# Patient Record
Sex: FEMALE | Race: BLACK OR AFRICAN AMERICAN | HISPANIC OR LATINO | Employment: OTHER | URBAN - METROPOLITAN AREA
[De-identification: names, ages, dates, MRNs, and addresses within clinical notes are randomized per-mention and may not be internally consistent; named-entity substitution may affect disease eponyms.]

---

## 2017-07-13 ENCOUNTER — HOSPITAL ENCOUNTER (INPATIENT)
Facility: HOSPITAL | Age: 72
LOS: 6 days | Discharge: NURSING, REHAB OR LONG-TERM CARE | DRG: 291 | End: 2017-07-20
Attending: EMERGENCY MEDICINE | Admitting: INTERNAL MEDICINE
Payer: MEDICARE

## 2017-07-13 DIAGNOSIS — I50.9 ACUTE CHF (HCC): ICD-10-CM

## 2017-07-13 DIAGNOSIS — R53.1 WEAKNESS: ICD-10-CM

## 2017-07-13 DIAGNOSIS — R79.1 SUPRATHERAPEUTIC INR: Primary | ICD-10-CM

## 2017-07-13 DIAGNOSIS — R06.00 DOE (DYSPNEA ON EXERTION): ICD-10-CM

## 2017-07-13 DIAGNOSIS — N39.0 URINARY TRACT INFECTION, ACUTE: ICD-10-CM

## 2017-07-13 DIAGNOSIS — R17 ELEVATED BILIRUBIN: ICD-10-CM

## 2017-07-14 ENCOUNTER — APPOINTMENT (INPATIENT)
Dept: ULTRASOUND IMAGING | Facility: HOSPITAL | Age: 72
DRG: 291 | End: 2017-07-14
Payer: MEDICARE

## 2017-07-14 ENCOUNTER — APPOINTMENT (EMERGENCY)
Dept: CT IMAGING | Facility: HOSPITAL | Age: 72
DRG: 291 | End: 2017-07-14
Payer: MEDICARE

## 2017-07-14 ENCOUNTER — GENERIC CONVERSION - ENCOUNTER (OUTPATIENT)
Dept: OTHER | Facility: OTHER | Age: 72
End: 2017-07-14

## 2017-07-14 ENCOUNTER — APPOINTMENT (INPATIENT)
Dept: NON INVASIVE DIAGNOSTICS | Facility: HOSPITAL | Age: 72
DRG: 291 | End: 2017-07-14
Payer: MEDICARE

## 2017-07-14 ENCOUNTER — APPOINTMENT (EMERGENCY)
Dept: RADIOLOGY | Facility: HOSPITAL | Age: 72
DRG: 291 | End: 2017-07-14
Payer: MEDICARE

## 2017-07-14 PROBLEM — Z86.73 HISTORY OF CVA (CEREBROVASCULAR ACCIDENT): Status: ACTIVE | Noted: 2017-07-14

## 2017-07-14 PROBLEM — R53.1 WEAKNESS: Status: ACTIVE | Noted: 2017-07-14

## 2017-07-14 PROBLEM — N17.9 AKI (ACUTE KIDNEY INJURY) (HCC): Status: ACTIVE | Noted: 2017-07-14

## 2017-07-14 PROBLEM — D72.829 LEUKOCYTOSIS: Status: ACTIVE | Noted: 2017-07-14

## 2017-07-14 PROBLEM — E11.9 DIABETES MELLITUS (HCC): Status: ACTIVE | Noted: 2017-07-14

## 2017-07-14 PROBLEM — R17 ELEVATED BILIRUBIN: Status: ACTIVE | Noted: 2017-07-14

## 2017-07-14 PROBLEM — D64.9 NORMOCYTIC ANEMIA: Status: ACTIVE | Noted: 2017-07-14

## 2017-07-14 PROBLEM — I50.9 CHF, ACUTE (HCC): Status: ACTIVE | Noted: 2017-07-14

## 2017-07-14 PROBLEM — I10 ESSENTIAL HYPERTENSION: Status: ACTIVE | Noted: 2017-07-14

## 2017-07-14 PROBLEM — M54.50 RIGHT LOW BACK PAIN: Status: ACTIVE | Noted: 2017-07-14

## 2017-07-14 PROBLEM — K59.00 CONSTIPATION: Status: ACTIVE | Noted: 2017-07-14

## 2017-07-14 PROBLEM — R79.1 SUPRATHERAPEUTIC INR: Status: ACTIVE | Noted: 2017-07-14

## 2017-07-14 LAB
ALBUMIN SERPL BCP-MCNC: 2 G/DL (ref 3.5–5)
ALBUMIN SERPL BCP-MCNC: 2.3 G/DL (ref 3.5–5)
ALP SERPL-CCNC: 292 U/L (ref 46–116)
ALP SERPL-CCNC: 301 U/L (ref 46–116)
ALT SERPL W P-5'-P-CCNC: 17 U/L (ref 12–78)
ALT SERPL W P-5'-P-CCNC: 19 U/L (ref 12–78)
ANION GAP SERPL CALCULATED.3IONS-SCNC: 12 MMOL/L (ref 4–13)
ANION GAP SERPL CALCULATED.3IONS-SCNC: 14 MMOL/L (ref 4–13)
APTT PPP: 100 SECONDS (ref 23–35)
APTT PPP: 90 SECONDS (ref 23–35)
AST SERPL W P-5'-P-CCNC: 41 U/L (ref 5–45)
AST SERPL W P-5'-P-CCNC: 52 U/L (ref 5–45)
BASOPHILS # BLD AUTO: 0.02 THOUSANDS/ΜL (ref 0–0.1)
BASOPHILS NFR BLD AUTO: 0 % (ref 0–1)
BILIRUB DIRECT SERPL-MCNC: 2.46 MG/DL (ref 0–0.2)
BILIRUB SERPL-MCNC: 3.1 MG/DL (ref 0.2–1)
BILIRUB SERPL-MCNC: 3.1 MG/DL (ref 0.2–1)
BUN SERPL-MCNC: 35 MG/DL (ref 5–25)
BUN SERPL-MCNC: 38 MG/DL (ref 5–25)
CALCIUM SERPL-MCNC: 8.2 MG/DL (ref 8.3–10.1)
CALCIUM SERPL-MCNC: 8.9 MG/DL (ref 8.3–10.1)
CHLORIDE SERPL-SCNC: 104 MMOL/L (ref 100–108)
CHLORIDE SERPL-SCNC: 106 MMOL/L (ref 100–108)
CHOLEST SERPL-MCNC: <50 MG/DL (ref 50–200)
CLARITY, POC: CLEAR
CO2 SERPL-SCNC: 20 MMOL/L (ref 21–32)
CO2 SERPL-SCNC: 21 MMOL/L (ref 21–32)
COLOR, POC: YELLOW
CREAT SERPL-MCNC: 1.27 MG/DL (ref 0.6–1.3)
CREAT SERPL-MCNC: 1.45 MG/DL (ref 0.6–1.3)
EOSINOPHIL # BLD AUTO: 0.06 THOUSAND/ΜL (ref 0–0.61)
EOSINOPHIL NFR BLD AUTO: 1 % (ref 0–6)
ERYTHROCYTE [DISTWIDTH] IN BLOOD BY AUTOMATED COUNT: 15.8 % (ref 11.6–15.1)
EST. AVERAGE GLUCOSE BLD GHB EST-MCNC: 146 MG/DL
EXT BILIRUBIN, UA: NEGATIVE
EXT BLOOD URINE: NORMAL
EXT GLUCOSE, UA: NEGATIVE
EXT KETONES: NEGATIVE
EXT NITRITE, UA: POSITIVE
EXT PH, UA: 5
EXT PROTEIN, UA: NEGATIVE
EXT SPECIFIC GRAVITY, UA: 1.01
EXT UROBILINOGEN: 0.2
GFR SERPL CREATININE-BSD FRML MDRD: 35.7 ML/MIN/1.73SQ M
GFR SERPL CREATININE-BSD FRML MDRD: 41.6 ML/MIN/1.73SQ M
GLUCOSE SERPL-MCNC: 110 MG/DL (ref 65–140)
GLUCOSE SERPL-MCNC: 114 MG/DL (ref 65–140)
GLUCOSE SERPL-MCNC: 80 MG/DL (ref 65–140)
GLUCOSE SERPL-MCNC: 87 MG/DL (ref 65–140)
GLUCOSE SERPL-MCNC: 88 MG/DL (ref 65–140)
GLUCOSE SERPL-MCNC: 97 MG/DL (ref 65–140)
HBA1C MFR BLD: 6.7 % (ref 4.2–6.3)
HCT VFR BLD AUTO: 33.5 % (ref 34.8–46.1)
HDLC SERPL-MCNC: 9 MG/DL (ref 40–60)
HGB BLD-MCNC: 11.1 G/DL (ref 11.5–15.4)
INR PPP: 10.52 (ref 0.86–1.16)
INR PPP: 6.21 (ref 0.86–1.16)
LYMPHOCYTES # BLD AUTO: 0.47 THOUSANDS/ΜL (ref 0.6–4.47)
LYMPHOCYTES NFR BLD AUTO: 4 % (ref 14–44)
MCH RBC QN AUTO: 29.5 PG (ref 26.8–34.3)
MCHC RBC AUTO-ENTMCNC: 33.1 G/DL (ref 31.4–37.4)
MCV RBC AUTO: 89 FL (ref 82–98)
MONOCYTES # BLD AUTO: 1.04 THOUSAND/ΜL (ref 0.17–1.22)
MONOCYTES NFR BLD AUTO: 10 % (ref 4–12)
NEUTROPHILS # BLD AUTO: 8.97 THOUSANDS/ΜL (ref 1.85–7.62)
NEUTS SEG NFR BLD AUTO: 84 % (ref 43–75)
NRBC BLD AUTO-RTO: 0 /100 WBCS
NT-PROBNP SERPL-MCNC: ABNORMAL PG/ML
PLATELET # BLD AUTO: 230 THOUSANDS/UL (ref 149–390)
PMV BLD AUTO: 10.4 FL (ref 8.9–12.7)
POTASSIUM SERPL-SCNC: 3.5 MMOL/L (ref 3.5–5.3)
POTASSIUM SERPL-SCNC: 3.9 MMOL/L (ref 3.5–5.3)
PROT SERPL-MCNC: 7.8 G/DL (ref 6.4–8.2)
PROT SERPL-MCNC: 8.2 G/DL (ref 6.4–8.2)
PROTHROMBIN TIME: 57.1 SECONDS (ref 12.1–14.4)
PROTHROMBIN TIME: 86.5 SECONDS (ref 12.1–14.4)
RBC # BLD AUTO: 3.76 MILLION/UL (ref 3.81–5.12)
SODIUM SERPL-SCNC: 138 MMOL/L (ref 136–145)
SODIUM SERPL-SCNC: 139 MMOL/L (ref 136–145)
TRIGL SERPL-MCNC: 57 MG/DL
TROPONIN I SERPL-MCNC: <0.02 NG/ML
WBC # BLD AUTO: 10.64 THOUSAND/UL (ref 4.31–10.16)
WBC # BLD EST: NEGATIVE 10*3/UL

## 2017-07-14 PROCEDURE — 81002 URINALYSIS NONAUTO W/O SCOPE: CPT | Performed by: EMERGENCY MEDICINE

## 2017-07-14 PROCEDURE — 96374 THER/PROPH/DIAG INJ IV PUSH: CPT

## 2017-07-14 PROCEDURE — 76705 ECHO EXAM OF ABDOMEN: CPT

## 2017-07-14 PROCEDURE — 96361 HYDRATE IV INFUSION ADD-ON: CPT

## 2017-07-14 PROCEDURE — 71010 HB CHEST X-RAY 1 VIEW FRONTAL (PORTABLE): CPT

## 2017-07-14 PROCEDURE — 99285 EMERGENCY DEPT VISIT HI MDM: CPT

## 2017-07-14 PROCEDURE — 80053 COMPREHEN METABOLIC PANEL: CPT | Performed by: PHYSICIAN ASSISTANT

## 2017-07-14 PROCEDURE — 93005 ELECTROCARDIOGRAM TRACING: CPT | Performed by: EMERGENCY MEDICINE

## 2017-07-14 PROCEDURE — 80053 COMPREHEN METABOLIC PANEL: CPT | Performed by: EMERGENCY MEDICINE

## 2017-07-14 PROCEDURE — 85730 THROMBOPLASTIN TIME PARTIAL: CPT | Performed by: PHYSICIAN ASSISTANT

## 2017-07-14 PROCEDURE — 80061 LIPID PANEL: CPT | Performed by: PHYSICIAN ASSISTANT

## 2017-07-14 PROCEDURE — 82948 REAGENT STRIP/BLOOD GLUCOSE: CPT

## 2017-07-14 PROCEDURE — 74176 CT ABD & PELVIS W/O CONTRAST: CPT

## 2017-07-14 PROCEDURE — 93306 TTE W/DOPPLER COMPLETE: CPT

## 2017-07-14 PROCEDURE — 83880 ASSAY OF NATRIURETIC PEPTIDE: CPT | Performed by: EMERGENCY MEDICINE

## 2017-07-14 PROCEDURE — 84484 ASSAY OF TROPONIN QUANT: CPT | Performed by: EMERGENCY MEDICINE

## 2017-07-14 PROCEDURE — 83036 HEMOGLOBIN GLYCOSYLATED A1C: CPT | Performed by: PHYSICIAN ASSISTANT

## 2017-07-14 PROCEDURE — 82248 BILIRUBIN DIRECT: CPT | Performed by: PHYSICIAN ASSISTANT

## 2017-07-14 PROCEDURE — 36415 COLL VENOUS BLD VENIPUNCTURE: CPT | Performed by: EMERGENCY MEDICINE

## 2017-07-14 PROCEDURE — 85730 THROMBOPLASTIN TIME PARTIAL: CPT | Performed by: EMERGENCY MEDICINE

## 2017-07-14 PROCEDURE — 85610 PROTHROMBIN TIME: CPT | Performed by: EMERGENCY MEDICINE

## 2017-07-14 PROCEDURE — 85610 PROTHROMBIN TIME: CPT | Performed by: PHYSICIAN ASSISTANT

## 2017-07-14 PROCEDURE — 84484 ASSAY OF TROPONIN QUANT: CPT | Performed by: PHYSICIAN ASSISTANT

## 2017-07-14 PROCEDURE — 85025 COMPLETE CBC W/AUTO DIFF WBC: CPT | Performed by: EMERGENCY MEDICINE

## 2017-07-14 RX ORDER — WARFARIN SODIUM 5 MG/1
5 TABLET ORAL
COMMUNITY
End: 2017-07-20 | Stop reason: HOSPADM

## 2017-07-14 RX ORDER — FUROSEMIDE 40 MG/1
40 TABLET ORAL 2 TIMES DAILY
Status: ON HOLD | COMMUNITY
End: 2017-12-29

## 2017-07-14 RX ORDER — ASCORBIC ACID 500 MG
250 TABLET ORAL DAILY
Status: DISCONTINUED | OUTPATIENT
Start: 2017-07-14 | End: 2017-07-20 | Stop reason: HOSPADM

## 2017-07-14 RX ORDER — MULTIVIT WITH MINERALS/LUTEIN
250 TABLET ORAL DAILY
COMMUNITY
End: 2021-02-14 | Stop reason: HOSPADM

## 2017-07-14 RX ORDER — ATORVASTATIN CALCIUM 40 MG/1
40 TABLET, FILM COATED ORAL DAILY
COMMUNITY

## 2017-07-14 RX ORDER — ACETAMINOPHEN 325 MG/1
325 TABLET ORAL EVERY 6 HOURS PRN
Status: DISCONTINUED | OUTPATIENT
Start: 2017-07-14 | End: 2017-07-20 | Stop reason: HOSPADM

## 2017-07-14 RX ORDER — CALCIUM POLYCARBOPHIL 625 MG
500 TABLET ORAL
Status: ON HOLD | COMMUNITY
End: 2017-12-29

## 2017-07-14 RX ORDER — METOPROLOL TARTRATE 5 MG/5ML
5 INJECTION INTRAVENOUS EVERY 6 HOURS PRN
Status: DISCONTINUED | OUTPATIENT
Start: 2017-07-14 | End: 2017-07-20 | Stop reason: HOSPADM

## 2017-07-14 RX ORDER — CHLORAL HYDRATE 500 MG
1000 CAPSULE ORAL 2 TIMES DAILY
COMMUNITY

## 2017-07-14 RX ORDER — LEVOTHYROXINE SODIUM 0.05 MG/1
50 TABLET ORAL DAILY
COMMUNITY

## 2017-07-14 RX ORDER — FERROUS SULFATE 325(65) MG
325 TABLET ORAL
Status: DISCONTINUED | OUTPATIENT
Start: 2017-07-14 | End: 2017-07-20 | Stop reason: HOSPADM

## 2017-07-14 RX ORDER — MULTIVITAMIN
1 TABLET ORAL DAILY
COMMUNITY

## 2017-07-14 RX ORDER — OXYBUTYNIN CHLORIDE 5 MG/1
10 TABLET, EXTENDED RELEASE ORAL
Status: DISCONTINUED | OUTPATIENT
Start: 2017-07-14 | End: 2017-07-20 | Stop reason: HOSPADM

## 2017-07-14 RX ORDER — FUROSEMIDE 10 MG/ML
40 INJECTION INTRAMUSCULAR; INTRAVENOUS 2 TIMES DAILY
Status: DISCONTINUED | OUTPATIENT
Start: 2017-07-14 | End: 2017-07-15

## 2017-07-14 RX ORDER — FUROSEMIDE 10 MG/ML
40 INJECTION INTRAMUSCULAR; INTRAVENOUS ONCE
Status: COMPLETED | OUTPATIENT
Start: 2017-07-14 | End: 2017-07-14

## 2017-07-14 RX ORDER — CALCIUM CARBONATE 200(500)MG
1000 TABLET,CHEWABLE ORAL DAILY PRN
Status: DISCONTINUED | OUTPATIENT
Start: 2017-07-14 | End: 2017-07-20 | Stop reason: HOSPADM

## 2017-07-14 RX ORDER — ONDANSETRON 2 MG/ML
4 INJECTION INTRAMUSCULAR; INTRAVENOUS EVERY 6 HOURS PRN
Status: DISCONTINUED | OUTPATIENT
Start: 2017-07-14 | End: 2017-07-20 | Stop reason: HOSPADM

## 2017-07-14 RX ORDER — FERROUS SULFATE 325(65) MG
325 TABLET ORAL
COMMUNITY
End: 2021-02-10

## 2017-07-14 RX ORDER — OXYBUTYNIN CHLORIDE 10 MG/1
10 TABLET, EXTENDED RELEASE ORAL
COMMUNITY
End: 2017-12-29 | Stop reason: HOSPADM

## 2017-07-14 RX ORDER — LEVOTHYROXINE SODIUM 0.05 MG/1
50 TABLET ORAL
Status: DISCONTINUED | OUTPATIENT
Start: 2017-07-14 | End: 2017-07-20 | Stop reason: HOSPADM

## 2017-07-14 RX ORDER — ATORVASTATIN CALCIUM 40 MG/1
40 TABLET, FILM COATED ORAL
Status: DISCONTINUED | OUTPATIENT
Start: 2017-07-14 | End: 2017-07-16

## 2017-07-14 RX ORDER — PHYTONADIONE 10 MG/ML
INJECTION, EMULSION INTRAMUSCULAR; INTRAVENOUS; SUBCUTANEOUS
Status: COMPLETED
Start: 2017-07-14 | End: 2017-07-14

## 2017-07-14 RX ADMIN — CEFTRIAXONE 1000 MG: 1 INJECTION, POWDER, FOR SOLUTION INTRAMUSCULAR; INTRAVENOUS at 09:02

## 2017-07-14 RX ADMIN — ATORVASTATIN CALCIUM 40 MG: 40 TABLET, FILM COATED ORAL at 17:18

## 2017-07-14 RX ADMIN — OXYBUTYNIN CHLORIDE 10 MG: 5 TABLET, EXTENDED RELEASE ORAL at 21:02

## 2017-07-14 RX ADMIN — FUROSEMIDE 40 MG: 10 INJECTION, SOLUTION INTRAMUSCULAR; INTRAVENOUS at 03:35

## 2017-07-14 RX ADMIN — PHYTONADIONE 10 MG: 10 INJECTION, EMULSION INTRAMUSCULAR; INTRAVENOUS; SUBCUTANEOUS at 03:15

## 2017-07-14 RX ADMIN — LEVOTHYROXINE SODIUM 50 MCG: 50 TABLET ORAL at 06:17

## 2017-07-14 RX ADMIN — OXYCODONE HYDROCHLORIDE AND ACETAMINOPHEN 250 MG: 500 TABLET ORAL at 09:04

## 2017-07-14 RX ADMIN — Medication 325 MG: at 09:03

## 2017-07-14 RX ADMIN — FUROSEMIDE 40 MG: 10 INJECTION, SOLUTION INTRAMUSCULAR; INTRAVENOUS at 09:03

## 2017-07-14 RX ADMIN — FUROSEMIDE 40 MG: 10 INJECTION, SOLUTION INTRAMUSCULAR; INTRAVENOUS at 17:18

## 2017-07-14 RX ADMIN — SERTRALINE HYDROCHLORIDE 50 MG: 50 TABLET ORAL at 09:03

## 2017-07-14 RX ADMIN — METOPROLOL TARTRATE 25 MG: 25 TABLET ORAL at 21:02

## 2017-07-14 RX ADMIN — METOPROLOL TARTRATE 25 MG: 25 TABLET ORAL at 09:03

## 2017-07-14 RX ADMIN — SODIUM CHLORIDE 1000 ML: 0.9 INJECTION, SOLUTION INTRAVENOUS at 01:00

## 2017-07-15 LAB
ALBUMIN SERPL BCP-MCNC: 1.9 G/DL (ref 3.5–5)
ALP SERPL-CCNC: 297 U/L (ref 46–116)
ALT SERPL W P-5'-P-CCNC: 13 U/L (ref 12–78)
ANION GAP SERPL CALCULATED.3IONS-SCNC: 13 MMOL/L (ref 4–13)
AST SERPL W P-5'-P-CCNC: 34 U/L (ref 5–45)
BILIRUB DIRECT SERPL-MCNC: 2.56 MG/DL (ref 0–0.2)
BILIRUB SERPL-MCNC: 3.3 MG/DL (ref 0.2–1)
BUN SERPL-MCNC: 35 MG/DL (ref 5–25)
CALCIUM SERPL-MCNC: 8.1 MG/DL (ref 8.3–10.1)
CHLORIDE SERPL-SCNC: 106 MMOL/L (ref 100–108)
CO2 SERPL-SCNC: 20 MMOL/L (ref 21–32)
CREAT SERPL-MCNC: 1.21 MG/DL (ref 0.6–1.3)
ERYTHROCYTE [DISTWIDTH] IN BLOOD BY AUTOMATED COUNT: 15.6 % (ref 11.6–15.1)
FERRITIN SERPL-MCNC: 930 NG/ML (ref 8–388)
GFR SERPL CREATININE-BSD FRML MDRD: 44 ML/MIN/1.73SQ M
GLUCOSE SERPL-MCNC: 114 MG/DL (ref 65–140)
GLUCOSE SERPL-MCNC: 119 MG/DL (ref 65–140)
GLUCOSE SERPL-MCNC: 122 MG/DL (ref 65–140)
GLUCOSE SERPL-MCNC: 135 MG/DL (ref 65–140)
GLUCOSE SERPL-MCNC: 166 MG/DL (ref 65–140)
HCT VFR BLD AUTO: 30.8 % (ref 34.8–46.1)
HGB BLD-MCNC: 10.4 G/DL (ref 11.5–15.4)
INR PPP: 2.1 (ref 0.86–1.16)
IRON SATN MFR SERPL: 13 %
IRON SERPL-MCNC: 26 UG/DL (ref 50–170)
MCH RBC QN AUTO: 29.5 PG (ref 26.8–34.3)
MCHC RBC AUTO-ENTMCNC: 33.8 G/DL (ref 31.4–37.4)
MCV RBC AUTO: 87 FL (ref 82–98)
PLATELET # BLD AUTO: 229 THOUSANDS/UL (ref 149–390)
PMV BLD AUTO: 10.6 FL (ref 8.9–12.7)
POTASSIUM SERPL-SCNC: 2.9 MMOL/L (ref 3.5–5.3)
PROT SERPL-MCNC: 7.6 G/DL (ref 6.4–8.2)
PROTHROMBIN TIME: 24.1 SECONDS (ref 12.1–14.4)
RBC # BLD AUTO: 3.53 MILLION/UL (ref 3.81–5.12)
SODIUM SERPL-SCNC: 139 MMOL/L (ref 136–145)
TIBC SERPL-MCNC: 193 UG/DL (ref 250–450)
WBC # BLD AUTO: 9.87 THOUSAND/UL (ref 4.31–10.16)

## 2017-07-15 PROCEDURE — 86803 HEPATITIS C AB TEST: CPT | Performed by: PHYSICIAN ASSISTANT

## 2017-07-15 PROCEDURE — G8978 MOBILITY CURRENT STATUS: HCPCS

## 2017-07-15 PROCEDURE — 97163 PT EVAL HIGH COMPLEX 45 MIN: CPT

## 2017-07-15 PROCEDURE — 82948 REAGENT STRIP/BLOOD GLUCOSE: CPT

## 2017-07-15 PROCEDURE — 86235 NUCLEAR ANTIGEN ANTIBODY: CPT | Performed by: PHYSICIAN ASSISTANT

## 2017-07-15 PROCEDURE — 83550 IRON BINDING TEST: CPT | Performed by: PHYSICIAN ASSISTANT

## 2017-07-15 PROCEDURE — 83540 ASSAY OF IRON: CPT | Performed by: PHYSICIAN ASSISTANT

## 2017-07-15 PROCEDURE — 86039 ANTINUCLEAR ANTIBODIES (ANA): CPT | Performed by: PHYSICIAN ASSISTANT

## 2017-07-15 PROCEDURE — 82728 ASSAY OF FERRITIN: CPT | Performed by: PHYSICIAN ASSISTANT

## 2017-07-15 PROCEDURE — 83516 IMMUNOASSAY NONANTIBODY: CPT | Performed by: PHYSICIAN ASSISTANT

## 2017-07-15 PROCEDURE — 80076 HEPATIC FUNCTION PANEL: CPT | Performed by: PHYSICIAN ASSISTANT

## 2017-07-15 PROCEDURE — 86705 HEP B CORE ANTIBODY IGM: CPT | Performed by: PHYSICIAN ASSISTANT

## 2017-07-15 PROCEDURE — 82784 ASSAY IGA/IGD/IGG/IGM EACH: CPT | Performed by: PHYSICIAN ASSISTANT

## 2017-07-15 PROCEDURE — 85610 PROTHROMBIN TIME: CPT | Performed by: INTERNAL MEDICINE

## 2017-07-15 PROCEDURE — 86038 ANTINUCLEAR ANTIBODIES: CPT | Performed by: PHYSICIAN ASSISTANT

## 2017-07-15 PROCEDURE — 86255 FLUORESCENT ANTIBODY SCREEN: CPT | Performed by: PHYSICIAN ASSISTANT

## 2017-07-15 PROCEDURE — 80048 BASIC METABOLIC PNL TOTAL CA: CPT | Performed by: INTERNAL MEDICINE

## 2017-07-15 PROCEDURE — 86704 HEP B CORE ANTIBODY TOTAL: CPT | Performed by: PHYSICIAN ASSISTANT

## 2017-07-15 PROCEDURE — 87340 HEPATITIS B SURFACE AG IA: CPT | Performed by: PHYSICIAN ASSISTANT

## 2017-07-15 PROCEDURE — 86256 FLUORESCENT ANTIBODY TITER: CPT | Performed by: PHYSICIAN ASSISTANT

## 2017-07-15 PROCEDURE — 85027 COMPLETE CBC AUTOMATED: CPT | Performed by: INTERNAL MEDICINE

## 2017-07-15 PROCEDURE — G8979 MOBILITY GOAL STATUS: HCPCS

## 2017-07-15 RX ORDER — FUROSEMIDE 10 MG/ML
20 INJECTION INTRAMUSCULAR; INTRAVENOUS 2 TIMES DAILY
Status: DISCONTINUED | OUTPATIENT
Start: 2017-07-15 | End: 2017-07-16

## 2017-07-15 RX ORDER — WARFARIN SODIUM 3 MG/1
3 TABLET ORAL
Status: DISCONTINUED | OUTPATIENT
Start: 2017-07-15 | End: 2017-07-17

## 2017-07-15 RX ORDER — LISINOPRIL 2.5 MG/1
2.5 TABLET ORAL DAILY
Status: DISCONTINUED | OUTPATIENT
Start: 2017-07-15 | End: 2017-07-18

## 2017-07-15 RX ORDER — POTASSIUM CHLORIDE 20 MEQ/1
40 TABLET, EXTENDED RELEASE ORAL 2 TIMES DAILY
Status: DISCONTINUED | OUTPATIENT
Start: 2017-07-15 | End: 2017-07-15

## 2017-07-15 RX ORDER — POTASSIUM CHLORIDE 20 MEQ/1
20 TABLET, EXTENDED RELEASE ORAL 2 TIMES DAILY
Status: DISCONTINUED | OUTPATIENT
Start: 2017-07-15 | End: 2017-07-16

## 2017-07-15 RX ORDER — SPIRONOLACTONE 25 MG/1
25 TABLET ORAL DAILY
Status: DISCONTINUED | OUTPATIENT
Start: 2017-07-15 | End: 2017-07-20 | Stop reason: HOSPADM

## 2017-07-15 RX ADMIN — METOPROLOL TARTRATE 25 MG: 25 TABLET ORAL at 09:00

## 2017-07-15 RX ADMIN — OXYCODONE HYDROCHLORIDE AND ACETAMINOPHEN 250 MG: 500 TABLET ORAL at 09:00

## 2017-07-15 RX ADMIN — SERTRALINE HYDROCHLORIDE 50 MG: 50 TABLET ORAL at 09:02

## 2017-07-15 RX ADMIN — Medication 325 MG: at 08:59

## 2017-07-15 RX ADMIN — WARFARIN SODIUM 3 MG: 3 TABLET ORAL at 17:22

## 2017-07-15 RX ADMIN — LEVOTHYROXINE SODIUM 50 MCG: 50 TABLET ORAL at 06:16

## 2017-07-15 RX ADMIN — ATORVASTATIN CALCIUM 40 MG: 40 TABLET, FILM COATED ORAL at 16:32

## 2017-07-15 RX ADMIN — FUROSEMIDE 20 MG: 10 INJECTION, SOLUTION INTRAMUSCULAR; INTRAVENOUS at 09:04

## 2017-07-15 RX ADMIN — INSULIN LISPRO 1 UNITS: 100 INJECTION, SOLUTION INTRAVENOUS; SUBCUTANEOUS at 12:15

## 2017-07-15 RX ADMIN — LISINOPRIL 2.5 MG: 2.5 TABLET ORAL at 10:45

## 2017-07-15 RX ADMIN — METOPROLOL TARTRATE 25 MG: 25 TABLET ORAL at 21:34

## 2017-07-15 RX ADMIN — FUROSEMIDE 20 MG: 10 INJECTION, SOLUTION INTRAMUSCULAR; INTRAVENOUS at 17:21

## 2017-07-15 RX ADMIN — OXYBUTYNIN CHLORIDE 10 MG: 5 TABLET, EXTENDED RELEASE ORAL at 21:34

## 2017-07-15 RX ADMIN — SPIRONOLACTONE 25 MG: 25 TABLET, FILM COATED ORAL at 10:45

## 2017-07-15 RX ADMIN — POTASSIUM CHLORIDE 20 MEQ: 1500 TABLET, EXTENDED RELEASE ORAL at 17:22

## 2017-07-16 LAB
ALBUMIN SERPL BCP-MCNC: 1.9 G/DL (ref 3.5–5)
ALP SERPL-CCNC: 296 U/L (ref 46–116)
ALT SERPL W P-5'-P-CCNC: 13 U/L (ref 12–78)
ANION GAP SERPL CALCULATED.3IONS-SCNC: 10 MMOL/L (ref 4–13)
AST SERPL W P-5'-P-CCNC: 39 U/L (ref 5–45)
BILIRUB SERPL-MCNC: 3.2 MG/DL (ref 0.2–1)
BUN SERPL-MCNC: 30 MG/DL (ref 5–25)
CALCIUM SERPL-MCNC: 8 MG/DL (ref 8.3–10.1)
CHLORIDE SERPL-SCNC: 104 MMOL/L (ref 100–108)
CO2 SERPL-SCNC: 21 MMOL/L (ref 21–32)
CREAT SERPL-MCNC: 1.08 MG/DL (ref 0.6–1.3)
ERYTHROCYTE [DISTWIDTH] IN BLOOD BY AUTOMATED COUNT: 15.6 % (ref 11.6–15.1)
GFR SERPL CREATININE-BSD FRML MDRD: 50.2 ML/MIN/1.73SQ M
GLUCOSE SERPL-MCNC: 135 MG/DL (ref 65–140)
GLUCOSE SERPL-MCNC: 367 MG/DL (ref 65–140)
GLUCOSE SERPL-MCNC: 94 MG/DL (ref 65–140)
GLUCOSE SERPL-MCNC: 94 MG/DL (ref 65–140)
GLUCOSE SERPL-MCNC: 96 MG/DL (ref 65–140)
HBV CORE AB SER QL: NORMAL
HBV CORE IGM SER QL: NORMAL
HBV SURFACE AG SER QL: NORMAL
HCT VFR BLD AUTO: 31.9 % (ref 34.8–46.1)
HCV AB SER QL: NORMAL
HGB BLD-MCNC: 10.7 G/DL (ref 11.5–15.4)
INR PPP: 2.03 (ref 0.86–1.16)
MCH RBC QN AUTO: 29.6 PG (ref 26.8–34.3)
MCHC RBC AUTO-ENTMCNC: 33.5 G/DL (ref 31.4–37.4)
MCV RBC AUTO: 88 FL (ref 82–98)
PLATELET # BLD AUTO: 224 THOUSANDS/UL (ref 149–390)
PMV BLD AUTO: 10.4 FL (ref 8.9–12.7)
POTASSIUM SERPL-SCNC: 4.3 MMOL/L (ref 3.5–5.3)
PROT SERPL-MCNC: 7.7 G/DL (ref 6.4–8.2)
PROTHROMBIN TIME: 23.5 SECONDS (ref 12.1–14.4)
RBC # BLD AUTO: 3.61 MILLION/UL (ref 3.81–5.12)
SODIUM SERPL-SCNC: 135 MMOL/L (ref 136–145)
WBC # BLD AUTO: 9.56 THOUSAND/UL (ref 4.31–10.16)

## 2017-07-16 PROCEDURE — 87493 C DIFF AMPLIFIED PROBE: CPT | Performed by: PHYSICIAN ASSISTANT

## 2017-07-16 PROCEDURE — 80053 COMPREHEN METABOLIC PANEL: CPT | Performed by: INTERNAL MEDICINE

## 2017-07-16 PROCEDURE — 85610 PROTHROMBIN TIME: CPT | Performed by: INTERNAL MEDICINE

## 2017-07-16 PROCEDURE — 85027 COMPLETE CBC AUTOMATED: CPT | Performed by: INTERNAL MEDICINE

## 2017-07-16 PROCEDURE — 82948 REAGENT STRIP/BLOOD GLUCOSE: CPT

## 2017-07-16 RX ORDER — FUROSEMIDE 40 MG/1
40 TABLET ORAL
Status: DISCONTINUED | OUTPATIENT
Start: 2017-07-16 | End: 2017-07-20 | Stop reason: HOSPADM

## 2017-07-16 RX ORDER — POTASSIUM CHLORIDE 20 MEQ/1
20 TABLET, EXTENDED RELEASE ORAL DAILY
Status: DISCONTINUED | OUTPATIENT
Start: 2017-07-17 | End: 2017-07-17

## 2017-07-16 RX ORDER — ATORVASTATIN CALCIUM 10 MG/1
10 TABLET, FILM COATED ORAL
Status: DISCONTINUED | OUTPATIENT
Start: 2017-07-16 | End: 2017-07-20 | Stop reason: HOSPADM

## 2017-07-16 RX ORDER — OXYCODONE HYDROCHLORIDE 5 MG/1
5 TABLET ORAL EVERY 8 HOURS PRN
Status: DISCONTINUED | OUTPATIENT
Start: 2017-07-16 | End: 2017-07-20 | Stop reason: HOSPADM

## 2017-07-16 RX ADMIN — METOPROLOL TARTRATE 25 MG: 25 TABLET ORAL at 21:24

## 2017-07-16 RX ADMIN — METOPROLOL TARTRATE 25 MG: 25 TABLET ORAL at 08:57

## 2017-07-16 RX ADMIN — OXYCODONE HYDROCHLORIDE AND ACETAMINOPHEN 250 MG: 500 TABLET ORAL at 08:57

## 2017-07-16 RX ADMIN — LISINOPRIL 2.5 MG: 2.5 TABLET ORAL at 08:57

## 2017-07-16 RX ADMIN — OXYBUTYNIN CHLORIDE 10 MG: 5 TABLET, EXTENDED RELEASE ORAL at 21:24

## 2017-07-16 RX ADMIN — FUROSEMIDE 40 MG: 40 TABLET ORAL at 08:57

## 2017-07-16 RX ADMIN — Medication 325 MG: at 08:00

## 2017-07-16 RX ADMIN — POTASSIUM CHLORIDE 20 MEQ: 1500 TABLET, EXTENDED RELEASE ORAL at 08:56

## 2017-07-16 RX ADMIN — SERTRALINE HYDROCHLORIDE 50 MG: 50 TABLET ORAL at 08:57

## 2017-07-16 RX ADMIN — WARFARIN SODIUM 3 MG: 3 TABLET ORAL at 17:43

## 2017-07-16 RX ADMIN — ATORVASTATIN CALCIUM 10 MG: 10 TABLET, FILM COATED ORAL at 17:42

## 2017-07-16 RX ADMIN — SPIRONOLACTONE 25 MG: 25 TABLET, FILM COATED ORAL at 08:57

## 2017-07-16 RX ADMIN — FUROSEMIDE 40 MG: 40 TABLET ORAL at 17:42

## 2017-07-16 RX ADMIN — INSULIN LISPRO 6 UNITS: 100 INJECTION, SOLUTION INTRAVENOUS; SUBCUTANEOUS at 22:51

## 2017-07-16 RX ADMIN — LEVOTHYROXINE SODIUM 50 MCG: 50 TABLET ORAL at 05:49

## 2017-07-17 LAB
ACTIN IGG SERPL-ACNC: 26 UNITS (ref 0–19)
ANION GAP SERPL CALCULATED.3IONS-SCNC: 12 MMOL/L (ref 4–13)
BUN SERPL-MCNC: 27 MG/DL (ref 5–25)
C DIFF TOX GENS STL QL NAA+PROBE: NORMAL
CALCIUM SERPL-MCNC: 8.2 MG/DL (ref 8.3–10.1)
CHLORIDE SERPL-SCNC: 104 MMOL/L (ref 100–108)
CO2 SERPL-SCNC: 19 MMOL/L (ref 21–32)
CREAT SERPL-MCNC: 1.05 MG/DL (ref 0.6–1.3)
ENDOMYSIUM IGA SER QL: NEGATIVE
GFR SERPL CREATININE-BSD FRML MDRD: 51.8 ML/MIN/1.73SQ M
GLIADIN PEPTIDE IGA SER-ACNC: 6 UNITS (ref 0–19)
GLIADIN PEPTIDE IGG SER-ACNC: 3 UNITS (ref 0–19)
GLUCOSE SERPL-MCNC: 101 MG/DL (ref 65–140)
GLUCOSE SERPL-MCNC: 118 MG/DL (ref 65–140)
GLUCOSE SERPL-MCNC: 131 MG/DL (ref 65–140)
GLUCOSE SERPL-MCNC: 146 MG/DL (ref 65–140)
GLUCOSE SERPL-MCNC: 63 MG/DL (ref 65–140)
GLUCOSE SERPL-MCNC: 72 MG/DL (ref 65–140)
IGA SERPL-MCNC: 777 MG/DL (ref 87–352)
INR PPP: 2.59 (ref 0.86–1.16)
MITOCHONDRIA M2 IGG SER-ACNC: 6.4 UNITS (ref 0–20)
POTASSIUM SERPL-SCNC: 3.1 MMOL/L (ref 3.5–5.3)
PROTHROMBIN TIME: 28.6 SECONDS (ref 12.1–14.4)
SODIUM SERPL-SCNC: 135 MMOL/L (ref 136–145)
TTG IGA SER-ACNC: 3 U/ML (ref 0–3)
TTG IGG SER-ACNC: 9 U/ML (ref 0–5)

## 2017-07-17 PROCEDURE — 97167 OT EVAL HIGH COMPLEX 60 MIN: CPT

## 2017-07-17 PROCEDURE — 85610 PROTHROMBIN TIME: CPT | Performed by: INTERNAL MEDICINE

## 2017-07-17 PROCEDURE — 80048 BASIC METABOLIC PNL TOTAL CA: CPT | Performed by: INTERNAL MEDICINE

## 2017-07-17 PROCEDURE — G8987 SELF CARE CURRENT STATUS: HCPCS

## 2017-07-17 PROCEDURE — G8988 SELF CARE GOAL STATUS: HCPCS

## 2017-07-17 PROCEDURE — 97535 SELF CARE MNGMENT TRAINING: CPT

## 2017-07-17 PROCEDURE — 82948 REAGENT STRIP/BLOOD GLUCOSE: CPT

## 2017-07-17 RX ORDER — POTASSIUM CHLORIDE 20 MEQ/1
20 TABLET, EXTENDED RELEASE ORAL 2 TIMES DAILY
Status: DISCONTINUED | OUTPATIENT
Start: 2017-07-17 | End: 2017-07-20 | Stop reason: HOSPADM

## 2017-07-17 RX ORDER — POTASSIUM CHLORIDE 20 MEQ/1
40 TABLET, EXTENDED RELEASE ORAL ONCE
Status: COMPLETED | OUTPATIENT
Start: 2017-07-17 | End: 2017-07-17

## 2017-07-17 RX ORDER — WARFARIN SODIUM 2 MG/1
2 TABLET ORAL
Status: DISCONTINUED | OUTPATIENT
Start: 2017-07-17 | End: 2017-07-19

## 2017-07-17 RX ADMIN — METOPROLOL TARTRATE 25 MG: 25 TABLET ORAL at 21:24

## 2017-07-17 RX ADMIN — POTASSIUM CHLORIDE 20 MEQ: 1500 TABLET, EXTENDED RELEASE ORAL at 10:00

## 2017-07-17 RX ADMIN — POTASSIUM CHLORIDE 20 MEQ: 1500 TABLET, EXTENDED RELEASE ORAL at 17:56

## 2017-07-17 RX ADMIN — SERTRALINE HYDROCHLORIDE 50 MG: 50 TABLET ORAL at 09:59

## 2017-07-17 RX ADMIN — LEVOTHYROXINE SODIUM 50 MCG: 50 TABLET ORAL at 06:28

## 2017-07-17 RX ADMIN — POTASSIUM CHLORIDE 40 MEQ: 1500 TABLET, EXTENDED RELEASE ORAL at 08:01

## 2017-07-17 RX ADMIN — FUROSEMIDE 40 MG: 40 TABLET ORAL at 16:42

## 2017-07-17 RX ADMIN — ATORVASTATIN CALCIUM 10 MG: 10 TABLET, FILM COATED ORAL at 16:42

## 2017-07-17 RX ADMIN — SPIRONOLACTONE 25 MG: 25 TABLET, FILM COATED ORAL at 10:00

## 2017-07-17 RX ADMIN — OXYBUTYNIN CHLORIDE 10 MG: 5 TABLET, EXTENDED RELEASE ORAL at 21:24

## 2017-07-17 RX ADMIN — Medication 325 MG: at 08:19

## 2017-07-17 RX ADMIN — WARFARIN SODIUM 2 MG: 2 TABLET ORAL at 17:56

## 2017-07-17 RX ADMIN — LISINOPRIL 2.5 MG: 2.5 TABLET ORAL at 10:00

## 2017-07-17 RX ADMIN — FUROSEMIDE 40 MG: 40 TABLET ORAL at 08:59

## 2017-07-17 RX ADMIN — METOPROLOL TARTRATE 25 MG: 25 TABLET ORAL at 09:59

## 2017-07-17 RX ADMIN — OXYCODONE HYDROCHLORIDE AND ACETAMINOPHEN 250 MG: 500 TABLET ORAL at 10:00

## 2017-07-18 LAB
ANA HOMOGEN SER QL IF: NORMAL
ANA HOMOGEN TITR SER: NORMAL {TITER}
ANION GAP SERPL CALCULATED.3IONS-SCNC: 10 MMOL/L (ref 4–13)
BUN SERPL-MCNC: 26 MG/DL (ref 5–25)
CALCIUM SERPL-MCNC: 8.6 MG/DL (ref 8.3–10.1)
CHLORIDE SERPL-SCNC: 103 MMOL/L (ref 100–108)
CO2 SERPL-SCNC: 20 MMOL/L (ref 21–32)
CREAT SERPL-MCNC: 1.12 MG/DL (ref 0.6–1.3)
ERYTHROCYTE [DISTWIDTH] IN BLOOD BY AUTOMATED COUNT: 15.6 % (ref 11.6–15.1)
GFR SERPL CREATININE-BSD FRML MDRD: 48.1 ML/MIN/1.73SQ M
GLUCOSE SERPL-MCNC: 116 MG/DL (ref 65–140)
GLUCOSE SERPL-MCNC: 116 MG/DL (ref 65–140)
GLUCOSE SERPL-MCNC: 126 MG/DL (ref 65–140)
GLUCOSE SERPL-MCNC: 199 MG/DL (ref 65–140)
GLUCOSE SERPL-MCNC: 217 MG/DL (ref 65–140)
HCT VFR BLD AUTO: 34.8 % (ref 34.8–46.1)
HGB BLD-MCNC: 11.6 G/DL (ref 11.5–15.4)
INR PPP: 2.99 (ref 0.86–1.16)
MCH RBC QN AUTO: 29.4 PG (ref 26.8–34.3)
MCHC RBC AUTO-ENTMCNC: 33.3 G/DL (ref 31.4–37.4)
MCV RBC AUTO: 88 FL (ref 82–98)
PLATELET # BLD AUTO: 249 THOUSANDS/UL (ref 149–390)
PMV BLD AUTO: 10.4 FL (ref 8.9–12.7)
POTASSIUM SERPL-SCNC: 4.5 MMOL/L (ref 3.5–5.3)
PROTHROMBIN TIME: 31.9 SECONDS (ref 12.1–14.4)
RBC # BLD AUTO: 3.95 MILLION/UL (ref 3.81–5.12)
RYE IGE QN: POSITIVE
SODIUM SERPL-SCNC: 133 MMOL/L (ref 136–145)
WBC # BLD AUTO: 10.57 THOUSAND/UL (ref 4.31–10.16)

## 2017-07-18 PROCEDURE — 82948 REAGENT STRIP/BLOOD GLUCOSE: CPT

## 2017-07-18 PROCEDURE — 80048 BASIC METABOLIC PNL TOTAL CA: CPT | Performed by: INTERNAL MEDICINE

## 2017-07-18 PROCEDURE — 85027 COMPLETE CBC AUTOMATED: CPT | Performed by: INTERNAL MEDICINE

## 2017-07-18 PROCEDURE — 87493 C DIFF AMPLIFIED PROBE: CPT | Performed by: NURSE PRACTITIONER

## 2017-07-18 PROCEDURE — 85610 PROTHROMBIN TIME: CPT | Performed by: INTERNAL MEDICINE

## 2017-07-18 RX ORDER — LISINOPRIL 5 MG/1
5 TABLET ORAL DAILY
Status: DISCONTINUED | OUTPATIENT
Start: 2017-07-19 | End: 2017-07-20 | Stop reason: HOSPADM

## 2017-07-18 RX ADMIN — POTASSIUM CHLORIDE 20 MEQ: 1500 TABLET, EXTENDED RELEASE ORAL at 17:12

## 2017-07-18 RX ADMIN — LISINOPRIL 2.5 MG: 2.5 TABLET ORAL at 08:16

## 2017-07-18 RX ADMIN — LEVOTHYROXINE SODIUM 50 MCG: 50 TABLET ORAL at 05:20

## 2017-07-18 RX ADMIN — FUROSEMIDE 40 MG: 40 TABLET ORAL at 16:49

## 2017-07-18 RX ADMIN — SPIRONOLACTONE 25 MG: 25 TABLET, FILM COATED ORAL at 08:16

## 2017-07-18 RX ADMIN — METOPROLOL TARTRATE 25 MG: 25 TABLET ORAL at 21:09

## 2017-07-18 RX ADMIN — ATORVASTATIN CALCIUM 10 MG: 10 TABLET, FILM COATED ORAL at 16:50

## 2017-07-18 RX ADMIN — WARFARIN SODIUM 2 MG: 2 TABLET ORAL at 17:12

## 2017-07-18 RX ADMIN — FUROSEMIDE 40 MG: 40 TABLET ORAL at 08:15

## 2017-07-18 RX ADMIN — SERTRALINE HYDROCHLORIDE 50 MG: 50 TABLET ORAL at 08:15

## 2017-07-18 RX ADMIN — OXYBUTYNIN CHLORIDE 10 MG: 5 TABLET, EXTENDED RELEASE ORAL at 21:09

## 2017-07-18 RX ADMIN — POTASSIUM CHLORIDE 20 MEQ: 1500 TABLET, EXTENDED RELEASE ORAL at 08:16

## 2017-07-18 RX ADMIN — Medication 325 MG: at 08:15

## 2017-07-18 RX ADMIN — METOPROLOL TARTRATE 25 MG: 25 TABLET ORAL at 08:15

## 2017-07-18 RX ADMIN — INSULIN LISPRO 2 UNITS: 100 INJECTION, SOLUTION INTRAVENOUS; SUBCUTANEOUS at 12:40

## 2017-07-18 RX ADMIN — OXYCODONE HYDROCHLORIDE AND ACETAMINOPHEN 250 MG: 500 TABLET ORAL at 08:15

## 2017-07-18 RX ADMIN — INSULIN LISPRO 2 UNITS: 100 INJECTION, SOLUTION INTRAVENOUS; SUBCUTANEOUS at 21:11

## 2017-07-19 LAB
C DIFF TOX GENS STL QL NAA+PROBE: NORMAL
GLUCOSE SERPL-MCNC: 132 MG/DL (ref 65–140)
GLUCOSE SERPL-MCNC: 135 MG/DL (ref 65–140)
GLUCOSE SERPL-MCNC: 163 MG/DL (ref 65–140)
GLUCOSE SERPL-MCNC: 88 MG/DL (ref 65–140)
INR PPP: 3.47 (ref 0.86–1.16)
PROTHROMBIN TIME: 35.9 SECONDS (ref 12.1–14.4)

## 2017-07-19 PROCEDURE — 97530 THERAPEUTIC ACTIVITIES: CPT

## 2017-07-19 PROCEDURE — 85610 PROTHROMBIN TIME: CPT | Performed by: INTERNAL MEDICINE

## 2017-07-19 PROCEDURE — 97110 THERAPEUTIC EXERCISES: CPT

## 2017-07-19 PROCEDURE — 82948 REAGENT STRIP/BLOOD GLUCOSE: CPT

## 2017-07-19 RX ORDER — METOPROLOL SUCCINATE 50 MG/1
50 TABLET, EXTENDED RELEASE ORAL DAILY
Status: DISCONTINUED | OUTPATIENT
Start: 2017-07-19 | End: 2017-07-20 | Stop reason: HOSPADM

## 2017-07-19 RX ADMIN — OXYCODONE HYDROCHLORIDE AND ACETAMINOPHEN 250 MG: 500 TABLET ORAL at 08:13

## 2017-07-19 RX ADMIN — LEVOTHYROXINE SODIUM 50 MCG: 50 TABLET ORAL at 05:08

## 2017-07-19 RX ADMIN — SPIRONOLACTONE 25 MG: 25 TABLET, FILM COATED ORAL at 08:13

## 2017-07-19 RX ADMIN — INSULIN LISPRO 1 UNITS: 100 INJECTION, SOLUTION INTRAVENOUS; SUBCUTANEOUS at 11:46

## 2017-07-19 RX ADMIN — OXYBUTYNIN CHLORIDE 10 MG: 5 TABLET, EXTENDED RELEASE ORAL at 21:20

## 2017-07-19 RX ADMIN — POTASSIUM CHLORIDE 20 MEQ: 1500 TABLET, EXTENDED RELEASE ORAL at 08:13

## 2017-07-19 RX ADMIN — LISINOPRIL 5 MG: 5 TABLET ORAL at 08:13

## 2017-07-19 RX ADMIN — METOPROLOL SUCCINATE 50 MG: 50 TABLET, FILM COATED, EXTENDED RELEASE ORAL at 20:18

## 2017-07-19 RX ADMIN — Medication 325 MG: at 07:43

## 2017-07-19 RX ADMIN — POTASSIUM CHLORIDE 20 MEQ: 1500 TABLET, EXTENDED RELEASE ORAL at 18:31

## 2017-07-19 RX ADMIN — FUROSEMIDE 40 MG: 40 TABLET ORAL at 07:43

## 2017-07-19 RX ADMIN — METOPROLOL TARTRATE 25 MG: 25 TABLET ORAL at 08:13

## 2017-07-19 RX ADMIN — SERTRALINE HYDROCHLORIDE 50 MG: 50 TABLET ORAL at 08:13

## 2017-07-19 RX ADMIN — FUROSEMIDE 40 MG: 40 TABLET ORAL at 16:33

## 2017-07-19 RX ADMIN — ATORVASTATIN CALCIUM 10 MG: 10 TABLET, FILM COATED ORAL at 16:33

## 2017-07-20 VITALS
BODY MASS INDEX: 26.15 KG/M2 | TEMPERATURE: 98.2 F | HEIGHT: 66 IN | HEART RATE: 81 BPM | DIASTOLIC BLOOD PRESSURE: 67 MMHG | SYSTOLIC BLOOD PRESSURE: 156 MMHG | RESPIRATION RATE: 18 BRPM | OXYGEN SATURATION: 96 % | WEIGHT: 162.7 LBS

## 2017-07-20 LAB
GLUCOSE SERPL-MCNC: 132 MG/DL (ref 65–140)
GLUCOSE SERPL-MCNC: 167 MG/DL (ref 65–140)
GLUCOSE SERPL-MCNC: 192 MG/DL (ref 65–140)
INR PPP: 3.93 (ref 0.86–1.16)
PROTHROMBIN TIME: 39.7 SECONDS (ref 12.1–14.4)

## 2017-07-20 PROCEDURE — 82948 REAGENT STRIP/BLOOD GLUCOSE: CPT

## 2017-07-20 PROCEDURE — 85610 PROTHROMBIN TIME: CPT | Performed by: INTERNAL MEDICINE

## 2017-07-20 RX ORDER — SPIRONOLACTONE 25 MG/1
25 TABLET ORAL DAILY
Qty: 30 TABLET | Refills: 2 | Status: SHIPPED | OUTPATIENT
Start: 2017-07-20 | End: 2017-12-29 | Stop reason: HOSPADM

## 2017-07-20 RX ORDER — POTASSIUM CHLORIDE 20 MEQ/1
20 TABLET, EXTENDED RELEASE ORAL 2 TIMES DAILY
Qty: 60 TABLET | Refills: 2 | Status: SHIPPED | OUTPATIENT
Start: 2017-07-20 | End: 2017-12-23 | Stop reason: HOSPADM

## 2017-07-20 RX ORDER — LISINOPRIL 5 MG/1
5 TABLET ORAL DAILY
Qty: 30 TABLET | Refills: 2 | Status: SHIPPED | OUTPATIENT
Start: 2017-07-20 | End: 2018-09-06 | Stop reason: SDUPTHER

## 2017-07-20 RX ORDER — METOPROLOL SUCCINATE 50 MG/1
50 TABLET, EXTENDED RELEASE ORAL DAILY
Qty: 30 TABLET | Refills: 2 | Status: SHIPPED | OUTPATIENT
Start: 2017-07-20 | End: 2021-02-14 | Stop reason: HOSPADM

## 2017-07-20 RX ADMIN — LISINOPRIL 5 MG: 5 TABLET ORAL at 09:26

## 2017-07-20 RX ADMIN — SPIRONOLACTONE 25 MG: 25 TABLET, FILM COATED ORAL at 09:26

## 2017-07-20 RX ADMIN — SERTRALINE HYDROCHLORIDE 50 MG: 50 TABLET ORAL at 09:25

## 2017-07-20 RX ADMIN — POTASSIUM CHLORIDE 20 MEQ: 1500 TABLET, EXTENDED RELEASE ORAL at 09:26

## 2017-07-20 RX ADMIN — INSULIN LISPRO 1 UNITS: 100 INJECTION, SOLUTION INTRAVENOUS; SUBCUTANEOUS at 11:52

## 2017-07-20 RX ADMIN — METOPROLOL SUCCINATE 50 MG: 50 TABLET, FILM COATED, EXTENDED RELEASE ORAL at 09:25

## 2017-07-20 RX ADMIN — LEVOTHYROXINE SODIUM 50 MCG: 50 TABLET ORAL at 05:19

## 2017-07-20 RX ADMIN — Medication 325 MG: at 08:25

## 2017-07-20 RX ADMIN — OXYCODONE HYDROCHLORIDE AND ACETAMINOPHEN 250 MG: 500 TABLET ORAL at 09:26

## 2017-07-20 RX ADMIN — FUROSEMIDE 40 MG: 40 TABLET ORAL at 08:25

## 2017-08-10 ENCOUNTER — GENERIC CONVERSION - ENCOUNTER (OUTPATIENT)
Dept: OTHER | Facility: OTHER | Age: 72
End: 2017-08-10

## 2017-09-22 ENCOUNTER — GENERIC CONVERSION - ENCOUNTER (OUTPATIENT)
Dept: OTHER | Facility: OTHER | Age: 72
End: 2017-09-22

## 2017-09-22 ENCOUNTER — TRANSCRIBE ORDERS (OUTPATIENT)
Dept: NON INVASIVE DIAGNOSTICS | Facility: CLINIC | Age: 72
End: 2017-09-22

## 2017-09-22 DIAGNOSIS — I42.9 FAMILIAL CARDIOMYOPATHY (HCC): Primary | ICD-10-CM

## 2017-10-16 DIAGNOSIS — I42.9 CARDIOMYOPATHY (HCC): ICD-10-CM

## 2017-10-17 ENCOUNTER — HOSPITAL ENCOUNTER (OUTPATIENT)
Dept: NON INVASIVE DIAGNOSTICS | Facility: CLINIC | Age: 72
Discharge: HOME | End: 2017-10-17
Payer: MEDICARE

## 2017-10-17 DIAGNOSIS — I42.9 CARDIOMYOPATHY (HCC): ICD-10-CM

## 2017-10-17 PROCEDURE — 93308 TTE F-UP OR LMTD: CPT

## 2017-10-31 ENCOUNTER — ALLSCRIPTS OFFICE VISIT (OUTPATIENT)
Dept: OTHER | Facility: OTHER | Age: 72
End: 2017-10-31

## 2017-11-01 NOTE — PROGRESS NOTES
Assessment  Assessed    1  Chronic systolic congestive heart failure (428 22,428 0) (I50 22)   2  Cardiomyopathy (425 4) (I42 9)   3  CAD S/P percutaneous coronary angioplasty (414 01,V45 82) (I25 10,Z98 61)   4  Hypertension (401 9) (I10)   5  Diastolic dysfunction (686 0) (I51 9)   6  Non-rheumatic mitral regurgitation (424 0) (I34 0)   7  Nonrheumatic aortic valve stenosis (424 1) (I35 0)   8  Tricuspid regurgitation (397 0) (I07 1)   9  Pulmonary hypertension (416 8) (I27 20)    Plan  CAD S/P percutaneous coronary angioplasty    · From  Metoprolol Succinate ER 50 MG Oral Tablet Extended Release 24 Hour  TAKE 1 TABLET DAILY To Metoprolol Succinate ER 25 MG Oral Tablet Extended Release  24 Hour TAKE 1 TABLET DAILY   Rx By: Rebecca Bhatia; Dispense: 30 Days ; #:30 Tablet Extended Release 24 Hour; Refill: 2;For: CAD S/P percutaneous coronary angioplasty; ANN = N; Record  Chronic systolic congestive heart failure    · From  Furosemide 40 MG Oral Tablet TAKE 1 1/2 TABLET EVERY MORNING  To Furosemide 40 MG Oral Tablet TAKE 1 TABLET DAILY   Rx By: Rebecca Bhatia; Dispense: 30 Days ; #:30 Tablet; Refill: 0;For: Chronic systolic congestive heart failure; ANN = N; Record   · Spironolactone 25 MG Oral Tablet; take 1 tablet every day   Rx By: Rebecca Bhatia; Dispense: 90 Days ; #:90 Tablet; Refill: 2;For: Chronic systolic congestive heart failure; ANN = N; Verified Transmission to [facility] #[id number]; Last Updated By: System, [employer]; 10/27/2017 4:73:02 PM  Diastolic dysfunction    · From  Potassium Chloride 20 MEQ TBCR TAKE 1 TABLET TWICE DAILY To  Klor-Con M20 20 MEQ Oral Tablet Extended Release take one tablet by mouth every day   Rx By: Rebecca Bhatia; Dispense: 30 Days ; #:30 Tablet Extended Release;  Refill: 2;For: Diastolic dysfunction; ANN = N; Record  Hypertension    · From  Lisinopril 5 MG Oral Tablet TAKE 1 TABLET DAILY AS DIRECTED To  Lisinopril 2 5 MG Oral Tablet TAKE 1 TABLET DAILY   Rx By: Osmany Gee, [place]il[place]; Dispense: 30 Days ; #:30 Tablet; Refill: 2;For: Hypertension; ANN = N; Record  Unlinked    · Vitamin C 250 MG Oral Tablet   Dispense: 0 Days ; #: Sufficient TABS; Refill: 0; ANN = N; Record; Last Updated By: Jayshree Flannery; 8/7/2017 2:34:28 PM    Discussion/Summary  Cardiology Discussion Summary Free Text Note Form [facility]:   2D echo October 2017 - LVEF 35-45%, up from 25-30%  Restrictive diastolic dysfunction  RV dilated  Mild MAC  Moderate MR  mild -moderate AS  Mild TR  PA systolic pressure 46 mmHg  Mild [place] echo July 2017 - EF 25-30%, restrictive physiology, RV/LA/RA dilated, mild MAC, severe MR, mild-mod AS, severe TR, severe PHTNecho Oct 2016 - EF 30%test Oct 2016 - No ischemia  EF 46%  furosemide to 40 mg daily  decrease K to one pill dailycan be returned  current meds  aggressive risk factor modification and therapeutic lifestyle changes  Low salt, low calorie, low fat, low cholesterol diet concepts of atherosclerosis, including signs and symptoms of cardiac disease  studies were reviewed  measures were reviewed  were entertained and answered  was advised to report any problem requiring medical attention  up with appropriate specialists and lab work as discussed  for follow up visit as scheduled or earlier, if needed  you for allowing me to participate in the care and evaluation of your patient  Should you have any questions, please feel free to contact me  Goals and Barriers: The patient has the current Goals: Symptom free  The patent has the current Barriers: None  Patient's Capacity to Self-Care: Patient is able to Self-Care  Medication SE Review and Pt Understands Tx: Possible side effects of new medications were reviewed with the patient/guardian today  The treatment plan was reviewed with the patient/guardian   The patient/guardian understands and agrees with the treatment plan       Counseling Documentation With Imm: The patient, patient's family was counseled regarding diagnostic results,-- instructions for management,-- risk factor reductions,-- prognosis,-- patient and family education,-- risks and benefits of treatment options,-- importance of compliance with treatment  total time of encounter was 40 minutes-- and-- 35 minutes was spent counseling  Chief Complaint  Chief Complaint Chronic Condition [facility]: Patient is here today for follow up of chronic conditions described in HPI  History of Present Illness  Cardiology HPI Free Text Note Form [facility]: [facility]  Found to have severe CMP and was discharged on LifeVest which she is currently wearing  And was started on OMT  She recently had her three monthly limited echo which she wants me to review with her  Pt feeling better since d/c  SOB much improved  No CP, swelling feet  Also denies palpitations, LH, syncope, orthopnea, PND  Taking her meds regularly  s/p PCI with 2 stents to RCA in 2011   Congestive Heart Failure (Follow-Up): The patient presents for follow-up of chronic heart failure  The patient is NYHA functional Class II  The patient states she has been stable with her heart failure symptoms since the last visit  Symptoms: resolved lower extremity edema,-- improved dyspnea on exertion,-- resolved fatigue,-- improved exercise intolerance,-- denies orthopnea-- and-- denies paroxysmal nocturnal dyspnea  Associated symptoms include no chest pain-- and-- no syncope  Coronary Artery Disease (Follow-Up): The patient states she has been stable with her coronary artery disease symptoms since the last visit  Comorbid Illnesses: cardiac failure-- and-- hypertension  Symptoms: denies chest pain when at rest,-- denies exertional chest pain,-- improved dyspnea,-- denies fatigue-- and-- improved exercise intolerance  Associated symptoms include no palpitations-- and-- no edema  Hypertension (Follow-Up): The patient presents for follow-up of essential hypertension   The patient states she has been stable with her blood pressure control since the last visit  Comorbid Illnesses: cardiac failure-- and-- coronary artery disease  Symptoms: denies impaired vision,-- improved dyspnea,-- denies chest pain,-- denies intermittent leg claudication-- and-- resolved lower extremity edema  Medications: the patient is adherent with her medication regimen  -- She denies medication side effects  Review of Systems  Cardiology Female ROS:     Cardiac: as noted in HPI  Skin: No complaints of nonhealing sores or skin rash  Genitourinary: No complaints of recurrent urinary tract infections, frequent urination at night, difficult urination, blood in urine, kidney stones, loss of bladder control, kidney problems, denies any birth control or hormone replacement, is not post menopausal, not currently pregnant  Psychological: No complaints of feeling depressed, anxiety, panic attacks, or difficulty concentrating  General: No complaints of trouble sleeping, lack of energy, fatigue, appetite changes, weight changes, fever, frequent infections, or night sweats  Respiratory: as noted in HPI  HEENT: No complaints of serious problems, hearing problems, nose problems, throat problems, or snoring  Gastrointestinal: No complaints of liver problems, nausea, vomiting, heartburn, constipation, bloody stools, diarrhea, problems swallowing, adbominal pain, or rectal bleeding  Hematologic: No complaints of bleeding disorders, anemia, blood clots, or excessive brusing  Neurological: No complaints of numbness, tingling, dizziness, weakness, seizures, headaches, syncope or fainting, AM fatigue, daytime sleepiness, no witnessed apnea episodes  Musculoskeletal: No complaints of arthritis, back pain, or painfull swelling  ROS Reviewed:   ROS reviewed  Active Problems  Problems    1  CAD S/P percutaneous coronary angioplasty (414 01,V45 82) (I25 10,Z98 61)   2  Cardiomyopathy (425 4) (I42 9)   3   Chronic systolic congestive heart failure (428 22,428 0) (I50 22)   4  Diastolic dysfunction (852 8) (I51 9)   5  Hypertension (401 9) (I10)   6  Nonrheumatic aortic valve stenosis (424 1) (I35 0)   7  Non-rheumatic mitral regurgitation (424 0) (I34 0)   8  Pulmonary hypertension (416 8) (I27 20)   9  Tricuspid regurgitation (397 0) (I07 1)    Past Medical History  Problems    1  History of CKD (chronic kidney disease), stage III (585 3) (N18 3)  Active Problems And Past Medical History Reviewed: The active problems and past medical history were reviewed and updated today  Surgical History  Problems    1  History of Breast Surgery Mastectomy  Surgical History Reviewed: The surgical history was reviewed and updated today  Family History  Family History    1  No pertinent family history  Family History Reviewed: The family history was reviewed and updated today  Social History  Problems    · Former smoker (G93 73) (I53 386)   · No alcohol use  Social History Reviewed: The social history was reviewed and updated today  The social history was reviewed and is unchanged  Current Meds   1  Atorvastatin Calcium 40 MG Oral Tablet; TAKE 1 TABLET DAILY; Therapy: (Recorded:[id number]) to Recorded   2  Ditropan XL 10 MG Oral Tablet Extended Release 24 Hour; take 2 tablet daily; Therapy: (Recorded:[id number]) to Recorded   3  Ferrous Sulfate 325 (65 Fe) MG Oral Tablet; Therapy: ([place]) to Recorded   4  Fiber 625 MG Oral Tablet; Therapy: ([place]) to Recorded   5  Fish Oil 1000 MG Oral Capsule; Therapy: ([place]) to Recorded   6  Furosemide 40 MG Oral Tablet; TAKE 1 TABLET TWICE DAILY; Therapy: ([place]) to Recorded   7  Levothyroxine Sodium 50 MCG Oral Tablet; TAKE 1 TABLET DAILY; Therapy: ([place]) to Recorded   8  Lisinopril 5 MG Oral Tablet; TAKE 1 TABLET DAILY AS DIRECTED; Therapy: (Recorded:[id number]) to Recorded   9   Metoprolol Succinate ER 50 MG Oral Tablet Extended Release 24 Hour; TAKE 1 TABLET   DAILY; Therapy: (Recorded:[id number]) to Recorded   10  Multivitamin TABS; Therapy: (Recorded:[id number]) to Recorded   11  Potassium Chloride 20 MEQ TBCR; TAKE 1 TABLET TWICE DAILY; Therapy: ([place]) to Recorded   12  Sertraline HCl - 50 MG Oral Tablet; TAKE 1 TABLET DAILY; Therapy: ([place]) to Recorded   13  Spironolactone 25 MG Oral Tablet; take 1 tablet every day; Therapy: (Recorded:[id number]) to Recorded   14  Vitamin C 250 MG Oral Tablet; Therapy: (Recorded:33Tsf2439) to Recorded  Medication List Reviewed: The medication list was reviewed and updated today  Allergies  Medication    1  Penicillins    Vitals  Vital Signs    Recorded: [id number] 12:47PM   Heart Rate 55   Systolic 152   Diastolic 68   Height 5 ft 6 in   Weight 150 lb    BMI Calculated 24 21   BSA Calculated 1 77   O2 Saturation 99     Physical Exam    Constitutional   General appearance: No acute distress, well appearing and well nourished  Eyes   Conjunctiva and Sclera examination: Conjunctiva pink, sclera anicteric  Ears, Nose, Mouth, and Throat - External inspection of ears and nose: Normal without deformities or discharge  -- Oropharynx: Clear, nares are clear, mucous membranes are moist    Neck   Neck and thyroid: Normal, supple, trachea midline, no thyromegaly  Pulmonary   Respiratory effort: No increased work of breathing or signs of respiratory distress  Auscultation of lungs: Clear to auscultation, no rales, no rhonchi, no wheezing, good air movement  Cardiovascular   Palpation of heart: Normal PMI, no thrills  Auscultation of heart: Normal rate and rhythm, normal S1 and S2, no murmurs  Carotid pulses: Normal, 2+ bilaterally  Examination of extremities for edema and/or varicosities: Normal     Chest - Chest: Abnormal -- Wearing LifeVest    Abdomen   Abdomen: Non-tender and no distention      Liver and spleen: No hepatomegaly or splenomegaly  Musculoskeletal Gait and station: Normal gait  -- Digits and nails: Normal without clubbing or cyanosis  Skin - Skin and subcutaneous tissue: Normal without rashes or lesions  Skin is warm and well perfused, normal turgor  Neurologic - Speech: Normal     Psychiatric - Orientation to person, place, and time: Normal -- Mood and affect: Normal       Future Appointments    Date/Time Provider Specialty Site   12/12/2017 01:40 PM ILIA Cooley   [facility]     Signatures   Electronically signed by : ILIA Dwyer ; Oct 31 2017  5:50PM EST                       (Author)

## 2017-12-01 ENCOUNTER — GENERIC CONVERSION - ENCOUNTER (OUTPATIENT)
Dept: OTHER | Facility: OTHER | Age: 72
End: 2017-12-01

## 2017-12-22 ENCOUNTER — HOSPITAL ENCOUNTER (INPATIENT)
Facility: HOSPITAL | Age: 72
LOS: 6 days | DRG: 682 | End: 2017-12-29
Attending: EMERGENCY MEDICINE | Admitting: INTERNAL MEDICINE
Payer: MEDICARE

## 2017-12-22 DIAGNOSIS — N17.9 AKI (ACUTE KIDNEY INJURY) (HCC): ICD-10-CM

## 2017-12-22 DIAGNOSIS — N39.0 UTI (URINARY TRACT INFECTION): Primary | ICD-10-CM

## 2017-12-22 LAB
BACTERIA UR QL AUTO: ABNORMAL /HPF
BILIRUB UR QL STRIP: NEGATIVE
CLARITY UR: ABNORMAL
COLOR UR: YELLOW
GLUCOSE UR STRIP-MCNC: NEGATIVE MG/DL
HGB UR QL STRIP.AUTO: ABNORMAL
KETONES UR STRIP-MCNC: NEGATIVE MG/DL
LEUKOCYTE ESTERASE UR QL STRIP: ABNORMAL
NITRITE UR QL STRIP: NEGATIVE
NON-SQ EPI CELLS URNS QL MICRO: ABNORMAL /HPF
PH UR STRIP.AUTO: 5.5 [PH] (ref 4.5–8)
PROT UR STRIP-MCNC: NEGATIVE MG/DL
RBC #/AREA URNS AUTO: ABNORMAL /HPF
SP GR UR STRIP.AUTO: 1.01 (ref 1–1.03)
UROBILINOGEN UR QL STRIP.AUTO: 0.2 E.U./DL
WBC #/AREA URNS AUTO: ABNORMAL /HPF

## 2017-12-22 PROCEDURE — 87086 URINE CULTURE/COLONY COUNT: CPT | Performed by: PHYSICIAN ASSISTANT

## 2017-12-22 PROCEDURE — 81001 URINALYSIS AUTO W/SCOPE: CPT | Performed by: PHYSICIAN ASSISTANT

## 2017-12-22 PROCEDURE — 87186 SC STD MICRODIL/AGAR DIL: CPT | Performed by: PHYSICIAN ASSISTANT

## 2017-12-22 PROCEDURE — 96360 HYDRATION IV INFUSION INIT: CPT

## 2017-12-22 PROCEDURE — 85007 BL SMEAR W/DIFF WBC COUNT: CPT | Performed by: PHYSICIAN ASSISTANT

## 2017-12-22 PROCEDURE — 80048 BASIC METABOLIC PNL TOTAL CA: CPT | Performed by: PHYSICIAN ASSISTANT

## 2017-12-22 PROCEDURE — 85027 COMPLETE CBC AUTOMATED: CPT | Performed by: PHYSICIAN ASSISTANT

## 2017-12-22 PROCEDURE — 36415 COLL VENOUS BLD VENIPUNCTURE: CPT | Performed by: PHYSICIAN ASSISTANT

## 2017-12-22 RX ORDER — NITROFURANTOIN 25; 75 MG/1; MG/1
100 CAPSULE ORAL 2 TIMES DAILY
Qty: 14 CAPSULE | Refills: 0 | Status: SHIPPED | OUTPATIENT
Start: 2017-12-22 | End: 2017-12-29 | Stop reason: HOSPADM

## 2017-12-22 RX ORDER — NITROFURANTOIN 25; 75 MG/1; MG/1
100 CAPSULE ORAL ONCE
Status: COMPLETED | OUTPATIENT
Start: 2017-12-22 | End: 2017-12-22

## 2017-12-22 RX ADMIN — SODIUM CHLORIDE 1000 ML: 0.9 INJECTION, SOLUTION INTRAVENOUS at 23:54

## 2017-12-22 RX ADMIN — NITROFURANTOIN MONOHYDRATE AND NITROFURANTOIN MACROCRYSTALLINE 100 MG: 75; 25 CAPSULE ORAL at 22:37

## 2017-12-23 ENCOUNTER — APPOINTMENT (INPATIENT)
Dept: ULTRASOUND IMAGING | Facility: HOSPITAL | Age: 72
DRG: 682 | End: 2017-12-23
Payer: MEDICARE

## 2017-12-23 PROBLEM — D69.6 THROMBOCYTOPENIA (HCC): Status: ACTIVE | Noted: 2017-12-23

## 2017-12-23 PROBLEM — E87.5 HYPERKALEMIA: Status: ACTIVE | Noted: 2017-12-23

## 2017-12-23 PROBLEM — E87.1 HYPONATREMIA: Status: ACTIVE | Noted: 2017-12-23

## 2017-12-23 PROBLEM — N39.0 UTI (URINARY TRACT INFECTION): Status: ACTIVE | Noted: 2017-12-23

## 2017-12-23 LAB
ALBUMIN SERPL BCP-MCNC: 3.2 G/DL (ref 3.5–5)
ALP SERPL-CCNC: 301 U/L (ref 46–116)
ALT SERPL W P-5'-P-CCNC: 98 U/L (ref 12–78)
ANION GAP SERPL CALCULATED.3IONS-SCNC: 13 MMOL/L (ref 4–13)
ANION GAP SERPL CALCULATED.3IONS-SCNC: 14 MMOL/L (ref 4–13)
APTT PPP: 52 SECONDS (ref 23–35)
AST SERPL W P-5'-P-CCNC: 105 U/L (ref 5–45)
BASOPHILS # BLD MANUAL: 0.08 THOUSAND/UL (ref 0–0.1)
BASOPHILS NFR MAR MANUAL: 1 % (ref 0–1)
BILIRUB SERPL-MCNC: 0.7 MG/DL (ref 0.2–1)
BUN SERPL-MCNC: 113 MG/DL (ref 5–25)
BUN SERPL-MCNC: 94 MG/DL (ref 5–25)
C DIFF TOX GENS STL QL NAA+PROBE: NORMAL
C3 SERPL-MCNC: 98.1 MG/DL (ref 90–180)
C4 SERPL-MCNC: 35 MG/DL (ref 10–40)
CALCIUM SERPL-MCNC: 9 MG/DL (ref 8.3–10.1)
CALCIUM SERPL-MCNC: 9.4 MG/DL (ref 8.3–10.1)
CHLORIDE SERPL-SCNC: 102 MMOL/L (ref 100–108)
CHLORIDE SERPL-SCNC: 106 MMOL/L (ref 100–108)
CO2 SERPL-SCNC: 17 MMOL/L (ref 21–32)
CO2 SERPL-SCNC: 18 MMOL/L (ref 21–32)
CREAT SERPL-MCNC: 2.61 MG/DL (ref 0.6–1.3)
CREAT SERPL-MCNC: 3.09 MG/DL (ref 0.6–1.3)
EOSINOPHIL # BLD MANUAL: 0 THOUSAND/UL (ref 0–0.4)
EOSINOPHIL NFR BLD MANUAL: 0 % (ref 0–6)
ERYTHROCYTE [DISTWIDTH] IN BLOOD BY AUTOMATED COUNT: 14 % (ref 11.6–15.1)
ERYTHROCYTE [DISTWIDTH] IN BLOOD BY AUTOMATED COUNT: 14.1 % (ref 11.6–15.1)
EST. AVERAGE GLUCOSE BLD GHB EST-MCNC: 131 MG/DL
GFR SERPL CREATININE-BSD FRML MDRD: 17 ML/MIN/1.73SQ M
GFR SERPL CREATININE-BSD FRML MDRD: 21 ML/MIN/1.73SQ M
GLUCOSE SERPL-MCNC: 118 MG/DL (ref 65–140)
GLUCOSE SERPL-MCNC: 120 MG/DL (ref 65–140)
GLUCOSE SERPL-MCNC: 139 MG/DL (ref 65–140)
GLUCOSE SERPL-MCNC: 141 MG/DL (ref 65–140)
GLUCOSE SERPL-MCNC: 167 MG/DL (ref 65–140)
GLUCOSE SERPL-MCNC: 172 MG/DL (ref 65–140)
HBA1C MFR BLD: 6.2 % (ref 4.2–6.3)
HCT VFR BLD AUTO: 36.1 % (ref 34.8–46.1)
HCT VFR BLD AUTO: 37.2 % (ref 34.8–46.1)
HGB BLD-MCNC: 11.5 G/DL (ref 11.5–15.4)
HGB BLD-MCNC: 12.2 G/DL (ref 11.5–15.4)
INR PPP: 2.52 (ref 0.86–1.16)
LYMPHOCYTES # BLD AUTO: 0.99 THOUSAND/UL (ref 0.6–4.47)
LYMPHOCYTES # BLD AUTO: 13 % (ref 14–44)
MAGNESIUM SERPL-MCNC: 2 MG/DL (ref 1.6–2.6)
MCH RBC QN AUTO: 30.1 PG (ref 26.8–34.3)
MCH RBC QN AUTO: 30.6 PG (ref 26.8–34.3)
MCHC RBC AUTO-ENTMCNC: 31.9 G/DL (ref 31.4–37.4)
MCHC RBC AUTO-ENTMCNC: 32.8 G/DL (ref 31.4–37.4)
MCV RBC AUTO: 93 FL (ref 82–98)
MCV RBC AUTO: 95 FL (ref 82–98)
MONOCYTES # BLD AUTO: 0.46 THOUSAND/UL (ref 0–1.22)
MONOCYTES NFR BLD: 6 % (ref 4–12)
NEUTROPHILS # BLD MANUAL: 5.86 THOUSAND/UL (ref 1.85–7.62)
NEUTS SEG NFR BLD AUTO: 77 % (ref 43–75)
NRBC BLD AUTO-RTO: 0 /100 WBCS
PLATELET # BLD AUTO: 98 THOUSANDS/UL (ref 149–390)
PLATELET # BLD AUTO: 98 THOUSANDS/UL (ref 149–390)
PLATELET BLD QL SMEAR: ABNORMAL
PMV BLD AUTO: 12.7 FL (ref 8.9–12.7)
PMV BLD AUTO: 13.4 FL (ref 8.9–12.7)
POTASSIUM SERPL-SCNC: 4.6 MMOL/L (ref 3.5–5.3)
POTASSIUM SERPL-SCNC: 5.8 MMOL/L (ref 3.5–5.3)
PROT SERPL-MCNC: 9 G/DL (ref 6.4–8.2)
PROTHROMBIN TIME: 27.9 SECONDS (ref 12.1–14.4)
RBC # BLD AUTO: 3.82 MILLION/UL (ref 3.81–5.12)
RBC # BLD AUTO: 3.99 MILLION/UL (ref 3.81–5.12)
SODIUM SERPL-SCNC: 133 MMOL/L (ref 136–145)
SODIUM SERPL-SCNC: 137 MMOL/L (ref 136–145)
TOTAL CELLS COUNTED SPEC: 100
TSH SERPL DL<=0.05 MIU/L-ACNC: 0.99 UIU/ML (ref 0.36–3.74)
VARIANT LYMPHS # BLD AUTO: 3 %
WBC # BLD AUTO: 7.46 THOUSAND/UL (ref 4.31–10.16)
WBC # BLD AUTO: 7.61 THOUSAND/UL (ref 4.31–10.16)

## 2017-12-23 PROCEDURE — 85027 COMPLETE CBC AUTOMATED: CPT | Performed by: PHYSICIAN ASSISTANT

## 2017-12-23 PROCEDURE — 86160 COMPLEMENT ANTIGEN: CPT | Performed by: INTERNAL MEDICINE

## 2017-12-23 PROCEDURE — 87040 BLOOD CULTURE FOR BACTERIA: CPT | Performed by: FAMILY MEDICINE

## 2017-12-23 PROCEDURE — 86038 ANTINUCLEAR ANTIBODIES: CPT | Performed by: INTERNAL MEDICINE

## 2017-12-23 PROCEDURE — 82948 REAGENT STRIP/BLOOD GLUCOSE: CPT

## 2017-12-23 PROCEDURE — 99284 EMERGENCY DEPT VISIT MOD MDM: CPT

## 2017-12-23 PROCEDURE — 86334 IMMUNOFIX E-PHORESIS SERUM: CPT | Performed by: INTERNAL MEDICINE

## 2017-12-23 PROCEDURE — 87493 C DIFF AMPLIFIED PROBE: CPT | Performed by: INTERNAL MEDICINE

## 2017-12-23 PROCEDURE — 83735 ASSAY OF MAGNESIUM: CPT | Performed by: PHYSICIAN ASSISTANT

## 2017-12-23 PROCEDURE — 84165 PROTEIN E-PHORESIS SERUM: CPT | Performed by: INTERNAL MEDICINE

## 2017-12-23 PROCEDURE — 76770 US EXAM ABDO BACK WALL COMP: CPT

## 2017-12-23 PROCEDURE — 84443 ASSAY THYROID STIM HORMONE: CPT | Performed by: PHYSICIAN ASSISTANT

## 2017-12-23 PROCEDURE — 85610 PROTHROMBIN TIME: CPT | Performed by: PHYSICIAN ASSISTANT

## 2017-12-23 PROCEDURE — 86225 DNA ANTIBODY NATIVE: CPT | Performed by: INTERNAL MEDICINE

## 2017-12-23 PROCEDURE — 80053 COMPREHEN METABOLIC PANEL: CPT | Performed by: PHYSICIAN ASSISTANT

## 2017-12-23 PROCEDURE — 85730 THROMBOPLASTIN TIME PARTIAL: CPT | Performed by: PHYSICIAN ASSISTANT

## 2017-12-23 PROCEDURE — 83036 HEMOGLOBIN GLYCOSYLATED A1C: CPT | Performed by: PHYSICIAN ASSISTANT

## 2017-12-23 PROCEDURE — 86039 ANTINUCLEAR ANTIBODIES (ANA): CPT | Performed by: INTERNAL MEDICINE

## 2017-12-23 RX ORDER — SODIUM POLYSTYRENE SULFONATE 15 G/60ML
15 SUSPENSION ORAL; RECTAL ONCE
Status: COMPLETED | OUTPATIENT
Start: 2017-12-23 | End: 2017-12-23

## 2017-12-23 RX ORDER — ACETAMINOPHEN 325 MG/1
650 TABLET ORAL EVERY 6 HOURS PRN
Status: DISCONTINUED | OUTPATIENT
Start: 2017-12-23 | End: 2017-12-29 | Stop reason: HOSPADM

## 2017-12-23 RX ORDER — SODIUM CHLORIDE 9 MG/ML
125 INJECTION, SOLUTION INTRAVENOUS CONTINUOUS
Status: DISCONTINUED | OUTPATIENT
Start: 2017-12-23 | End: 2017-12-23 | Stop reason: SDUPTHER

## 2017-12-23 RX ORDER — OXYBUTYNIN CHLORIDE 5 MG/1
10 TABLET, EXTENDED RELEASE ORAL
Status: DISCONTINUED | OUTPATIENT
Start: 2017-12-23 | End: 2017-12-23 | Stop reason: DRUGHIGH

## 2017-12-23 RX ORDER — METOPROLOL TARTRATE 5 MG/5ML
5 INJECTION INTRAVENOUS EVERY 6 HOURS PRN
Status: DISCONTINUED | OUTPATIENT
Start: 2017-12-23 | End: 2017-12-29 | Stop reason: HOSPADM

## 2017-12-23 RX ORDER — FUROSEMIDE 40 MG/1
40 TABLET ORAL
Status: DISCONTINUED | OUTPATIENT
Start: 2017-12-23 | End: 2017-12-23

## 2017-12-23 RX ORDER — ONDANSETRON 2 MG/ML
4 INJECTION INTRAMUSCULAR; INTRAVENOUS EVERY 6 HOURS PRN
Status: DISCONTINUED | OUTPATIENT
Start: 2017-12-23 | End: 2017-12-29 | Stop reason: HOSPADM

## 2017-12-23 RX ORDER — LEVOTHYROXINE SODIUM 0.05 MG/1
50 TABLET ORAL
Status: DISCONTINUED | OUTPATIENT
Start: 2017-12-23 | End: 2017-12-29 | Stop reason: HOSPADM

## 2017-12-23 RX ORDER — METOPROLOL SUCCINATE 50 MG/1
50 TABLET, EXTENDED RELEASE ORAL DAILY
Status: DISCONTINUED | OUTPATIENT
Start: 2017-12-23 | End: 2017-12-29 | Stop reason: HOSPADM

## 2017-12-23 RX ORDER — SODIUM CHLORIDE 9 MG/ML
75 INJECTION, SOLUTION INTRAVENOUS CONTINUOUS
Status: DISCONTINUED | OUTPATIENT
Start: 2017-12-23 | End: 2017-12-24

## 2017-12-23 RX ORDER — ATORVASTATIN CALCIUM 40 MG/1
40 TABLET, FILM COATED ORAL DAILY
Status: DISCONTINUED | OUTPATIENT
Start: 2017-12-23 | End: 2017-12-29 | Stop reason: HOSPADM

## 2017-12-23 RX ORDER — FERROUS SULFATE 325(65) MG
325 TABLET ORAL
Status: DISCONTINUED | OUTPATIENT
Start: 2017-12-23 | End: 2017-12-29 | Stop reason: HOSPADM

## 2017-12-23 RX ORDER — OXYBUTYNIN CHLORIDE 5 MG/1
10 TABLET, EXTENDED RELEASE ORAL
Status: DISCONTINUED | OUTPATIENT
Start: 2017-12-23 | End: 2017-12-29 | Stop reason: HOSPADM

## 2017-12-23 RX ORDER — LISINOPRIL 5 MG/1
5 TABLET ORAL DAILY
Status: DISCONTINUED | OUTPATIENT
Start: 2017-12-23 | End: 2017-12-23

## 2017-12-23 RX ADMIN — OXYBUTYNIN CHLORIDE 10 MG: 5 TABLET, EXTENDED RELEASE ORAL at 21:36

## 2017-12-23 RX ADMIN — SODIUM CHLORIDE 1000 ML: 0.9 INJECTION, SOLUTION INTRAVENOUS at 01:09

## 2017-12-23 RX ADMIN — SODIUM CHLORIDE 75 ML/HR: 0.9 INJECTION, SOLUTION INTRAVENOUS at 17:58

## 2017-12-23 RX ADMIN — DESMOPRESSIN ACETATE 40 MG: 0.2 TABLET ORAL at 11:00

## 2017-12-23 RX ADMIN — CEFTRIAXONE 1000 MG: 1 INJECTION, SOLUTION INTRAVENOUS at 01:09

## 2017-12-23 RX ADMIN — FUROSEMIDE 40 MG: 40 TABLET ORAL at 08:33

## 2017-12-23 RX ADMIN — SODIUM POLYSTYRENE SULFONATE 15 G: 15 SUSPENSION ORAL; RECTAL at 04:00

## 2017-12-23 RX ADMIN — DESMOPRESSIN ACETATE 40 MG: 0.2 TABLET ORAL at 09:40

## 2017-12-23 RX ADMIN — LEVOTHYROXINE SODIUM 50 MCG: 50 TABLET ORAL at 06:53

## 2017-12-23 RX ADMIN — METOPROLOL TARTRATE 5 MG: 5 INJECTION, SOLUTION INTRAVENOUS at 04:00

## 2017-12-23 RX ADMIN — METOPROLOL SUCCINATE 50 MG: 50 TABLET, FILM COATED, EXTENDED RELEASE ORAL at 11:00

## 2017-12-23 RX ADMIN — SERTRALINE HYDROCHLORIDE 50 MG: 50 TABLET ORAL at 10:15

## 2017-12-23 RX ADMIN — SODIUM CHLORIDE 75 ML/HR: 0.9 INJECTION, SOLUTION INTRAVENOUS at 04:00

## 2017-12-23 RX ADMIN — INSULIN LISPRO 1 UNITS: 100 INJECTION, SOLUTION INTRAVENOUS; SUBCUTANEOUS at 17:00

## 2017-12-23 RX ADMIN — Medication 325 MG: at 08:00

## 2017-12-23 NOTE — DISCHARGE INSTRUCTIONS

## 2017-12-23 NOTE — CONSULTS
Consultation - Nephrology   Yasmany Parra 71 y o  female MRN: [id number]  Unit/Bed#: MS 36-1 Encounter: [id number]    Referring PHYSICIAN:  Joy Sage     REASON FOR THE CONSULTATION:  Acute kidney injury    DATE OF CONSULTATION:  December 23, 2017    ADMISSION DIAGNOSIS: UTI (urinary tract infection)     CHIEF COMPLAINT     80-year-old woman was brought in emergency room by the family for unknown reason and admitted with possible UTI and acute kidney injury    HPI     Patient is quite demented cannot give any history  She does not know why she is here  She denies any acute complaint    She denies a headache dizziness any visual problem, he had no chest pain no palpitation or shortness of Breath, no nausea no vomiting no abdominal pain, denies any urinary or bowel problem, no ankle pain or ankle swelling  she does carry diagnosis of cardiomyopathy with EF about 25% and does follow with Cardiology    She does take medication as prescribed according to the chart    She does look comfortable    PAST MEDICAL HISTORY     Past Medical History:   Diagnosis Date    Cancer [facility])     Cardiac disease     Diabetes mellitus ([facility] )     Hypertension     Renal disorder     Stroke ([facility] )        PAST SURGICAL HISTORY     Past Surgical History:   Procedure Laterality Date    CARDIAC SURGERY      CORONARY ANGIOPLASTY WITH STENT PLACEMENT      KIDNEY SURGERY      MASTECTOMY         ALLERGIES     Allergies   Allergen Reactions    Penicillins Rash       SOCIAL HISTORY     History   Alcohol Use No     History   Drug Use No     History   Smoking Status    Former Smoker    Packs/day: 0 50    Years: 45 00    Quit date: 12/23/2009   Smokeless Tobacco    Never Used       FAMILY HISTORY     Family History   Problem Relation Age of Onset    Family history unknown: Yes       CURRENT MEDICATIONS       Current Facility-Administered Medications:     acetaminophen (TYLENOL) tablet 650 mg, 650 mg, Oral, Q6H PRN, Steven Vazquez ISAAC    atorvastatin (LIPITOR) tablet 40 mg, 40 mg, Oral, Daily, Lindsay Lee PA-C    [START ON 12/24/2017] cefTRIAXone (ROCEPHIN) IVPB (premix) 1,000 mg, 1,000 mg, Intravenous, Q24H, Mone Lee PA-C    ferrous sulfate tablet 325 mg, 325 mg, Oral, Daily With Breakfast, Nancy Mullins PA-C, 325 mg at 12/23/17 0800    insulin lispro (HumaLOG) 100 units/mL subcutaneous injection 1-5 Units, 1-5 Units, Subcutaneous, TID AC **AND** Fingerstick Glucose (POCT), , , TID AC, Nancy Mullins PA-C    insulin lispro (HumaLOG) 100 units/mL subcutaneous injection 1-5 Units, 1-5 Units, Subcutaneous, HS, Mone Lee PA-C    levothyroxine tablet 50 mcg, 50 mcg, Oral, Early Morning, Nancy Mullins PA-C, 50 mcg at 12/23/17 1198    metoprolol (LOPRESSOR) injection 5 mg, 5 mg, Intravenous, Q6H PRN, Nancy Mullins PA-C, 5 mg at 12/23/17 0400    metoprolol succinate (TOPROL-XL) 24 hr tablet 50 mg, 50 mg, Oral, Daily, Mone Lee PA-C    ondansetron [facility]) injection 4 mg, 4 mg, Intravenous, Q6H PRN, Nancy Mullins PA-C    oxybutynin (DITROPAN-XL) 24 hr tablet 10 mg, 10 mg, Oral, HS, Mone Lee PA-C    sertraline (ZOLOFT) tablet 50 mg, 50 mg, Oral, Daily, Lindsay Lee PA-C    sodium chloride 0 9 % infusion, 75 mL/hr, Intravenous, Continuous, Lindsay Lee PA-C, Last Rate: 75 mL/hr at 12/23/17 0400, 75 mL/hr at 12/23/17 0400    REVIEW OF SYSTEMS     Complete 10 point review of systems were obtained and discussed in length with the patient  Complete review of systems were negative / unremarkable except mentioned in the HPI section       LAB RESULTS          Results from last 7 days  Lab Units 12/23/17  0640 12/23/17  0628 12/22/17  2354   WBC Thousand/uL  --  7 46 7 61   HEMOGLOBIN g/dL  --  11 5 12 2   HEMATOCRIT %  --  36 1 37 2   PLATELETS Thousands/uL  --  98* 98*   SODIUM mmol/L 137  --  133*   POTASSIUM mmol/L 4 6  --  5 8*   CHLORIDE mmol/L 106  --  102   CO2 mmol/L 18*  --  17*   BUN mg/dL 94* --  113*   CREATININE mg/dL 2 61*  --  3 09*   CALCIUM mg/dL 9 0  --  9 4   MAGNESIUM mg/dL 2 0  --   --    ALBUMIN g/dL 3 2*  --   --    TOTAL PROTEIN g/dL 9 0*  --   --    GLUCOSE RANDOM mg/dL 120  --  139       I have personally reviewed the old medical records and patient's previously known baseline creatinine level is ~ 1 2    RADIOLOGY RESULTS     Results for orders placed during the hospital encounter of 07/13/17   XR chest 1 view portable    Narrative CHEST     INDICATION:  Shortness of breath  Elevated INR  COMPARISON:  Chest film 9/1/2005 and report of CT chest also from that date    VIEWS:   AP frontal    IMAGES:  1    FINDINGS:         The cardiac silhouette is enlarged  Prominent, indistinct pulmonary vasculature and faint reticular interstitial densities in the lung bases consistent with element of CHF  Mild azygous distention  No discernible pleural effusions or pneumothorax  Visualized osseous structures appear within normal limits for the patient's age  Impression Cardiomegaly with mild CHF  Workstation performed: [id number]       No results found for this or any previous visit  No results found for this or any previous visit  No results found for this or any previous visit  No results found for this or any previous visit  No results found for this or any previous visit      OBJECTIVE     Current Weight: Weight - Scale: 65 kg (143 lb 4 8 oz)  Vitals:    12/23/17 0700   BP: 150/62   Pulse: 74   Resp: 18   Temp: 99 5 °F (37 5 °C)   SpO2:        Intake/Output Summary (Last 24 hours) at 12/23/17 1214  Last data filed at 12/23/17 0231   Gross per 24 hour   Intake             1170 ml   Output                0 ml   Net             1170 ml       PHYSICAL EXAMINATION     Physical Exam:  Eyes:  Pupils equally react to light conjunctiva pink  ENT:  Moist tongue  Gen:  Not in any acute distress  Neck:  Supple no JVD no carotid artery bruit  Chest:  Clear no rales no rhonchi moving air very well  Cor:  S1-S2 normal no S3 no S4 grade 3/6 systolic murmur  Abdomen:  Soft nontender positive bowel sounds no organomegaly  Ext:  Decreased dorsalis pedis pulse no ankle swelling no calf tenderness  Neuro:  Awake seems to be confused does move all extremities well  Skin:  Decreased skin turgor  PLAN / RECOMMENDATIONS      Acute kidney injury:  Possible related to cardiorenal syndrome  May be volume depletion  Will hold diuretic and give gentle IV hydration  Will monitor volume status very closely    CKD stage 3:  Baseline creatinine is 1 2  She does seems to have paraproteinemia saw will order some workup in past she was positive for a any so I will do workup for that also    Cardiomyopathy:  Will hold diuretic now but will need to resume diuretic before discharge    Altered mental status:  Seems to be baseline will continue to monitor    Thank you for the consultation to participate in patient's care  I have personally discussed my plan with the referring physician  Adilia Kaur MD  Nephrology  12/23/2017        Portions of the record may have been created with voice recognition software  Occasional wrong word or "sound a like" substitutions may have occurred due to the inherent limitations of voice recognition software  Read the chart carefully and recognize, using context, where substitutions have occurred

## 2017-12-23 NOTE — ED PROVIDER NOTES
History  Chief Complaint   Patient presents with    Possible UTI     Patient comes to ED for furthur evaluation of blood in the urine and possible UTI       71year old female with past medical history significant for hyperlipidemia, diabetes and stroke presents to ED with chief complaint of UTI symptoms  History obtained from patient and patient's daughter at bedside  Onset of symptoms reported as 2 days ago  Location of symptoms reported as suprapubic area  Quality is reported as dysuria, frequency and hematuria  Modifiers: urination exacerbates symptoms  Context: patient daughter reports patient has been having UTI symptoms for the past few days - called PCP and has outpatient labwork performed - received called about possible UTI and PCP referred patient to ED for further evaluation of possible UTI  Patient reports no fevers, no back pain, denies urinary retention  Denies dizziness, syncope, vomiting or diaphoresis  Daughter reports a history of diabetes  Medical summary: review of past visit history via [employer] demonstrates patient was last seen as outpatient by cardiologist on 10/31/2017 for evaluation of CHF and valvular heart disease  History provided by:  Patient and relative   used: No        Prior to Admission Medications   Prescriptions Last Dose Informant Patient Reported? Taking?    Calcium Polycarbophil (FIBER) 625 MG TABS 12/22/2017 at Unknown time  Yes Yes   Sig: Take 500 mg by mouth   Multiple Vitamin (MULTIVITAMIN) tablet 12/22/2017 at Unknown time  Yes Yes   Sig: Take 1 tablet by mouth daily   Omega-3 Fatty Acids (FISH OIL) 1,000 mg 12/22/2017 at Unknown time  Yes Yes   Sig: Take 1,000 mg by mouth 2 (two) times a day   ascorbic acid (VITAMIN C) 250 mg tablet 12/22/2017 at Unknown time  Yes Yes   Sig: Take 250 mg by mouth daily   atorvastatin (LIPITOR) 40 mg tablet 12/22/2017 at Unknown time  Yes Yes   Sig: Take 40 mg by mouth daily   ferrous sulfate 325 (65 Fe) mg tablet 12/22/2017 at Unknown time  Yes Yes   Sig: Take 325 mg by mouth daily with breakfast   furosemide (LASIX) 40 mg tablet 12/22/2017 at Unknown time  Yes Yes   Sig: Take 40 mg by mouth 2 (two) times a day   levothyroxine 50 mcg tablet 12/22/2017 at Unknown time  Yes Yes   Sig: Take 50 mcg by mouth daily   lisinopril (ZESTRIL) 5 mg tablet 12/22/2017 at Unknown time  No Yes   Sig: Take 1 tablet by mouth daily   metoprolol succinate (TOPROL-XL) 50 mg 24 hr tablet 12/22/2017 at Unknown time  No Yes   Sig: Take 1 tablet by mouth daily   oxybutynin (DITROPAN-XL) 10 MG 24 hr tablet 12/22/2017 at Unknown time  Yes Yes   Sig: Take 10 mg by mouth 2 (two) times a day   sertraline (ZOLOFT) 50 mg tablet 12/22/2017 at Unknown time  Yes Yes   Sig: Take 50 mg by mouth daily   spironolactone (ALDACTONE) 25 mg tablet 12/22/2017 at Unknown time  No Yes   Sig: Take 1 tablet by mouth daily      Facility-Administered Medications: None       Past Medical History:   Diagnosis Date    Cancer [facility])     Cardiac disease     [facility] mellitus ([facility] [address] )     Hypertension     Renal disorder     Stroke [facility])        Past Surgical History:   Procedure Laterality Date    CARDIAC SURGERY      CORONARY ANGIOPLASTY WITH STENT PLACEMENT      KIDNEY SURGERY      MASTECTOMY         Family History   Problem Relation Age of Onset    Family history unknown: Yes     I have reviewed and agree with the history as documented  Social History   Substance Use Topics    Smoking status: Former Smoker     Packs/day: 0 50     Years: 45 00     Quit date: 12/23/2009    Smokeless tobacco: Never Used    Alcohol use No        Review of Systems   Constitutional: Negative for activity change, appetite change, chills, diaphoresis, fatigue, fever and unexpected weight change     HENT: Negative for congestion, dental problem, drooling, ear discharge, ear pain, facial swelling, hearing loss, mouth sores, nosebleeds, postnasal drip, rhinorrhea, sinus pain, sinus pressure, sneezing, sore throat, tinnitus, trouble swallowing and voice change  Eyes: Negative for photophobia, pain, discharge, redness, itching and visual disturbance  Respiratory: Negative for cough, choking, chest tightness, shortness of breath and wheezing  Cardiovascular: Negative for chest pain, palpitations and leg swelling  Gastrointestinal: Negative for abdominal distention, anal bleeding, blood in stool, constipation, diarrhea, nausea, rectal pain and vomiting  Endocrine: Negative for cold intolerance, heat intolerance, polydipsia, polyphagia and polyuria  Genitourinary: Positive for difficulty urinating, dysuria, frequency, hematuria and urgency  Negative for decreased urine volume, dyspareunia, enuresis, menstrual problem, pelvic pain, vaginal bleeding, vaginal discharge and vaginal pain  Musculoskeletal: Negative for arthralgias, back pain, gait problem, joint swelling, myalgias, neck pain and neck stiffness  Skin: Negative for color change, pallor, rash and wound  Allergic/Immunologic: Negative for environmental allergies, food allergies and immunocompromised state  Neurological: Negative for dizziness, tremors, seizures, syncope, facial asymmetry, speech difficulty, weakness, light-headedness, numbness and headaches  Hematological: Negative for adenopathy  Does not bruise/bleed easily  Psychiatric/Behavioral: Negative for agitation, confusion, hallucinations and self-injury  The patient is not hyperactive  All other systems reviewed and are negative        Physical Exam  ED Triage Vitals   Temperature Pulse Respirations Blood Pressure SpO2   12/22/17 1853 12/22/17 1856 12/22/17 1856 12/22/17 1856 12/22/17 1856   98 4 °F (36 9 °C) 76 18 114/78 100 %      Temp Source Heart Rate Source Patient Position - Orthostatic VS BP Location FiO2 (%)   12/22/17 1853 12/22/17 2354 12/22/17 1856 12/22/17 1856 --   Oral Monitor Sitting Left arm       Pain Score       12/22/17 1853 5           Orthostatic Vital Signs  Vitals:    12/22/17 2354 12/23/17 0112 12/23/17 0231 12/23/17 0700   BP: 110/80 145/80 (!) 173/73 150/62   Pulse: 72 70 70 74   Patient Position - Orthostatic VS: Lying Lying Lying Lying       Physical Exam   Constitutional: She is oriented to person, place, and time  She appears well-developed and well-nourished  No distress  /62   Pulse 74   Temp 99 5 °F (37 5 °C) (Oral)   Resp 18   Ht 5' 7" (1 702 m)   Wt 65 kg (143 lb 4 8 oz)   SpO2 99%   BMI 22 44 kg/m²   interp normal, no intervention  HENT:   Head: Normocephalic and atraumatic  Right Ear: External ear normal    Left Ear: External ear normal    Nose: Nose normal    Mouth/Throat: Oropharynx is clear and moist  No oropharyngeal exudate  Eyes: Conjunctivae and EOM are normal  Right eye exhibits no discharge  Left eye exhibits no discharge  No scleral icterus  Neck: Normal range of motion  Neck supple  No JVD present  No tracheal deviation present  Cardiovascular: Normal rate, regular rhythm and intact distal pulses  Murmur heard  Pulmonary/Chest: Effort normal and breath sounds normal  No respiratory distress  She has no wheezes  She has no rales  She exhibits no tenderness  Abdominal: Soft  Bowel sounds are normal  She exhibits no distension and no mass  There is tenderness  There is no rebound and no guarding  No hernia  Suprapubic tenderness to palpation  Musculoskeletal: Normal range of motion  She exhibits no edema, tenderness or deformity  Lymphadenopathy:     She has no cervical adenopathy  Neurological: She is alert and oriented to person, place, and time  She displays normal reflexes  No cranial nerve deficit or sensory deficit  She exhibits normal muscle tone  Coordination normal    Skin: Skin is warm and dry  Capillary refill takes less than 2 seconds  No rash noted  She is not diaphoretic  No erythema  No pallor  Psychiatric: She has a normal mood and affect   Her behavior is normal  Judgment and thought content normal    Nursing note and vitals reviewed  ED Medications  Medications   atorvastatin (LIPITOR) tablet 40 mg (not administered)   ferrous sulfate tablet 325 mg (325 mg Oral Given 12/23/17 0800)   levothyroxine tablet 50 mcg (50 mcg Oral Given 12/23/17 0653)   metoprolol succinate (TOPROL-XL) 24 hr tablet 50 mg (not administered)   sertraline (ZOLOFT) tablet 50 mg (not administered)   sodium chloride 0 9 % infusion (75 mL/hr Intravenous New Bag 12/23/17 0400)   ondansetron (ZOFRAN) injection 4 mg (not administered)   acetaminophen (TYLENOL) tablet 650 mg (not administered)   cefTRIAXone (ROCEPHIN) IVPB (premix) 1,000 mg (not administered)   metoprolol (LOPRESSOR) injection 5 mg (5 mg Intravenous Given 12/23/17 0400)   insulin lispro (HumaLOG) 100 units/mL subcutaneous injection 1-5 Units (1 Units Subcutaneous Not Given 12/23/17 0834)   insulin lispro (HumaLOG) 100 units/mL subcutaneous injection 1-5 Units (not administered)   oxybutynin (DITROPAN-XL) 24 hr tablet 10 mg (not administered)   nitrofurantoin (MACROBID) extended-release capsule 100 mg (100 mg Oral Given 12/22/17 2237)   sodium chloride 0 9 % bolus 1,000 mL (0 mL Intravenous Stopped 12/23/17 0100)   sodium chloride 0 9 % bolus 1,000 mL (0 mL Intravenous Stopped 12/23/17 0223)   cefTRIAXone (ROCEPHIN) IVPB (premix) 1,000 mg (0 mg Intravenous Stopped 12/23/17 0152)   sodium polystyrene sulfonate (KAYEXALATE) oral suspension 15 g (15 g Oral Given 12/23/17 0400)       Diagnostic Studies  Results Reviewed     Procedure Component Value Units Date/Time    Hemoglobin A1c (Orders if not completed within the last 90 days) [[id number]] Collected:  12/23/17 0628    Lab Status:   In process Specimen:  Blood from Arm, Left Updated:  12/23/17 0721    CBC and differential [[id number]]  (Abnormal) Collected:  12/22/17 2354    Lab Status:  Final result Specimen:  Blood from Arm, Right Updated:  12/23/17 0021     WBC 7 61 Thousand/uL      RBC 3 99 Million/uL      Hemoglobin 12 2 g/dL      Hematocrit 37 2 %      MCV 93 fL      MCH 30 6 pg      MCHC 32 8 g/dL      RDW 14 0 %      MPV 12 7 fL      Platelets 98 (L) Thousands/uL      nRBC 0 /100 WBCs     Basic metabolic panel [[id number]]  (Abnormal) Collected:  12/22/17 2354    Lab Status:  Final result Specimen:  Blood from Arm, Right Updated:  12/23/17 0015     Sodium 133 (L) mmol/L      Potassium 5 8 (H) mmol/L      Chloride 102 mmol/L      CO2 17 (L) mmol/L      Anion Gap 14 (H) mmol/L       (H) mg/dL      Creatinine 3 09 (H) mg/dL      Glucose 139 mg/dL      Calcium 9 4 mg/dL      eGFR 17 ml/min/1 73sq m     Narrative:         National Kidney Disease Education Program recommendations are as follows:  GFR calculation is accurate only with a steady state creatinine  Chronic Kidney disease less than 60 ml/min/1 73 sq  meters  Kidney failure less than 15 ml/min/1 73 sq  meters  Urine Microscopic [[id number]]  (Abnormal) Collected:  12/22/17 2048    Lab Status:  Final result Specimen:  Urine from Urine, Clean Catch Updated:  12/22/17 2105     RBC, UA 2-4 (A) /hpf      WBC, UA 30-50 (A) /hpf      Epithelial Cells Moderate (A) /hpf      Bacteria, UA Innumerable (A) /hpf     Urine culture [[id number]] Collected:  12/22/17 2048    Lab Status:   In process Specimen:  Urine from Urine, Clean Catch Updated:  12/22/17 2105    UA w Reflex to Microscopic w Reflex to Culture [[id number]]  (Abnormal) Collected:  12/22/17 2048    Lab Status:  Final result Specimen:  Urine from Urine, Clean Catch Updated:  12/22/17 2057     Color, UA Yellow     Clarity, UA Cloudy     Specific Gravity, UA 1 015     pH, UA 5 5     Leukocytes, UA Large (A)     Nitrite, UA Negative     Protein, UA Negative mg/dl      Glucose, UA Negative mg/dl      Ketones, UA Negative mg/dl      Urobilinogen, UA 0 2 E U /dl      Bilirubin, UA Negative     Blood, UA Trace-Intact (A)                 US retroperitoneal complete    (Results Pending)              Procedures  Procedures       Phone Contacts  ED Phone Contact    ED Course  ED Course                                MDM  Number of Diagnoses or Management Options  UTI (urinary tract infection): new and requires workup  Diagnosis management comments: ddx includes but is not limited to UTI, STI, pregnancy, ectopic pregnancy, pyelonephritis, kidney stone, ovarian cyst, endometriosis, cervicitis, consider but doubt TOA, ovarian torsion, appendicitis, plan:  check UA  Lab results reviewed: UA remarkable for large leukocytes, trace blood and negative for nitrites  Innumerable bacteria  I reviewed results with patient at bedside  Initially discussed with patient would treat with antibiotics for UTI as outpatient  However, during ED coarse, patient's daughter arrived and provided additional details of HPI in which she reported that patient's primary care physician had wanted patient to get lab work to evaluate kidney function due to concerns for worsening kidney function  I apologized to patient daughter - she was not present during initial evaluation and patient herself did not share this information with me during initial evaluation - now that new information was available I discussed with daughter will order BMP and CBC to assess kidney function and infection  Labs ordered    Lab results reviewed:  BMP remarkable for elevated potassium at 5 8 - slightly hemolyzed, BUN elevated at 113, Creatinine elevated at 3 09  Comparison with most recent renal function testing from 7/2017 demonstrates creatinine 1 1  Todays results represent new onset renal failure, possible secondary to uti  Anion gap elevated but glucose 139 - doubt dka  Potassium elevated but hemolyzed sample - discussed results with daughter and patient at bedside  Will admit for new onset renal failure and UTI,  IV fluids and IV antibiotics for UTI ordered    Case discussed with Hilton Loera PA-C regarding admission Amount and/or Complexity of Data Reviewed  Clinical lab tests: ordered and reviewed  Obtain history from someone other than the patient: yes  Review and summarize past medical records: yes  Independent visualization of images, tracings, or specimens: yes    Patient Progress  Patient progress: stable    CritCare Time    Disposition  Final diagnoses:   UTI (urinary tract infection)     Time reflects when diagnosis was documented in both MDM as applicable and the Disposition within this note     Time User Action Codes Description Comment    12/22/2017 10:29 PM Everet Ohm Add [N39 0] UTI (urinary tract infection)     12/23/2017  2:19 AM Earma Kales Add [N17 9] MANJEET (acute kidney injury) ([facility] [address] )     12/23/2017  2:19 AM Earma Kales Modify [N17 9] MANJEET (acute kidney injury) [facility])       ED Disposition     ED Disposition Condition Comment    Admit    Case discussed with Steven Chang PA-C    Patient is to be admission status to the service of Dr Carmen Forsyth up With Specialties Details Why Contact Info Additional 200 Agustina Johnson MD Internal Medicine Call in 2 days for further evaluation of symptoms 500 1000 [facility] 28 [facility] Emergency Department Emergency Medicine Go to If symptoms worsen 34 [facility] 4183 ED, [address], 301 [facility] [address], MD Urology Call in 2 days for further evaluation of symptoms [address]  [phone]           Current Discharge Medication List      START taking these medications    Details   nitrofurantoin (MACROBID) 100 mg capsule Take 1 capsule by mouth 2 (two) times a day for 7 days  Qty: 14 capsule, Refills: 0         CONTINUE these medications which have NOT CHANGED    Details   ascorbic acid (VITAMIN C) 250 mg tablet Take 250 mg by mouth daily      atorvastatin (LIPITOR) 40 mg tablet Take 40 mg by mouth daily      Calcium Polycarbophil (FIBER) 625 MG TABS Take 500 mg by mouth      ferrous sulfate 325 (65 Fe) mg tablet Take 325 mg by mouth daily with breakfast      furosemide (LASIX) 40 mg tablet Take 40 mg by mouth 2 (two) times a day      levothyroxine 50 mcg tablet Take 50 mcg by mouth daily      lisinopril (ZESTRIL) 5 mg tablet Take 1 tablet by mouth daily  Qty: 30 tablet, Refills: 2      metoprolol succinate (TOPROL-XL) 50 mg 24 hr tablet Take 1 tablet by mouth daily  Qty: 30 tablet, Refills: 2      Multiple Vitamin (MULTIVITAMIN) tablet Take 1 tablet by mouth daily      Omega-3 Fatty Acids (FISH OIL) 1,000 mg Take 1,000 mg by mouth 2 (two) times a day      oxybutynin (DITROPAN-XL) 10 MG 24 hr tablet Take 10 mg by mouth 2 (two) times a day      sertraline (ZOLOFT) 50 mg tablet Take 50 mg by mouth daily      spironolactone (ALDACTONE) 25 mg tablet Take 1 tablet by mouth daily  Qty: 30 tablet, Refills: 2           No discharge procedures on file      ED Provider  Electronically Signed by           Keron Monae PA-C  12/23/17 5428

## 2017-12-23 NOTE — H&P
History and Physical - [facility]    Patient Information: Alejandra Jaramillo 71 y o  female MRN: [id number]  Unit/Bed#: RUDI Encounter: [id number]  Admitting Physician: Cassidy Bergeron PA-C  PCP: Daniel Wang MD  Date of Admission:  12/23/17    Assessment/Plan:    Hospital Problem List:     Principal Problem:    UTI (urinary tract infection)  Active Problems:    MANJEET (acute kidney injury) ([facility] )    Essential hypertension    Diabetes mellitus ([place] [address] )    Hyponatremia    Hyperkalemia    Thrombocytopenia ([place] [address] )      Plan for the Primary Problem(s):  · UTI  · IVF hydration, await UC, IV rocephin    Plan for Additional Problems:   · MANJEET  · Fluids, avoid nephrotoxic meds, nephrology consult  · HTN  · PRN lopressor, monitor  · DM  · SSI, BGM, a1c, diabetic diet  · Hyponatremia  · Fluids, monitor BMP in am  · Hyperkalemia  · Monitor K, 5 8, kayexelate  · Thrombocytopenia  · 98, monitor, no bleeding    VTE Prophylaxis: Pharmacologic VTE Prophylaxis contraindicated due to thrombocytopenia  / sequential compression device   Code Status: full  POLST: POLST form is not discussed and not completed at this time  Anticipated Length of Stay:  Patient will be admitted on an Inpatient basis with an anticipated length of stay of  > 2 midnights  Justification for Hospital Stay: IVF and IV abx    Total Time for Visit, including Counseling / Coordination of Care: 1 hour  Greater than 50% of this total time spent on direct patient counseling and coordination of care  Chief Complaint:   Sent in from PCP    History of Present Illness:    Alejandra Jaramillo is a 71 y o  female who presents with PMHx of Ca, cardiac disease, DM, HTN, renal disorder, CVA presenting from PCP  Pt unsure why she was sent here  Pt alone on exam  Denies any systemic sx at this time  History is very limited at this time  Review of Systems:    Review of Systems   Constitutional: Negative for chills and fatigue  HENT: Negative for congestion      Eyes: Negative for visual disturbance  Respiratory: Negative for cough, shortness of breath and wheezing  Cardiovascular: Negative for chest pain, palpitations and leg swelling  Gastrointestinal: Negative for abdominal distention, abdominal pain, constipation, diarrhea, nausea and vomiting  Genitourinary: Negative for hematuria  Musculoskeletal: Negative for gait problem  Skin: Negative for color change  Neurological: Negative for dizziness, syncope, speech difficulty, weakness, light-headedness, numbness and headaches  Psychiatric/Behavioral: Negative for decreased concentration  Past Medical and Surgical History:     Past Medical History:   Diagnosis Date    Cancer [facility])     Cardiac disease     Diabetes mellitus ([facility] )     Hypertension     Renal disorder     Stroke [facility])        Past Surgical History:   Procedure Laterality Date    CARDIAC SURGERY      CORONARY ANGIOPLASTY WITH STENT PLACEMENT      KIDNEY SURGERY      MASTECTOMY         Meds/Allergies:    Prior to Admission medications    Medication Sig Start Date End Date Taking?  Authorizing Provider   ascorbic acid (VITAMIN C) 250 mg tablet Take 250 mg by mouth daily    Historical Provider, MD   atorvastatin (LIPITOR) 40 mg tablet Take 40 mg by mouth daily    Historical Provider, MD   Calcium Polycarbophil (FIBER) 625 MG TABS Take 500 mg by mouth    Historical Provider, MD   ferrous sulfate 325 (65 Fe) mg tablet Take 325 mg by mouth daily with breakfast    Historical Provider, MD   furosemide (LASIX) 40 mg tablet Take 40 mg by mouth 2 (two) times a day    Historical Provider, MD   levothyroxine 50 mcg tablet Take 50 mcg by mouth daily    Historical Provider, MD   lisinopril (ZESTRIL) 5 mg tablet Take 1 tablet by mouth daily 7/20/17   Miles Ahumada MD   metoprolol succinate (TOPROL-XL) 50 mg 24 hr tablet Take 1 tablet by mouth daily 7/20/17   Miles Ahumada MD   Multiple Vitamin (MULTIVITAMIN) tablet Take 1 tablet by mouth daily Historical Provider, MD   nitrofurantoin (MACROBID) 100 mg capsule Take 1 capsule by mouth 2 (two) times a day for 7 days 12/22/17 12/29/17  Mary Mantle Kiah,    Omega-3 Fatty Acids (FISH OIL) 1,000 mg Take 1,000 mg by mouth 2 (two) times a day    Historical Provider, MD   oxybutynin (DITROPAN-XL) 10 MG 24 hr tablet Take 10 mg by mouth 2 (two) times a day    Historical Provider, MD   potassium chloride (K-DUR,KLOR-CON) 20 mEq tablet Take 1 tablet by mouth 2 (two) times a day 7/20/17   Alejandro Carson MD   Probiotic Product (SOLUBLE FIBER/PROBIOTICS PO) Take by mouth    Historical Provider, MD   sertraline (ZOLOFT) 50 mg tablet Take 50 mg by mouth daily    Historical Provider, MD   spironolactone (ALDACTONE) 25 mg tablet Take 1 tablet by mouth daily 7/20/17   Alejandro Carson MD     nurse med rec    Allergies: Allergies   Allergen Reactions    Penicillins Rash       Social History:     Marital Status:    Occupation: unknown  Patient Pre-hospital Living Situation: daughter  Patient Pre-hospital Level of Mobility: unknown  Patient Pre-hospital Diet Restrictions: diabetic  Substance Use History:   History   Alcohol Use No     History   Smoking Status    Former Smoker   Smokeless Tobacco    Never Used     History   Drug Use No       Family History:    History reviewed  No pertinent family history  Physical Exam:     Vitals:   Blood Pressure: 145/80 (12/23/17 0112)  Pulse: 70 (12/23/17 0112)  Temperature: 98 4 °F (36 9 °C) (12/22/17 1853)  Temp Source: Oral (12/22/17 1853)  Respirations: 16 (12/23/17 0112)  Height: 5' 8" (172 7 cm) (12/22/17 1853)  Weight - Scale: 68 kg (150 lb) (12/22/17 1853)  SpO2: 99 % (12/23/17 0112)    Physical Exam   Constitutional: She is oriented to person, place, and time  She appears well-developed and well-nourished  No distress  HENT:   Head: Normocephalic and atraumatic  Mouth/Throat: Oropharynx is clear and moist  No oropharyngeal exudate     Eyes: Conjunctivae are normal  Right eye exhibits no discharge  Left eye exhibits no discharge  No scleral icterus  Neck: Neck supple  Cardiovascular: Normal rate, regular rhythm, normal heart sounds and intact distal pulses  Exam reveals no gallop and no friction rub  No murmur heard  Pulmonary/Chest: Effort normal and breath sounds normal  No respiratory distress  She has no wheezes  She has no rales  She exhibits no tenderness  Abdominal: Soft  Bowel sounds are normal  She exhibits no distension and no mass  There is no tenderness  There is no rebound and no guarding  Musculoskeletal: Normal range of motion  She exhibits no edema, tenderness or deformity  Neurological: She is alert and oriented to person, place, and time  No cranial nerve deficit  Coordination normal    Skin: Skin is warm and dry  No rash noted  She is not diaphoretic  No erythema  No pallor  Psychiatric: She has a normal mood and affect  Her behavior is normal    Nursing note and vitals reviewed  Additional Data:     Lab Results: I have personally reviewed pertinent reports  Results from last 7 days  Lab Units 12/22/17  2354   WBC Thousand/uL 7 61   HEMOGLOBIN g/dL 12 2   HEMATOCRIT % 37 2   PLATELETS Thousands/uL 98*   LYMPHO PCT % 13*   MONO PCT MAN % 6   EOSINO PCT MANUAL % 0       Results from last 7 days  Lab Units 12/22/17  2354   SODIUM mmol/L 133*   POTASSIUM mmol/L 5 8*   CHLORIDE mmol/L 102   CO2 mmol/L 17*   BUN mg/dL 113*   CREATININE mg/dL 3 09*   CALCIUM mg/dL 9 4   GLUCOSE RANDOM mg/dL 139           Imaging: I have personally reviewed pertinent reports  No results found  EKG, Pathology, and Other Studies Reviewed on Admission:   · BMP, CBC, UA    Allscripts / Epic Records Reviewed: No     ** Please Note: This note has been constructed using a voice recognition system   **

## 2017-12-23 NOTE — PLAN OF CARE
Problem: Potential for Falls  Goal: Patient will remain free of falls  INTERVENTIONS:  - Assess patient frequently for physical needs  -  Identify cognitive and physical deficits and behaviors that affect risk of falls    -  [place] fall precautions as indicated by assessment   - Educate patient/family on patient safety including physical limitations  - Instruct patient to call for assistance with activity based on assessment  - Modify environment to reduce risk of injury  - Consider OT/PT consult to assist with strengthening/mobility   Outcome: Progressing      Problem: INFECTION - ADULT  Goal: Absence or prevention of progression during hospitalization  INTERVENTIONS:  - Assess and monitor for signs and symptoms of infection  - Monitor lab/diagnostic results  - Monitor all insertion sites, i e  indwelling lines, tubes, and drains  - Monitor endotracheal (as able) and nasal secretions for changes in amount and color  - [place] appropriate cooling/warming therapies per order  - Administer medications as ordered  - Instruct and encourage patient and family to use good hand hygiene technique  - Identify and instruct in appropriate isolation precautions for identified infection/condition  Outcome: Progressing      Problem: DISCHARGE PLANNING  Goal: Discharge to home or other facility with appropriate resources  INTERVENTIONS:  - Identify barriers to discharge w/patient and caregiver  - Arrange for needed discharge resources and transportation as appropriate  - Identify discharge learning needs (meds, wound care, etc )  - Arrange for interpretive services to assist at discharge as needed  - Refer to Case Management Department for coordinating discharge planning if the patient needs post-hospital services based on physician/advanced practitioner order or complex needs related to functional status, cognitive ability, or social support system  Outcome: Progressing      Problem: Knowledge Deficit  Goal: Patient/family/caregiver demonstrates understanding of disease process, treatment plan, medications, and discharge instructions  Complete learning assessment and assess knowledge base    Interventions:  - Provide teaching at level of understanding  - Provide teaching via preferred learning methods  Outcome: Progressing      Problem: METABOLIC, FLUID AND ELECTROLYTES - ADULT  Goal: Electrolytes maintained within normal limits  INTERVENTIONS:  - Monitor labs and assess patient for signs and symptoms of electrolyte imbalances  - Administer electrolyte replacement as ordered  - Monitor response to electrolyte replacements, including repeat lab results as appropriate  - Instruct patient on fluid and nutrition as appropriate  Outcome: Progressing    Goal: Fluid balance maintained  INTERVENTIONS:  - Monitor labs and assess for signs and symptoms of volume excess or deficit  - Monitor I/O and WT  - Instruct patient on fluid and nutrition as appropriate  Outcome: Progressing    Goal: Glucose maintained within target range  INTERVENTIONS:  - Monitor Blood Glucose as ordered  - Assess for signs and symptoms of hyperglycemia and hypoglycemia  - Administer ordered medications to maintain glucose within target range  - Assess nutritional intake and initiate nutrition service referral as needed  Outcome: Progressing

## 2017-12-24 PROBLEM — E87.5 HYPERKALEMIA: Status: RESOLVED | Noted: 2017-12-23 | Resolved: 2017-12-24

## 2017-12-24 PROBLEM — E87.1 HYPONATREMIA: Status: RESOLVED | Noted: 2017-12-23 | Resolved: 2017-12-24

## 2017-12-24 PROBLEM — G93.40 ENCEPHALOPATHY: Status: ACTIVE | Noted: 2017-12-24

## 2017-12-24 LAB
25(OH)D3 SERPL-MCNC: 15 NG/ML (ref 30–100)
ANION GAP SERPL CALCULATED.3IONS-SCNC: 14 MMOL/L (ref 4–13)
BACTERIA UR CULT: ABNORMAL
BACTERIA UR CULT: ABNORMAL
BASOPHILS # BLD AUTO: 0.04 THOUSANDS/ΜL (ref 0–0.1)
BASOPHILS NFR BLD AUTO: 1 % (ref 0–1)
BUN SERPL-MCNC: 67 MG/DL (ref 5–25)
CALCIUM SERPL-MCNC: 8.4 MG/DL (ref 8.3–10.1)
CHLORIDE SERPL-SCNC: 107 MMOL/L (ref 100–108)
CO2 SERPL-SCNC: 17 MMOL/L (ref 21–32)
CREAT SERPL-MCNC: 2.11 MG/DL (ref 0.6–1.3)
EOSINOPHIL # BLD AUTO: 0 THOUSAND/ΜL (ref 0–0.61)
EOSINOPHIL NFR BLD AUTO: 0 % (ref 0–6)
ERYTHROCYTE [DISTWIDTH] IN BLOOD BY AUTOMATED COUNT: 13.9 % (ref 11.6–15.1)
FERRITIN SERPL-MCNC: 3888 NG/ML (ref 8–388)
GFR SERPL CREATININE-BSD FRML MDRD: 27 ML/MIN/1.73SQ M
GLUCOSE SERPL-MCNC: 117 MG/DL (ref 65–140)
GLUCOSE SERPL-MCNC: 131 MG/DL (ref 65–140)
GLUCOSE SERPL-MCNC: 139 MG/DL (ref 65–140)
GLUCOSE SERPL-MCNC: 143 MG/DL (ref 65–140)
GLUCOSE SERPL-MCNC: 172 MG/DL (ref 65–140)
HCT VFR BLD AUTO: 34 % (ref 34.8–46.1)
HGB BLD-MCNC: 10.9 G/DL (ref 11.5–15.4)
IRON SATN MFR SERPL: 19 %
IRON SERPL-MCNC: 38 UG/DL (ref 50–170)
LYMPHOCYTES # BLD AUTO: 1.31 THOUSANDS/ΜL (ref 0.6–4.47)
LYMPHOCYTES NFR BLD AUTO: 15 % (ref 14–44)
MCH RBC QN AUTO: 29.9 PG (ref 26.8–34.3)
MCHC RBC AUTO-ENTMCNC: 32.1 G/DL (ref 31.4–37.4)
MCV RBC AUTO: 93 FL (ref 82–98)
MONOCYTES # BLD AUTO: 0.39 THOUSAND/ΜL (ref 0.17–1.22)
MONOCYTES NFR BLD AUTO: 4 % (ref 4–12)
NEUTROPHILS # BLD AUTO: 6.99 THOUSANDS/ΜL (ref 1.85–7.62)
NEUTS SEG NFR BLD AUTO: 80 % (ref 43–75)
NRBC BLD AUTO-RTO: 0 /100 WBCS
PHOSPHATE SERPL-MCNC: 3.2 MG/DL (ref 2.3–4.1)
PLATELET # BLD AUTO: 92 THOUSANDS/UL (ref 149–390)
PMV BLD AUTO: 12.6 FL (ref 8.9–12.7)
POTASSIUM SERPL-SCNC: 3.8 MMOL/L (ref 3.5–5.3)
PTH-INTACT SERPL-MCNC: 104.4 PG/ML (ref 14–72)
RBC # BLD AUTO: 3.64 MILLION/UL (ref 3.81–5.12)
SODIUM SERPL-SCNC: 138 MMOL/L (ref 136–145)
TIBC SERPL-MCNC: 198 UG/DL (ref 250–450)
URATE SERPL-MCNC: 7.6 MG/DL (ref 2–6.8)
WBC # BLD AUTO: 8.79 THOUSAND/UL (ref 4.31–10.16)

## 2017-12-24 PROCEDURE — 83970 ASSAY OF PARATHORMONE: CPT | Performed by: INTERNAL MEDICINE

## 2017-12-24 PROCEDURE — 83550 IRON BINDING TEST: CPT | Performed by: INTERNAL MEDICINE

## 2017-12-24 PROCEDURE — 84550 ASSAY OF BLOOD/URIC ACID: CPT | Performed by: INTERNAL MEDICINE

## 2017-12-24 PROCEDURE — 84100 ASSAY OF PHOSPHORUS: CPT | Performed by: INTERNAL MEDICINE

## 2017-12-24 PROCEDURE — 80048 BASIC METABOLIC PNL TOTAL CA: CPT | Performed by: INTERNAL MEDICINE

## 2017-12-24 PROCEDURE — 83540 ASSAY OF IRON: CPT | Performed by: INTERNAL MEDICINE

## 2017-12-24 PROCEDURE — 85025 COMPLETE CBC W/AUTO DIFF WBC: CPT | Performed by: INTERNAL MEDICINE

## 2017-12-24 PROCEDURE — 82948 REAGENT STRIP/BLOOD GLUCOSE: CPT

## 2017-12-24 PROCEDURE — 82728 ASSAY OF FERRITIN: CPT | Performed by: INTERNAL MEDICINE

## 2017-12-24 PROCEDURE — 82306 VITAMIN D 25 HYDROXY: CPT | Performed by: INTERNAL MEDICINE

## 2017-12-24 RX ORDER — DIPHENOXYLATE HYDROCHLORIDE AND ATROPINE SULFATE 2.5; .025 MG/1; MG/1
1 TABLET ORAL 4 TIMES DAILY PRN
Status: DISCONTINUED | OUTPATIENT
Start: 2017-12-24 | End: 2017-12-29 | Stop reason: HOSPADM

## 2017-12-24 RX ORDER — NYSTATIN 100000 [USP'U]/G
1 POWDER TOPICAL 3 TIMES DAILY
Status: DISCONTINUED | OUTPATIENT
Start: 2017-12-24 | End: 2017-12-29 | Stop reason: HOSPADM

## 2017-12-24 RX ADMIN — METOPROLOL SUCCINATE 50 MG: 50 TABLET, FILM COATED, EXTENDED RELEASE ORAL at 08:02

## 2017-12-24 RX ADMIN — INSULIN LISPRO 1 UNITS: 100 INJECTION, SOLUTION INTRAVENOUS; SUBCUTANEOUS at 12:19

## 2017-12-24 RX ADMIN — CEFTRIAXONE 1000 MG: 1 INJECTION, SOLUTION INTRAVENOUS at 00:59

## 2017-12-24 RX ADMIN — Medication 325 MG: at 08:03

## 2017-12-24 RX ADMIN — NYSTATIN 1 APPLICATION: 100000 POWDER TOPICAL at 16:57

## 2017-12-24 RX ADMIN — NYSTATIN 1 APPLICATION: 100000 POWDER TOPICAL at 22:26

## 2017-12-24 RX ADMIN — DESMOPRESSIN ACETATE 40 MG: 0.2 TABLET ORAL at 08:02

## 2017-12-24 RX ADMIN — SERTRALINE HYDROCHLORIDE 50 MG: 50 TABLET ORAL at 08:03

## 2017-12-24 RX ADMIN — LEVOTHYROXINE SODIUM 50 MCG: 50 TABLET ORAL at 05:57

## 2017-12-24 RX ADMIN — NYSTATIN 1 APPLICATION: 100000 POWDER TOPICAL at 12:29

## 2017-12-24 RX ADMIN — OXYBUTYNIN CHLORIDE 10 MG: 5 TABLET, EXTENDED RELEASE ORAL at 22:26

## 2017-12-24 RX ADMIN — SODIUM CHLORIDE 75 ML/HR: 0.9 INJECTION, SOLUTION INTRAVENOUS at 05:57

## 2017-12-24 NOTE — PROGRESS NOTES
NEPHROLOGY PROGRESS NOTE    Patient: Porsche Chan               Sex: female          DOA: 12/22/2017  7:31 PM   YOB: 1945        Age:  71 y o         LOS:  LOS: 1 day       HPI     Patient with cardiorenal syndrome came with abdominal pain    SUBJECTIVE     Feeling better  Still confused    Denies any complaint    CURRENT MEDICATIONS       Current Facility-Administered Medications:     acetaminophen (TYLENOL) tablet 650 mg, 650 mg, Oral, Q6H PRN, Tae Pineda PA-C    atorvastatin (LIPITOR) tablet 40 mg, 40 mg, Oral, Daily, Tae Pineda PA-C, 40 mg at 12/24/17 0802    cefTRIAXone (ROCEPHIN) IVPB (premix) 1,000 mg, 1,000 mg, Intravenous, Q24H, Tae Pineda PA-C, Last Rate: 100 mL/hr at 12/24/17 0059, 1,000 mg at 12/24/17 0059    ferrous sulfate tablet 325 mg, 325 mg, Oral, Daily With Breakfast, Tae Pineda PA-C, 325 mg at 12/24/17 0803    insulin lispro (HumaLOG) 100 units/mL subcutaneous injection 1-5 Units, 1-5 Units, Subcutaneous, TID AC, 1 Units at 12/23/17 1700 **AND** Fingerstick Glucose (POCT), , , TID AC, Tae Pineda PA-C    insulin lispro (HumaLOG) 100 units/mL subcutaneous injection 1-5 Units, 1-5 Units, Subcutaneous, HS, Mone Lee PA-C    levothyroxine tablet 50 mcg, 50 mcg, Oral, Early Morning, Tae Pineda PA-C, 50 mcg at 12/24/17 0557    metoprolol (LOPRESSOR) injection 5 mg, 5 mg, Intravenous, Q6H PRN, Tae Pineda PA-C, 5 mg at 12/23/17 0400    metoprolol succinate (TOPROL-XL) 24 hr tablet 50 mg, 50 mg, Oral, Daily, Regina Lee PA-C, 50 mg at 12/24/17 0802    nystatin (MYCOSTATIN) powder 1 application, 1 application, Topical, TID, Tae Pineda PA-C    ondansetron [facility]) injection 4 mg, 4 mg, Intravenous, Q6H PRN, Tae Pineda PA-C    oxybutynin (DITROPAN-XL) 24 hr tablet 10 mg, 10 mg, Oral, HS, Tae Pineda PA-C, 10 mg at 12/23/17 2136    sertraline (ZOLOFT) tablet 50 mg, 50 mg, Oral, Daily, Regina Lee PA-C, 50 mg at 12/24/17 0803    sodium chloride 0 9 % infusion, 75 mL/hr, Intravenous, Continuous, Kelli Barlow PA-C, Last Rate: 75 mL/hr at 12/24/17 0557, 75 mL/hr at 12/24/17 0557    OBJECTIVE     Current Weight: Weight - Scale: 66 2 kg (145 lb 15 1 oz)  Vitals:    12/24/17 0800   BP: 132/60   Pulse: 66   Resp: 18   Temp: 100 5 °F (38 1 °C)   SpO2: 93%       Intake/Output Summary (Last 24 hours) at 12/24/17 1028  Last data filed at 12/24/17 0557   Gross per 24 hour   Intake          1898 75 ml   Output              300 ml   Net          1598 75 ml       PHYSICAL EXAMINATION     Physical Exam:   Eyes:  Pupils equally react to light  ENT:  Moist tongue  Gen:  Not in any acute distress  Neck:  Supple no JVD  Chest:  Clear no rales no rhonchi  Cor:  S1-S2 normal no S3 no S4  Abdomen:  Soft nontender  Ext:  No swelling  Neuro:   Awake and alert  Skin:   Good skin turgor      LAB RESULTS       Results from last 7 days  Lab Units 12/24/17  0520 12/23/17  0640 12/23/17  0628 12/22/17  2354   WBC Thousand/uL 8 79  --  7 46 7 61   HEMOGLOBIN g/dL 10 9*  --  11 5 12 2   HEMATOCRIT % 34 0*  --  36 1 37 2   PLATELETS Thousands/uL 92*  --  98* 98*   SODIUM mmol/L 138 137  --  133*   POTASSIUM mmol/L 3 8 4 6  --  5 8*   CHLORIDE mmol/L 107 106  --  102   CO2 mmol/L 17* 18*  --  17*   BUN mg/dL 67* 94*  --  113*   CREATININE mg/dL 2 11* 2 61*  --  3 09*   CALCIUM mg/dL 8 4 9 0  --  9 4   MAGNESIUM mg/dL  --  2 0  --   --    PHOSPHORUS mg/dL 3 2  --   --   --    ALBUMIN g/dL  --  3 2*  --   --    TOTAL PROTEIN g/dL  --  9 0*  --   --    GLUCOSE RANDOM mg/dL 131 120  --  139       RADIOLOGY RESULTS      Results for orders placed during the hospital encounter of 07/13/17   XR chest 1 view portable    Narrative CHEST     INDICATION:  Shortness of breath  Elevated INR  COMPARISON:  Chest film 9/1/2005 and report of CT chest also from that date    VIEWS:   AP frontal    IMAGES:  1    FINDINGS:         The cardiac silhouette is enlarged      Prominent, indistinct pulmonary vasculature and faint reticular interstitial densities in the lung bases consistent with element of CHF  Mild azygous distention  No discernible pleural effusions or pneumothorax  Visualized osseous structures appear within normal limits for the patient's age  Impression Cardiomegaly with mild CHF  Workstation performed: [id number]       No results found for this or any previous visit  No results found for this or any previous visit  No results found for this or any previous visit  No results found for this or any previous visit  No results found for this or any previous visit  PLAN / RECOMMENDATIONS      Acute kidney injury:  Part of the cardiorenal syndrome  Slowly improving on IV fluid and off diuretic    Cardiomyopathy:  Asymptomatic but I will stop IV fluid to prevent fluid overload    Altered mental status:  Possible dementia will continue to monitor    Will follow    Speedy Rodriguez MD  Nephrology  12/24/2017        Portions of the record may have been created with voice recognition software  Occasional wrong word or "sound a like" substitutions may have occurred due to the inherent limitations of voice recognition software  Read the chart carefully and recognize, using context, where substitutions have occurred

## 2017-12-24 NOTE — ASSESSMENT & PLAN NOTE
· Patient has been more so confused over the last several days per daughter  States that she is normally normal with periods of forgetfulness at baseline  States that she had a kidney removed earlier this year and subsequently her mental status has been optimal   States this was done at [facility] due no records in computer, will have daughter signed consent to review records when available  · Likely multifactorial in the setting of MANJEET and acute UTI  · Patient is currently alert and oriented to person and place  Unable to recall the month,year or what brought her to the hospital   · C diff negative  · Fall precautions  · Reduction p r n

## 2017-12-24 NOTE — ASSESSMENT & PLAN NOTE
· Creatinine 3 09 on admission, improved today to 2 11  · Nephrology following, baseline appears to be 1 2   · Continue to trend BMP  · Continue intravenous fluids and hold Lasix  Patient is euvolemic on exam   · Continue to monitor fluid status closely  · Strict I&Os, daily weights

## 2017-12-24 NOTE — ASSESSMENT & PLAN NOTE
· Blood pressure is controlled, last reading 132/60  · Continue metoprolol  · ACE-inhibitor currently on hold in the setting of MANJEET  · Monitor

## 2017-12-24 NOTE — PROGRESS NOTES
[facility]    Progress Note Alyssa Mcmahon 1945, 71 y o  female MRN: [id number]    Unit/Bed#: -01 Encounter: [id number]    Primary Care Provider: Jomar Ross MD   Date and time admitted to hospital: 12/22/2017  7:31 PM    * Encephalopathy   Assessment & Plan    · Patient has been more so confused over the last several days per daughter  States that she is normally normal with periods of forgetfulness at baseline  States that she had a kidney removed earlier this year and subsequently her mental status has been optimal   States this was done at [facility] due no records in computer, will have daughter signed consent to review records when available  · Likely multifactorial in the setting of MANJEET and acute UTI  · Patient is currently alert and oriented to person and place  Unable to recall the month,year or what brought her to the hospital   · C diff negative  · Fall precautions  · Reduction p r n         UTI (urinary tract infection)   Assessment & Plan    · Patient was having fevers at home went to primary care physician was subsequently sent to the ED per daughter  · Urinalysis positive on admission suspecting urinary tract infection, final culture pending--follow result  · Blood cultures pending--follow result  · Low-grade temp this morning 100 5, no leukocytosis, continue to trend  · Continue intravenous Rocephin         MANJEET (acute kidney injury) ([facility] [address] )   Assessment & Plan    · Creatinine 3 09 on admission, improved today to 2 11  · Nephrology following, baseline appears to be 1 2  · Will discontinue IVF at this time as patient has EF of 40%  and continue to hold Lasix  Patient is euvolemic on exam   · Continue to monitor fluid status closely  · Continue to trend BMP  · Strict I&Os, daily weights  Hyponatremiaresolved as of 12/24/2017   Assessment & Plan    · Resolved today at 138    · Monitor        Diabetes mellitus [facility])   Assessment & Plan · Hemoglobin A1c 6 2, does not take any medications at home  · Continue sliding scale insulin p r n  · Blood sugars acceptable  · Diabetic diet  · Monitor Accu-Cheks        Hyperkalemiaresolved as of 2017   Assessment & Plan    · Resolved, 3 8 today  · Monitor        Essential hypertension   Assessment & Plan    · Blood pressure is controlled, last reading 132/60  · Continue metoprolol  · ACE-inhibitor currently on hold in the setting of MANJEET  · Monitor        Thrombocytopenia (HCC)   Assessment & Plan    · Platelets 92 today, was normal per records from July  · DVT prophylaxis contraindicated at this time  · Continue to monitor  · No signs of bleeding            Pharmacologic: Pharmacologic VTE Prophylaxis contraindicated due to Thrombocytopenia  Mechanical VTE Prophylaxis in Place: Yes    Patient Centered Rounds: I have performed bedside rounds with nursing staff today  Cisco Dolan    Discussions with Specialists or Other Care Team Provider: nursing, case management  Education and Discussions with Family / Patient: Patient and daughter Nikita Mae over phone  Time Spent for Care: 30 minutes  More than 50% of total time spent on counseling and coordination of care as described above  Current Length of Stay: 1 day(s)    Current Patient Status: Inpatient   Certification Statement: The patient will continue to require additional inpatient hospital stay due to Intravenous antibiotics, monitor labs, PT OT consult, Nephrology following    Discharge Plan / Estimated Discharge Date:  When culture back, symptomatically improved, cleared by Nephrology and PT OT recommendations obtained  Code Status: Level 1 - Full Code      Subjective:   Patient offers no complaints  Denies any urinary symptoms  Difficult to obtain subjective data given patient's mental status      Objective:     Vitals:   Temp (24hrs), Av 8 °F (37 7 °C), Min:99 5 °F (37 5 °C), Max:100 5 °F (38 1 °C)    HR:  [66-80] 66  Resp:  [16-18] 18  BP: (119-137)/(56-60) 132/60  SpO2:  [93 %-98 %] 93 %  Body mass index is 22 86 kg/m²  Input and Output Summary (last 24 hours): Intake/Output Summary (Last 24 hours) at 12/24/17 0835  Last data filed at 12/24/17 0557   Gross per 24 hour   Intake          2078 75 ml   Output              800 ml   Net          1278 75 ml       Physical Exam:     Physical Exam   Constitutional: She appears well-developed and well-nourished  HENT:   Head: Normocephalic  Eyes: Pupils are equal, round, and reactive to light  Neck: Normal range of motion  No JVD present  Cardiovascular: Normal rate, regular rhythm and intact distal pulses  Murmur heard  Pulmonary/Chest: Breath sounds normal    Poor respiratory effort  Abdominal: Soft  Bowel sounds are normal  There is no tenderness  Musculoskeletal: Normal range of motion  She exhibits no edema  Neurological: She is alert  Oriented to person and place  Skin: Skin is warm and dry  Psychiatric: She has a normal mood and affect  Nursing note and vitals reviewed  Additional Data:     Labs:      Results from last 7 days  Lab Units 12/24/17  0520   WBC Thousand/uL 8 79   HEMOGLOBIN g/dL 10 9*   HEMATOCRIT % 34 0*   PLATELETS Thousands/uL 92*   NEUTROS PCT % 80*   LYMPHS PCT % 15   MONOS PCT % 4   EOS PCT % 0       Results from last 7 days  Lab Units 12/24/17  0520 12/23/17  0640   SODIUM mmol/L 138 137   POTASSIUM mmol/L 3 8 4 6   CHLORIDE mmol/L 107 106   CO2 mmol/L 17* 18*   BUN mg/dL 67* 94*   CREATININE mg/dL 2 11* 2 61*   CALCIUM mg/dL 8 4 9 0   TOTAL PROTEIN g/dL  --  9 0*   BILIRUBIN TOTAL mg/dL  --  0 70   ALK PHOS U/L  --  301*   ALT U/L  --  98*   AST U/L  --  105*   GLUCOSE RANDOM mg/dL 131 120       Results from last 7 days  Lab Units 12/23/17  0628   INR  2 52*         Recent Cultures (last 7 days):       Results from last 7 days  Lab Units 12/23/17  4664 12/22/17 2048   URINE CULTURE   --  Culture results to follow     C DIFF TOXIN B NEGATIVE for C difficle toxin by PCR    --        Last 24 Hours Medication List:     atorvastatin 40 mg Oral Daily   cefTRIAXone 1,000 mg Intravenous Q24H   ferrous sulfate 325 mg Oral Daily With Breakfast   insulin lispro 1-5 Units Subcutaneous TID AC   insulin lispro 1-5 Units Subcutaneous HS   levothyroxine 50 mcg Oral Early Morning   metoprolol succinate 50 mg Oral Daily   nystatin 1 application Topical TID   oxybutynin 10 mg Oral HS   sertraline 50 mg Oral Daily        Today, Patient Was Seen By: CHANDU Mosley    ** Please Note: Dragon 360 Dictation voice to text software may have been used in the creation of this document   **

## 2017-12-24 NOTE — CASE MANAGEMENT
Initial Clinical Review    Admission: Date/Time/Statement: 12/23/17 @ 0100     Orders Placed This Encounter   Procedures    Inpatient Admission (expected length of stay for this patient is greater than two midnights)     Standing Status:   Standing     Number of Occurrences:   1     Order Specific Question:   Admitting Physician     Answer:   Cristal Ann [[id number]]     Order Specific Question:   Level of Care     Answer:   Med Surg [[id number]]     Order Specific Question:   Estimated length of stay     Answer:   More than 2 Midnights     Order Specific Question:   Certification     Answer:   I certify that inpatient services are medically necessary for this patient for a duration of greater than two midnights  See H&P and MD Progress Notes for additional information about the patient's course of treatment  ED: Date/Time/Mode of Arrival:   ED Arrival Information     Expected Arrival Acuity Means of Arrival Escorted By Service Admission Type    - 12/22/2017 18:23 Urgent Walk-In Family Member General Medicine Urgent    Arrival Complaint    POSSIBLE UTI          Chief Complaint:   Chief Complaint   Patient presents with    Possible UTI     Patient comes to ED for furthur evaluation of blood in the urine and possible UTI  History of Illness: 71 y o  female who presents with PMHx of Ca, cardiac disease, DM, HTN, renal disorder, CVA presenting from PCP  Pt unsure why she was sent here  Pt alone on exam  Denies any systemic sx at this time      ED Vital Signs:   ED Triage Vitals   Temperature Pulse Respirations Blood Pressure SpO2   12/22/17 1853 12/22/17 1856 12/22/17 1856 12/22/17 1856 12/22/17 1856   98 4 °F (36 9 °C) 76 18 114/78 100 %      Temp Source Heart Rate Source Patient Position - Orthostatic VS BP Location FiO2 (%)   12/22/17 1853 12/22/17 2354 12/22/17 1856 12/22/17 1856 --   Oral Monitor Sitting Left arm       Pain Score       12/22/17 1853       5        Wt Readings from Last 1 Encounters: 12/24/17 66 2 kg (145 lb 15 1 oz)       Vital Signs (abnormal): WNL    Abnormal Labs:    12/23/17 0628    Protime 12 1 - 14 4 seconds 27 9     INR 0 86 - 1 16 2 52       12/22/17 2354     Sodium 136 - 145 mmol/L 133     Potassium 3 5 - 5 3 mmol/L 5 8     Comments: Slightly Hemolyzed; Results May be Affected   Chloride 100 - 108 mmol/L 102    CO2 21 - 32 mmol/L 17     Anion Gap 4 - 13 mmol/L 14     BUN 5 - 25 mg/dL 113     Creatinine 0 60 - 1 30 mg/dL 3 09       12/24/17 0520     WBC 4 31 - 10 16 Thousand/uL 8 79    RBC 3 81 - 5 12 Million/uL 3 64     Hemoglobin 11 5 - 15 4 g/dL 10 9     Hematocrit 34 8 - 46 1 % 34 0       Diagnostic Test Results: US kidney and bladder - Normal appearance status post left nephrectomy    ED Treatment:   Medication Administration from 12/22/2017 1823 to 12/23/2017 0231       Date/Time Order Dose Route Action Action by Comments     12/22/2017 2237 nitrofurantoin (MACROBID) extended-release capsule 100 mg 100 mg Oral Given Edwardo Savage RN      12/22/2017 2354 sodium chloride 0 9 % bolus 1,000 mL 1,000 mL Intravenous [address] Marcela Potts RN      12/23/2017 0109 sodium chloride 0 9 % bolus 1,000 mL 1,000 mL Intravenous [address] Marcela Potts RN      12/23/2017 0109 cefTRIAXone (ROCEPHIN) IVPB (premix) 1,000 mg 1,000 mg Intravenous New Max Shah RN        Past Medical/Surgical History:    Active Ambulatory Problems     Diagnosis Date Noted    Supratherapeutic INR 07/14/2017    Constipation 07/14/2017    MNAJEET (acute kidney injury) ([facility] ) 07/14/2017    Leukocytosis 07/14/2017    Normocytic anemia 07/14/2017    Essential hypertension 07/14/2017    CHF, acute ([facility] ) 07/14/2017    [facility] 07/14/2017    Right low back pain 07/14/2017    Weakness 07/14/2017    Elevated bilirubin 07/14/2017     Resolved Ambulatory Problems     Diagnosis Date Noted    No Resolved Ambulatory Problems     Past Medical History:   Diagnosis Date    Cancer ([facility] )  Cardiac disease     Diabetes mellitus ([place] [address] )     Hypertension     Renal disorder     Stroke [facility])        Admitting Diagnosis: UTI (urinary tract infection) [N39 0]  MANJEET (acute kidney injury) ([place] [address] ) [N17 9]    Age/Sex: 71 y o  female    Assessment/Plan:   Hospital Problem List:      Principal Problem:    UTI (urinary tract infection)  Active Problems:    MANJEET (acute kidney injury) ([place] [address] )    Essential hypertension    Diabetes mellitus ([place] [address] )    Hyponatremia    Hyperkalemia    Thrombocytopenia ([place] [address] )     Plan for the Primary Problem(s):  · UTI  ? IVF hydration, await UC, IV rocephin     Plan for Additional Problems:   · MANJEET  ? Fluids, avoid nephrotoxic meds, nephrology consult  · HTN  ? PRN lopressor, monitor  · DM  ? SSI, BGM, a1c, diabetic diet  · Hyponatremia  ? Fluids, monitor BMP in am  · Hyperkalemia  ? Monitor K, 5 8, kayexelate  · Thrombocytopenia  ? 98, monitor, no bleeding     VTE Prophylaxis: Pharmacologic VTE Prophylaxis contraindicated due to thrombocytopenia  / sequential compression device   Code Status: full  POLST: POLST form is not discussed and not completed at this time      Anticipated Length of Stay:  Patient will be admitted on an Inpatient basis with an anticipated length of stay of  > 2 midnights     Justification for Hospital Stay: IVF and IV abx       Admission Orders:  Bld culture x2  Nephrology cons  PT/OT eval and treat    Scheduled Meds:   atorvastatin 40 mg Oral Daily   cefTRIAXone 1,000 mg Intravenous Q24H   ferrous sulfate 325 mg Oral Daily With Breakfast   insulin lispro 1-5 Units Subcutaneous TID AC   insulin lispro 1-5 Units Subcutaneous HS   levothyroxine 50 mcg Oral Early Morning   metoprolol succinate 50 mg Oral Daily   nystatin 1 application Topical TID   oxybutynin 10 mg Oral HS   sertraline 50 mg Oral Daily     Continuous Infusions:     sodium chloride 0 9 % infusion  Rate: 75 mL/hr Dose: 75 mL/hr  Freq: Continuous Route: IV    PRN Meds:   acetaminophen   diphenoxylate-atropine    metoprolol    ondansetron    ----------------------------------------------------------------------------------------------    12/23 Nephrology cons:    PLAN / RECOMMENDATIONS       Acute kidney injury:  Possible related to cardiorenal syndrome  May be volume depletion  Will hold diuretic and give gentle IV hydration  Will monitor volume status very closely     CKD stage 3:  Baseline creatinine is 1 2    She does seems to have paraproteinemia saw will order some workup in past she was positive for a any so I will do workup for that also     Cardiomyopathy:  Will hold diuretic now but will need to resume diuretic before discharge     Altered mental status:  Seems to be baseline will continue to monitor

## 2017-12-24 NOTE — ASSESSMENT & PLAN NOTE
· Patient was having fevers at home went to primary care physician was subsequently sent to the ED per daughter  · Urinalysis positive on admission suspecting urinary tract infection, final culture pending--follow result  · Blood cultures pending--follow result  · Low-grade temp this morning 100 5, no leukocytosis, continue to trend     · Continue intravenous Rocephin

## 2017-12-24 NOTE — ASSESSMENT & PLAN NOTE
· Hemoglobin A1c 6 2, does not take any medications at home  · Continue sliding scale insulin p r n    · Blood sugars acceptable  · Diabetic diet  · Monitor Accu-Cheks

## 2017-12-24 NOTE — ASSESSMENT & PLAN NOTE
· Platelets 92 today, was normal per records from July  · DVT prophylaxis contraindicated at this time    · Continue to monitor  · No signs of bleeding

## 2017-12-25 PROBLEM — IMO0002 SOLITARY KIDNEY: Status: ACTIVE | Noted: 2017-12-25

## 2017-12-25 PROBLEM — K59.00 CONSTIPATION: Status: RESOLVED | Noted: 2017-07-14 | Resolved: 2017-12-25

## 2017-12-25 PROBLEM — R53.1 WEAKNESS: Status: RESOLVED | Noted: 2017-07-14 | Resolved: 2017-12-25

## 2017-12-25 PROBLEM — D72.829 LEUKOCYTOSIS: Status: RESOLVED | Noted: 2017-07-14 | Resolved: 2017-12-25

## 2017-12-25 PROBLEM — M54.50 RIGHT LOW BACK PAIN: Status: RESOLVED | Noted: 2017-07-14 | Resolved: 2017-12-25

## 2017-12-25 PROBLEM — R79.1 SUPRATHERAPEUTIC INR: Status: RESOLVED | Noted: 2017-07-14 | Resolved: 2017-12-25

## 2017-12-25 PROBLEM — I50.9 CHF, ACUTE (HCC): Status: RESOLVED | Noted: 2017-07-14 | Resolved: 2017-12-25

## 2017-12-25 PROBLEM — R17 ELEVATED BILIRUBIN: Status: RESOLVED | Noted: 2017-07-14 | Resolved: 2017-12-25

## 2017-12-25 LAB
ANION GAP SERPL CALCULATED.3IONS-SCNC: 13 MMOL/L (ref 4–13)
BACTERIA UR QL AUTO: ABNORMAL /HPF
BILIRUB UR QL STRIP: NEGATIVE
BUN SERPL-MCNC: 60 MG/DL (ref 5–25)
CALCIUM SERPL-MCNC: 8.3 MG/DL (ref 8.3–10.1)
CHLORIDE SERPL-SCNC: 107 MMOL/L (ref 100–108)
CHLORIDE UR-SCNC: 24 MMOL/L (ref 10–330)
CLARITY UR: ABNORMAL
CO2 SERPL-SCNC: 16 MMOL/L (ref 21–32)
COLOR UR: YELLOW
CREAT SERPL-MCNC: 2.2 MG/DL (ref 0.6–1.3)
CREAT UR-MCNC: 126 MG/DL
CREAT UR-MCNC: 130 MG/DL
FINE GRAN CASTS URNS QL MICRO: ABNORMAL /LPF
GFR SERPL CREATININE-BSD FRML MDRD: 25 ML/MIN/1.73SQ M
GLUCOSE SERPL-MCNC: 102 MG/DL (ref 65–140)
GLUCOSE SERPL-MCNC: 104 MG/DL (ref 65–140)
GLUCOSE SERPL-MCNC: 140 MG/DL (ref 65–140)
GLUCOSE SERPL-MCNC: 161 MG/DL (ref 65–140)
GLUCOSE SERPL-MCNC: 88 MG/DL (ref 65–140)
GLUCOSE UR STRIP-MCNC: NEGATIVE MG/DL
HGB UR QL STRIP.AUTO: ABNORMAL
KETONES UR STRIP-MCNC: NEGATIVE MG/DL
LEUKOCYTE ESTERASE UR QL STRIP: ABNORMAL
MICROALBUMIN UR-MCNC: 71.7 MG/L (ref 0–20)
MICROALBUMIN/CREAT 24H UR: 57 MG/G CREATININE (ref 0–30)
NITRITE UR QL STRIP: NEGATIVE
NON-SQ EPI CELLS URNS QL MICRO: ABNORMAL /HPF
OSMOLALITY UR: 444 MMOL/KG
PH UR STRIP.AUTO: 6 [PH] (ref 4.5–8)
POTASSIUM SERPL-SCNC: 3.6 MMOL/L (ref 3.5–5.3)
PROT UR STRIP-MCNC: ABNORMAL MG/DL
RBC #/AREA URNS AUTO: ABNORMAL /HPF
SODIUM 24H UR-SCNC: 22 MOL/L
SODIUM SERPL-SCNC: 136 MMOL/L (ref 136–145)
SP GR UR STRIP.AUTO: 1.01 (ref 1–1.03)
UROBILINOGEN UR QL STRIP.AUTO: 0.2 E.U./DL
WBC #/AREA URNS AUTO: ABNORMAL /HPF

## 2017-12-25 PROCEDURE — 84300 ASSAY OF URINE SODIUM: CPT | Performed by: INTERNAL MEDICINE

## 2017-12-25 PROCEDURE — 82948 REAGENT STRIP/BLOOD GLUCOSE: CPT

## 2017-12-25 PROCEDURE — 81001 URINALYSIS AUTO W/SCOPE: CPT | Performed by: INTERNAL MEDICINE

## 2017-12-25 PROCEDURE — 83935 ASSAY OF URINE OSMOLALITY: CPT | Performed by: INTERNAL MEDICINE

## 2017-12-25 PROCEDURE — 82436 ASSAY OF URINE CHLORIDE: CPT | Performed by: INTERNAL MEDICINE

## 2017-12-25 PROCEDURE — 80048 BASIC METABOLIC PNL TOTAL CA: CPT | Performed by: INTERNAL MEDICINE

## 2017-12-25 PROCEDURE — 82043 UR ALBUMIN QUANTITATIVE: CPT | Performed by: INTERNAL MEDICINE

## 2017-12-25 PROCEDURE — 82570 ASSAY OF URINE CREATININE: CPT | Performed by: INTERNAL MEDICINE

## 2017-12-25 PROCEDURE — 84166 PROTEIN E-PHORESIS/URINE/CSF: CPT | Performed by: INTERNAL MEDICINE

## 2017-12-25 RX ORDER — CEFUROXIME AXETIL 250 MG/1
250 TABLET ORAL EVERY 12 HOURS SCHEDULED
Status: DISCONTINUED | OUTPATIENT
Start: 2017-12-25 | End: 2017-12-25

## 2017-12-25 RX ORDER — CEFUROXIME AXETIL 250 MG/1
250 TABLET ORAL EVERY 24 HOURS
Status: DISCONTINUED | OUTPATIENT
Start: 2017-12-25 | End: 2017-12-29 | Stop reason: HOSPADM

## 2017-12-25 RX ADMIN — OXYBUTYNIN CHLORIDE 10 MG: 5 TABLET, EXTENDED RELEASE ORAL at 22:23

## 2017-12-25 RX ADMIN — DESMOPRESSIN ACETATE 40 MG: 0.2 TABLET ORAL at 08:07

## 2017-12-25 RX ADMIN — SODIUM CHLORIDE 500 ML: 0.9 INJECTION, SOLUTION INTRAVENOUS at 10:40

## 2017-12-25 RX ADMIN — INSULIN LISPRO 1 UNITS: 100 INJECTION, SOLUTION INTRAVENOUS; SUBCUTANEOUS at 12:08

## 2017-12-25 RX ADMIN — SERTRALINE HYDROCHLORIDE 50 MG: 50 TABLET ORAL at 08:07

## 2017-12-25 RX ADMIN — Medication 325 MG: at 08:07

## 2017-12-25 RX ADMIN — NYSTATIN 1 APPLICATION: 100000 POWDER TOPICAL at 08:08

## 2017-12-25 RX ADMIN — CEFTRIAXONE 1000 MG: 1 INJECTION, SOLUTION INTRAVENOUS at 00:13

## 2017-12-25 RX ADMIN — NYSTATIN 1 APPLICATION: 100000 POWDER TOPICAL at 22:24

## 2017-12-25 RX ADMIN — METOPROLOL SUCCINATE 50 MG: 50 TABLET, FILM COATED, EXTENDED RELEASE ORAL at 08:07

## 2017-12-25 RX ADMIN — NYSTATIN 1 APPLICATION: 100000 POWDER TOPICAL at 15:34

## 2017-12-25 RX ADMIN — ACETAMINOPHEN 650 MG: 325 TABLET ORAL at 00:13

## 2017-12-25 RX ADMIN — LEVOTHYROXINE SODIUM 50 MCG: 50 TABLET ORAL at 05:26

## 2017-12-25 RX ADMIN — CEFUROXIME AXETIL 250 MG: 250 TABLET ORAL at 15:33

## 2017-12-25 NOTE — PROGRESS NOTES
Progress Note Reese Toney 1945, 71 y o  female MRN: [id number]    Unit/Bed#: -01 Encounter: [id number]    Primary Care Provider: Aranza Matthews MD   Date and time admitted to hospital: 12/22/2017  7:31 PM        * Encephalopathy   Overview    Acute confusion present on admission likely infectious etiology given positive findings of UTI as well as component of metabolic dysfunction given acute kidney injury, continue hydration switch to Ceftin oral which needs to be dosed renally  Await PT recommendations     Solitary kidney   Overview    Status post nephrectomy, with acute kidney injury currently, appreciate Nephrology input     Thrombocytopenia ([facility] [facility] )   Overview    Not present on admission, VTE prophylaxis on hold     Diabetes mellitus ([facility] )   Overview    Well controlled, sliding scale if needed     Essential hypertension   Overview    Chronic, continue metoprolol, lisinopril and Lasix on hold due to renal function     MANJEET (acute kidney injury) ([facility] [address] )   Overview    In a solitary kidney with component of cardiorenal syndrome given that patient has history of CHF  Function unchanged, gentle bolus of fluid, appreciate Nephrology input         VTE Pharmacologic Prophylaxis:   Pharmacologic: Pharmacologic VTE Prophylaxis contraindicated due to Thrombocytopenia  Mechanical: Mechanical VTE prophylaxis in place  Patient Centered Rounds: I have performed bedside rounds with nursing staff today  Discussions with Specialists or Other Care Team Provider:  Nephrology  Education and Discussions with Family / Patient:  Patient, call placed to daughter  Time Spent for Care: 20 minutes  More than 50% of total time spent on counseling and coordination of care as described above      Current Length of Stay: 2 day(s)  Current Patient Status: Inpatient   Certification Statement: The patient will continue to require additional inpatient hospital stay due to Management of acute encephalopathy, acute kidney injury in a patient with solitary kidney    Discharge Plan:  Pending clearance by Nephrology and PT recommendations  Code Status: Level 1 - Full Code    Subjective:   No events overnight, nursing concerned with patient appearing dehydrated, she has been having intermittent fluid intake  Patient complains of fatigue, she denies any pain or dizziness    Objective:   Vitals:   Temp (24hrs), Av °F (37 2 °C), Min:97 9 °F (36 6 °C), Max:100 2 °F (37 9 °C)    HR:  [64-82] 82  Resp:  [16] 16  BP: (140-146)/(63-66) 140/63  SpO2:  [94 %-98 %] 98 %  Body mass index is 22 96 kg/m²  Input and Output Summary (last 24 hours): Intake/Output Summary (Last 24 hours) at 17 1235  Last data filed at 17 0501   Gross per 24 hour   Intake             1130 ml   Output                0 ml   Net             1130 ml       Physical Exam:     Physical Exam   Constitutional: She appears well-developed  No distress  Thin body habitus   HENT:   Head: Normocephalic and atraumatic  Missing dentition dry lips   Eyes: Conjunctivae and EOM are normal    Neck: Neck supple  No JVD present  Cardiovascular: Normal rate and regular rhythm  Pulmonary/Chest: Effort normal and breath sounds normal    Musculoskeletal: She exhibits no edema  Neurological: She is alert  She is disoriented  She displays no tremor  No cranial nerve deficit or sensory deficit  Coordination abnormal    Skin: Skin is warm and dry  No rash noted  She is not diaphoretic  No cyanosis  No pallor  Nails show no clubbing  Psychiatric: Her mood appears anxious  Her speech is delayed  She is slowed and withdrawn  Thought content is not paranoid and not delusional    Nursing note and vitals reviewed        Additional Data:   Labs:    Results from last 7 days  Lab Units 17  0520   WBC Thousand/uL 8 79   HEMOGLOBIN g/dL 10 9*   HEMATOCRIT % 34 0*   PLATELETS Thousands/uL 92*   NEUTROS PCT % 80*   LYMPHS PCT % 15   MONOS PCT % 4   EOS PCT % 0 Results from last 7 days  Lab Units 12/25/17  0511  12/23/17  0640   SODIUM mmol/L 136  < > 137   POTASSIUM mmol/L 3 6  < > 4 6   CHLORIDE mmol/L 107  < > 106   CO2 mmol/L 16*  < > 18*   BUN mg/dL 60*  < > 94*   CREATININE mg/dL 2 20*  < > 2 61*   CALCIUM mg/dL 8 3  < > 9 0   TOTAL PROTEIN g/dL  --   --  9 0*   BILIRUBIN TOTAL mg/dL  --   --  0 70   ALK PHOS U/L  --   --  301*   ALT U/L  --   --  98*   AST U/L  --   --  105*   GLUCOSE RANDOM mg/dL 104  < > 120   < > = values in this interval not displayed  Results from last 7 days  Lab Units 12/23/17  0628   INR  2 52*       * I Have Reviewed All Lab Data Listed Above  * Additional Pertinent Lab Tests Reviewed: All Labs Within Last 24 Hours Reviewed    Imaging:    Imaging Reports Reviewed Today Include:  None new    Cultures:   Blood Culture:   Lab Results   Component Value Date    BLOODCX No Growth at 24 hrs  12/23/2017     Urine Culture:   Lab Results   Component Value Date    URINECX >100,000 cfu/ml Escherichia coli (A) 12/22/2017    URINECX 50,000-59,000 cfu/ml Escherichia coli (A) 12/22/2017     Sputum Culture: No components found for: SPUTUMCX  Wound Culture: No results found for: WOUNDCULT    Last 24 Hours Medication List:     atorvastatin 40 mg Oral Daily   cefuroxime 250 mg Oral Q24H   ferrous sulfate 325 mg Oral Daily With Breakfast   insulin lispro 1-5 Units Subcutaneous TID AC   insulin lispro 1-5 Units Subcutaneous HS   levothyroxine 50 mcg Oral Early Morning   metoprolol succinate 50 mg Oral Daily   nystatin 1 application Topical TID   oxybutynin 10 mg Oral HS   sertraline 50 mg Oral Daily   sodium chloride 500 mL Intravenous Once        Today, Patient Was Seen By: Kimberli Singh MD    ** Please Note: Dragon 360 Dictation voice to text software may have been used in the creation of this document   **

## 2017-12-25 NOTE — PROGRESS NOTES
NEPHROLOGY PROGRESS NOTE    Patient: Hector Preciado               Sex: female          DOA: 12/22/2017  7:31 PM   YOB: 1945        Age:  71 y o         LOS:  LOS: 2 days       HPI     Admitted with confusion    SUBJECTIVE     Feeling well denies any complaint    Not sure why she is here    CURRENT MEDICATIONS       Current Facility-Administered Medications:     acetaminophen (TYLENOL) tablet 650 mg, 650 mg, Oral, Q6H PRN, GAIL Rodriguez-C, 650 mg at 12/25/17 0013    atorvastatin (LIPITOR) tablet 40 mg, 40 mg, Oral, Daily, Kymeline Darryl PA-C, 40 mg at 12/25/17 7044    cefTRIAXone (ROCEPHIN) IVPB (premix) 1,000 mg, 1,000 mg, Intravenous, Q24H, Gregory Lee PA-C, Stopped at 12/25/17 0100    diphenoxylate-atropine (LOMOTIL) 2 5-0 025 mg per tablet 1 tablet, 1 tablet, Oral, 4x Daily PRN, CHANDU Anaya    ferrous sulfate tablet 325 mg, 325 mg, Oral, Daily With Breakfast, Hazeline Friendly, PA-C, 325 mg at 12/25/17 0807    insulin lispro (HumaLOG) 100 units/mL subcutaneous injection 1-5 Units, 1-5 Units, Subcutaneous, TID AC, 1 Units at 12/24/17 1219 **AND** Fingerstick Glucose (POCT), , , TID AC, Mariah Andrews PA-C    insulin lispro (HumaLOG) 100 units/mL subcutaneous injection 1-5 Units, 1-5 Units, Subcutaneous, HS, Mone Lee PA-C    levothyroxine tablet 50 mcg, 50 mcg, Oral, Early Morning, Kymeline Friendly, PA-C, 50 mcg at 12/25/17 4473    metoprolol (LOPRESSOR) injection 5 mg, 5 mg, Intravenous, Q6H PRN, Kymeline Darryl PA-C, 5 mg at 12/23/17 0400    metoprolol succinate (TOPROL-XL) 24 hr tablet 50 mg, 50 mg, Oral, Daily, GAIL Mckeon-C, 50 mg at 12/25/17 5335    nystatin (MYCOSTATIN) powder 1 application, 1 application, Topical, TID, Mariah FriendlyISAAC, 1 application at 03/55/74 6070    ondansetron [facility]) injection 4 mg, 4 mg, Intravenous, Q6H PRN, Hazeline FriendlyISAAC    oxybutynin (DITROPAN-XL) 24 hr tablet 10 mg, 10 mg, Oral, HS, Gregorymar Lee PA-C, 10 mg at 12/24/17 2226    sertraline (ZOLOFT) tablet 50 mg, 50 mg, Oral, Daily, Emogene Angy Lee PA-C, 50 mg at 12/25/17 0807    sodium chloride 0 9 % bolus 500 mL, 500 mL, Intravenous, Once, Alessandra Puente MD    OBJECTIVE     Current Weight: Weight - Scale: 66 5 kg (146 lb 9 7 oz)  Vitals:    12/25/17 0746   BP: 140/63   Pulse: 82   Resp: 16   Temp: 97 9 °F (36 6 °C)   SpO2: 98%       Intake/Output Summary (Last 24 hours) at 12/25/17 1041  Last data filed at 12/25/17 0501   Gross per 24 hour   Intake          1436 25 ml   Output                0 ml   Net          1436 25 ml       PHYSICAL EXAMINATION     Physical Exam:   Eyes:  Pupils equally react to light  ENT:  Moist tongue  Gen:  Not in any acute distress  Neck:  Supple no JVD  Chest:  Clear no rales no rhonchi  Cor:  S1-S2 normal no S3 no S4  Abdomen:  Soft nontender positive bowel sounds  Ext:  No swelling  Neuro:  Awake and confused  Skin:  Decreased skin turgor      LAB RESULTS       Results from last 7 days  Lab Units 12/25/17  0511 12/24/17  0520 12/23/17  0640 12/23/17  0628 12/22/17  2354   WBC Thousand/uL  --  8 79  --  7 46 7 61   HEMOGLOBIN g/dL  --  10 9*  --  11 5 12 2   HEMATOCRIT %  --  34 0*  --  36 1 37 2   PLATELETS Thousands/uL  --  92*  --  98* 98*   SODIUM mmol/L 136 138 137  --  133*   POTASSIUM mmol/L 3 6 3 8 4 6  --  5 8*   CHLORIDE mmol/L 107 107 106  --  102   CO2 mmol/L 16* 17* 18*  --  17*   BUN mg/dL 60* 67* 94*  --  113*   CREATININE mg/dL 2 20* 2 11* 2 61*  --  3 09*   CALCIUM mg/dL 8 3 8 4 9 0  --  9 4   MAGNESIUM mg/dL  --   --  2 0  --   --    PHOSPHORUS mg/dL  --  3 2  --   --   --    ALBUMIN g/dL  --   --  3 2*  --   --    TOTAL PROTEIN g/dL  --   --  9 0*  --   --    GLUCOSE RANDOM mg/dL 104 131 120  --  139       RADIOLOGY RESULTS      Results for orders placed during the hospital encounter of 07/13/17   XR chest 1 view portable    Narrative CHEST     INDICATION:  Shortness of breath  Elevated INR      COMPARISON:  Chest film 9/1/2005 and report of CT chest also from that date    VIEWS:   AP frontal    IMAGES:  1    FINDINGS:         The cardiac silhouette is enlarged  Prominent, indistinct pulmonary vasculature and faint reticular interstitial densities in the lung bases consistent with element of CHF  Mild azygous distention  No discernible pleural effusions or pneumothorax  Visualized osseous structures appear within normal limits for the patient's age  Impression Cardiomegaly with mild CHF  Workstation performed: [id number]       No results found for this or any previous visit  No results found for this or any previous visit  No results found for this or any previous visit  No results found for this or any previous visit  No results found for this or any previous visit  PLAN / RECOMMENDATIONS      Acute kidney injury:  She does have 1 functioning kidney with left nephrectomy  She does have cardiomyopathy with EF of 25-30% so she likely to have cardiorenal syndrome  I will order urine electrolytes to assess volume status  Further workup is in progress    Possible CKD:  Again single functioning kidney  Workup in progress  May need IV hydration    Will continue to follow    Constance Sutton MD  Nephrology  12/25/2017        Portions of the record may have been created with voice recognition software  Occasional wrong word or "sound a like" substitutions may have occurred due to the inherent limitations of voice recognition software  Read the chart carefully and recognize, using context, where substitutions have occurred

## 2017-12-26 LAB
ANION GAP SERPL CALCULATED.3IONS-SCNC: 14 MMOL/L (ref 4–13)
BUN SERPL-MCNC: 61 MG/DL (ref 5–25)
CALCIUM SERPL-MCNC: 8.5 MG/DL (ref 8.3–10.1)
CHLORIDE SERPL-SCNC: 105 MMOL/L (ref 100–108)
CO2 SERPL-SCNC: 15 MMOL/L (ref 21–32)
CREAT SERPL-MCNC: 2.34 MG/DL (ref 0.6–1.3)
GFR SERPL CREATININE-BSD FRML MDRD: 23 ML/MIN/1.73SQ M
GLUCOSE SERPL-MCNC: 106 MG/DL (ref 65–140)
GLUCOSE SERPL-MCNC: 127 MG/DL (ref 65–140)
GLUCOSE SERPL-MCNC: 92 MG/DL (ref 65–140)
GLUCOSE SERPL-MCNC: 96 MG/DL (ref 65–140)
GLUCOSE SERPL-MCNC: 98 MG/DL (ref 65–140)
POTASSIUM SERPL-SCNC: 3.7 MMOL/L (ref 3.5–5.3)
SODIUM SERPL-SCNC: 134 MMOL/L (ref 136–145)
URATE SERPL-MCNC: 7.8 MG/DL (ref 2–6.8)

## 2017-12-26 PROCEDURE — 80048 BASIC METABOLIC PNL TOTAL CA: CPT | Performed by: INTERNAL MEDICINE

## 2017-12-26 PROCEDURE — 82948 REAGENT STRIP/BLOOD GLUCOSE: CPT

## 2017-12-26 PROCEDURE — 84550 ASSAY OF BLOOD/URIC ACID: CPT | Performed by: INTERNAL MEDICINE

## 2017-12-26 RX ORDER — SODIUM CHLORIDE 9 MG/ML
75 INJECTION, SOLUTION INTRAVENOUS CONTINUOUS
Status: DISCONTINUED | OUTPATIENT
Start: 2017-12-26 | End: 2017-12-29

## 2017-12-26 RX ADMIN — SODIUM CHLORIDE 75 ML/HR: 0.9 INJECTION, SOLUTION INTRAVENOUS at 11:40

## 2017-12-26 RX ADMIN — CEFUROXIME AXETIL 250 MG: 250 TABLET ORAL at 12:37

## 2017-12-26 RX ADMIN — OXYBUTYNIN CHLORIDE 10 MG: 5 TABLET, EXTENDED RELEASE ORAL at 23:26

## 2017-12-26 RX ADMIN — Medication 325 MG: at 09:50

## 2017-12-26 RX ADMIN — DESMOPRESSIN ACETATE 40 MG: 0.2 TABLET ORAL at 09:50

## 2017-12-26 RX ADMIN — NYSTATIN 1 APPLICATION: 100000 POWDER TOPICAL at 15:56

## 2017-12-26 RX ADMIN — SERTRALINE HYDROCHLORIDE 50 MG: 50 TABLET ORAL at 09:50

## 2017-12-26 RX ADMIN — NYSTATIN 1 APPLICATION: 100000 POWDER TOPICAL at 09:49

## 2017-12-26 RX ADMIN — NYSTATIN 1 APPLICATION: 100000 POWDER TOPICAL at 23:29

## 2017-12-26 RX ADMIN — LEVOTHYROXINE SODIUM 50 MCG: 50 TABLET ORAL at 05:14

## 2017-12-26 RX ADMIN — METOPROLOL SUCCINATE 50 MG: 50 TABLET, FILM COATED, EXTENDED RELEASE ORAL at 09:50

## 2017-12-26 NOTE — PLAN OF CARE
DISCHARGE PLANNING     Discharge to home or other facility with appropriate resources Progressing        INFECTION - ADULT     Absence or prevention of progression during hospitalization Progressing        Knowledge Deficit     Patient/family/caregiver demonstrates understanding of disease process, treatment plan, medications, and discharge instructions Progressing        METABOLIC, FLUID AND ELECTROLYTES - ADULT     Electrolytes maintained within normal limits Progressing     Fluid balance maintained Progressing     Glucose maintained within target range Progressing        Potential for Falls     Patient will remain free of falls Progressing        Prexisting or High Potential for Compromised Skin Integrity     Skin integrity is maintained or improved Progressing

## 2017-12-26 NOTE — PROGRESS NOTES
NEPHROLOGY PROGRESS NOTE    Patient: Fatimah Robison               Sex: female          DOA: 12/22/2017  7:31 PM   YOB: 1945        Age:  71 y o         LOS:  LOS: 3 days       HPI     Patient with acute kidney injury    SUBJECTIVE     Feeling well denies any complaint    CURRENT MEDICATIONS       Current Facility-Administered Medications:     acetaminophen (TYLENOL) tablet 650 mg, 650 mg, Oral, Q6H PRN, Cristian Ward PA-C, 650 mg at 12/25/17 0013    atorvastatin (LIPITOR) tablet 40 mg, 40 mg, Oral, Daily, GAIL Cedeño-C, 40 mg at 12/26/17 6114    cefuroxime (CEFTIN) tablet 250 mg, 250 mg, Oral, Q24H, Iglesia Gupta MD, 250 mg at 12/25/17 1533    diphenoxylate-atropine (LOMOTIL) 2 5-0 025 mg per tablet 1 tablet, 1 tablet, Oral, 4x Daily PRN, CHANDU Anaya    ferrous sulfate tablet 325 mg, 325 mg, Oral, Daily With Breakfast, Cristian Ward PA-C, 325 mg at 12/26/17 0950    insulin lispro (HumaLOG) 100 units/mL subcutaneous injection 1-5 Units, 1-5 Units, Subcutaneous, TID AC, 1 Units at 12/25/17 1208 **AND** Fingerstick Glucose (POCT), , , TID AC, Cristian Ward PA-C    insulin lispro (HumaLOG) 100 units/mL subcutaneous injection 1-5 Units, 1-5 Units, Subcutaneous, HS, Mone Lee PA-C    levothyroxine tablet 50 mcg, 50 mcg, Oral, Early Morning, Cristian Ward PA-C, 50 mcg at 12/26/17 0514    metoprolol (LOPRESSOR) injection 5 mg, 5 mg, Intravenous, Q6H PRN, Cristian Ward PA-C, 5 mg at 12/23/17 0400    metoprolol succinate (TOPROL-XL) 24 hr tablet 50 mg, 50 mg, Oral, Daily, Peggy Lee PA-C, 50 mg at 12/26/17 0950    nystatin (MYCOSTATIN) powder 1 application, 1 application, Topical, TID, GAIL LenzWENDY, 1 application at 58/71/70 0949    ondansetron [facility] injection 4 mg, 4 mg, Intravenous, Q6H PRN, Cristian Ward PA-C    oxybutynin (DITROPAN-XL) 24 hr tablet 10 mg, 10 mg, Oral, HS, Cristian Ward PA-C, 10 mg at 12/25/17 0881    sertraline (ZOLOFT) tablet 50 mg, 50 mg, Oral, Daily, Baron Adry Lee PA-C, 50 mg at 12/26/17 0950    sodium chloride 0 9 % infusion, 75 mL/hr, Intravenous, Continuous, Patricia Mendoza MD    OBJECTIVE     Current Weight: Weight - Scale: 66 9 kg (147 lb 7 8 oz)  Vitals:    12/25/17 2300   BP: 138/64   Pulse: 74   Resp: 18   Temp: 99 9 °F (37 7 °C)   SpO2: 95%       Intake/Output Summary (Last 24 hours) at 12/26/17 1028  Last data filed at 12/25/17 2201   Gross per 24 hour   Intake              960 ml   Output              250 ml   Net              710 ml       PHYSICAL EXAMINATION     Physical Exam:   Eyes:  Pupils equally react to light  ENT:  Dry tongue  Gen:  Not in any acute distress  Neck:  Supple no JVD  Chest:  Clear no rales no rhonchi  Cor:  S1-S2 normal no S3 no S4  Abdomen:  Soft nontender positive bowel sounds  Ext:  No leg swelling  Neuro:  Awake and alert  Skin:  Good skin turgor      LAB RESULTS       Results from last 7 days  Lab Units 12/26/17  0449 12/25/17  0511 12/24/17  0520 12/23/17  0640 12/23/17  0628 12/22/17  2354   WBC Thousand/uL  --   --  8 79  --  7 46 7 61   HEMOGLOBIN g/dL  --   --  10 9*  --  11 5 12 2   HEMATOCRIT %  --   --  34 0*  --  36 1 37 2   PLATELETS Thousands/uL  --   --  92*  --  98* 98*   SODIUM mmol/L 134* 136 138 137  --  133*   POTASSIUM mmol/L 3 7 3 6 3 8 4 6  --  5 8*   CHLORIDE mmol/L 105 107 107 106  --  102   CO2 mmol/L 15* 16* 17* 18*  --  17*   BUN mg/dL 61* 60* 67* 94*  --  113*   CREATININE mg/dL 2 34* 2 20* 2 11* 2 61*  --  3 09*   CALCIUM mg/dL 8 5 8 3 8 4 9 0  --  9 4   MAGNESIUM mg/dL  --   --   --  2 0  --   --    PHOSPHORUS mg/dL  --   --  3 2  --   --   --    ALBUMIN g/dL  --   --   --  3 2*  --   --    TOTAL PROTEIN g/dL  --   --   --  9 0*  --   --    GLUCOSE RANDOM mg/dL 96 104 131 120  --  139       RADIOLOGY RESULTS      Results for orders placed during the hospital encounter of 07/13/17   XR chest 1 view portable    Narrative CHEST     INDICATION:  Shortness of breath  Elevated INR  COMPARISON:  Chest film 9/1/2005 and report of CT chest also from that date    VIEWS:   AP frontal    IMAGES:  1    FINDINGS:         The cardiac silhouette is enlarged  Prominent, indistinct pulmonary vasculature and faint reticular interstitial densities in the lung bases consistent with element of CHF  Mild azygous distention  No discernible pleural effusions or pneumothorax  Visualized osseous structures appear within normal limits for the patient's age  Impression Cardiomegaly with mild CHF  Workstation performed: [id number]       No results found for this or any previous visit  No results found for this or any previous visit  No results found for this or any previous visit  No results found for this or any previous visit  No results found for this or any previous visit  PLAN / RECOMMENDATIONS      Acute kidney injury:  Getting worse  Urinary study suggest volume depletion  I will start gentle IV hydration and monitor closely    Confusion:  Seems to be baseline with possible dementia    Single functioning kidney:  Status post nephrectomy    High blood pressure:  Stable    Will continue to follow    Tomás Walton MD  Nephrology  12/26/2017        Portions of the record may have been created with voice recognition software  Occasional wrong word or "sound a like" substitutions may have occurred due to the inherent limitations of voice recognition software  Read the chart carefully and recognize, using context, where substitutions have occurred

## 2017-12-26 NOTE — PHYSICAL THERAPY NOTE
PHYSICAL THERAPY CANCELLATION NOTE      Patient Name: Gisel Cota  RZAZP'[id number] Date: 12/26/2017    Pt's RN cleared pt for PT session at this time  PT approached pt supine in bed  Pt refusing PT at this time reporting she just wants to rest in bed  PT will continue to follow and will re-address as schedule permits      Ale Skinner, PT

## 2017-12-26 NOTE — PROGRESS NOTES
[facility] Internal Medicine Progress Note  Patient: Efraín Moctezuma 71 y o  female   MRN: [id number]  PCP: Agatha Goodman MD  Unit/Bed#: -01 Encounter: [id number]  Date Of Visit: 12/26/17    Assessment:    Principal Problem:    Encephalopathy  Active Problems:    MANJEET (acute kidney injury) ([facility] [address] )    Essential hypertension    Diabetes mellitus ([facility] [address] )    Thrombocytopenia ([facility] [address] )    UTI (urinary tract infection)    Solitary kidney      Plan:    # Acute encephalopathy - secondary to toxic metabolic encephalopathy d/t uti  - baseline cognitive impairment d/t hx of CVA thus likely vascular dementia as confirmed per her dtr  Never been formally dx with dementia  Will need neuropsych eval as outpatient  - resolving, dtr reports of some improvement compared to presentation     # MANJEET - d/t pre-renal   - w/ solitary kidney  - on IVF hydration  - Avoid nephrotoxins thus diuretics and ace-I on hold  - Nephrology on board  - s/p renal US    # Chronic systolic HF, ef of 26-45%  - no sign of exacerbation  - close monitor of resp status given on IVF d/t above  Presently remains o2 independent  - on toprol xl, lisinopril on hold    # UTI - e-coli, on ceftin (renally dosed)    # DM II - cont ISS    # Thrombocytopenia - secondary to acute infxn process  - no sign of acute blood loss  - cont to monitor    VTE Pharmacologic Prophylaxis:   Pharmacologic: Pharmacologic VTE Prophylaxis contraindicated due to thrombocytopenia  Mechanical VTE Prophylaxis in Place: Yes    Patient Centered Rounds: I have performed bedside rounds with nursing staff today  Discussions with Specialists or Other Care Team Provider:     Education and Discussions with Family / Patient: Patient and her dtr at length    Time Spent for Care: 45 minutes  More than 50% of total time spent on counseling and coordination of care as described above      Current Length of Stay: 3 day(s)    Current Patient Status: Inpatient   Certification Statement: The patient will continue to require additional inpatient hospital stay due to MANJEET requiring IVF    Discharge Plan / Estimated Discharge Date: Refused PT eval today    Code Status: Level 1 - Full Code      Subjective:   Sitting up in bed  AAOx2  No complaints  Had large diarrhea this am, c-diff neg  Objective:     Vitals:   Temp (24hrs), Av °F (37 8 °C), Min:99 9 °F (37 7 °C), Max:100 1 °F (37 8 °C)    HR:  [70-75] 75  Resp:  [18] 18  BP: (124-138)/(60-64) 124/60  SpO2:  [95 %-98 %] 98 %  Body mass index is 23 1 kg/m²  Input and Output Summary (last 24 hours): Intake/Output Summary (Last 24 hours) at 17 1527  Last data filed at 17 2201   Gross per 24 hour   Intake              220 ml   Output              250 ml   Net              -30 ml       Physical Exam:     Physical Exam   Constitutional: She appears well-developed and well-nourished  Neck: Neck supple  Cardiovascular: Normal rate, regular rhythm, normal heart sounds and intact distal pulses  Exam reveals no gallop and no friction rub  No murmur heard  Pulmonary/Chest: Effort normal and breath sounds normal  No respiratory distress  She has no wheezes  She has no rales  She exhibits no tenderness  Abdominal: Soft  Bowel sounds are normal  She exhibits no distension and no mass  There is no tenderness  There is no rebound and no guarding  Musculoskeletal: Normal range of motion  She exhibits no edema, tenderness or deformity  Neurological: She is alert  She has normal reflexes  She displays normal reflexes  No cranial nerve deficit  She exhibits normal muscle tone  Coordination normal    AAOx 2   Skin: Skin is warm and dry  Chronic skin changes consistent with chronic venous stasis   Psychiatric: She has a normal mood and affect   Her behavior is normal    Baseline cognitive impairment         Additional Data:     Labs:      Results from last 7 days  Lab Units 17  0520   WBC Thousand/uL 8 79   HEMOGLOBIN g/dL 10 9*   HEMATOCRIT % 34 0*   PLATELETS Thousands/uL 92*   NEUTROS PCT % 80*   LYMPHS PCT % 15   MONOS PCT % 4   EOS PCT % 0       Results from last 7 days  Lab Units 12/26/17  0449  12/23/17  0640   SODIUM mmol/L 134*  < > 137   POTASSIUM mmol/L 3 7  < > 4 6   CHLORIDE mmol/L 105  < > 106   CO2 mmol/L 15*  < > 18*   BUN mg/dL 61*  < > 94*   CREATININE mg/dL 2 34*  < > 2 61*   CALCIUM mg/dL 8 5  < > 9 0   TOTAL PROTEIN g/dL  --   --  9 0*   BILIRUBIN TOTAL mg/dL  --   --  0 70   ALK PHOS U/L  --   --  301*   ALT U/L  --   --  98*   AST U/L  --   --  105*   GLUCOSE RANDOM mg/dL 96  < > 120   < > = values in this interval not displayed  Results from last 7 days  Lab Units 12/23/17  0628   INR  2 52*       * I Have Reviewed All Lab Data Listed Above  * Additional Pertinent Lab Tests Reviewed: All Labs Within Last 24 Hours Reviewed    Imaging:    Imaging Reports Reviewed Today Include:   Imaging Personally Reviewed by Myself Includes:     Recent Cultures (last 7 days):       Results from last 7 days  Lab Units 12/23/17  0905 12/23/17  0638 12/22/17 2048   BLOOD CULTURE  No Growth at 72 hrs   --   --    URINE CULTURE   --   --    >100,000 cfu/ml Escherichia coli*  50,000-59,000 cfu/ml Escherichia coli*   C DIFF TOXIN B   --  NEGATIVE for C difficle toxin by PCR    --        Last 24 Hours Medication List:     atorvastatin 40 mg Oral Daily   cefuroxime 250 mg Oral Q24H   ferrous sulfate 325 mg Oral Daily With Breakfast   insulin lispro 1-5 Units Subcutaneous TID AC   insulin lispro 1-5 Units Subcutaneous HS   levothyroxine 50 mcg Oral Early Morning   metoprolol succinate 50 mg Oral Daily   nystatin 1 application Topical TID   oxybutynin 10 mg Oral HS   sertraline 50 mg Oral Daily        Today, Patient Was Seen By: Kayley Winn DO    ** Please Note: This note has been constructed using a voice recognition system   **

## 2017-12-27 LAB
ANA HOMOGEN SER QL IF: NORMAL
ANA HOMOGEN TITR SER: NORMAL {TITER}
ANION GAP SERPL CALCULATED.3IONS-SCNC: 13 MMOL/L (ref 4–13)
BUN SERPL-MCNC: 57 MG/DL (ref 5–25)
CALCIUM SERPL-MCNC: 7.8 MG/DL (ref 8.3–10.1)
CHLORIDE SERPL-SCNC: 109 MMOL/L (ref 100–108)
CO2 SERPL-SCNC: 15 MMOL/L (ref 21–32)
CREAT SERPL-MCNC: 2.1 MG/DL (ref 0.6–1.3)
DSDNA AB SER-ACNC: 1 IU/ML (ref 0–9)
ERYTHROCYTE [DISTWIDTH] IN BLOOD BY AUTOMATED COUNT: 14.1 % (ref 11.6–15.1)
GFR SERPL CREATININE-BSD FRML MDRD: 27 ML/MIN/1.73SQ M
GLUCOSE SERPL-MCNC: 101 MG/DL (ref 65–140)
GLUCOSE SERPL-MCNC: 138 MG/DL (ref 65–140)
GLUCOSE SERPL-MCNC: 92 MG/DL (ref 65–140)
GLUCOSE SERPL-MCNC: 95 MG/DL (ref 65–140)
GLUCOSE SERPL-MCNC: 96 MG/DL (ref 65–140)
HCT VFR BLD AUTO: 27.8 % (ref 34.8–46.1)
HGB BLD-MCNC: 9.2 G/DL (ref 11.5–15.4)
MCH RBC QN AUTO: 30.6 PG (ref 26.8–34.3)
MCHC RBC AUTO-ENTMCNC: 33.1 G/DL (ref 31.4–37.4)
MCV RBC AUTO: 92 FL (ref 82–98)
PLATELET # BLD AUTO: 87 THOUSANDS/UL (ref 149–390)
PMV BLD AUTO: 12.6 FL (ref 8.9–12.7)
POTASSIUM SERPL-SCNC: 3.6 MMOL/L (ref 3.5–5.3)
RBC # BLD AUTO: 3.01 MILLION/UL (ref 3.81–5.12)
RYE IGE QN: POSITIVE
SODIUM SERPL-SCNC: 137 MMOL/L (ref 136–145)
WBC # BLD AUTO: 11.67 THOUSAND/UL (ref 4.31–10.16)

## 2017-12-27 PROCEDURE — G8979 MOBILITY GOAL STATUS: HCPCS

## 2017-12-27 PROCEDURE — 82948 REAGENT STRIP/BLOOD GLUCOSE: CPT

## 2017-12-27 PROCEDURE — 97163 PT EVAL HIGH COMPLEX 45 MIN: CPT

## 2017-12-27 PROCEDURE — 85027 COMPLETE CBC AUTOMATED: CPT | Performed by: INTERNAL MEDICINE

## 2017-12-27 PROCEDURE — 80048 BASIC METABOLIC PNL TOTAL CA: CPT | Performed by: INTERNAL MEDICINE

## 2017-12-27 PROCEDURE — G8978 MOBILITY CURRENT STATUS: HCPCS

## 2017-12-27 PROCEDURE — 97530 THERAPEUTIC ACTIVITIES: CPT

## 2017-12-27 RX ADMIN — DESMOPRESSIN ACETATE 40 MG: 0.2 TABLET ORAL at 10:14

## 2017-12-27 RX ADMIN — LEVOTHYROXINE SODIUM 50 MCG: 50 TABLET ORAL at 06:11

## 2017-12-27 RX ADMIN — NYSTATIN 1 APPLICATION: 100000 POWDER TOPICAL at 20:44

## 2017-12-27 RX ADMIN — SERTRALINE HYDROCHLORIDE 50 MG: 50 TABLET ORAL at 10:14

## 2017-12-27 RX ADMIN — SODIUM CHLORIDE 75 ML/HR: 0.9 INJECTION, SOLUTION INTRAVENOUS at 00:35

## 2017-12-27 RX ADMIN — METOPROLOL SUCCINATE 50 MG: 50 TABLET, FILM COATED, EXTENDED RELEASE ORAL at 10:14

## 2017-12-27 RX ADMIN — NYSTATIN 1 APPLICATION: 100000 POWDER TOPICAL at 10:14

## 2017-12-27 RX ADMIN — Medication 325 MG: at 10:14

## 2017-12-27 RX ADMIN — OXYBUTYNIN CHLORIDE 10 MG: 5 TABLET, EXTENDED RELEASE ORAL at 23:06

## 2017-12-27 NOTE — PHYSICAL THERAPY NOTE
Physical Therapy Evaluation    Patient's Name: Tiffanie Muñoz    Admitting Diagnosis  UTI (urinary tract infection) [N39 0]  MANJEET (acute kidney injury) ([facility] [address] ) [N17 9]    Problem List  Patient Active Problem List   Diagnosis    MANJEET (acute kidney injury) ([facility] )    Normocytic anemia    Essential hypertension    Diabetes mellitus ([facility] )    [facility]    UTI (urinary tract infection)    Encephalopathy    Solitary kidney       Past Medical History  Past Medical History:   Diagnosis Date    Cancer [facility])     Cardiac disease     CHF (congestive heart failure) (HCC)     Chronic systolic heart failure (HCC)     Diabetes mellitus ([facility] )     Hypertension     Renal disorder     Stroke [facility])        Past Surgical History  Past Surgical History:   Procedure Laterality Date    CARDIAC SURGERY      CORONARY ANGIOPLASTY WITH STENT PLACEMENT      KIDNEY SURGERY      MASTECTOMY          12/27/17 9221   Note Type   Note type Eval/Treat   Pain Assessment   Pain Assessment No/denies pain   Pain Score No Pain   Home Living   Type of Home House   Home Layout Multi-level;Performs ADLs on one level; Able to live on main level with bedroom/bathroom  (1st floor setup; ~10 CODEY w/ HR)   Bathroom Shower/Tub Tub/shower unit   [address] [facility]  (reports occasional use of RW)   Prior Function   Level of [place] Independent with ADLs and functional mobility  (pt reporting she is independent w/ all ADLs)   Lives With Daughter;Family  (dtr works during the day)   [place] [address] Help From Family   IADLs Needs assistance   Falls in the last 6 months 0   Restrictions/Precautions   Weight Bearing Precautions Per Order No   Other Precautions Cognitive; Chair Alarm; Bed Alarm;Limb alert;Multiple lines  (R UE limb alert)   General   Family/Caregiver Present No   Cognition   Overall Cognitive Status Impaired   Arousal/Participation Alert   Orientation Level Oriented to person;Oriented to place; Disoriented to time;Disoriented to situation  (knew December, did not recall year)   Memory Decreased recall of biographical information;Decreased recall of recent events   Following Commands Follows one step commands without difficulty   RUE Assessment   RUE Assessment WFL  (AROM)   LUE Assessment   LUE Assessment WFL  (AROM)   RLE Assessment   RLE Assessment WFL   Strength RLE   R Hip Flexion 4-/5   R Knee Extension 4/5   LLE Assessment   LLE Assessment WFL   Strength LLE   L Hip Flexion 4-/5   L Knee Extension 4/5   Coordination   Movements are Fluid and Coordinated 0   Sensation ((+) N/T reported in R fingertips)   Light Touch   RLE Light Touch Grossly intact   LLE Light Touch Grossly intact   Bed Mobility   Rolling L 4  Minimal assistance   Additional items Assist x 1; Increased time required;Verbal cues   Supine to Sit 3  Moderate assistance   Additional items Assist x 1;HOB elevated; Bedrails; Increased time required;Verbal cues   Transfers   Sit to Stand 3  Moderate assistance   Additional items Assist x 1; Increased time required;Verbal cues   Stand to Sit 3  Moderate assistance   Additional items Assist x 1;Verbal cues   Ambulation/Elevation   Gait pattern Forward Flexion;Decreased foot clearance;Shuffling; Short stride; Excessively slow   Gait Assistance 3  Moderate assist   Additional items Assist x 1;Verbal cues; Tactile cues   Assistive Device Rolling walker   Distance 20' x2   Balance   Static Sitting Fair   Dynamic Sitting Fair -   Static Standing Fair -   Dynamic Standing Poor +   Ambulatory Poor +   Endurance Deficit   Endurance Deficit Yes   Activity Tolerance   Activity Tolerance Patient limited by fatigue   Nurse Made Aware Yes, ROMELIA Marrero verbalized pt appropriate for PT session, made aware of outcomes   Assessment   Prognosis Good   Problem List Decreased strength;Decreased endurance; Impaired balance;Decreased mobility; Decreased cognition;Decreased safety awareness; Impaired sensation Assessment Pt is 71 y o  female seen for PT evaluation on 12/27/2017 s/p admit to [facility] on 12/22/2017 w/ Encephalopathy 2/2 toxic metabolic encephalopathy due to UTI  PT consulted to assess pt's functional mobility and d/c needs  Order placed for PT eval and tx, w/ up w/ A order  Comorbidities affecting pt's physical performance at time of assessment include: MANJEET, essential HTN, DM, thrombocytopenia, UTI, solitary kidney  Question reliability of PLOF and social hx info obtained during IE as pt appears to have increased confusion and is poor historian  PTA, pt was ambulates household distances, has ~10 CODEY, lives w/ dtr and family in multi level home and has a first floor setup; pt reporting she was independent w/ ADLs PTA  Personal factors affecting pt at time of IE include: ambulating w/ assistive device, stairs to enter home, inability to navigate level surfaces w/o external assistance, decreased initiation and engagement, limited insight into impairments, inability to perform IADLs and inability to perform ADLs  Please find objective findings from PT assessment regarding body systems outlined above with impairments and limitations including weakness, impaired balance, decreased endurance, decreased activity tolerance, decreased functional mobility tolerance, decreased safety awareness, fall risk and decreased cognition, as well as mobility assessment (need for cueing for mobility technique)  The following objective measures performed on IE also reveal limitations: Barthel Index: 35/100 and Modified [place]: 4 (moderate/severe disability)  Pt to benefit from continued PT tx to address deficits as defined above and maximize level of functional independent mobility and consistency  From PT/mobility standpoint, recommendation at time of d/c would be STR pending progress in order to facilitate return to PLOF     Barriers to Discharge Inaccessible home environment   Goals   Patient Goals none expressed by patient   STG Expiration Date 01/06/18   Short Term Goal #1 In 7-10 days: Increase bilateral LE strength 1/2 grade to facilitate independent mobility, Perform all bed mobility tasks independently to decrease caregiver burden, Perform all transfers with distant S to improve independence, Ambulate > 100 ft  with RW with distant S w/o LOB and w/ normalized gait pattern 100% of the time, Navigate 10 stairs with close S with unilateral handrail to facilitate return to previous living environment, Increase all balance 1/2 grade to decrease risk for falls, Complete exercise program independently and Tolerate 4 hr OOB to faciliate upright tolerance   Treatment Day 0   Plan   Treatment/Interventions Functional transfer training;LE strengthening/ROM; Endurance training;Elevations; Therapeutic exercise;Cognitive reorientation;Patient/family training;Equipment eval/education; Bed mobility;Gait training;Spoke to nursing;OT   PT Frequency 5x/wk   Recommendation   Recommendation Short-term skilled PT   Equipment Recommended Walker   PT - OK to Discharge Yes  (when medically cleared, if to STR)   Modified [place] Scale   Modified [place] Scale 4   Barthel Index   Feeding 10   Bathing 0   Grooming Score 0   Dressing Score 5   Bladder Score 0   Bowels Score 5   Toilet Use Score 5   Transfers (Bed/Chair) Score 10   Mobility (Level Surface) Score 0   Stairs Score 0   Barthel Index Score 35         Bella Felipe, PT

## 2017-12-27 NOTE — PROGRESS NOTES
[facility] Internal Medicine Progress Note  Patient: Petra Boyle 71 y o  female   MRN: [id number]  PCP: Vicky Taylor MD  Unit/Bed#: -01 Encounter: [id number]  Date Of Visit: 12/27/17    Assessment:    Principal Problem:    Encephalopathy  Active Problems:    MANJEET (acute kidney injury) ([facility] [address] )    Essential hypertension    Diabetes mellitus ([facility] [address] )    Thrombocytopenia ([facility] [address] )    UTI (urinary tract infection)    Solitary kidney      Plan:    # Acute encephalopathy - secondary to toxic metabolic encephalopathy d/t uti  - baseline cognitive impairment d/t hx of CVA thus likely vascular dementia as confirmed per her dtr  Never been formally dx with dementia  Will need neuropsych eval as outpatient  - resolving     # MANJEET - d/t pre-renal   - w/ solitary kidney  -cont on IVF hydration  - Avoid nephrotoxins thus diuretics and ace-I on hold  - Nephrology on board  - s/p renal US    # Chronic systolic HF, ef of 81-84%  - no sign of exacerbation  - close monitor of resp status given on IVF d/t above  Presently remains o2 independent  - on toprol xl, lisinopril on hold    # UTI - e-coli, on ceftin (renally dosed)  - slight bump in wbc, remains afebrile, non toxic appearing, cont to monitor    # DM II - cont ISS    # Thrombocytopenia - secondary to acute infxn process  - no sign of acute blood loss  - cont to monitor    VTE Pharmacologic Prophylaxis:   Pharmacologic: Pharmacologic VTE Prophylaxis contraindicated due to thrombocytopenia  Mechanical VTE Prophylaxis in Place: Yes    Patient Centered Rounds: I have performed bedside rounds with nursing staff today  Discussions with Specialists or Other Care Team Provider:     Education and Discussions with Family / Patient: Patient    Time Spent for Care: 45 minutes  More than 50% of total time spent on counseling and coordination of care as described above      Current Length of Stay: 4 day(s)    Current Patient Status: Inpatient   Certification Statement: The patient will continue to require additional inpatient hospital stay due to MANJEET requiring IVF    Discharge Plan / Estimated Discharge Date: s/p PT needs STR    Code Status: Level 1 - Full Code      Subjective:   Sitting in chair  AAOx2  No complaints  Objective:     Vitals:   Temp (24hrs), Av 7 °F (37 6 °C), Min:98 8 °F (37 1 °C), Max:100 2 °F (37 9 °C)    HR:  [65-75] 65  Resp:  [18] 18  BP: (108-124)/(55-60) 108/55  SpO2:  [95 %-98 %] 95 %  Body mass index is 23 48 kg/m²  Input and Output Summary (last 24 hours): Intake/Output Summary (Last 24 hours) at 17 1404  Last data filed at 17 1300   Gross per 24 hour   Intake          1856 25 ml   Output                0 ml   Net          1856 25 ml       Physical Exam:     Physical Exam   Constitutional: She appears well-developed and well-nourished  Neck: Neck supple  Cardiovascular: Normal rate, regular rhythm, normal heart sounds and intact distal pulses  Exam reveals no gallop and no friction rub  No murmur heard  Pulmonary/Chest: Effort normal and breath sounds normal  No respiratory distress  She has no wheezes  She has no rales  She exhibits no tenderness  Abdominal: Soft  Bowel sounds are normal  She exhibits no distension and no mass  There is no tenderness  There is no rebound and no guarding  Musculoskeletal: Normal range of motion  She exhibits no edema, tenderness or deformity  Neurological: She is alert  She has normal reflexes  No cranial nerve deficit  She exhibits normal muscle tone  Coordination normal    AAOx 2   Skin: Skin is warm and dry  Chronic skin changes consistent with chronic venous stasis   Psychiatric: She has a normal mood and affect   Her behavior is normal    Baseline cognitive impairment         Additional Data:     Labs:      Results from last 7 days  Lab Units 17  0442 17  0520   WBC Thousand/uL 11 67* 8 79   HEMOGLOBIN g/dL 9 2* 10 9*   HEMATOCRIT % 27 8* 34 0*   PLATELETS Thousands/uL 87* 92*   NEUTROS PCT %  --  80*   LYMPHS PCT %  --  15   MONOS PCT %  --  4   EOS PCT %  --  0       Results from last 7 days  Lab Units 12/27/17  0442  12/23/17  0640   SODIUM mmol/L 137  < > 137   POTASSIUM mmol/L 3 6  < > 4 6   CHLORIDE mmol/L 109*  < > 106   CO2 mmol/L 15*  < > 18*   BUN mg/dL 57*  < > 94*   CREATININE mg/dL 2 10*  < > 2 61*   CALCIUM mg/dL 7 8*  < > 9 0   TOTAL PROTEIN g/dL  --   --  9 0*   BILIRUBIN TOTAL mg/dL  --   --  0 70   ALK PHOS U/L  --   --  301*   ALT U/L  --   --  98*   AST U/L  --   --  105*   GLUCOSE RANDOM mg/dL 95  < > 120   < > = values in this interval not displayed  Results from last 7 days  Lab Units 12/23/17  0628   INR  2 52*       * I Have Reviewed All Lab Data Listed Above  * Additional Pertinent Lab Tests Reviewed: All Labs Within Last 24 Hours Reviewed    Imaging:    Imaging Reports Reviewed Today Include:   Imaging Personally Reviewed by Myself Includes:     Recent Cultures (last 7 days):       Results from last 7 days  Lab Units 12/23/17  0905 12/23/17  0638 12/22/17 2048   BLOOD CULTURE  No Growth After 4 Days  --   --    URINE CULTURE   --   --    >100,000 cfu/ml Escherichia coli*  50,000-59,000 cfu/ml Escherichia coli*   C DIFF TOXIN B   --  NEGATIVE for C difficle toxin by PCR    --        Last 24 Hours Medication List:     atorvastatin 40 mg Oral Daily   cefuroxime 250 mg Oral Q24H   ferrous sulfate 325 mg Oral Daily With Breakfast   insulin lispro 1-5 Units Subcutaneous TID AC   insulin lispro 1-5 Units Subcutaneous HS   levothyroxine 50 mcg Oral Early Morning   metoprolol succinate 50 mg Oral Daily   nystatin 1 application Topical TID   oxybutynin 10 mg Oral HS   sertraline 50 mg Oral Daily        Today, Patient Was Seen By: Stella Escamilla DO    ** Please Note: This note has been constructed using a voice recognition system   **

## 2017-12-27 NOTE — CASE MANAGEMENT
Continued Stay Review    Date: 12/27    Vital Signs: /55   Pulse 65   Temp 98 8 °F (37 1 °C) (Oral)   Resp 18   Ht 5' 7" (1 702 m)   Wt 68 kg (149 lb 14 6 oz)   SpO2 95%   BMI 23 48 kg/m²     Medications:   Scheduled Meds:   atorvastatin 40 mg Oral Daily   cefuroxime 250 mg Oral Q24H   ferrous sulfate 325 mg Oral Daily With Breakfast   insulin lispro 1-5 Units Subcutaneous TID AC   insulin lispro 1-5 Units Subcutaneous HS   levothyroxine 50 mcg Oral Early Morning   metoprolol succinate 50 mg Oral Daily   nystatin 1 application Topical TID   oxybutynin 10 mg Oral HS   sertraline 50 mg Oral Daily     Continuous Infusions:   sodium chloride 75 mL/hr Last Rate: 75 mL/hr (12/27/17 0035)     PRN Meds:   acetaminophen    diphenoxylate-atropine    metoprolol    ondansetron    Abnormal Labs/Diagnostic Results: Results from last 7 days  Lab Units 12/27/17  0442 12/26/17  0449 12/25/17  0511 12/24/17  0520 12/23/17  0640 12/23/17  0628 12/22/17  2354   WBC Thousand/uL 11 67*  --   --  8 79  --  7 46 7 61   HEMOGLOBIN g/dL 9 2*  --   --  10 9*  --  11 5 12 2   HEMATOCRIT % 27 8*  --   --  34 0*  --  36 1 37 2   PLATELETS Thousands/uL 87*  --   --  92*  --  98* 98*   SODIUM mmol/L 137 134* 136 138 137  --  133*   POTASSIUM mmol/L 3 6 3 7 3 6 3 8 4 6  --  5 8*   CHLORIDE mmol/L 109* 105 107 107 106  --  102   CO2 mmol/L 15* 15* 16* 17* 18*  --  17*   BUN mg/dL 57* 61* 60* 67* 94*  --  113*   CREATININE mg/dL 2 10* 2 34* 2 20* 2 11* 2 61*  --  3 09*   CALCIUM mg/dL 7 8* 8 5 8 3 8 4 9 0  --  9 4   MAGNESIUM mg/dL  --   --   --   --  2 0  --   --    PHOSPHORUS mg/dL  --   --   --  3 2  --   --   --    ALBUMIN g/dL  --   --   --   --  3 2*  --   --    TOTAL PROTEIN g/dL  --   --   --   --  9 0*  --   --    GLUCOSE RANDOM mg/dL 95 96 104 131 120  --  139          Age/Sex: 71 y o  female     Assessment/Plan:    Assessment:     Principal Problem:    Encephalopathy  Active Problems:    MANJEET (acute kidney injury) ([facility]) Essential hypertension    Diabetes mellitus (HCC)    Thrombocytopenia (HCC)    UTI (urinary tract infection)    Solitary kidney        Plan:     # Acute encephalopathy - secondary to toxic metabolic encephalopathy d/t uti  - baseline cognitive impairment d/t hx of CVA thus likely vascular dementia as confirmed per her dtr  Never been formally dx with dementia  Will need neuropsych eval as outpatient  - resolving     # MANJEET - d/t pre-renal   - w/ solitary kidney  -cont on IVF hydration  - Avoid nephrotoxins thus diuretics and ace-I on hold  - Nephrology on board  - s/p renal US     # Chronic systolic HF, ef of 15-76%  - no sign of exacerbation  - close monitor of resp status given on IVF d/t above  Presently remains o2 independent  - on toprol xl, lisinopril on hold     # UTI - e-coli, on ceftin (renally dosed)  - slight bump in wbc, remains afebrile, non toxic appearing, cont to monitor     # DM II - cont ISS     # Thrombocytopenia - secondary to acute infxn process  - no sign of acute blood loss  - cont to monitor     VTE Pharmacologic Prophylaxis:   Pharmacologic: Pharmacologic VTE Prophylaxis contraindicated due to thrombocytopenia  Mechanical VTE Prophylaxis in Place: Yes     Patient Centered Rounds: I have performed bedside rounds with nursing staff today      Discussions with Specialists or Other Care Team Provider:      Education and Discussions with Family / Patient: Patient     Time Spent for Care: 45 minutes    More than 50% of total time spent on counseling and coordination of care as described above      Current Length of Stay: 4 day(s)     Current Patient Status: Inpatient   Certification Statement: The patient will continue to require additional inpatient hospital stay due to MANJEET requiring IVF     Discharge Plan / Estimated Discharge Date: s/p PT needs STR     Nephrology progress note 12/27     Acute kidney injury:  Responding to IV hydration which I will continue as she is tolerating well     Hypertension:  Well control     Altered mental status:  Seems to be stable     Will continue to follow    PT eval   Recommendation   Recommendation Short-term skilled PT   Equipment Recommended Walker

## 2017-12-27 NOTE — PHYSICAL THERAPY NOTE
Physical Therapy Treatment Note       12/27/17 0907   Pain Assessment   Pain Assessment No/denies pain   Pain Score No Pain   Restrictions/Precautions   Weight Bearing Precautions Per Order No   Other Precautions Cognitive; Chair Alarm; Bed Alarm;Limb alert;Multiple lines; Fall Risk   General   Chart Reviewed Yes   Response to Previous Treatment Patient with no complaints from previous session  Family/Caregiver Present No   Cognition   Overall Cognitive Status Impaired   Arousal/Participation Alert;Persistent stimuli required   Attention Attends with cues to redirect   Orientation Level Oriented to person;Oriented to place; Disoriented to time;Disoriented to situation  (knew December, did not recall year)   Memory Decreased recall of biographical information;Decreased recall of recent events   Following Commands Follows one step commands without difficulty   Transfers   Sit to Stand 3  Moderate assistance  (min/mod)   Additional items Assist x 1; Armrests; Increased time required;Verbal cues   Stand to Sit 4  Minimal assistance   Additional items Assist x 1; Increased time required;Verbal cues   Toilet transfer 3  Moderate assistance   Additional items Assist x 1;Commode; Increased time required;Verbal cues   Balance   Static Sitting Fair   Dynamic Sitting Fair -   Static Standing Fair -  (tolerance: 3-4 minutes w/ B UE support)   Endurance Deficit   Endurance Deficit Yes   Activity Tolerance   Activity Tolerance Patient limited by fatigue   Nurse Made Aware Yes, RN Bibi Mccoy verbalized pt appropriate for PT session   Assessment   Prognosis Good   Problem List Decreased strength;Decreased endurance; Impaired balance;Decreased mobility; Decreased cognition;Decreased safety awareness; Impaired sensation   Assessment Pt seen for PT treatment session this date with interventions consisting of therapeutic activity consisting of training: sit<>stand transfers, static standing tolerance for 3-4 minutes w/ B UE support and vc and tactile cues for static standing posture faciliation  Pt agreeable to PT treatment session upon arrival  Pt noted to favor forward flexed posture during static standing, able to correct to neutral stance w/ increased VCs  Post session: pt returned back to recliner, all needs in reach and RN notified of session findings/recommendations  Continue to recommend STR at time of d/c in order to maximize pt's functional independence and safety w/ mobility  Pt continues to be functioning below baseline level, and remains limited 2* factors listed above  PT will continue to see pt while here in order to address the deficits listed above and provide interventions consistent w/ POC in effort to achieve STGs  Barriers to Discharge Inaccessible home environment   Goals   Patient Goals none expressed by patient   STG Expiration Date 01/06/18   Short Term Goal #1 STGs remain appropriate   Treatment Day 1   Plan   Treatment/Interventions Functional transfer training;LE strengthening/ROM; Endurance training;Elevations; Therapeutic exercise;Cognitive reorientation;Patient/family training;Equipment eval/education; Bed mobility;Gait training;Spoke to nursing;OT   PT Frequency 5x/wk   Recommendation   Recommendation Short-term skilled PT   Equipment Recommended Walker   PT - OK to Discharge Yes  (when medically cleared, if to STR)       Trudi Jiang, PT    Time of PT treatment session: [id number]

## 2017-12-27 NOTE — SOCIAL WORK
Cm met with patient at bedside, patient alert and oriented during interview  Patient reports residing in a private home with her daughter [facility]  Patient reports being independent with ADL's, no dme use, vna  isai s has a hx of str with [facility]  Patient [place] [facility] [place] barriers  Patient reports having strong family and community supports, adequate transportation to MD appointments  Cm reviewed therapy recommendations for str  Patient reports being receptive with no preference in facility  Patient provided cm with consent to speak with her daughter [facility] if needed  Patient declined information regarding poa or advanced directives  Referrals sent to str for review

## 2017-12-27 NOTE — PLAN OF CARE
Problem: PHYSICAL THERAPY ADULT  Goal: Performs mobility at highest level of function for planned discharge setting  See evaluation for individualized goals  Treatment/Interventions: Functional transfer training, LE strengthening/ROM, Endurance training, Elevations, Therapeutic exercise, Cognitive reorientation, Patient/family training, Equipment eval/education, Bed mobility, Gait training, Spoke to nursing, OT  Equipment Recommended: Rodgers Cranker       See flowsheet documentation for full assessment, interventions and recommendations  Prognosis: Good  Problem List: Decreased strength, Decreased endurance, Impaired balance, Decreased mobility, Decreased cognition, Decreased safety awareness, Impaired sensation  Assessment: Pt is 71 y o  female seen for PT evaluation on 12/27/2017 s/p admit to [facility] on 12/22/2017 w/ Encephalopathy 2/2 toxic metabolic encephalopathy due to UTI  PT consulted to assess pt's functional mobility and d/c needs  Order placed for PT eval and tx, w/ up w/ A order  Comorbidities affecting pt's physical performance at time of assessment include: MANJEET, essential HTN, DM, thrombocytopenia, UTI, solitary kidney  Question reliability of PLOF and social hx info obtained during IE as pt appears to have increased confusion and is poor historian  PTA, pt was ambulates household distances, has ~10 CODEY, lives w/ dtr and family in multi level home and has a first floor setup; pt reporting she was independent w/ ADLs PTA  Personal factors affecting pt at time of IE include: ambulating w/ assistive device, stairs to enter home, inability to navigate level surfaces w/o external assistance, decreased initiation and engagement, limited insight into impairments, inability to perform IADLs and inability to perform ADLs   Please find objective findings from PT assessment regarding body systems outlined above with impairments and limitations including weakness, impaired balance, decreased endurance, decreased activity tolerance, decreased functional mobility tolerance, decreased safety awareness, fall risk and decreased cognition, as well as mobility assessment (need for cueing for mobility technique)  The following objective measures performed on IE also reveal limitations: Barthel Index: 35/100 and Modified Mateo: 4 (moderate/severe disability)  Pt to benefit from continued PT tx to address deficits as defined above and maximize level of functional independent mobility and consistency  From PT/mobility standpoint, recommendation at time of d/c would be STR pending progress in order to facilitate return to PLOF  Barriers to Discharge: Inaccessible home environment     Recommendation: Short-term skilled PT     PT - OK to Discharge: Yes (when medically cleared, if to STR)    See flowsheet documentation for full assessment

## 2017-12-27 NOTE — PLAN OF CARE

## 2017-12-27 NOTE — PROGRESS NOTES
NEPHROLOGY PROGRESS NOTE    Patient: Nella Oconnor               Sex: female          DOA: 12/22/2017  7:31 PM   YOB: 1945        Age:  71 y o         LOS:  LOS: 4 days       HPI     Patient with acute kidney injury    SUBJECTIVE     Feeling better    No acute complaint    CURRENT MEDICATIONS       Current Facility-Administered Medications:     acetaminophen (TYLENOL) tablet 650 mg, 650 mg, Oral, Q6H PRN, GAIL White-C, 650 mg at 12/25/17 0013    atorvastatin (LIPITOR) tablet 40 mg, 40 mg, Oral, Daily, GAIL Haney-C, 40 mg at 12/27/17 1014    cefuroxime (CEFTIN) tablet 250 mg, 250 mg, Oral, Q24H, Kimberli Singh MD, 250 mg at 12/26/17 1237    diphenoxylate-atropine (LOMOTIL) 2 5-0 025 mg per tablet 1 tablet, 1 tablet, Oral, 4x Daily PRN, CHANDU Anaya    ferrous sulfate tablet 325 mg, 325 mg, Oral, Daily With Breakfast, GAIL White-FRANKI, 325 mg at 12/27/17 1014    insulin lispro (HumaLOG) 100 units/mL subcutaneous injection 1-5 Units, 1-5 Units, Subcutaneous, TID AC, 1 Units at 12/25/17 1208 **AND** Fingerstick Glucose (POCT), , , TID AC, Ash Cerda PA-C    insulin lispro (HumaLOG) 100 units/mL subcutaneous injection 1-5 Units, 1-5 Units, Subcutaneous, HS, Mone Lee PA-C    levothyroxine tablet 50 mcg, 50 mcg, Oral, Early Morning, Ash Cerda PA-C, 50 mcg at 12/27/17 1434    metoprolol (LOPRESSOR) injection 5 mg, 5 mg, Intravenous, Q6H PRN, GAIL White-C, 5 mg at 12/23/17 0400    metoprolol succinate (TOPROL-XL) 24 hr tablet 50 mg, 50 mg, Oral, Daily, Dominguez Lee PA-C, 50 mg at 12/27/17 1014    nystatin (MYCOSTATIN) powder 1 application, 1 application, Topical, TID, Ash Cerda PA-C, 1 application at 61/02/69 1014    ondansetron [facility]) injection 4 mg, 4 mg, Intravenous, Q6H PRN, Ash Cerda PA-C    oxybutynin (DITROPAN-XL) 24 hr tablet 10 mg, 10 mg, Oral, HS, Dominguez Lee PA-C, 10 mg at 12/26/17 6323    sertraline (ZOLOFT) tablet 50 mg, 50 mg, Oral, Daily, Tee Lee PA-C, 50 mg at 12/27/17 1014    sodium chloride 0 9 % infusion, 75 mL/hr, Intravenous, Continuous, Jessica Dee MD, Last Rate: 75 mL/hr at 12/27/17 0035, 75 mL/hr at 12/27/17 0035    OBJECTIVE     Current Weight: Weight - Scale: 68 kg (149 lb 14 6 oz)  Vitals:    12/27/17 0654   BP: 108/55   Pulse: 65   Resp: 18   Temp: 98 8 °F (37 1 °C)   SpO2: 95%       Intake/Output Summary (Last 24 hours) at 12/27/17 1117  Last data filed at 12/27/17 0601   Gross per 24 hour   Intake          1616 25 ml   Output                0 ml   Net          1616 25 ml       PHYSICAL EXAMINATION     Physical Exam:   Eyes:  Pupils equally react to light  ENT:  Moist tongue  Gen:  Not in any acute distress  Neck:  Supple no JVD  Chest:  Clear  Cor:  S1-S2 normal  Abdomen:  Soft nontender  Ext:  No swelling  Neuro:  Awake and alert  Skin:  Decreased skin turgor      LAB RESULTS       Results from last 7 days  Lab Units 12/27/17  0442 12/26/17  0449 12/25/17  0511 12/24/17  0520 12/23/17  0640 12/23/17  0628 12/22/17  2354   WBC Thousand/uL 11 67*  --   --  8 79  --  7 46 7 61   HEMOGLOBIN g/dL 9 2*  --   --  10 9*  --  11 5 12 2   HEMATOCRIT % 27 8*  --   --  34 0*  --  36 1 37 2   PLATELETS Thousands/uL 87*  --   --  92*  --  98* 98*   SODIUM mmol/L 137 134* 136 138 137  --  133*   POTASSIUM mmol/L 3 6 3 7 3 6 3 8 4 6  --  5 8*   CHLORIDE mmol/L 109* 105 107 107 106  --  102   CO2 mmol/L 15* 15* 16* 17* 18*  --  17*   BUN mg/dL 57* 61* 60* 67* 94*  --  113*   CREATININE mg/dL 2 10* 2 34* 2 20* 2 11* 2 61*  --  3 09*   CALCIUM mg/dL 7 8* 8 5 8 3 8 4 9 0  --  9 4   MAGNESIUM mg/dL  --   --   --   --  2 0  --   --    PHOSPHORUS mg/dL  --   --   --  3 2  --   --   --    ALBUMIN g/dL  --   --   --   --  3 2*  --   --    TOTAL PROTEIN g/dL  --   --   --   --  9 0*  --   --    GLUCOSE RANDOM mg/dL 95 96 104 131 120  --  139       RADIOLOGY RESULTS      Results for orders placed during the hospital encounter of 07/13/17   XR chest 1 view portable    Narrative CHEST     INDICATION:  Shortness of breath  Elevated INR  COMPARISON:  Chest film 9/1/2005 and report of CT chest also from that date    VIEWS:   AP frontal    IMAGES:  1    FINDINGS:         The cardiac silhouette is enlarged  Prominent, indistinct pulmonary vasculature and faint reticular interstitial densities in the lung bases consistent with element of CHF  Mild azygous distention  No discernible pleural effusions or pneumothorax  Visualized osseous structures appear within normal limits for the patient's age  Impression Cardiomegaly with mild CHF  Workstation performed: [id number]       No results found for this or any previous visit  No results found for this or any previous visit  No results found for this or any previous visit  No results found for this or any previous visit  No results found for this or any previous visit  PLAN / RECOMMENDATIONS      Acute kidney injury:  Responding to IV hydration which I will continue as she is tolerating well    Hypertension:  Well control    Altered mental status:  Seems to be stable    Will continue to follow        Ivory Avitia MD  Nephrology  12/27/2017        Portions of the record may have been created with voice recognition software  Occasional wrong word or "sound a like" substitutions may have occurred due to the inherent limitations of voice recognition software  Read the chart carefully and recognize, using context, where substitutions have occurred

## 2017-12-28 LAB
ALBUMIN UR ELPH-MCNC: 100 %
ALPHA1 GLOB MFR UR ELPH: 0 %
ALPHA2 GLOB MFR UR ELPH: 0 %
ANION GAP SERPL CALCULATED.3IONS-SCNC: 13 MMOL/L (ref 4–13)
B-GLOBULIN MFR UR ELPH: 0 %
BACTERIA BLD CULT: NORMAL
BUN SERPL-MCNC: 51 MG/DL (ref 5–25)
CALCIUM SERPL-MCNC: 8.1 MG/DL (ref 8.3–10.1)
CHLORIDE SERPL-SCNC: 112 MMOL/L (ref 100–108)
CO2 SERPL-SCNC: 15 MMOL/L (ref 21–32)
CREAT SERPL-MCNC: 1.8 MG/DL (ref 0.6–1.3)
ERYTHROCYTE [DISTWIDTH] IN BLOOD BY AUTOMATED COUNT: 14.2 % (ref 11.6–15.1)
GAMMA GLOB MFR UR ELPH: 0 %
GFR SERPL CREATININE-BSD FRML MDRD: 32 ML/MIN/1.73SQ M
GLUCOSE SERPL-MCNC: 104 MG/DL (ref 65–140)
GLUCOSE SERPL-MCNC: 105 MG/DL (ref 65–140)
GLUCOSE SERPL-MCNC: 115 MG/DL (ref 65–140)
GLUCOSE SERPL-MCNC: 142 MG/DL (ref 65–140)
GLUCOSE SERPL-MCNC: 79 MG/DL (ref 65–140)
GLUCOSE SERPL-MCNC: 97 MG/DL (ref 65–140)
HCT VFR BLD AUTO: 26.8 % (ref 34.8–46.1)
HGB BLD-MCNC: 8.8 G/DL (ref 11.5–15.4)
INR PPP: 1.75 (ref 0.86–1.16)
MCH RBC QN AUTO: 30.2 PG (ref 26.8–34.3)
MCHC RBC AUTO-ENTMCNC: 32.8 G/DL (ref 31.4–37.4)
MCV RBC AUTO: 92 FL (ref 82–98)
PLATELET # BLD AUTO: 113 THOUSANDS/UL (ref 149–390)
PMV BLD AUTO: 12.4 FL (ref 8.9–12.7)
POTASSIUM SERPL-SCNC: 3.8 MMOL/L (ref 3.5–5.3)
PROT PATTERN UR ELPH-IMP: ABNORMAL
PROT UR-MCNC: 93 MG/DL
PROTHROMBIN TIME: 20.9 SECONDS (ref 12.1–14.4)
RBC # BLD AUTO: 2.91 MILLION/UL (ref 3.81–5.12)
SODIUM SERPL-SCNC: 140 MMOL/L (ref 136–145)
WBC # BLD AUTO: 11.87 THOUSAND/UL (ref 4.31–10.16)

## 2017-12-28 PROCEDURE — 82948 REAGENT STRIP/BLOOD GLUCOSE: CPT

## 2017-12-28 PROCEDURE — 85610 PROTHROMBIN TIME: CPT | Performed by: INTERNAL MEDICINE

## 2017-12-28 PROCEDURE — 80048 BASIC METABOLIC PNL TOTAL CA: CPT | Performed by: INTERNAL MEDICINE

## 2017-12-28 PROCEDURE — 85027 COMPLETE CBC AUTOMATED: CPT | Performed by: INTERNAL MEDICINE

## 2017-12-28 PROCEDURE — 97167 OT EVAL HIGH COMPLEX 60 MIN: CPT

## 2017-12-28 PROCEDURE — G8988 SELF CARE GOAL STATUS: HCPCS

## 2017-12-28 PROCEDURE — 97530 THERAPEUTIC ACTIVITIES: CPT

## 2017-12-28 PROCEDURE — 97535 SELF CARE MNGMENT TRAINING: CPT

## 2017-12-28 PROCEDURE — G8987 SELF CARE CURRENT STATUS: HCPCS

## 2017-12-28 RX ADMIN — LEVOTHYROXINE SODIUM 50 MCG: 50 TABLET ORAL at 06:52

## 2017-12-28 RX ADMIN — NYSTATIN 1 APPLICATION: 100000 POWDER TOPICAL at 08:48

## 2017-12-28 RX ADMIN — Medication 325 MG: at 08:47

## 2017-12-28 RX ADMIN — SODIUM CHLORIDE 75 ML/HR: 0.9 INJECTION, SOLUTION INTRAVENOUS at 21:23

## 2017-12-28 RX ADMIN — OXYBUTYNIN CHLORIDE 10 MG: 5 TABLET, EXTENDED RELEASE ORAL at 21:17

## 2017-12-28 RX ADMIN — NYSTATIN 1 APPLICATION: 100000 POWDER TOPICAL at 21:17

## 2017-12-28 RX ADMIN — NYSTATIN 1 APPLICATION: 100000 POWDER TOPICAL at 16:31

## 2017-12-28 RX ADMIN — DESMOPRESSIN ACETATE 40 MG: 0.2 TABLET ORAL at 08:48

## 2017-12-28 RX ADMIN — CEFUROXIME AXETIL 250 MG: 250 TABLET ORAL at 16:30

## 2017-12-28 RX ADMIN — SODIUM CHLORIDE 75 ML/HR: 0.9 INJECTION, SOLUTION INTRAVENOUS at 06:55

## 2017-12-28 RX ADMIN — ACETAMINOPHEN 650 MG: 325 TABLET ORAL at 23:20

## 2017-12-28 RX ADMIN — SERTRALINE HYDROCHLORIDE 50 MG: 50 TABLET ORAL at 08:47

## 2017-12-28 RX ADMIN — METOPROLOL SUCCINATE 50 MG: 50 TABLET, FILM COATED, EXTENDED RELEASE ORAL at 08:47

## 2017-12-28 NOTE — SOCIAL WORK
Cm received a follow up phone call from patient daughter stating that she would like her mother to be considered for [facility] acute rehab  Referral sent this day

## 2017-12-28 NOTE — PLAN OF CARE
Problem: Potential for Falls  Goal: Patient will remain free of falls  INTERVENTIONS:  - Assess patient frequently for physical needs  -  Identify cognitive and physical deficits and behaviors that affect risk of falls    -  [place] fall precautions as indicated by assessment   - Educate patient/family on patient safety including physical limitations  - Instruct patient to call for assistance with activity based on assessment  - Modify environment to reduce risk of injury  - Consider OT/PT consult to assist with strengthening/mobility   Outcome: Progressing      Problem: INFECTION - ADULT  Goal: Absence or prevention of progression during hospitalization  INTERVENTIONS:  - Assess and monitor for signs and symptoms of infection  - Monitor lab/diagnostic results  - Monitor all insertion sites, i e  indwelling lines, tubes, and drains  - Monitor endotracheal (as able) and nasal secretions for changes in amount and color  - [place] appropriate cooling/warming therapies per order  - Administer medications as ordered  - Instruct and encourage patient and family to use good hand hygiene technique  - Identify and instruct in appropriate isolation precautions for identified infection/condition   Outcome: Progressing      Problem: DISCHARGE PLANNING  Goal: Discharge to home or other facility with appropriate resources  INTERVENTIONS:  - Identify barriers to discharge w/patient and caregiver  - Arrange for needed discharge resources and transportation as appropriate  - Identify discharge learning needs (meds, wound care, etc )  - Arrange for interpretive services to assist at discharge as needed  - Refer to Case Management Department for coordinating discharge planning if the patient needs post-hospital services based on physician/advanced practitioner order or complex needs related to functional status, cognitive ability, or social support system   Outcome: Progressing      Problem: Knowledge Deficit  Goal: Patient/family/caregiver demonstrates understanding of disease process, treatment plan, medications, and discharge instructions  Complete learning assessment and assess knowledge base    Interventions:  - Provide teaching at level of understanding  - Provide teaching via preferred learning methods   Outcome: Progressing      Problem: METABOLIC, FLUID AND ELECTROLYTES - ADULT  Goal: Electrolytes maintained within normal limits  INTERVENTIONS:  - Monitor labs and assess patient for signs and symptoms of electrolyte imbalances  - Administer electrolyte replacement as ordered  - Monitor response to electrolyte replacements, including repeat lab results as appropriate  - Instruct patient on fluid and nutrition as appropriate   Outcome: Progressing    Goal: Fluid balance maintained  INTERVENTIONS:  - Monitor labs and assess for signs and symptoms of volume excess or deficit  - Monitor I/O and WT  - Instruct patient on fluid and nutrition as appropriate   Outcome: Progressing    Goal: Glucose maintained within target range  INTERVENTIONS:  - Monitor Blood Glucose as ordered  - Assess for signs and symptoms of hyperglycemia and hypoglycemia  - Administer ordered medications to maintain glucose within target range  - Assess nutritional intake and initiate nutrition service referral as needed   Outcome: Progressing      Problem: Prexisting or High Potential for Compromised Skin Integrity  Goal: Skin integrity is maintained or improved  INTERVENTIONS:  - Identify patients at risk for skin breakdown  - Assess and monitor skin integrity  - Assess and monitor nutrition and hydration status  - Monitor labs (i e  albumin)  - Assess for incontinence   - Turn and reposition patient  - Assist with mobility/ambulation  - Relieve pressure over bony prominences  - Avoid friction and shearing  - Provide appropriate hygiene as needed including keeping skin clean and dry  - Evaluate need for skin moisturizer/barrier cream  - Collaborate with interdisciplinary team (i e  Nutrition, Rehabilitation, etc )   - Patient/family teaching   Outcome: Progressing      Problem: DISCHARGE PLANNING - CARE MANAGEMENT  Goal: Discharge to post-acute care or home with appropriate resources  INTERVENTIONS:  - Conduct assessment to determine patient/family and health care team treatment goals, and need for post-acute services based on payer coverage, community resources, and patient preferences, and barriers to discharge  - Address psychosocial, clinical, and financial barriers to discharge as identified in assessment in conjunction with the patient/family and health care team  - Arrange appropriate level of post-acute services according to patient's   needs and preference and payer coverage in collaboration with the physician and health care team  - Communicate with and update the patient/family, physician, and health care team regarding progress on the discharge plan  - Arrange appropriate transportation to post-acute venues   Outcome: Progressing

## 2017-12-28 NOTE — PROGRESS NOTES
NEPHROLOGY PROGRESS NOTE    Patient: Jaden De La Cruz               Sex: female          DOA: 12/22/2017  7:31 PM   YOB: 1945        Age:  71 y o         LOS:  LOS: 5 days       HPI     Patient with acute kidney injury    SUBJECTIVE     Feeling quite well denies any complaint    CURRENT MEDICATIONS       Current Facility-Administered Medications:     acetaminophen (TYLENOL) tablet 650 mg, 650 mg, Oral, Q6H PRN, Marylou Harrell PA-C, 650 mg at 12/25/17 0013    atorvastatin (LIPITOR) tablet 40 mg, 40 mg, Oral, Daily, Guy Lee PA-C, 40 mg at 12/28/17 0848    cefuroxime (CEFTIN) tablet 250 mg, 250 mg, Oral, Q24H, Carlos Alberto Mendenhall MD, 250 mg at 12/26/17 1237    diphenoxylate-atropine (LOMOTIL) 2 5-0 025 mg per tablet 1 tablet, 1 tablet, Oral, 4x Daily PRN, CHANDU Anaya    ferrous sulfate tablet 325 mg, 325 mg, Oral, Daily With Breakfast, Marylou Harrell PA-C, 325 mg at 12/28/17 0847    insulin lispro (HumaLOG) 100 units/mL subcutaneous injection 1-5 Units, 1-5 Units, Subcutaneous, TID AC, 1 Units at 12/25/17 1208 **AND** Fingerstick Glucose (POCT), , , TID AC, Marylou Harrell PA-C    insulin lispro (HumaLOG) 100 units/mL subcutaneous injection 1-5 Units, 1-5 Units, Subcutaneous, HS, Mone Lee PA-C    levothyroxine tablet 50 mcg, 50 mcg, Oral, Early Morning, Marylou Harrell PA-C, 50 mcg at 12/28/17 4182    metoprolol (LOPRESSOR) injection 5 mg, 5 mg, Intravenous, Q6H PRN, Marylou Harrell PA-C, 5 mg at 12/23/17 0400    metoprolol succinate (TOPROL-XL) 24 hr tablet 50 mg, 50 mg, Oral, Daily, Guy Lee PA-C, 50 mg at 12/28/17 0847    nystatin (MYCOSTATIN) powder 1 application, 1 application, Topical, TID, Marylou Harrell PA-C, 1 application at 79/21/37 0848    ondansetron [facility] injection 4 mg, 4 mg, Intravenous, Q6H PRN, Marylou Harrell PA-C    oxybutynin (DITROPAN-XL) 24 hr tablet 10 mg, 10 mg, Oral, HS, Marylou Harrell PA-C, 10 mg at 12/27/17 5789    sertraline (ZOLOFT) tablet 50 mg, 50 mg, Oral, Daily, Gerson Lee PA-C, 50 mg at 12/28/17 0847    sodium chloride 0 9 % infusion, 75 mL/hr, Intravenous, Continuous, Chandra Villanueva MD, Last Rate: 75 mL/hr at 12/28/17 0655, 75 mL/hr at 12/28/17 0655    OBJECTIVE     Current Weight: Weight - Scale: 68 kg (149 lb 14 6 oz)  Vitals:    12/28/17 0700   BP: 114/58   Pulse: 77   Resp: 18   Temp: 99 9 °F (37 7 °C)   SpO2: 95%       Intake/Output Summary (Last 24 hours) at 12/28/17 1511  Last data filed at 12/28/17 0800   Gross per 24 hour   Intake           1957 5 ml   Output                0 ml   Net           1957 5 ml       PHYSICAL EXAMINATION     Physical Exam:   Eyes:  Pupils equally react to light  ENT:  Moist tongue  Gen:  Not in any acute distress  Neck:  Supple  Chest:  Clear  Cor:  S1-S2 normal  Abdomen:  Soft nontender  Ext:  No swelling  Neuro:  Awake and alert  Skin:  Good skin turgor      LAB RESULTS       Results from last 7 days  Lab Units 12/28/17  0449 12/27/17  0442 12/26/17  0449 12/25/17  0511 12/24/17  0520 12/23/17  0640 12/23/17  0628 12/22/17  2354   WBC Thousand/uL 11 87* 11 67*  --   --  8 79  --  7 46 7 61   HEMOGLOBIN g/dL 8 8* 9 2*  --   --  10 9*  --  11 5 12 2   HEMATOCRIT % 26 8* 27 8*  --   --  34 0*  --  36 1 37 2   PLATELETS Thousands/uL 113* 87*  --   --  92*  --  98* 98*   SODIUM mmol/L 140 137 134* 136 138 137  --  133*   POTASSIUM mmol/L 3 8 3 6 3 7 3 6 3 8 4 6  --  5 8*   CHLORIDE mmol/L 112* 109* 105 107 107 106  --  102   CO2 mmol/L 15* 15* 15* 16* 17* 18*  --  17*   BUN mg/dL 51* 57* 61* 60* 67* 94*  --  113*   CREATININE mg/dL 1 80* 2 10* 2 34* 2 20* 2 11* 2 61*  --  3 09*   CALCIUM mg/dL 8 1* 7 8* 8 5 8 3 8 4 9 0  --  9 4   MAGNESIUM mg/dL  --   --   --   --   --  2 0  --   --    PHOSPHORUS mg/dL  --   --   --   --  3 2  --   --   --    ALBUMIN g/dL  --   --   --   --   --  3 2*  --   --    TOTAL PROTEIN g/dL  --   --   --   --   --  9 0*  --   --    GLUCOSE RANDOM mg/dL 104 95 96 104 131 889 -- [facility]      Results for orders placed during the hospital encounter of 07/13/17   XR chest 1 view portable    Narrative CHEST     INDICATION:  Shortness of breath  Elevated INR  COMPARISON:  Chest film 9/1/2005 and report of CT chest also from that date    VIEWS:   AP frontal    IMAGES:  1    FINDINGS:         The cardiac silhouette is enlarged  Prominent, indistinct pulmonary vasculature and faint reticular interstitial densities in the lung bases consistent with element of CHF  Mild azygous distention  No discernible pleural effusions or pneumothorax  Visualized osseous structures appear within normal limits for the patient's age  Impression Cardiomegaly with mild CHF  Workstation performed: [id number]       No results found for this or any previous visit  No results found for this or any previous visit  No results found for this or any previous visit  No results found for this or any previous visit  No results found for this or any previous visit  PLAN / RECOMMENDATIONS      Acute kidney injury:  Responding to IV hydration and tolerating well so will continue    Altered mental status:  Likely because of dementia stable    Disposition stable enough to be discharged renal point of view    Hugo Bernabe MD  Nephrology  12/28/2017        Portions of the record may have been created with voice recognition software  Occasional wrong word or "sound a like" substitutions may have occurred due to the inherent limitations of voice recognition software  Read the chart carefully and recognize, using context, where substitutions have occurred

## 2017-12-28 NOTE — PLAN OF CARE
Problem: OCCUPATIONAL THERAPY ADULT  Goal: Performs self-care activities at highest level of function for planned discharge setting  See evaluation for individualized goals  Treatment Interventions: ADL retraining, Functional transfer training, UE strengthening/ROM, Endurance training, Equipment evaluation/education, Patient/family training, Fine motor coordination activities, Compensatory technique education, Continued evaluation, Energy conservation, Activityengagement  Equipment Recommended: Bedside commode, Tub seat with back       See flowsheet documentation for full assessment, interventions and recommendations  Limitation: Decreased ADL status, Decreased UE strength, Decreased Safe judgement during ADL, Decreased endurance, Decreased self-care trans, Decreased high-level ADLs  Prognosis: Good  Assessment: Patient is a 71 y o  female seen for OT evaluation s/p admit to [address] [place] [address] on 12/22/2017 w/Encephalopathy with toxic metabolic encephalopathy due to UTI  Commorbidities affecting patient's functional performance at time of assessment include:MANJEET, essential HTN, DM, thrombocytopenia, UTI, solitary kidney  Prior to admission, Patient reporting independent with self care, except with bathing, daughter helps with showers, ambulatory with no AD, using RW PRN  Lives with her daughter in a 2 story house, 1st floor set up with 10 CODEY  Patient reported that daughter works during the day, she spends time alone during the day, has cell phone  Daughter assists with medication management and meals  PLOF and home set up obtained from patient, some reliability issues secondary to decreased cognition    Personal factors affecting patient at time of initial evaluation include: limited caregiver support, steps to enter, limited insight into deficits, decreased initiation and engagement, difficulty performing ADLs and difficulty performing IADLs   Upon evaluation, patient requires moderate assist for UB ADLs, maximal assist for LB ADLs, transfers and functional ambulation in room and bathroom with minimal  assist, with the use of Rolling Walker  Occupational performance is affected by the following deficits: orientation, decreased muscle strength, dynamic sit/ stand balance deficit with poor standing tolerance time for self care and functional mobility, decreased activity tolerance, impaired memory, impaired problem solving, decreased safety awareness and postural control and postural alignment deficit, requiring external assistance to complete transitional movements  Therapist completed  extensive additional review of medical records and additional review of physical, cognitive or psychosocial history, clinical examination identifying 5 or more performance deficits, clinical decision making of a high complexity , consistent with a high complexity level evaluation  Patient to benefit from continued Occupational Therapy treatment while in the hospital to address deficits as defined above and maximize level of functional independence with ADLs and functional mobility        OT Discharge Recommendation: Short Term Rehab  OT - OK to Discharge: Yes (STR when medically cleared)

## 2017-12-28 NOTE — SOCIAL WORK
Cm placed a follow up call to patient daughter BODØ regarding therapy recommendations for STR  Daughter stated she would rather her mother remain locally and will consider either [facility] or [facility] provided consent for [facility] to reach her via telephone regarding their facilities  Referrals sent  [facility] has accepted, if this facility is choice

## 2017-12-28 NOTE — PLAN OF CARE
Problem: DISCHARGE PLANNING - CARE MANAGEMENT  Goal: Discharge to post-acute care or home with appropriate resources  INTERVENTIONS:  - Conduct assessment to determine patient/family and health care team treatment goals, and need for post-acute services based on payer coverage, community resources, and patient preferences, and barriers to discharge  - Address psychosocial, clinical, and financial barriers to discharge as identified in assessment in conjunction with the patient/family and health care team  - Arrange appropriate level of post-acute services according to patient's   needs and preference and payer coverage in collaboration with the physician and health care team  - Communicate with and update the patient/family, physician, and health care team regarding progress on the discharge plan  - Arrange appropriate transportation to post-acute venues   Outcome: Progressing  Daughter is receptive to [employer] or [facility] at [place] for Donnie Barth and will follow-up will cm with choice

## 2017-12-28 NOTE — OCCUPATIONAL THERAPY NOTE
Occupational Therapy Evaluation        Patient Name: Marco Antonio Thrasher  OAJWK'L Date: 12/28/2017 12/28/17 1310   Note Type   Note type Eval/Treat   Restrictions/Precautions   Weight Bearing Precautions Per Order No   Other Precautions Cognitive; Chair Alarm; Bed Alarm; Fall Risk   Pain Assessment   Pain Assessment No/denies pain   Pain Score No Pain   Home Living   Type of Home House   Home Layout Multi-level; Able to live on main level with bedroom/bathroom; Performs ADLs on one level  (1st floor set up, 10 CODEY with HR)   Bathroom Shower/Tub Tub/shower unit   Bathroom Toilet Standard   Bathroom Equipment Grab bars in shower; Tub transfer bench;Hand-held shower   [address] Walker  (Walker PRN)   Prior Function   Level of [place] Independent with ADLs and functional mobility  (pt reporting she is independent w/ all ADLs)   Lives With Daughter;Family  (dtr works during the day)   [address] in the last 6 months 0   Vocational Retired   Lifestyle   Autonomy Patient reporting independent with self care, except with bathing, daughter helps with showers, ambulatory with no AD, using RW PRN  Lives with her daughter in a 2 story house, 1st floor set up with 10 CODEY  Patient reported that daughter works during the day, she spends time alone during the day, has cell phone  Daughter assists with medication management and meals  PLOF and home set up obtained from patient, some reliability issues secondary to decreased cognition  Reciprocal Relationships Supportive family   Psychosocial   Psychosocial (WDL) WDL   ADL   Eating Assistance 5  Supervision/Setup   Grooming Assistance 4  Minimal Assistance   UB Bathing Assistance 3  Moderate Assistance   LB Bathing Assistance 2  Maximal Assistance   LB Bathing Deficit Right lower leg including foot; Left lower leg including foot   UB Dressing Assistance 4  Minimal Assistance   LB Dressing Assistance 2  Maximal Assistance   Toileting Assistance  2  Maximal Assistance   Functional Assistance 3  Moderate Assistance   Bed Mobility   Rolling R 4  Minimal assistance   Additional items Assist x 1;HOB elevated; Bedrails; Increased time required;Verbal cues;LE management   Rolling L 4  Minimal assistance   Additional items Assist x 1;HOB elevated; Bedrails; Increased time required;Verbal cues;LE management   Supine to Sit 3  Moderate assistance   Additional items Assist x 1;HOB elevated; Bedrails; Increased time required;Verbal cues   Transfers   Sit to Stand 3  Moderate assistance   Additional items Assist x 1;HOB elevated; Increased time required;Verbal cues   Stand to Sit 4  Minimal assistance   Additional items Assist x 1; Increased time required;Verbal cues; Bed elevated   Functional Mobility   Functional Mobility 3  Moderate assistance   Additional items Rolling walker   Balance   Static Sitting Good   Dynamic Sitting Fair +   Static Standing Fair   Dynamic Standing Fair -   Activity Tolerance   Activity Tolerance Patient limited by fatigue   Nurse Made Aware Yes ROMELIA Esparza verbalized patient appropriate for therapy   RUE Assessment   RUE Assessment WFL  (AROM WFL, strenght deficit)   RUE Strength   RUE Overall Strength Deficits  (3+/5)   LUE Assessment   LUE Assessment WFL  (AROM WFL, strength deficit)   LUE Strength   LUE Overall Strength Deficits  (3+/5)   Hand Function   Gross Motor Coordination Functional   Fine Motor Coordination Functional   Sensation   Light Touch No apparent deficits  (BUEs)   Vision-Basic Assessment   Current Vision Wears glasses only for reading  (glasses not available bedside)   Cognition   Overall Cognitive Status Impaired   Arousal/Participation Alert; Cooperative;Responsive   Attention Attends with cues to redirect   Orientation Level Oriented to person;Oriented to place; Disoriented to time;Disoriented to situation  (knew December, did not recall year)   Memory Decreased recall of biographical information;Decreased recall of recent events   Following Commands Follows one step commands without difficulty   Assessment   Limitation Decreased ADL status; Decreased UE strength;Decreased Safe judgement during ADL;Decreased endurance;Decreased self-care trans;Decreased high-level ADLs   Prognosis Good   Assessment Patient is a 71 y o  female seen for OT evaluation s/p admit to [id number] [facility] on 12/22/2017 w/Encephalopathy with toxic metabolic encephalopathy due to UTI  Commorbidities affecting patient's functional performance at time of assessment include:MANJEET, essential HTN, DM, thrombocytopenia, UTI, solitary kidney  Prior to admission, Patient reporting independent with self care, except with bathing, daughter helps with showers, ambulatory with no AD, using RW PRN  Lives with her daughter in a 2 story house, 1st floor set up with 10 CODEY  Patient reported that daughter works during the day, she spends time alone during the day, has cell phone  Daughter assists with medication management and meals  PLOF and home set up obtained from patient, some reliability issues secondary to decreased cognition    Personal factors affecting patient at time of initial evaluation include: limited caregiver support, steps to enter, limited insight into deficits, decreased initiation and engagement, difficulty performing ADLs and difficulty performing IADLs  Upon evaluation, patient requires moderate assist for UB ADLs, maximal assist for LB ADLs, transfers and functional ambulation in room and bathroom with minimal  assist, with the use of Rolling Walker   Occupational performance is affected by the following deficits: orientation, decreased muscle strength, dynamic sit/ stand balance deficit with poor standing tolerance time for self care and functional mobility, decreased activity tolerance, impaired memory, impaired problem solving, decreased safety awareness and postural control and postural alignment deficit, requiring external assistance to complete transitional movements  Therapist completed  extensive additional review of medical records and additional review of physical, cognitive or psychosocial history, clinical examination identifying 5 or more performance deficits, clinical decision making of a high complexity , consistent with a high complexity level evaluation  Patient to benefit from continued Occupational Therapy treatment while in the hospital to address deficits as defined above and maximize level of functional independence with ADLs and functional mobility  Plan   Treatment Interventions ADL retraining;Functional transfer training;UE strengthening/ROM; Endurance training;Equipment evaluation/education;Patient/family training;Fine motor coordination activities; Compensatory technique education;Continued evaluation; Energy conservation; Activityengagement   Goal Expiration Date 01/11/18   Treatment Day 1   OT Frequency 3-5x/wk   Additional Treatment Session   Start Time 8090   End Time 1402   Recommendation   OT Discharge Recommendation Short Term Rehab   Equipment Recommended Bedside commode;Tub seat with back   OT - OK to Discharge Yes  (STR when medically cleared)   Barthel Index   Feeding 5   Bathing 0   Grooming Score 0   Dressing Score 5   Bladder Score 5   Bowels Score 5   Toilet Use Score 5   Transfers (Bed/Chair) Score 10   Mobility (Level Surface) Score 0   Stairs Score 0   Barthel Index Score 35   Modified Mateo Scale   Modified [place] Scale 4     Occupational Performance areas to address include: grooming , bathing/ shower, dressing, toilet hygiene, transfer to all surfaces, functional mobility, emergency response, health maintenance, medication routine/ management, IADLS: Household maintenance, IADLs: safety procedures, IADLs: meal prep/ clean up, Leisure Participation and Social participation  From OT standpoint, recommendation at time of d/c would be Short Term Rehab  Treatment Assessment:  Patient participated in Skilled OT session this date with interventions consisting of ADL re training with the use of correct body mechnaics, Energy Conservation techniques, Work simplification skills , safety awareness and fall prevention techniques, maintaining back safety precautions, therapeutic exercise to: increase functional use of BUEs, increase BUE muscle strength ,  therapeutic activities to: increase activity tolerance, increase standing tolerance time with unilateral UE support to complete sink level ADLs, increase dynamic sit/ stand balance during functional activity  and increase OOB/ sitting tolerance   Patient agreeable to OT treatment session, upon arrival patient was found supine in bed, alert, responsive  and in no apparent distress  In comparison to previous session, patient with improvements in overall activity tolerance with ability to maintain dynamic sitting balance at edge of bed, complete UB ADls in unsupported sitting position at min assist level  Standing tolerance measured at 3 minutes with BUE support with ability to transfer bed to [address] [place], [place] [address]x Rd with min assist of 1 with the use of RW  Gabriel Jaimes Patient requiring verbal cues for safety, verbal cues for correct technique and frequent rest periods  Patient continues to be functioning below baseline level, occupational performance remains limited secondary to factors listed above and increased risk for falls and injury  From OT standpoint, recommendation at time of d/c would be Short Term Rehab  Patient to benefit from continued Occupational Therapy treatment while in the hospital to address deficits as defined above and maximize level of functional independence with ADLs and functional mobility  Occupational therapy Goals:  In 7- 10 days:  1- Patient will verbalize and demonstrate use of energy conservation/ deep breathing technique and work simplification skills during functional activity with no verbal cues   2- Patient will verbalize and demonstrate good body mechanics and joint protection techniques during  ADLs/ IADLs with no verbal cues   3- Patient will increase OOB/ sitting tolerance to 2-4 hours per day for increased participation in self care and leisure tasks with no s/s of exertion    4- Patient will identify s/s of exertion during ADL and functional mobility with no  verbal cues  5-Patient will verbalize/ demonstrate  compensatory strategies to recover from exertion with no  verbal cues  6-Patient will increase standing tolerance time to  10  minutes with  Unilateral UE support to complete sink level ADLs @ Mod I level    7- Patient will increase sitting tolerance at edge of bed to 30 minutes to complete UB ADLs @ Mod I level  ADLs/IADLs  8- Patient will complete LB ADLs at Mod I level,  with the use of AE as indicated    5- Patient/ Family  will demonstrate competency with UE Home Exercise Program

## 2017-12-28 NOTE — PROGRESS NOTES
[facility] Progress Note  Patient: Mary Chavira 71 y o  female   MRN: [id number]  PCP: Oscar Shah MD  Unit/Bed#: -01 Encounter: [id number]  Date Of Visit: 12/28/17    Assessment:    Principal Problem:    Encephalopathy  Active Problems:    MANJEET (acute kidney injury) ([facility] )    Essential hypertension    Diabetes mellitus ([facility] )    Thrombocytopenia ([facility] )    UTI (urinary tract infection)    Solitary kidney      Plan:    # Acute encephalopathy - secondary to toxic metabolic encephalopathy d/t uti  - baseline cognitive impairment d/t hx of CVA thus likely vascular dementia as confirmed per her dtr  Never been formally dx with dementia  Will need neuropsych eval as outpatient  - resolving     # MANJEET - d/t pre-renal   - w/ solitary kidney  - responding to IV fluids therefore per Nephrology recommends to continue   - Avoid nephrotoxins thus diuretics and ace-I on hold  - Nephrology on board  - s/p renal US    # Chronic systolic HF, ef of 32-72%  - no sign of exacerbation  - close monitor of resp status given on IVF d/t above  Presently remains o2 independent  - on toprol xl, lisinopril on hold    # UTI - e-coli, on ceftin (renally dosed)  - WBC remains relatively stable, remains afebrile, non toxic appearing    # DM II - cont ISS    # Thrombocytopenia - secondary to acute infxn process, resolving  - no sign of acute blood loss    VTE Pharmacologic Prophylaxis:   Pharmacologic: Pharmacologic VTE Prophylaxis contraindicated due to thrombocytopenia  Mechanical VTE Prophylaxis in Place: Yes    Patient Centered Rounds: I have performed bedside rounds with nursing staff today  Discussions with Specialists or Other Care Team Provider:     Education and Discussions with Family / Patient: Patient    Time Spent for Care: 45 minutes  More than 50% of total time spent on counseling and coordination of care as described above      Current Length of Stay: 5 day(s)    Current Patient Status: Inpatient Certification Statement: The patient will continue to require additional inpatient hospital stay due to MANJEET requiring IVF    Discharge Plan / Estimated Discharge Date: s/p PT needs STR, her daughter wish in for patient to be considered for acute rehab thus referral has been made    Code Status: Level 1 - Full Code      Subjective:   Sitting in chair  AAOx2  Denies shortness of breath, remains oxygen dependent   Objective:     Vitals:   Temp (24hrs), Av 2 °F (37 3 °C), Min:98 3 °F (36 8 °C), Max:99 9 °F (37 7 °C)    HR:  [65-82] 65  Resp:  [18] 18  BP: (114-146)/(58-65) 144/65  SpO2:  [95 %-98 %] 95 %  Body mass index is 23 48 kg/m²  Input and Output Summary (last 24 hours): Intake/Output Summary (Last 24 hours) at 17 1853  Last data filed at 17 0800   Gross per 24 hour   Intake            5 ml   Output                0 ml   Net            5 ml       Physical Exam:     Physical Exam   Constitutional: She appears well-developed and well-nourished  Neck: Neck supple  Cardiovascular: Normal rate, regular rhythm, normal heart sounds and intact distal pulses  Exam reveals no gallop and no friction rub  No murmur heard  Pulmonary/Chest: Effort normal and breath sounds normal  No respiratory distress  She has no wheezes  She has no rales  She exhibits no tenderness  Abdominal: Soft  Bowel sounds are normal  She exhibits no distension and no mass  There is no tenderness  There is no rebound and no guarding  Musculoskeletal: Normal range of motion  She exhibits no edema, tenderness or deformity  Neurological: She is alert  She has normal reflexes  No cranial nerve deficit  She exhibits normal muscle tone  Coordination normal    AAOx 2   Skin: Skin is warm and dry  Chronic skin changes consistent with chronic venous stasis   Psychiatric: She has a normal mood and affect   Her behavior is normal    Baseline cognitive impairment         Additional Data:     Labs:      Results from last 7 days  Lab Units 12/28/17  0449  12/24/17  0520   WBC Thousand/uL 11 87*  < > 8 79   HEMOGLOBIN g/dL 8 8*  < > 10 9*   HEMATOCRIT % 26 8*  < > 34 0*   PLATELETS Thousands/uL 113*  < > 92*   NEUTROS PCT %  --   --  80*   LYMPHS PCT %  --   --  15   MONOS PCT %  --   --  4   EOS PCT %  --   --  0   < > = values in this interval not displayed  Results from last 7 days  Lab Units 12/28/17  0449  12/23/17  0640   SODIUM mmol/L 140  < > 137   POTASSIUM mmol/L 3 8  < > 4 6   CHLORIDE mmol/L 112*  < > 106   CO2 mmol/L 15*  < > 18*   BUN mg/dL 51*  < > 94*   CREATININE mg/dL 1 80*  < > 2 61*   CALCIUM mg/dL 8 1*  < > 9 0   TOTAL PROTEIN g/dL  --   --  9 0*   BILIRUBIN TOTAL mg/dL  --   --  0 70   ALK PHOS U/L  --   --  301*   ALT U/L  --   --  98*   AST U/L  --   --  105*   GLUCOSE RANDOM mg/dL 104  < > 120   < > = values in this interval not displayed  Results from last 7 days  Lab Units 12/28/17  0449   INR  1 75*       * I Have Reviewed All Lab Data Listed Above  * Additional Pertinent Lab Tests Reviewed: All Labs Within Last 24 Hours Reviewed    Imaging:    Imaging Reports Reviewed Today Include:   Imaging Personally Reviewed by Myself Includes:     Recent Cultures (last 7 days):       Results from last 7 days  Lab Units 12/23/17  0905 12/23/17  0638 12/22/17 2048   BLOOD CULTURE  No Growth After 5 Days    --   --    URINE CULTURE   --   --    >100,000 cfu/ml Escherichia coli*  50,000-59,000 cfu/ml Escherichia coli*   C DIFF TOXIN B   --  NEGATIVE for C difficle toxin by PCR    --        Last 24 Hours Medication List:     atorvastatin 40 mg Oral Daily   cefuroxime 250 mg Oral Q24H   ferrous sulfate 325 mg Oral Daily With Breakfast   insulin lispro 1-5 Units Subcutaneous TID AC   insulin lispro 1-5 Units Subcutaneous HS   levothyroxine 50 mcg Oral Early Morning   metoprolol succinate 50 mg Oral Daily   nystatin 1 application Topical TID   oxybutynin 10 mg Oral HS   sertraline 50 mg Oral Daily Today, Patient Was Seen By: Dannielle Martínez DO    ** Please Note: This note has been constructed using a voice recognition system   **

## 2017-12-29 ENCOUNTER — HOSPITAL ENCOUNTER (OUTPATIENT)
Facility: HOSPITAL | Age: 72
Discharge: HOME HEALTH | End: 2018-01-16
Attending: PHYSICAL MEDICINE & REHABILITATION | Admitting: PHYSICAL MEDICINE & REHABILITATION

## 2017-12-29 VITALS
BODY MASS INDEX: 24.26 KG/M2 | OXYGEN SATURATION: 97 % | HEIGHT: 67 IN | SYSTOLIC BLOOD PRESSURE: 138 MMHG | TEMPERATURE: 98.9 F | DIASTOLIC BLOOD PRESSURE: 65 MMHG | HEART RATE: 77 BPM | RESPIRATION RATE: 16 BRPM | WEIGHT: 154.54 LBS

## 2017-12-29 PROBLEM — D69.6 THROMBOCYTOPENIA (HCC): Status: RESOLVED | Noted: 2017-12-23 | Resolved: 2017-12-29

## 2017-12-29 PROBLEM — N39.0 UTI (URINARY TRACT INFECTION): Status: RESOLVED | Noted: 2017-12-23 | Resolved: 2017-12-29

## 2017-12-29 PROBLEM — N17.9 AKI (ACUTE KIDNEY INJURY) (HCC): Status: RESOLVED | Noted: 2017-07-14 | Resolved: 2017-12-29

## 2017-12-29 PROBLEM — G93.40 ENCEPHALOPATHY: Status: RESOLVED | Noted: 2017-12-24 | Resolved: 2017-12-29

## 2017-12-29 LAB
ANION GAP SERPL CALCULATED.3IONS-SCNC: 10 MMOL/L (ref 4–13)
BUN SERPL-MCNC: 39 MG/DL (ref 5–25)
CALCIUM SERPL-MCNC: 8.4 MG/DL (ref 8.3–10.1)
CHLORIDE SERPL-SCNC: 112 MMOL/L (ref 100–108)
CO2 SERPL-SCNC: 16 MMOL/L (ref 21–32)
CREAT SERPL-MCNC: 1.44 MG/DL (ref 0.6–1.3)
GFR SERPL CREATININE-BSD FRML MDRD: 42 ML/MIN/1.73SQ M
GLUCOSE SERPL-MCNC: 107 MG/DL (ref 65–140)
GLUCOSE SERPL-MCNC: 86 MG/DL (ref 65–140)
GLUCOSE SERPL-MCNC: 92 MG/DL (ref 65–140)
GLUCOSE SERPL-MCNC: 98 MG/DL (ref 65–140)
POTASSIUM SERPL-SCNC: 4.5 MMOL/L (ref 3.5–5.3)
SODIUM SERPL-SCNC: 138 MMOL/L (ref 136–145)

## 2017-12-29 PROCEDURE — 82948 REAGENT STRIP/BLOOD GLUCOSE: CPT

## 2017-12-29 PROCEDURE — 80048 BASIC METABOLIC PNL TOTAL CA: CPT | Performed by: INTERNAL MEDICINE

## 2017-12-29 RX ORDER — FUROSEMIDE 40 MG/1
40 TABLET ORAL DAILY
Refills: 0
Start: 2017-12-30 | End: 2018-09-06 | Stop reason: SDUPTHER

## 2017-12-29 RX ORDER — CALCIUM POLYCARBOPHIL 625 MG
500 TABLET ORAL DAILY
Qty: 14 EACH | Refills: 0 | Status: SHIPPED | OUTPATIENT
Start: 2017-12-29 | End: 2021-02-10

## 2017-12-29 RX ORDER — CEFUROXIME AXETIL 250 MG/1
250 TABLET ORAL EVERY 24 HOURS
Qty: 10 TABLET | Refills: 0
Start: 2017-12-29 | End: 2017-12-30

## 2017-12-29 RX ORDER — NYSTATIN 100000 [USP'U]/G
1 POWDER TOPICAL 3 TIMES DAILY
Qty: 15 G | Refills: 0 | Status: SHIPPED | OUTPATIENT
Start: 2017-12-29 | End: 2021-02-10

## 2017-12-29 RX ORDER — OXYBUTYNIN CHLORIDE 10 MG/1
10 TABLET, EXTENDED RELEASE ORAL
Refills: 0
Start: 2017-12-29

## 2017-12-29 RX ADMIN — CEFUROXIME AXETIL 250 MG: 250 TABLET ORAL at 15:22

## 2017-12-29 RX ADMIN — METOPROLOL SUCCINATE 50 MG: 50 TABLET, FILM COATED, EXTENDED RELEASE ORAL at 09:38

## 2017-12-29 RX ADMIN — NYSTATIN 1 APPLICATION: 100000 POWDER TOPICAL at 09:37

## 2017-12-29 RX ADMIN — SERTRALINE HYDROCHLORIDE 50 MG: 50 TABLET ORAL at 09:37

## 2017-12-29 RX ADMIN — Medication 325 MG: at 06:16

## 2017-12-29 RX ADMIN — DESMOPRESSIN ACETATE 40 MG: 0.2 TABLET ORAL at 09:38

## 2017-12-29 RX ADMIN — LEVOTHYROXINE SODIUM 50 MCG: 50 TABLET ORAL at 06:16

## 2017-12-29 RX ADMIN — SODIUM CHLORIDE 75 ML/HR: 0.9 INJECTION, SOLUTION INTRAVENOUS at 11:28

## 2017-12-29 NOTE — SOCIAL WORK
Cm informed that [facility] for [facility]  If patient medically stable [facility]  Cm contacted patient daughter Umm Cervantes regarding acceptance to acute rehab  Daughter remains receptive  Cm reviewed imm letter with daughter, daughter reports understanding, and no needs at this time  Treatment team aware

## 2017-12-29 NOTE — DISCHARGE SUMMARY
Discharge Summary - [facility] Internal Medicine    Patient Information: Porsche Chan 71 y o  female MRN: [id number]  Unit/Bed#: -01 Encounter: [id number]    Discharging Physician / Practitioner: Manjeet Deng DO  PCP: Vega Chamberlain MD  Admission Date: 12/22/2017  Discharge Date: 12/29/17    Reason for Admission: MANJEET, AMS    Discharge Diagnoses:     Principal Problem (Resolved):    Encephalopathy  Active Problems:    Essential hypertension    Diabetes mellitus ([facility] )    Solitary kidney  Resolved Problems:    MANJEET (acute kidney injury) ([facility] )    Hyponatremia    Hyperkalemia    [facility] )    UTI (urinary tract infection)      Consultations During Hospital Stay:  · Nephrology    Procedures Performed:     · Renal US    Significant Findings:     · Refer to hospital course    Incidental Findings:   ·     Test Results Pending at Discharge (will require follow up):   ·      Outpatient Tests Requested:  · BMP in 5 days    Complications:  none    Hospital Course:     # Acute encephalopathy - secondary to metabolic encephalopathy d/t uti, MANJEET  - baseline cognitive impairment d/t hx of CVA thus likely vascular dementia as confirmed per her dtr  Never been formally dx with dementia  Educated dtr pt will need neuropsych eval as outpatient  - resolving, MS returning to baseline     # MANJEET - d/t pre-renal   - w/ solitary kidney  - responded to IV fluids w/ trend of cr   - s/p renal US  - Discussed with nephrology ok to resume lisinopril, lower lasix and d/c aldactone  - repeat bmp in 1 week     # Chronic systolic HF, actually improvement of ef from 25% ---> 40% s/p most recent echo  - no sign of exacerbation  - on toprol xl, resumed back on lowered dose of lasix given above  Given improvement of ef aldactone discontinued    - cont to monitor fluid status via daily wts     # UTI - e-coli, on ceftin (renally dosed), needs one more dose     # DM II, chronically diet controlled -  cont ISS     # Thrombocytopenia - secondary to acute infxn process, resolving  - no sign of acute blood loss    Disp: s/p PT eval, d/c to [facility]  Plan of care extensively discussed with her dtr  Condition at Discharge: stable     Discharge Day Visit / Exam:     Subjective:  Feels well, no acute events overnight  Denies dyspnea, remains o2 independent  Vitals: Blood Pressure: 148/68 (12/29/17 0700)  Pulse: 83 (12/29/17 0700)  Temperature: 97 8 °F (36 6 °C) (12/29/17 0700)  Temp Source: Oral (12/29/17 0700)  Respirations: 18 (12/29/17 0700)  Height: 5' 7" (170 2 cm) (12/23/17 0231)  Weight - Scale: 70 1 kg (154 lb 8 7 oz) (12/29/17 0600)  SpO2: 96 % (12/29/17 0700)  Exam:   Physical Exam   Constitutional: She is oriented to person, place, and time  She appears well-developed  Neck: Neck supple  Cardiovascular: Normal rate, regular rhythm, normal heart sounds and intact distal pulses  Exam reveals no gallop and no friction rub  No murmur heard  Pulmonary/Chest: Effort normal and breath sounds normal  No respiratory distress  She has no wheezes  She has no rales  She exhibits no tenderness  Abdominal: Soft  Bowel sounds are normal  She exhibits no distension and no mass  There is no tenderness  There is no rebound and no guarding  Musculoskeletal: Normal range of motion  She exhibits no edema, tenderness or deformity  Neurological: She is alert and oriented to person, place, and time  She has normal reflexes  She displays normal reflexes  No cranial nerve deficit  She exhibits normal muscle tone  Coordination normal    Skin:   Chronic skin changes consistent with venous stasis   Psychiatric: She has a normal mood and affect  Her behavior is normal  Judgment and thought content normal        Discharge instructions/Information to patient and family:   See after visit summary for information provided to patient and family        Provisions for Follow-Up Care:  See after visit summary for information related to follow-up care and any pertinent home health orders  Disposition:     Acute Rehab at [facility]    For Discharges to [facility] SNF:   · Not Applicable to this Patient - Not Applicable to this Patient    Planned Readmission: no     Discharge Statement:  I spent > 30 minutes discharging the patient  This time was spent on the day of discharge  I had direct contact with the patient on the day of discharge  Greater than 50% of the total time was spent examining patient, answering all patient questions, arranging and discussing plan of care with patient as well as directly providing post-discharge instructions  Additional time then spent on discharge activities  Discharge Medications:  See after visit summary for reconciled discharge medications provided to patient and family  ** Please Note: Dragon 360 Dictation voice to text software may have been used in the creation of this document   **

## 2017-12-29 NOTE — NURSING NOTE
Report called to [address] [place] [address]  Discharge instructions and med rec hand delivered

## 2017-12-30 LAB
ALBUMIN SERPL ELPH-MCNC: 3.82 G/DL (ref 3.5–5)
ALBUMIN SERPL ELPH-MCNC: 43.9 % (ref 52–65)
ALPHA1 GLOB SERPL ELPH-MCNC: 0.5 G/DL (ref 0.1–0.4)
ALPHA1 GLOB SERPL ELPH-MCNC: 5.7 % (ref 2.5–5)
ALPHA2 GLOB SERPL ELPH-MCNC: 0.71 G/DL (ref 0.4–1.2)
ALPHA2 GLOB SERPL ELPH-MCNC: 8.2 % (ref 7–13)
BETA GLOB ABNORMAL SERPL ELPH-MCNC: 0.46 G/DL (ref 0.4–0.8)
BETA1 GLOB SERPL ELPH-MCNC: 5.3 % (ref 5–13)
BETA2 GLOB SERPL ELPH-MCNC: 9 % (ref 2–8)
BETA2+GAMMA GLOB SERPL ELPH-MCNC: 0.78 G/DL (ref 0.2–0.5)
GAMMA GLOB ABNORMAL SERPL ELPH-MCNC: 2.43 G/DL (ref 0.5–1.6)
GAMMA GLOB SERPL ELPH-MCNC: 27.9 % (ref 12–22)
IGG/ALB SER: 0.78 {RATIO} (ref 1.1–1.8)
INTERPRETATION UR IFE-IMP: NORMAL
PROT SERPL-MCNC: 8.7 G/DL (ref 6.4–8.2)

## 2018-01-01 LAB
ALBUMIN SERPL BCP-MCNC: 2.2 G/DL (ref 3.5–5)
ALP SERPL-CCNC: 242 U/L (ref 46–116)
ALT SERPL W P-5'-P-CCNC: 30 U/L (ref 12–78)
ANION GAP SERPL CALCULATED.3IONS-SCNC: 10 MMOL/L (ref 4–13)
AST SERPL W P-5'-P-CCNC: 31 U/L (ref 5–45)
BASOPHILS # BLD AUTO: 0.03 THOUSANDS/ΜL (ref 0–0.1)
BASOPHILS NFR BLD AUTO: 0 % (ref 0–1)
BILIRUB SERPL-MCNC: 0.8 MG/DL (ref 0.2–1)
BUN SERPL-MCNC: 32 MG/DL (ref 5–25)
CALCIUM SERPL-MCNC: 7.8 MG/DL (ref 8.3–10.1)
CHLORIDE SERPL-SCNC: 110 MMOL/L (ref 100–108)
CO2 SERPL-SCNC: 18 MMOL/L (ref 21–32)
CREAT SERPL-MCNC: 1.31 MG/DL (ref 0.6–1.3)
EOSINOPHIL # BLD AUTO: 0.13 THOUSAND/ΜL (ref 0–0.61)
EOSINOPHIL NFR BLD AUTO: 2 % (ref 0–6)
ERYTHROCYTE [DISTWIDTH] IN BLOOD BY AUTOMATED COUNT: 14.6 % (ref 11.6–15.1)
GFR SERPL CREATININE-BSD FRML MDRD: 47 ML/MIN/1.73SQ M
GLUCOSE SERPL-MCNC: 97 MG/DL (ref 65–140)
HCT VFR BLD AUTO: 26 % (ref 34.8–46.1)
HGB BLD-MCNC: 8.5 G/DL (ref 11.5–15.4)
LYMPHOCYTES # BLD AUTO: 1.47 THOUSANDS/ΜL (ref 0.6–4.47)
LYMPHOCYTES NFR BLD AUTO: 19 % (ref 14–44)
MCH RBC QN AUTO: 30.6 PG (ref 26.8–34.3)
MCHC RBC AUTO-ENTMCNC: 32.7 G/DL (ref 31.4–37.4)
MCV RBC AUTO: 94 FL (ref 82–98)
MONOCYTES # BLD AUTO: 0.56 THOUSAND/ΜL (ref 0.17–1.22)
MONOCYTES NFR BLD AUTO: 7 % (ref 4–12)
NEUTROPHILS # BLD AUTO: 5.45 THOUSANDS/ΜL (ref 1.85–7.62)
NEUTS SEG NFR BLD AUTO: 71 % (ref 43–75)
NRBC BLD AUTO-RTO: 0 /100 WBCS
PLATELET # BLD AUTO: 190 THOUSANDS/UL (ref 149–390)
PMV BLD AUTO: 10.6 FL (ref 8.9–12.7)
POTASSIUM SERPL-SCNC: 3.8 MMOL/L (ref 3.5–5.3)
PREALB SERPL-MCNC: 8 MG/DL (ref 18–40)
PROT SERPL-MCNC: 7 G/DL (ref 6.4–8.2)
RBC # BLD AUTO: 2.78 MILLION/UL (ref 3.81–5.12)
SODIUM SERPL-SCNC: 138 MMOL/L (ref 136–145)
WBC # BLD AUTO: 7.68 THOUSAND/UL (ref 4.31–10.16)

## 2018-01-01 PROCEDURE — 84134 ASSAY OF PREALBUMIN: CPT | Performed by: PHYSICAL MEDICINE & REHABILITATION

## 2018-01-01 PROCEDURE — 85025 COMPLETE CBC W/AUTO DIFF WBC: CPT | Performed by: PHYSICAL MEDICINE & REHABILITATION

## 2018-01-01 PROCEDURE — 80053 COMPREHEN METABOLIC PANEL: CPT | Performed by: PHYSICAL MEDICINE & REHABILITATION

## 2018-01-02 LAB
INR PPP: 1.5 (ref 0.86–1.16)
PROTHROMBIN TIME: 18.5 SECONDS (ref 12.1–14.4)

## 2018-01-02 PROCEDURE — 85610 PROTHROMBIN TIME: CPT | Performed by: INTERNAL MEDICINE

## 2018-01-02 NOTE — CONSULTS
Full consultation was written in the chart by myself today      Natali Castellanos MD  Nephrology  1/2/2018

## 2018-01-03 ENCOUNTER — APPOINTMENT (OUTPATIENT)
Dept: ULTRASOUND IMAGING | Facility: HOSPITAL | Age: 73
End: 2018-01-03

## 2018-01-03 ENCOUNTER — APPOINTMENT (OUTPATIENT)
Dept: RADIOLOGY | Facility: HOSPITAL | Age: 73
End: 2018-01-03

## 2018-01-03 PROCEDURE — 76882 US LMTD JT/FCL EVL NVASC XTR: CPT

## 2018-01-03 PROCEDURE — 73560 X-RAY EXAM OF KNEE 1 OR 2: CPT

## 2018-01-04 LAB
ANION GAP SERPL CALCULATED.3IONS-SCNC: 10 MMOL/L (ref 4–13)
BASOPHILS # BLD AUTO: 0.05 THOUSANDS/ΜL (ref 0–0.1)
BASOPHILS NFR BLD AUTO: 1 % (ref 0–1)
BUN SERPL-MCNC: 28 MG/DL (ref 5–25)
CALCIUM SERPL-MCNC: 8.3 MG/DL (ref 8.3–10.1)
CHLORIDE SERPL-SCNC: 104 MMOL/L (ref 100–108)
CO2 SERPL-SCNC: 20 MMOL/L (ref 21–32)
CREAT SERPL-MCNC: 1.14 MG/DL (ref 0.6–1.3)
EOSINOPHIL # BLD AUTO: 0.09 THOUSAND/ΜL (ref 0–0.61)
EOSINOPHIL NFR BLD AUTO: 1 % (ref 0–6)
ERYTHROCYTE [DISTWIDTH] IN BLOOD BY AUTOMATED COUNT: 14.8 % (ref 11.6–15.1)
GFR SERPL CREATININE-BSD FRML MDRD: 56 ML/MIN/1.73SQ M
GLUCOSE SERPL-MCNC: 92 MG/DL (ref 65–140)
HCT VFR BLD AUTO: 27.5 % (ref 34.8–46.1)
HGB BLD-MCNC: 8.8 G/DL (ref 11.5–15.4)
INR PPP: 1.77 (ref 0.86–1.16)
LYMPHOCYTES # BLD AUTO: 1.66 THOUSANDS/ΜL (ref 0.6–4.47)
LYMPHOCYTES NFR BLD AUTO: 22 % (ref 14–44)
MCH RBC QN AUTO: 30.7 PG (ref 26.8–34.3)
MCHC RBC AUTO-ENTMCNC: 32 G/DL (ref 31.4–37.4)
MCV RBC AUTO: 96 FL (ref 82–98)
MONOCYTES # BLD AUTO: 0.72 THOUSAND/ΜL (ref 0.17–1.22)
MONOCYTES NFR BLD AUTO: 10 % (ref 4–12)
NEUTROPHILS # BLD AUTO: 5.04 THOUSANDS/ΜL (ref 1.85–7.62)
NEUTS SEG NFR BLD AUTO: 66 % (ref 43–75)
NRBC BLD AUTO-RTO: 0 /100 WBCS
PLATELET # BLD AUTO: 241 THOUSANDS/UL (ref 149–390)
PMV BLD AUTO: 10.5 FL (ref 8.9–12.7)
POTASSIUM SERPL-SCNC: 4.2 MMOL/L (ref 3.5–5.3)
PROTHROMBIN TIME: 21.1 SECONDS (ref 12.1–14.4)
RBC # BLD AUTO: 2.87 MILLION/UL (ref 3.81–5.12)
SODIUM SERPL-SCNC: 134 MMOL/L (ref 136–145)
WBC # BLD AUTO: 7.6 THOUSAND/UL (ref 4.31–10.16)

## 2018-01-04 PROCEDURE — 80048 BASIC METABOLIC PNL TOTAL CA: CPT | Performed by: NURSE PRACTITIONER

## 2018-01-04 PROCEDURE — 85025 COMPLETE CBC W/AUTO DIFF WBC: CPT | Performed by: NURSE PRACTITIONER

## 2018-01-04 PROCEDURE — 85610 PROTHROMBIN TIME: CPT | Performed by: NURSE PRACTITIONER

## 2018-01-05 LAB
INR PPP: 1.9 (ref 0.86–1.16)
PROTHROMBIN TIME: 22.3 SECONDS (ref 12.1–14.4)

## 2018-01-05 PROCEDURE — 85610 PROTHROMBIN TIME: CPT | Performed by: NURSE PRACTITIONER

## 2018-01-07 ENCOUNTER — APPOINTMENT (OUTPATIENT)
Dept: MRI IMAGING | Facility: HOSPITAL | Age: 73
End: 2018-01-07

## 2018-01-07 PROCEDURE — 73718 MRI LOWER EXTREMITY W/O DYE: CPT

## 2018-01-08 LAB
ANION GAP SERPL CALCULATED.3IONS-SCNC: 8 MMOL/L (ref 4–13)
BASOPHILS # BLD MANUAL: 0 THOUSAND/UL (ref 0–0.1)
BASOPHILS NFR MAR MANUAL: 0 % (ref 0–1)
BUN SERPL-MCNC: 25 MG/DL (ref 5–25)
CALCIUM SERPL-MCNC: 8.9 MG/DL (ref 8.3–10.1)
CHLORIDE SERPL-SCNC: 103 MMOL/L (ref 100–108)
CO2 SERPL-SCNC: 23 MMOL/L (ref 21–32)
CREAT SERPL-MCNC: 1.08 MG/DL (ref 0.6–1.3)
EOSINOPHIL # BLD MANUAL: 0.14 THOUSAND/UL (ref 0–0.4)
EOSINOPHIL NFR BLD MANUAL: 3 % (ref 0–6)
ERYTHROCYTE [DISTWIDTH] IN BLOOD BY AUTOMATED COUNT: 14.4 % (ref 11.6–15.1)
GFR SERPL CREATININE-BSD FRML MDRD: 60 ML/MIN/1.73SQ M
GLUCOSE SERPL-MCNC: 90 MG/DL (ref 65–140)
HCT VFR BLD AUTO: 27.5 % (ref 34.8–46.1)
HGB BLD-MCNC: 8.8 G/DL (ref 11.5–15.4)
INR PPP: 1.95 (ref 0.86–1.16)
LYMPHOCYTES # BLD AUTO: 1.16 THOUSAND/UL (ref 0.6–4.47)
LYMPHOCYTES # BLD AUTO: 25 % (ref 14–44)
MCH RBC QN AUTO: 30.7 PG (ref 26.8–34.3)
MCHC RBC AUTO-ENTMCNC: 32 G/DL (ref 31.4–37.4)
MCV RBC AUTO: 96 FL (ref 82–98)
MONOCYTES # BLD AUTO: 0.28 THOUSAND/UL (ref 0–1.22)
MONOCYTES NFR BLD: 6 % (ref 4–12)
NEUTROPHILS # BLD MANUAL: 3 THOUSAND/UL (ref 1.85–7.62)
NEUTS SEG NFR BLD AUTO: 65 % (ref 43–75)
NRBC BLD AUTO-RTO: 0 /100 WBCS
PLATELET # BLD AUTO: 218 THOUSANDS/UL (ref 149–390)
PLATELET BLD QL SMEAR: ADEQUATE
PMV BLD AUTO: 10 FL (ref 8.9–12.7)
POTASSIUM SERPL-SCNC: 4.9 MMOL/L (ref 3.5–5.3)
PROTHROMBIN TIME: 22.8 SECONDS (ref 12.1–14.4)
RBC # BLD AUTO: 2.87 MILLION/UL (ref 3.81–5.12)
SODIUM SERPL-SCNC: 134 MMOL/L (ref 136–145)
TOTAL CELLS COUNTED SPEC: 100
VARIANT LYMPHS # BLD AUTO: 1 %
WBC # BLD AUTO: 4.62 THOUSAND/UL (ref 4.31–10.16)

## 2018-01-08 PROCEDURE — 85007 BL SMEAR W/DIFF WBC COUNT: CPT | Performed by: NURSE PRACTITIONER

## 2018-01-08 PROCEDURE — 85610 PROTHROMBIN TIME: CPT | Performed by: NURSE PRACTITIONER

## 2018-01-08 PROCEDURE — 80048 BASIC METABOLIC PNL TOTAL CA: CPT | Performed by: NURSE PRACTITIONER

## 2018-01-08 PROCEDURE — 85027 COMPLETE CBC AUTOMATED: CPT | Performed by: NURSE PRACTITIONER

## 2018-01-11 LAB
ANION GAP SERPL CALCULATED.3IONS-SCNC: 8 MMOL/L (ref 4–13)
BASOPHILS # BLD AUTO: 0.11 THOUSANDS/ΜL (ref 0–0.1)
BASOPHILS NFR BLD AUTO: 2 % (ref 0–1)
BUN SERPL-MCNC: 29 MG/DL (ref 5–25)
CALCIUM SERPL-MCNC: 8.8 MG/DL (ref 8.3–10.1)
CHLORIDE SERPL-SCNC: 102 MMOL/L (ref 100–108)
CO2 SERPL-SCNC: 24 MMOL/L (ref 21–32)
CREAT SERPL-MCNC: 1.29 MG/DL (ref 0.6–1.3)
EOSINOPHIL # BLD AUTO: 0.19 THOUSAND/ΜL (ref 0–0.61)
EOSINOPHIL NFR BLD AUTO: 3 % (ref 0–6)
ERYTHROCYTE [DISTWIDTH] IN BLOOD BY AUTOMATED COUNT: 14.6 % (ref 11.6–15.1)
GFR SERPL CREATININE-BSD FRML MDRD: 48 ML/MIN/1.73SQ M
GLUCOSE SERPL-MCNC: 89 MG/DL (ref 65–140)
HCT VFR BLD AUTO: 28 % (ref 34.8–46.1)
HGB BLD-MCNC: 8.8 G/DL (ref 11.5–15.4)
INR PPP: 1.74 (ref 0.86–1.16)
LYMPHOCYTES # BLD AUTO: 1.94 THOUSANDS/ΜL (ref 0.6–4.47)
LYMPHOCYTES NFR BLD AUTO: 33 % (ref 14–44)
MCH RBC QN AUTO: 30.6 PG (ref 26.8–34.3)
MCHC RBC AUTO-ENTMCNC: 31.4 G/DL (ref 31.4–37.4)
MCV RBC AUTO: 97 FL (ref 82–98)
MONOCYTES # BLD AUTO: 0.49 THOUSAND/ΜL (ref 0.17–1.22)
MONOCYTES NFR BLD AUTO: 8 % (ref 4–12)
NEUTROPHILS # BLD AUTO: 3.15 THOUSANDS/ΜL (ref 1.85–7.62)
NEUTS SEG NFR BLD AUTO: 53 % (ref 43–75)
NRBC BLD AUTO-RTO: 0 /100 WBCS
PLATELET # BLD AUTO: 207 THOUSANDS/UL (ref 149–390)
PMV BLD AUTO: 10.1 FL (ref 8.9–12.7)
POTASSIUM SERPL-SCNC: 4.6 MMOL/L (ref 3.5–5.3)
PROTHROMBIN TIME: 20.8 SECONDS (ref 12.1–14.4)
RBC # BLD AUTO: 2.88 MILLION/UL (ref 3.81–5.12)
SODIUM SERPL-SCNC: 134 MMOL/L (ref 136–145)
WBC # BLD AUTO: 5.9 THOUSAND/UL (ref 4.31–10.16)

## 2018-01-11 PROCEDURE — 85610 PROTHROMBIN TIME: CPT | Performed by: NURSE PRACTITIONER

## 2018-01-11 PROCEDURE — 85025 COMPLETE CBC W/AUTO DIFF WBC: CPT | Performed by: NURSE PRACTITIONER

## 2018-01-11 PROCEDURE — 80048 BASIC METABOLIC PNL TOTAL CA: CPT | Performed by: NURSE PRACTITIONER

## 2018-01-14 VITALS
HEART RATE: 55 BPM | SYSTOLIC BLOOD PRESSURE: 124 MMHG | DIASTOLIC BLOOD PRESSURE: 68 MMHG | OXYGEN SATURATION: 99 % | HEIGHT: 66 IN | WEIGHT: 150 LBS | BODY MASS INDEX: 24.11 KG/M2

## 2018-01-15 LAB
ANION GAP SERPL CALCULATED.3IONS-SCNC: 10 MMOL/L (ref 4–13)
BASOPHILS # BLD AUTO: 0.07 THOUSANDS/ΜL (ref 0–0.1)
BASOPHILS NFR BLD AUTO: 1 % (ref 0–1)
BUN SERPL-MCNC: 36 MG/DL (ref 5–25)
CALCIUM SERPL-MCNC: 8.8 MG/DL (ref 8.3–10.1)
CHLORIDE SERPL-SCNC: 103 MMOL/L (ref 100–108)
CO2 SERPL-SCNC: 23 MMOL/L (ref 21–32)
CREAT SERPL-MCNC: 1.44 MG/DL (ref 0.6–1.3)
EOSINOPHIL # BLD AUTO: 0.31 THOUSAND/ΜL (ref 0–0.61)
EOSINOPHIL NFR BLD AUTO: 4 % (ref 0–6)
ERYTHROCYTE [DISTWIDTH] IN BLOOD BY AUTOMATED COUNT: 14.7 % (ref 11.6–15.1)
GFR SERPL CREATININE-BSD FRML MDRD: 42 ML/MIN/1.73SQ M
GLUCOSE P FAST SERPL-MCNC: 88 MG/DL (ref 65–99)
GLUCOSE SERPL-MCNC: 88 MG/DL (ref 65–140)
HCT VFR BLD AUTO: 29.8 % (ref 34.8–46.1)
HGB BLD-MCNC: 9.5 G/DL (ref 11.5–15.4)
INR PPP: 1.95 (ref 0.86–1.16)
LYMPHOCYTES # BLD AUTO: 2.06 THOUSANDS/ΜL (ref 0.6–4.47)
LYMPHOCYTES NFR BLD AUTO: 28 % (ref 14–44)
MCH RBC QN AUTO: 31.1 PG (ref 26.8–34.3)
MCHC RBC AUTO-ENTMCNC: 31.9 G/DL (ref 31.4–37.4)
MCV RBC AUTO: 98 FL (ref 82–98)
MONOCYTES # BLD AUTO: 0.59 THOUSAND/ΜL (ref 0.17–1.22)
MONOCYTES NFR BLD AUTO: 8 % (ref 4–12)
NEUTROPHILS # BLD AUTO: 4.25 THOUSANDS/ΜL (ref 1.85–7.62)
NEUTS SEG NFR BLD AUTO: 58 % (ref 43–75)
NRBC BLD AUTO-RTO: 0 /100 WBCS
PLATELET # BLD AUTO: 183 THOUSANDS/UL (ref 149–390)
PMV BLD AUTO: 10.8 FL (ref 8.9–12.7)
POTASSIUM SERPL-SCNC: 4.6 MMOL/L (ref 3.5–5.3)
PROTHROMBIN TIME: 22.8 SECONDS (ref 12.1–14.4)
RBC # BLD AUTO: 3.05 MILLION/UL (ref 3.81–5.12)
SODIUM SERPL-SCNC: 136 MMOL/L (ref 136–145)
WBC # BLD AUTO: 7.31 THOUSAND/UL (ref 4.31–10.16)

## 2018-01-15 PROCEDURE — 85025 COMPLETE CBC W/AUTO DIFF WBC: CPT | Performed by: NURSE PRACTITIONER

## 2018-01-15 PROCEDURE — 80048 BASIC METABOLIC PNL TOTAL CA: CPT | Performed by: NURSE PRACTITIONER

## 2018-01-15 PROCEDURE — 85610 PROTHROMBIN TIME: CPT | Performed by: NURSE PRACTITIONER

## 2018-01-16 LAB
ANION GAP SERPL CALCULATED.3IONS-SCNC: 6 MMOL/L (ref 4–13)
BUN SERPL-MCNC: 33 MG/DL (ref 5–25)
CALCIUM SERPL-MCNC: 8.6 MG/DL (ref 8.3–10.1)
CHLORIDE SERPL-SCNC: 107 MMOL/L (ref 100–108)
CO2 SERPL-SCNC: 26 MMOL/L (ref 21–32)
CREAT SERPL-MCNC: 1.44 MG/DL (ref 0.6–1.3)
GFR SERPL CREATININE-BSD FRML MDRD: 42 ML/MIN/1.73SQ M
GLUCOSE SERPL-MCNC: 53 MG/DL (ref 65–140)
POTASSIUM SERPL-SCNC: 4.4 MMOL/L (ref 3.5–5.3)
SODIUM SERPL-SCNC: 139 MMOL/L (ref 136–145)

## 2018-01-16 PROCEDURE — 80048 BASIC METABOLIC PNL TOTAL CA: CPT | Performed by: NURSE PRACTITIONER

## 2018-01-22 VITALS
SYSTOLIC BLOOD PRESSURE: 122 MMHG | HEIGHT: 66 IN | HEART RATE: 56 BPM | DIASTOLIC BLOOD PRESSURE: 64 MMHG | BODY MASS INDEX: 23.78 KG/M2 | WEIGHT: 148 LBS | OXYGEN SATURATION: 98 %

## 2018-09-05 ENCOUNTER — OFFICE VISIT (OUTPATIENT)
Dept: CARDIOLOGY CLINIC | Facility: CLINIC | Age: 73
End: 2018-09-05
Payer: MEDICARE

## 2018-09-05 VITALS
HEART RATE: 58 BPM | SYSTOLIC BLOOD PRESSURE: 144 MMHG | HEIGHT: 67 IN | WEIGHT: 173 LBS | BODY MASS INDEX: 27.15 KG/M2 | DIASTOLIC BLOOD PRESSURE: 82 MMHG | OXYGEN SATURATION: 95 %

## 2018-09-05 DIAGNOSIS — Z98.61 CAD S/P PERCUTANEOUS CORONARY ANGIOPLASTY: ICD-10-CM

## 2018-09-05 DIAGNOSIS — I50.22 CHRONIC SYSTOLIC (CONGESTIVE) HEART FAILURE (HCC): Primary | ICD-10-CM

## 2018-09-05 DIAGNOSIS — I25.10 CAD S/P PERCUTANEOUS CORONARY ANGIOPLASTY: ICD-10-CM

## 2018-09-05 DIAGNOSIS — I51.89 DIASTOLIC DYSFUNCTION: ICD-10-CM

## 2018-09-05 DIAGNOSIS — E78.2 MIXED HYPERLIPIDEMIA: ICD-10-CM

## 2018-09-05 DIAGNOSIS — I34.0 NONRHEUMATIC MITRAL VALVE REGURGITATION: ICD-10-CM

## 2018-09-05 DIAGNOSIS — I36.1 NONRHEUMATIC TRICUSPID VALVE REGURGITATION: ICD-10-CM

## 2018-09-05 DIAGNOSIS — I35.0 NON-RHEUMATIC AORTIC STENOSIS: ICD-10-CM

## 2018-09-05 DIAGNOSIS — I10 ESSENTIAL HYPERTENSION: ICD-10-CM

## 2018-09-05 DIAGNOSIS — I42.8 OTHER CARDIOMYOPATHY (HCC): ICD-10-CM

## 2018-09-05 PROCEDURE — 99214 OFFICE O/P EST MOD 30 MIN: CPT | Performed by: INTERNAL MEDICINE

## 2018-09-06 RX ORDER — FUROSEMIDE 40 MG/1
20 TABLET ORAL DAILY
Qty: 45 TABLET | Refills: 1
Start: 2018-09-06 | End: 2019-02-21 | Stop reason: HOSPADM

## 2018-09-06 RX ORDER — LISINOPRIL 5 MG/1
7.5 TABLET ORAL DAILY
Qty: 45 TABLET | Refills: 5 | Status: SHIPPED | OUTPATIENT
Start: 2018-09-06 | End: 2018-09-19 | Stop reason: SDUPTHER

## 2018-09-06 NOTE — PROGRESS NOTES
CARDIOLOGY OFFICE VISIT  [facility]  [address] [place] [address], [facility] Beryl Brunner  Tel: [phone]      NAME: Radha Vann  AGE: 68 y o  SEX: female  : 1945   MRN: [id number]      Chief Complaint:  Chief Complaint   Patient presents with    Follow-up     CHF, cardiomyopathy         History of Present Illness:   Patient comes for follow up  States she is doing well from cardiac stand point and denies chest pain / pressure, SOB, palpitations, lightheadedness, syncope, swelling feet, orthopnea, PND, claudication  Chronic systolic congestive heart failure - Pt was admitted to [facility] in 5946 for acute systolic CHF  Found to have severe CMP and was discharged on LifeVest  And was started on OMT  She then had her three monthly limited echo as OP and it showed EF of 35-40%  LifeVest was d/kaitlin and OMT was contd  Cardiomyopathy -  On lisinopril and metoprolol succinate which she takes regularly  CAD s/p PCI with 2 stents to RCA in  -  States is doing well cardiac wise with no cardiac symptoms  Taking all medications regularly  HTN, DD -  Has been hypertensive for many years  Taking medications regularly  Denies lightheadedness, headache, medication side effects  HLP -  Has had hyperlipidemia for many years  Taking statin regularly along with diet control  Denies myalgia  PCP closely monitoring the blood work  MR, AS, TR -    Stable    Asymptomatic      Past Medical History:  Past Medical History:   Diagnosis Date    Aortic valve stenosis     [facility]     Cardiac disease     Cardiomyopathy ([facility]     [facility]     Chronic systolic heart failure (HCC)     Coronary artery disease     Diabetes mellitus ([facility]     Diastolic dysfunction     Hypertension     Mitral regurgitation     Pulmonary HTN (HCC)     Renal disorder     Stroke ([facility]     Tricuspid regurgitation Past Surgical History:  Past Surgical History:   Procedure Laterality Date    CARDIAC SURGERY      CORONARY ANGIOPLASTY WITH STENT PLACEMENT      KIDNEY SURGERY      MASTECTOMY           Family History:  Family History   Problem Relation Age of Onset    Family history unknown: Yes         Social History:  Social History     Social History    Marital status:       Spouse name: N/A    Number of children: N/A    Years of education: N/A     Social History Main Topics    Smoking status: Former Smoker     Packs/day: 0 50     Years: 45 00     Quit date: 12/23/2009    Smokeless tobacco: Never Used    Alcohol use No    Drug use: No    Sexual activity: Not on file     Other Topics Concern    Not on file     Social History Narrative    Lives with daughter         Active Problems:  Patient Active Problem List   Diagnosis    Normocytic anemia    Essential hypertension    Diabetes mellitus ([address] )    Solitary kidney         The following portions of the patient's history were reviewed and updated as appropriate: past medical history, past surgical history, past family history,  past social history, current medications, allergies and problem list       Review of Systems:  Constitutional: Denies fever, chills, fatigue  Eyes: Denies eye redness, eye discharge, double vision  ENT: Denies hearing loss, tinnitus, sneezing, nasal discharge, sore throat   Respiratory: Denies cough, expectoration, hemoptysis, shortness of breath  Cardiovascular: Denies chest pain, palpitations, orthopnea, PND, lower extremity swelling  Gastrointestinal: Denies abdominal pain, nausea, vomiting, hematemesis, diarrhea, bloody stools  Genito-Urinary: Denies dysuria, incontinence  Musculoskeletal: Denies back pain, joint pain, muscle pain  Neurologic: Denies confusion, lightheadedness, syncope, headache, focal weakness, sensory changes, seizures  Endocrine: Denies polyuria, polydipsia, temperature intolerance  Allergy and Immunology: Denies hives, insect bite sensitivity  Hematological and Lymphatic: Denies bleeding problems, swollen glands   Psychological: Denies depression, suicidal ideation, anxiety, panic  Dermatological: Denies pruritus, rash, skin lesion changes      Vitals:  Vitals:    09/05/18 1512   BP: 144/82   Pulse: 58   SpO2: 95%       Body mass index is 27 1 kg/m²  Weight (last 2 days)     Date/Time   Weight    09/05/18 1512  78 5 (173)                Physical Examination:  General: Patient is not in acute distress  Awake, alert, oriented in time, place and person  Responding to commands  Head: Normocephalic  Atraumatic  Eyes: Both pupils normal sized, round and reactive to light  Nonicteric  ENT: Normal external ear canals  Nares normal, no drainage  Lips and oral mucosa normal  Neck: Supple  JVP not raised  Trachea central  No thyromegaly  Lungs: Bilateral bronchovascular breath sounds with no crackles or rhonchi  Chest wall: No tenderness  Cardiovascular: RRR  S1 and S2 normal  Grade 3/6 PSM at apex  Grade 3/6 LIBIA at base  No rub or gallop  Gastrointestinal: Abdomen soft, nontender  No guarding or rigidity  Liver and spleen not palpable  Bowel sounds present  Neurologic: Patient is awake, alert, oriented in time, place and person  Responding to command  Moving all extremities  Integumentary:  No skin rash  Lymphatic: No cervical lymphadenopathy  Back: Symmetric   No CVA tenderness  Extremities: No clubbing, cyanosis or edema      Laboratory Results:  CBC with diff:   Lab Results   Component Value Date    WBC 7 31 01/15/2018    RBC 3 05 (L) 01/15/2018    HGB 9 5 (L) 01/15/2018    HCT 29 8 (L) 01/15/2018    MCV 98 01/15/2018    MCH 31 1 01/15/2018    RDW 14 7 01/15/2018     01/15/2018       CMP:  Lab Results   Component Value Date    CREATININE 1 44 (H) 01/16/2018    BUN 33 (H) 01/16/2018     01/16/2018    K 4 4 01/16/2018     01/16/2018    CO2 26 01/16/2018    ALKPHOS 242 (H) 01/01/2018    ALT 30 2018    AST 31 2018    BILIDIR 2 56 (H) 07/15/2017       Lab Results   Component Value Date    HGBA1C 6 2 2017    MG 2 0 2017    PHOS 3 2 2017       Lab Results   Component Value Date    TROPONINI <0 02 2017    TROPONINI <0 02 2017    TROPONINI <0 02 2017       Lipid Profile:   No results found for: CHOL  Lab Results   Component Value Date    HDL 9 (L) 2017     Lab Results   Component Value Date    [facility]  2017      Comment:      Unable to calculate     Lab Results   Component Value Date    TRIG 57 2017       Cardiac testing:   Results for orders placed during the hospital encounter of 17   Echo complete with contrast if indicated    [place] [id number]  [phone]    Transthoracic Echocardiogram  2D, M-mode, Doppler, and Color Doppler    Study date:  2017    Patient: Mark Monahan  MR number: [id number]  Account number: [de-identified]  : 1945  Age: 80 years  Gender: Female  Status: Inpatient  Location: Bedside  Height: 66 in  Weight: 173 lb  BP: 149/ 65 mmHg    Indications: Heart Failure    Diagnoses: I50 9 - Heart failure, unspecified    Sonographer:  VANIA Epperson,RDCS  Interpreting Physician:  Ledy Amador MD  Referring Physician:  Latha St MD  Group:  [facility]    SUMMARY    LEFT VENTRICLE:  Systolic function was severely reduced  Ejection fraction was estimated to be 25 % - 30%  There was severe diffuse hypokinesis  Doppler parameters were consistent with restrictive physiology, indicative of decreased left ventricular diastolic compliance and/or increased left atrial pressure  The ratio of mitral peak early diastolic velocity to medial annulus peak early diastolic velocity (E/Ea) was 19  RIGHT VENTRICLE:  The ventricle was dilated    Systolic function was normal     LEFT ATRIUM:  The atrium was mildly dilated  RIGHT ATRIUM:  The atrium was mildly dilated  MITRAL VALVE:  There was mild annular calcification  There was severe regurgitation  AORTIC VALVE:  The valve was trileaflet  Leaflets exhibited increased thickness and calcification with reduced cuspal separation  Transaortic velocity was increased due to valvular stenosis  There was mild to moderate stenosis  Peak and mean AV gradients were 20 and 10 mm Hg respectively  KOFI by the Vmax method was 1 4 sq cm  There was trace regurgitation  TRICUSPID VALVE:  There was severe regurgitation  Estimated peak PA pressure was 80 mmHg  The findings suggest severe pulmonary hypertension  PULMONIC VALVE:  There was mild regurgitation  IVC, HEPATIC VEINS:  The inferior vena cava was dilated  The respirophasic change in diameter was less than 50%  HISTORY: PRIOR HISTORY: Congestive Heart Failure, Hypertension, Diabetes Mellitus, Weakness, Back pain    PROCEDURE: The procedure was performed at the bedside  This was a routine study  The transthoracic approach was used  The study included complete 2D imaging, M-mode, complete spectral Doppler, and color Doppler  The heart rate was 73 bpm,  at the start of the study  Images were obtained from the parasternal, apical, subcostal, and suprasternal notch acoustic windows  Image quality was good  LEFT VENTRICLE: Size was normal  Systolic function was severely reduced  Ejection fraction was estimated to be 25 % - 30%  There was severe diffuse hypokinesis  Wall thickness was normal  No evidence of apical thrombus  DOPPLER: Doppler  parameters were consistent with restrictive physiology, indicative of decreased left ventricular diastolic compliance and/or increased left atrial pressure  RIGHT VENTRICLE: The ventricle was dilated  Systolic function was normal  Wall thickness was normal     LEFT ATRIUM: The atrium was mildly dilated  RIGHT ATRIUM: The atrium was mildly dilated      MITRAL VALVE: There was mild annular calcification  There was normal leaflet separation  DOPPLER: The transmitral velocity was within the normal range  There was no evidence for stenosis  There was severe regurgitation  AORTIC VALVE: The valve was trileaflet  Leaflets exhibited increased thickness and calcification with reduced cuspal separation  DOPPLER: Transaortic velocity was increased due to valvular stenosis  There was mild to moderate stenosis  Peak and mean AV gradients were 20 and 10 mm Hg respectively  KOFI by the Vmax method was 1 4 sq cm  There was trace regurgitation  TRICUSPID VALVE: The valve structure was normal  There was normal leaflet separation  DOPPLER: The transtricuspid velocity was within the normal range  There was no evidence for stenosis  There was severe regurgitation  Estimated peak PA  pressure was 80 mmHg  The findings suggest severe pulmonary hypertension  PULMONIC VALVE: Leaflets exhibited normal thickness, no calcification, and normal cuspal separation  DOPPLER: The transpulmonic velocity was within the normal range  There was mild regurgitation  PERICARDIUM: There was no pericardial effusion  The pericardium was normal in appearance  AORTA: The root exhibited normal size  SYSTEMIC VEINS: IVC: The inferior vena cava was dilated  The respirophasic change in diameter was less than 50%      MEASUREMENT TABLES    DOPPLER MEASUREMENTS  Left ventricle   (Reference normals)  E/Ea, med ho, tiss DP   19    (--)    SYSTEM MEASUREMENT TABLES    2D  %FS: 7 5 %  Ao Diam: 3 1 cm  EDV(Teich): 194 6 ml  EF Biplane: 26 8 %  EF(Teich): 16 4 %  ESV(Teich): 162 7 ml  IVSd: 0 8 cm  LA Area: 28 2 cm2  LA Diam: 4 3 cm  LVEDV MOD A2C: 190 9 ml  LVEDV MOD A4C: 150 7 ml  LVEDV MOD BP: 170 3 ml  LVEF MOD A2C: 18 9 %  LVEF MOD A4C: 38 1 %  LVESV MOD A2C: 154 9 ml  LVESV MOD A4C: 93 2 ml  LVESV MOD BP: 124 7 ml  LVIDd: 6 2 cm  LVIDs: 5 7 cm  LVLd A2C: 9 3 cm  LVLd A4C: 9 4 cm  LVLs A2C: 8 9 cm  LVLs A4C: 8 2 cm  LVOT Diam: 1 9 cm  LVPWd: 0 6 cm  RA Area: 29 7 cm2  RVIDd: 4 4 cm  SV MOD A2C: 36 ml  SV MOD A4C: 57 5 ml  SV(Teich): 32 ml    CF  MR Als  Twan: 0 3 m/s  MR Flow: 152 5 ml/s  MR Rad: 0 9 cm  TR Als  Twan: 0 3 m/s  TR Flow: 200 3 ml/s  TR Rad: 1 1 cm    CW  AV Env  Ti: 350 1 ms  AV VTI: 49 8 cm  AV Vmax: 2 2 m/s  AV Vmean: 1 4 m/s  AV maxP 7 mmHg  AV meanP 5 mmHg  MR VTI: 188 4 cm  MR Vmax: 5 8 m/s  TR VTI: 137 5 cm  TR Vmax: 4 m/s  TR Vmax: 4 1 m/s  TR maxP 6 mmHg    MM  TAPSE: 2 2 cm    PW  KOFI (VTI): 1 2 cm2  KOFI Vmax: 1 4 cm2  E': 0 1 m/s  E/E': 19 1  LVOT Env  Ti: 310 2 ms  LVOT VTI: 22 cm  LVOT Vmax: 1 1 m/s  LVOT Vmean: 0 7 m/s  LVOT maxP 3 mmHg  LVOT meanP 4 mmHg  LVSV Dopp: 60 7 ml  MR ERO: 0 3 cm2  MR RV: 49 7 ml  MV A Twan: 0 4 m/s  MV Dec [place]: 7 5 m/s2  MV DecT: 145 9 ms  MV E Twan: 1 1 m/s  MV E/A Ratio: 2 6  MV PHT: 42 3 ms  MVA By PHT: 5 2 cm2  TR ERO: 0 5 cm2  TR RV: 66 4 ml    [facility]redited [facility]    Prepared and electronically signed by    Cindy Argueta MD  Signed 2017 18:18:10         Medications:    Current Outpatient Prescriptions:     ascorbic acid (VITAMIN C) 250 mg tablet, Take 250 mg by mouth daily, Disp: , Rfl:     atorvastatin (LIPITOR) 40 mg tablet, Take 40 mg by mouth daily, Disp: , Rfl:     Calcium Polycarbophil (FIBER) 625 MG TABS, Take 0 8 tablets by mouth daily, Disp: 14 each, Rfl: 0    ferrous sulfate 325 (65 Fe) mg tablet, Take 325 mg by mouth daily with breakfast, Disp: , Rfl:     furosemide (LASIX) 40 mg tablet, Take 1 tablet by mouth daily, Disp: , Rfl: 0    levothyroxine 50 mcg tablet, Take 50 mcg by mouth daily, Disp: , Rfl:     lisinopril (ZESTRIL) 5 mg tablet, Take 1 tablet by mouth daily, Disp: 30 tablet, Rfl: 2    metoprolol succinate (TOPROL-XL) 50 mg 24 hr tablet, Take 1 tablet by mouth daily, Disp: 30 tablet, Rfl: 2    Multiple Vitamin (MULTIVITAMIN) tablet, Take 1 tablet by mouth daily, Disp: , Rfl:     Omega-3 Fatty Acids (FISH OIL) 1,000 mg, Take 1,000 mg by mouth 2 (two) times a day, Disp: , Rfl:     oxybutynin (DITROPAN-XL) 10 MG 24 hr tablet, Take 1 tablet by mouth daily at bedtime (Patient taking differently: Take 5 mg by mouth daily at bedtime  ), Disp: , Rfl: 0    sertraline (ZOLOFT) 50 mg tablet, Take 50 mg by mouth daily, Disp: , Rfl:     nystatin (nystatin) powder, Apply 1 application topically 3 (three) times a day for 5 days, Disp: 15 g, Rfl: 0      Allergies: Allergies   Allergen Reactions    Penicillins Rash         Assessment and Plan:  1  Chronic systolic (congestive) heart failure (HCC)   euvolemic  Decrease furosemide to 20 mg daily  Continue ACE-inhibitor and beta-blocker  Low-salt diet  Daily weights  If weight increases by 3 lb in a day or 5 lb in a week please increase the diuretic dose as explained    2  Other cardiomyopathy ([facility]   EF 35-40%  Continue Ace inhibitor, beta-blocker    3  CAD S/P percutaneous coronary angioplasty   stable  Continue beta-blocker, statin, ACE-inhibitor  Not on aspirin due to high bleeding risk along with Coumadin (for CVA)    4  Essential hypertension   BP  High  Increase lisinopril to 7 5 mg daily    5  Diastolic dysfunction   tight BP control    6  Nonrheumatic mitral valve regurgitation, Non-rheumatic aortic stenosis, Nonrheumatic tricuspid valve regurgitation   follow-up with serial echocardiograms    7  HLP   continue statin and diet control  Her PCP closely monitor the blood work    Recommend aggressive risk factor modification and therapeutic lifestyle changes  Low-salt, low-calorie, low-fat, low-cholesterol diet with regular exercise and to optimize weight  I will defer the ordering and monitoring of necessity lab studies to you, but I am available and happy to review and manage any of the data at your request in the future  Discussed concepts of atherosclerosis, including signs and symptoms of cardiac disease  Previous studies were reviewed  Safety measures were reviewed  Questions were entertained and answered  Patient was advised to report any problems requiring medical attention  Follow-up with PCP and appropriate specialist and lab work as discussed  Return for follow up visit as scheduled or earlier, if needed  Thank you for allowing me to participate in the care and evaluation of your patient  Should you have any questions, please feel free to contact me        Deja Birch MD  9/6/2018,3:12 PM

## 2018-09-07 ENCOUNTER — TELEPHONE (OUTPATIENT)
Dept: CARDIOLOGY CLINIC | Facility: CLINIC | Age: 73
End: 2018-09-07

## 2018-09-07 NOTE — TELEPHONE ENCOUNTER
Patients daughter called with the list of medications requested       LEVOTHYROXINE   IRON   FISH OIL   PROBIOTIC  METOPROLOL  MULTI-VITAMIN  FIBER   SERTRALINE  VITAMIN C   ATORVASTATIN   COUMADIN   OXYBUTYNIN

## 2018-09-19 DIAGNOSIS — I10 ESSENTIAL HYPERTENSION: ICD-10-CM

## 2018-09-19 RX ORDER — LISINOPRIL 2.5 MG/1
2.5 TABLET ORAL DAILY
Qty: 90 TABLET | Refills: 0 | Status: SHIPPED | OUTPATIENT
Start: 2018-09-19 | End: 2018-09-19 | Stop reason: SDUPTHER

## 2018-09-19 RX ORDER — LISINOPRIL 2.5 MG/1
2.5 TABLET ORAL DAILY
Qty: 90 TABLET | Refills: 1 | Status: SHIPPED | OUTPATIENT
Start: 2018-09-19 | End: 2018-09-19 | Stop reason: SDUPTHER

## 2018-09-19 RX ORDER — LISINOPRIL 2.5 MG/1
2.5 TABLET ORAL DAILY
Qty: 90 TABLET | Refills: 3 | Status: SHIPPED | OUTPATIENT
Start: 2018-09-19 | End: 2019-05-02 | Stop reason: SDUPTHER

## 2018-09-19 NOTE — TELEPHONE ENCOUNTER
Cindy Argueta MD  [facility]             Please send lisinopril 2 5 mg daily to her pharmacy

## 2018-09-19 NOTE — TELEPHONE ENCOUNTER
PT DAUGHTER KAYLI HAS BEEN WAITING FOR A CALL BACK FROM DR [address]  SINCE 09/07 TO DISCUSS MODIFICATIONS ON PT MEDS  PT DAUGHTER WOULD LIKE TO SPEAK TO DR OJEDA TODAY

## 2019-02-15 ENCOUNTER — TELEPHONE (OUTPATIENT)
Dept: CARDIOLOGY CLINIC | Facility: CLINIC | Age: 74
End: 2019-02-15

## 2019-02-15 ENCOUNTER — HOSPITAL ENCOUNTER (INPATIENT)
Facility: HOSPITAL | Age: 74
LOS: 5 days | Discharge: HOME HEALTH | DRG: 286 | End: 2019-02-21
Attending: EMERGENCY MEDICINE | Admitting: INTERNAL MEDICINE
Payer: MEDICARE

## 2019-02-15 ENCOUNTER — APPOINTMENT (EMERGENCY)
Dept: RADIOLOGY | Facility: HOSPITAL | Age: 74
DRG: 286 | End: 2019-02-15
Payer: MEDICARE

## 2019-02-15 DIAGNOSIS — N17.9 AKI (ACUTE KIDNEY INJURY) (HCC): ICD-10-CM

## 2019-02-15 DIAGNOSIS — I50.9 ACUTE EXACERBATION OF CHF (CONGESTIVE HEART FAILURE) (HCC): Primary | ICD-10-CM

## 2019-02-15 DIAGNOSIS — E08.01 DIABETES MELLITUS DUE TO UNDERLYING CONDITION WITH HYPEROSMOLAR COMA, UNSPECIFIED WHETHER LONG TERM INSULIN USE (HCC): ICD-10-CM

## 2019-02-15 DIAGNOSIS — IMO0002 SOLITARY KIDNEY: ICD-10-CM

## 2019-02-15 DIAGNOSIS — R60.0 BILATERAL LEG EDEMA: ICD-10-CM

## 2019-02-15 DIAGNOSIS — I50.23 ACUTE ON CHRONIC SYSTOLIC CONGESTIVE HEART FAILURE (HCC): ICD-10-CM

## 2019-02-15 LAB
ALBUMIN SERPL BCP-MCNC: 2.8 G/DL (ref 3.5–5)
ALP SERPL-CCNC: 218 U/L (ref 46–116)
ALT SERPL W P-5'-P-CCNC: 27 U/L (ref 12–78)
ANION GAP SERPL CALCULATED.3IONS-SCNC: 9 MMOL/L (ref 4–13)
AST SERPL W P-5'-P-CCNC: 45 U/L (ref 5–45)
BASOPHILS # BLD AUTO: 0.05 THOUSANDS/ΜL (ref 0–0.1)
BASOPHILS NFR BLD AUTO: 1 % (ref 0–1)
BILIRUB DIRECT SERPL-MCNC: 0.51 MG/DL (ref 0–0.2)
BILIRUB SERPL-MCNC: 0.8 MG/DL (ref 0.2–1)
BUN SERPL-MCNC: 24 MG/DL (ref 5–25)
CALCIUM SERPL-MCNC: 9 MG/DL (ref 8.3–10.1)
CHLORIDE SERPL-SCNC: 107 MMOL/L (ref 100–108)
CO2 SERPL-SCNC: 23 MMOL/L (ref 21–32)
CREAT SERPL-MCNC: 1.36 MG/DL (ref 0.6–1.3)
EOSINOPHIL # BLD AUTO: 0.13 THOUSAND/ΜL (ref 0–0.61)
EOSINOPHIL NFR BLD AUTO: 2 % (ref 0–6)
ERYTHROCYTE [DISTWIDTH] IN BLOOD BY AUTOMATED COUNT: 16.3 % (ref 11.6–15.1)
GFR SERPL CREATININE-BSD FRML MDRD: 45 ML/MIN/1.73SQ M
GLUCOSE SERPL-MCNC: 102 MG/DL (ref 65–140)
HCT VFR BLD AUTO: 47.3 % (ref 34.8–46.1)
HGB BLD-MCNC: 14.4 G/DL (ref 11.5–15.4)
IMM GRANULOCYTES # BLD AUTO: 0.03 THOUSAND/UL (ref 0–0.2)
IMM GRANULOCYTES NFR BLD AUTO: 1 % (ref 0–2)
LYMPHOCYTES # BLD AUTO: 1.01 THOUSANDS/ΜL (ref 0.6–4.47)
LYMPHOCYTES NFR BLD AUTO: 18 % (ref 14–44)
MCH RBC QN AUTO: 30.3 PG (ref 26.8–34.3)
MCHC RBC AUTO-ENTMCNC: 30.4 G/DL (ref 31.4–37.4)
MCV RBC AUTO: 99 FL (ref 82–98)
MONOCYTES # BLD AUTO: 0.46 THOUSAND/ΜL (ref 0.17–1.22)
MONOCYTES NFR BLD AUTO: 8 % (ref 4–12)
NEUTROPHILS # BLD AUTO: 4.1 THOUSANDS/ΜL (ref 1.85–7.62)
NEUTS SEG NFR BLD AUTO: 70 % (ref 43–75)
NRBC BLD AUTO-RTO: 0 /100 WBCS
NT-PROBNP SERPL-MCNC: ABNORMAL PG/ML
PLATELET # BLD AUTO: 116 THOUSANDS/UL (ref 149–390)
PMV BLD AUTO: 11.5 FL (ref 8.9–12.7)
POTASSIUM SERPL-SCNC: 5.1 MMOL/L (ref 3.5–5.3)
PROT SERPL-MCNC: 7.5 G/DL (ref 6.4–8.2)
RBC # BLD AUTO: 4.76 MILLION/UL (ref 3.81–5.12)
SODIUM SERPL-SCNC: 139 MMOL/L (ref 136–145)
TROPONIN I SERPL-MCNC: <0.02 NG/ML
WBC # BLD AUTO: 5.78 THOUSAND/UL (ref 4.31–10.16)

## 2019-02-15 PROCEDURE — 93005 ELECTROCARDIOGRAM TRACING: CPT

## 2019-02-15 PROCEDURE — 85025 COMPLETE CBC W/AUTO DIFF WBC: CPT | Performed by: PHYSICIAN ASSISTANT

## 2019-02-15 PROCEDURE — 80076 HEPATIC FUNCTION PANEL: CPT | Performed by: PHYSICIAN ASSISTANT

## 2019-02-15 PROCEDURE — 36415 COLL VENOUS BLD VENIPUNCTURE: CPT | Performed by: PHYSICIAN ASSISTANT

## 2019-02-15 PROCEDURE — 71046 X-RAY EXAM CHEST 2 VIEWS: CPT

## 2019-02-15 PROCEDURE — 80048 BASIC METABOLIC PNL TOTAL CA: CPT | Performed by: PHYSICIAN ASSISTANT

## 2019-02-15 PROCEDURE — 84484 ASSAY OF TROPONIN QUANT: CPT | Performed by: PHYSICIAN ASSISTANT

## 2019-02-15 PROCEDURE — 99285 EMERGENCY DEPT VISIT HI MDM: CPT

## 2019-02-15 PROCEDURE — 83880 ASSAY OF NATRIURETIC PEPTIDE: CPT | Performed by: PHYSICIAN ASSISTANT

## 2019-02-15 RX ORDER — FUROSEMIDE 10 MG/ML
40 INJECTION INTRAMUSCULAR; INTRAVENOUS ONCE
Status: COMPLETED | OUTPATIENT
Start: 2019-02-15 | End: 2019-02-15

## 2019-02-15 RX ADMIN — FUROSEMIDE 40 MG: 10 INJECTION, SOLUTION INTRAVENOUS at 22:53

## 2019-02-15 NOTE — TELEPHONE ENCOUNTER
S/w daughter Skyler Soni  She states that her mother was staying with a relative and when she saw her mother last evening her legs looked a little larger than normal  Both equal in size  She states that she noticed that her eyes are puffy also  She denies whole face or neck edema  Denies SOB  Denies weight gain, but she also stated that she did not weigh her  She did not check her pulse, but states that her mother did not complain about abnormal HR  Not sure of BP because she did not check that either and she was not around her mother at the time of the call  Daughter wanted her mother to be seen today by Dr Dom Starr or his partner, but I was told that there are no available appts so she was advised to take her mother to the ER  Daughter said she is going to go back to the house to re-eval her mom and then decided if she should take her to the ER which she does not want to do  She said if she doesn't take her mother to the ER today she will call back to try to get a Monday appt with another provider

## 2019-02-15 NOTE — ED PROVIDER NOTES
History  Chief Complaint   Patient presents with    Ankle Swelling     Pt presents with family for c/o bilateral LE swelling, hx CHF     Charisma Mcclure is a 68 y o  female w PMH CAD, HTN, HLD, DM, hypothryoidism, renal disorder who presents for evaluation of lower leg swelling  Patient has swelling of her bilateral lower extremities  Her grandson noticed this last night, he had not seen her for about a month  Pt doesn't check her legs every day so she is not aware of exactly when these sx began  It looks abt the same this morning since last evening  She has sob w exertion but no cp / tightness  No leg pain or claudication w ambulation  No pain or numbness of the LE  She has had no injury or trauma  No prior DVT / PE, no recent travel / trauma / surgery  She is a former smoker  Prior to Admission Medications   Prescriptions Last Dose Informant Patient Reported? Taking?    Calcium Polycarbophil (FIBER) 625 MG TABS   No No   Sig: Take 0 8 tablets by mouth daily   Multiple Vitamin (MULTIVITAMIN) tablet   Yes No   Sig: Take 1 tablet by mouth daily   Omega-3 Fatty Acids (FISH OIL) 1,000 mg   Yes No   Sig: Take 1,000 mg by mouth 2 (two) times a day   ascorbic acid (VITAMIN C) 250 mg tablet   Yes No   Sig: Take 250 mg by mouth daily   atorvastatin (LIPITOR) 40 mg tablet   Yes No   Sig: Take 40 mg by mouth daily   ferrous sulfate 325 (65 Fe) mg tablet   Yes No   Sig: Take 325 mg by mouth daily with breakfast   furosemide (LASIX) 40 mg tablet   No No   Sig: Take 0 5 tablets (20 mg total) by mouth daily   levothyroxine 50 mcg tablet   Yes No   Sig: Take 50 mcg by mouth daily   lisinopril (ZESTRIL) 2 5 mg tablet   No No   Sig: Take 1 tablet (2 5 mg total) by mouth daily   metoprolol succinate (TOPROL-XL) 50 mg 24 hr tablet   No No   Sig: Take 1 tablet by mouth daily   nystatin (nystatin) powder   No No   Sig: Apply 1 application topically 3 (three) times a day for 5 days   oxybutynin (DITROPAN-XL) 10 MG 24 hr tablet   No No   Sig: Take 1 tablet by mouth daily at bedtime   Patient taking differently: Take 5 mg by mouth daily at bedtime     sertraline (ZOLOFT) 50 mg tablet   Yes No   Sig: Take 50 mg by mouth daily      Facility-Administered Medications: None       Past Medical History:   Diagnosis Date    Aortic valve stenosis     Cancer ([place] [address] )     Cardiac disease     Cardiomyopathy ([place] [address] )     [facility]     [facility]     Coronary artery disease     Diabetes mellitus ([place] [address] )     Diastolic dysfunction     Hypertension     Mitral regurgitation     Pulmonary HTN (HCC)     Renal disorder     Stroke ([place] [address] )     Tricuspid regurgitation        Past Surgical History:   Procedure Laterality Date    CARDIAC SURGERY      CORONARY ANGIOPLASTY WITH STENT PLACEMENT      KIDNEY SURGERY      MASTECTOMY         Family History   Family history unknown: Yes     I have reviewed and agree with the history as documented  Social History     Tobacco Use    Smoking status: Former Smoker     Packs/day: 0 50     Years: 45 00     Pack years: 22 50     Last attempt to quit: 2009     Years since quittin 1    Smokeless tobacco: Never Used   Substance Use Topics    Alcohol use: No    Drug use: No        Review of Systems   Constitutional: Negative for chills, diaphoresis and fever  HENT: Negative for congestion and sore throat  Eyes: Negative for visual disturbance  Respiratory: Negative for cough, chest tightness, shortness of breath and wheezing  Cardiovascular: Negative for chest pain and leg swelling  Gastrointestinal: Negative for abdominal pain, constipation, diarrhea, nausea and vomiting  Genitourinary: Negative for difficulty urinating, dysuria, frequency, hematuria, urgency, vaginal bleeding, vaginal discharge and vaginal pain  Musculoskeletal: Negative for arthralgias and myalgias  Skin: Positive for color change          Edema of LE    Neurological: Negative for dizziness, weakness, light-headedness, numbness and headaches  Psychiatric/Behavioral: The patient is not nervous/anxious  Physical Exam  Physical Exam   Constitutional: She is oriented to person, place, and time  She appears well-developed and well-nourished  No distress  HENT:   Head: Normocephalic and atraumatic  Eyes: Pupils are equal, round, and reactive to light  Neck: Neck supple  No tracheal deviation present  Cardiovascular: Normal rate, regular rhythm, normal heart sounds and intact distal pulses  Exam reveals no gallop and no friction rub  No murmur heard  Pulmonary/Chest: Effort normal and breath sounds normal  No respiratory distress  She has no wheezes  She has no rales  Abdominal: Soft  Bowel sounds are normal  She exhibits no distension and no mass  There is no tenderness  There is no guarding  Musculoskeletal: She exhibits no edema or deformity  Neurological: She is alert and oriented to person, place, and time  Skin: Skin is warm and dry  She is not diaphoretic    + 4 pitting edema of bilateral lower extremities, both swollen and erythematous, skin is taut but she is nontender to palpation, including palpation of deep veins, she has no apparent abscess   Psychiatric: She has a normal mood and affect  Her behavior is normal    Nursing note and vitals reviewed        Vital Signs  ED Triage Vitals [02/15/19 1629]   Temperature Pulse Respirations Blood Pressure SpO2   (!) 97 3 °F (36 3 °C) 71 20 148/74 99 %      Temp Source Heart Rate Source Patient Position - Orthostatic VS BP Location FiO2 (%)   Oral Monitor Sitting Left arm --      Pain Score       No Pain           Vitals:    02/15/19 1629   BP: 148/74   Pulse: 71   Patient Position - Orthostatic VS: Sitting       Visual Acuity      ED Medications  Medications   furosemide (LASIX) injection 40 mg (has no administration in time range)       Diagnostic Studies  Results Reviewed     Procedure Component Value Units Date/Time    Hepatic function panel [[id number]]  (Abnormal) Collected:  02/15/19 2033    Lab Status:  Final result Specimen:  Blood from Arm, Left Updated:  02/15/19 2101     Total Bilirubin 0 80 mg/dL      Bilirubin, Direct 0 51 mg/dL      Alkaline Phosphatase 218 U/L      AST 45 U/L      ALT 27 U/L      Total Protein 7 5 g/dL      Albumin 2 8 g/dL     NT-BNP PRO (BNP for [place], [place], [place], [place], [place], [facility], [facility], [place] campuses) [[id number]]  (Abnormal) Collected:  02/15/19 2033    Lab Status:  Final result Specimen:  Blood from Arm, Left Updated:  02/15/19 2101     NT-proBNP 10,568 pg/mL     Basic metabolic panel [[id number]]  (Abnormal) Collected:  02/15/19 2033    Lab Status:  Final result Specimen:  Blood from Arm, Left Updated:  02/15/19 2055     Sodium 139 mmol/L      Potassium 5 1 mmol/L      Chloride 107 mmol/L      CO2 23 mmol/L      ANION GAP 9 mmol/L      BUN 24 mg/dL      Creatinine 1 36 mg/dL      Glucose 102 mg/dL      Calcium 9 0 mg/dL      eGFR 45 ml/min/1 73sq m     Narrative:       National Kidney Disease Education Program recommendations are as follows:  GFR calculation is accurate only with a steady state creatinine  Chronic Kidney disease less than 60 ml/min/1 73 sq  meters  Kidney failure less than 15 ml/min/1 73 sq  meters      Troponin I [[id number]]  (Normal) Collected:  02/15/19 1818    Lab Status:  Final result Specimen:  Blood from Arm, Left Updated:  02/15/19 1846     Troponin I <0 02 ng/mL     CBC and differential [[id number]]  (Abnormal) Collected:  02/15/19 1818    Lab Status:  Final result Specimen:  Blood from Arm, Left Updated:  02/15/19 1826     WBC 5 78 Thousand/uL      RBC 4 76 Million/uL      Hemoglobin 14 4 g/dL      Hematocrit 47 3 %      MCV 99 fL      MCH 30 3 pg      MCHC 30 4 g/dL      RDW 16 3 %      MPV 11 5 fL      Platelets 502 Thousands/uL      nRBC 0 /100 WBCs      Neutrophils Relative 70 %      Immat GRANS % 1 %      Lymphocytes Relative 18 %      Monocytes Relative 8 % Eosinophils Relative 2 %      Basophils Relative 1 %      Neutrophils Absolute 4 10 Thousands/µL      Immature Grans Absolute 0 03 Thousand/uL      Lymphocytes Absolute 1 01 Thousands/µL      Monocytes Absolute 0 46 Thousand/µL      Eosinophils Absolute 0 13 Thousand/µL      Basophils Absolute 0 05 Thousands/µL                  XR chest 2 views    (Results Pending)              Procedures  Procedures       Phone Contacts  ED Phone Contact    ED Course           Identification of Seniors at Risk      Most Recent Value   (ISAR) Identification of Seniors at Risk   Before the illness or injury that brought you to the Emergency, did you need someone to help you on a regular basis? 1 Filed at: 02/15/2019 1628   In the last 24 hours, have you needed more help than usual?  1 Filed at: 02/15/2019 9391   Have you been hospitalized for one or more nights during the past 6 months? 0 Filed at: 02/15/2019 1628   In general, do you see well?  0 Filed at: 02/15/2019 1628   In general, do you have serious problems with your memory? 0 Filed at: 02/15/2019 1628   Do you take more than three different medications every day? 1 Filed at: 02/15/2019 1628   ISAR Score  3 Filed at: 02/15/2019 1628                          MDM  Number of Diagnoses or Management Options  Acute exacerbation of CHF (congestive heart failure) [facility]):   Diagnosis management comments: DDX includes but not ltd to:   Consider CHF exacerbation, bilateral LE cellulitis  DVT would be less likely as she actually doesn't have risk factors and has a low wells score  She has bilateral sx which would pt away from it and also has no pain, including any pain to palpation  Seems less likely to be arterial ischemia as she has normal strength / sensory exam and pulses detectable w doppler (although unable to detect w palpation due to her edema)  Consider ischemia, infection       Plan is to obtain:  CBC to check for anemia, leukocytosis, hydration status  Chemistry panel to check for lyte abnormalities, organ function   BNP to check for CHF/ congestion  CXR to check for congestive changes, active pulmonary disease   EKG/trop to check for ischemic changes     Based on results:  Patient will be admitted to medical management  Concern for acute CHF exacerbation given elevation in BNP, her acute findings on exam of lower extremity edema and her sob w exertion which is a new acute change for her  Will start on IV diuretic  Disposition  Final diagnoses:   Acute exacerbation of CHF (congestive heart failure) ([address] )     Time reflects when diagnosis was documented in both MDM as applicable and the Disposition within this note     Time User Action Codes Description Comment    2/15/2019  9:18 PM Sravani Amato Add [I50 9] Acute exacerbation of CHF (congestive heart failure) [facility])       ED Disposition     ED Disposition Condition Date/Time Comment    Admit Stable Fri Feb 15, 2019  9:18 PM Case was discussed with Dr Kristel Girard and the patient's admission status was agreed to be Admission Status: observation status to the service of Dr Clari Joyner   Follow-up Information    None         Patient's Medications   Discharge Prescriptions    No medications on file     No discharge procedures on file      ED Provider  Electronically Signed by           Rafia Richardson PA-C  02/15/19 0502

## 2019-02-15 NOTE — TELEPHONE ENCOUNTER
pts daughter called and stated the pt is experiencing swelling in the face and lower extremities  She was advised that the pt should go to emergency room but she insisted on making and appointment instead   Due to pt having CHF she was transferred to [facility] for triage

## 2019-02-15 NOTE — ED NOTES
Patient reports "swelling and redness of the lower legs" started yesterday   Denies pain, no injury reported     Travis Collins RN  02/15/19 5035

## 2019-02-16 ENCOUNTER — APPOINTMENT (OUTPATIENT)
Dept: ULTRASOUND IMAGING | Facility: HOSPITAL | Age: 74
DRG: 286 | End: 2019-02-16
Payer: MEDICARE

## 2019-02-16 PROBLEM — I50.23 ACUTE ON CHRONIC SYSTOLIC CONGESTIVE HEART FAILURE (HCC): Status: ACTIVE | Noted: 2019-02-16

## 2019-02-16 PROBLEM — R60.0 BILATERAL LEG EDEMA: Status: ACTIVE | Noted: 2019-02-16

## 2019-02-16 LAB
ALBUMIN SERPL BCP-MCNC: 3 G/DL (ref 3.5–5)
ALP SERPL-CCNC: 213 U/L (ref 46–116)
ALT SERPL W P-5'-P-CCNC: 37 U/L (ref 12–78)
ANION GAP SERPL CALCULATED.3IONS-SCNC: 15 MMOL/L (ref 4–13)
AST SERPL W P-5'-P-CCNC: 41 U/L (ref 5–45)
ATRIAL RATE: 60 BPM
ATRIAL RATE: 64 BPM
BILIRUB SERPL-MCNC: 1.1 MG/DL (ref 0.2–1)
BUN SERPL-MCNC: 28 MG/DL (ref 5–25)
CALCIUM SERPL-MCNC: 9 MG/DL (ref 8.3–10.1)
CHLORIDE SERPL-SCNC: 109 MMOL/L (ref 100–108)
CO2 SERPL-SCNC: 21 MMOL/L (ref 21–32)
CREAT SERPL-MCNC: 1.49 MG/DL (ref 0.6–1.3)
ERYTHROCYTE [DISTWIDTH] IN BLOOD BY AUTOMATED COUNT: 16.2 % (ref 11.6–15.1)
EST. AVERAGE GLUCOSE BLD GHB EST-MCNC: 166 MG/DL
GFR SERPL CREATININE-BSD FRML MDRD: 40 ML/MIN/1.73SQ M
GLUCOSE SERPL-MCNC: 103 MG/DL (ref 65–140)
GLUCOSE SERPL-MCNC: 127 MG/DL (ref 65–140)
GLUCOSE SERPL-MCNC: 148 MG/DL (ref 65–140)
GLUCOSE SERPL-MCNC: 159 MG/DL (ref 65–140)
GLUCOSE SERPL-MCNC: 169 MG/DL (ref 65–140)
GLUCOSE SERPL-MCNC: 99 MG/DL (ref 65–140)
HBA1C MFR BLD: 7.4 % (ref 4.2–6.3)
HCT VFR BLD AUTO: 43.6 % (ref 34.8–46.1)
HGB BLD-MCNC: 13.7 G/DL (ref 11.5–15.4)
MAGNESIUM SERPL-MCNC: 1.9 MG/DL (ref 1.6–2.6)
MCH RBC QN AUTO: 30.2 PG (ref 26.8–34.3)
MCHC RBC AUTO-ENTMCNC: 31.4 G/DL (ref 31.4–37.4)
MCV RBC AUTO: 96 FL (ref 82–98)
P AXIS: 50 DEGREES
P AXIS: 62 DEGREES
PHOSPHATE SERPL-MCNC: 3.7 MG/DL (ref 2.3–4.1)
PLATELET # BLD AUTO: 138 THOUSANDS/UL (ref 149–390)
PLATELET # BLD AUTO: 144 THOUSANDS/UL (ref 149–390)
PMV BLD AUTO: 11.7 FL (ref 8.9–12.7)
PMV BLD AUTO: 12.1 FL (ref 8.9–12.7)
POTASSIUM SERPL-SCNC: 4.6 MMOL/L (ref 3.5–5.3)
PR INTERVAL: 208 MS
PR INTERVAL: 210 MS
PROT SERPL-MCNC: 7.3 G/DL (ref 6.4–8.2)
QRS AXIS: 88 DEGREES
QRS AXIS: 94 DEGREES
QRSD INTERVAL: 102 MS
QRSD INTERVAL: 104 MS
QT INTERVAL: 464 MS
QT INTERVAL: 476 MS
QTC INTERVAL: 476 MS
QTC INTERVAL: 478 MS
RBC # BLD AUTO: 4.54 MILLION/UL (ref 3.81–5.12)
SODIUM SERPL-SCNC: 145 MMOL/L (ref 136–145)
T WAVE AXIS: 80 DEGREES
T WAVE AXIS: 84 DEGREES
TROPONIN I SERPL-MCNC: 0.02 NG/ML
TROPONIN I SERPL-MCNC: 0.02 NG/ML
VENTRICULAR RATE: 60 BPM
VENTRICULAR RATE: 64 BPM
WBC # BLD AUTO: 7.18 THOUSAND/UL (ref 4.31–10.16)

## 2019-02-16 PROCEDURE — 83735 ASSAY OF MAGNESIUM: CPT | Performed by: PHYSICIAN ASSISTANT

## 2019-02-16 PROCEDURE — 85049 AUTOMATED PLATELET COUNT: CPT | Performed by: PHYSICIAN ASSISTANT

## 2019-02-16 PROCEDURE — 97163 PT EVAL HIGH COMPLEX 45 MIN: CPT

## 2019-02-16 PROCEDURE — 85027 COMPLETE CBC AUTOMATED: CPT | Performed by: PHYSICIAN ASSISTANT

## 2019-02-16 PROCEDURE — 82948 REAGENT STRIP/BLOOD GLUCOSE: CPT

## 2019-02-16 PROCEDURE — 84100 ASSAY OF PHOSPHORUS: CPT | Performed by: PHYSICIAN ASSISTANT

## 2019-02-16 PROCEDURE — 80053 COMPREHEN METABOLIC PANEL: CPT | Performed by: PHYSICIAN ASSISTANT

## 2019-02-16 PROCEDURE — 93010 ELECTROCARDIOGRAM REPORT: CPT | Performed by: INTERNAL MEDICINE

## 2019-02-16 PROCEDURE — 99222 1ST HOSP IP/OBS MODERATE 55: CPT | Performed by: INTERNAL MEDICINE

## 2019-02-16 PROCEDURE — G8979 MOBILITY GOAL STATUS: HCPCS

## 2019-02-16 PROCEDURE — G8978 MOBILITY CURRENT STATUS: HCPCS

## 2019-02-16 PROCEDURE — G8988 SELF CARE GOAL STATUS: HCPCS

## 2019-02-16 PROCEDURE — 97166 OT EVAL MOD COMPLEX 45 MIN: CPT

## 2019-02-16 PROCEDURE — 99219 PR INITIAL OBSERVATION CARE/DAY 50 MINUTES: CPT | Performed by: INTERNAL MEDICINE

## 2019-02-16 PROCEDURE — 36415 COLL VENOUS BLD VENIPUNCTURE: CPT | Performed by: PHYSICIAN ASSISTANT

## 2019-02-16 PROCEDURE — G8987 SELF CARE CURRENT STATUS: HCPCS

## 2019-02-16 PROCEDURE — 84484 ASSAY OF TROPONIN QUANT: CPT | Performed by: INTERNAL MEDICINE

## 2019-02-16 PROCEDURE — 83036 HEMOGLOBIN GLYCOSYLATED A1C: CPT | Performed by: PHYSICIAN ASSISTANT

## 2019-02-16 PROCEDURE — 84484 ASSAY OF TROPONIN QUANT: CPT | Performed by: PHYSICIAN ASSISTANT

## 2019-02-16 PROCEDURE — 93970 EXTREMITY STUDY: CPT

## 2019-02-16 RX ORDER — OXYBUTYNIN CHLORIDE 5 MG/1
5 TABLET, EXTENDED RELEASE ORAL
Status: DISCONTINUED | OUTPATIENT
Start: 2019-02-16 | End: 2019-02-21 | Stop reason: HOSPADM

## 2019-02-16 RX ORDER — ASPIRIN 81 MG/1
81 TABLET ORAL DAILY
Status: DISCONTINUED | OUTPATIENT
Start: 2019-02-16 | End: 2019-02-21 | Stop reason: HOSPADM

## 2019-02-16 RX ORDER — ONDANSETRON 2 MG/ML
4 INJECTION INTRAMUSCULAR; INTRAVENOUS EVERY 8 HOURS PRN
Status: DISCONTINUED | OUTPATIENT
Start: 2019-02-16 | End: 2019-02-21 | Stop reason: HOSPADM

## 2019-02-16 RX ORDER — METOPROLOL SUCCINATE 50 MG/1
50 TABLET, EXTENDED RELEASE ORAL DAILY
Status: DISCONTINUED | OUTPATIENT
Start: 2019-02-16 | End: 2019-02-21

## 2019-02-16 RX ORDER — FUROSEMIDE 10 MG/ML
80 INJECTION INTRAMUSCULAR; INTRAVENOUS
Status: DISCONTINUED | OUTPATIENT
Start: 2019-02-16 | End: 2019-02-17

## 2019-02-16 RX ORDER — CHLORAL HYDRATE 500 MG
1000 CAPSULE ORAL 2 TIMES DAILY
Status: DISCONTINUED | OUTPATIENT
Start: 2019-02-16 | End: 2019-02-21 | Stop reason: HOSPADM

## 2019-02-16 RX ORDER — LEVOTHYROXINE SODIUM 0.05 MG/1
50 TABLET ORAL
Status: DISCONTINUED | OUTPATIENT
Start: 2019-02-16 | End: 2019-02-21 | Stop reason: HOSPADM

## 2019-02-16 RX ORDER — ATORVASTATIN CALCIUM 40 MG/1
40 TABLET, FILM COATED ORAL DAILY
Status: DISCONTINUED | OUTPATIENT
Start: 2019-02-16 | End: 2019-02-21 | Stop reason: HOSPADM

## 2019-02-16 RX ORDER — NYSTATIN 100000 [USP'U]/G
POWDER TOPICAL 2 TIMES DAILY
Status: DISCONTINUED | OUTPATIENT
Start: 2019-02-16 | End: 2019-02-21 | Stop reason: HOSPADM

## 2019-02-16 RX ORDER — FUROSEMIDE 40 MG/1
20 TABLET ORAL DAILY
Status: DISCONTINUED | OUTPATIENT
Start: 2019-02-16 | End: 2019-02-16

## 2019-02-16 RX ORDER — ACETAMINOPHEN 325 MG/1
650 TABLET ORAL EVERY 6 HOURS PRN
Status: DISCONTINUED | OUTPATIENT
Start: 2019-02-16 | End: 2019-02-21 | Stop reason: HOSPADM

## 2019-02-16 RX ORDER — ASCORBIC ACID 500 MG
250 TABLET ORAL DAILY
Status: DISCONTINUED | OUTPATIENT
Start: 2019-02-16 | End: 2019-02-21 | Stop reason: HOSPADM

## 2019-02-16 RX ORDER — LISINOPRIL 5 MG/1
5 TABLET ORAL DAILY
Status: DISCONTINUED | OUTPATIENT
Start: 2019-02-17 | End: 2019-02-21 | Stop reason: HOSPADM

## 2019-02-16 RX ORDER — FUROSEMIDE 10 MG/ML
40 INJECTION INTRAMUSCULAR; INTRAVENOUS DAILY
Status: DISCONTINUED | OUTPATIENT
Start: 2019-02-16 | End: 2019-02-16

## 2019-02-16 RX ORDER — LISINOPRIL 2.5 MG/1
2.5 TABLET ORAL DAILY
Status: DISCONTINUED | OUTPATIENT
Start: 2019-02-16 | End: 2019-02-16

## 2019-02-16 RX ORDER — FERROUS SULFATE 325(65) MG
325 TABLET ORAL
Status: DISCONTINUED | OUTPATIENT
Start: 2019-02-16 | End: 2019-02-21 | Stop reason: HOSPADM

## 2019-02-16 RX ORDER — HEPARIN SODIUM 5000 [USP'U]/ML
5000 INJECTION, SOLUTION INTRAVENOUS; SUBCUTANEOUS EVERY 8 HOURS SCHEDULED
Status: DISCONTINUED | OUTPATIENT
Start: 2019-02-16 | End: 2019-02-21 | Stop reason: HOSPADM

## 2019-02-16 RX ADMIN — Medication 1000 MG: at 08:54

## 2019-02-16 RX ADMIN — FUROSEMIDE 40 MG: 10 INJECTION, SOLUTION INTRAVENOUS at 08:55

## 2019-02-16 RX ADMIN — INSULIN LISPRO 1 UNITS: 100 INJECTION, SOLUTION INTRAVENOUS; SUBCUTANEOUS at 21:44

## 2019-02-16 RX ADMIN — Medication 1000 MG: at 17:06

## 2019-02-16 RX ADMIN — ASPIRIN 81 MG: 81 TABLET, COATED ORAL at 08:55

## 2019-02-16 RX ADMIN — METOPROLOL SUCCINATE 50 MG: 50 TABLET, EXTENDED RELEASE ORAL at 08:55

## 2019-02-16 RX ADMIN — FERROUS SULFATE TAB 325 MG (65 MG ELEMENTAL FE) 325 MG: 325 (65 FE) TAB at 08:55

## 2019-02-16 RX ADMIN — HEPARIN SODIUM 5000 UNITS: 5000 INJECTION, SOLUTION INTRAVENOUS; SUBCUTANEOUS at 07:18

## 2019-02-16 RX ADMIN — SERTRALINE HYDROCHLORIDE 50 MG: 50 TABLET ORAL at 08:55

## 2019-02-16 RX ADMIN — ATORVASTATIN CALCIUM 40 MG: 40 TABLET, FILM COATED ORAL at 08:55

## 2019-02-16 RX ADMIN — FUROSEMIDE 80 MG: 10 INJECTION, SOLUTION INTRAVENOUS at 15:19

## 2019-02-16 RX ADMIN — HEPARIN SODIUM 5000 UNITS: 5000 INJECTION, SOLUTION INTRAVENOUS; SUBCUTANEOUS at 21:55

## 2019-02-16 RX ADMIN — NYSTATIN: 100000 POWDER TOPICAL at 08:55

## 2019-02-16 RX ADMIN — LISINOPRIL 2.5 MG: 2.5 TABLET ORAL at 08:54

## 2019-02-16 RX ADMIN — OXYBUTYNIN CHLORIDE 5 MG: 5 TABLET, EXTENDED RELEASE ORAL at 02:27

## 2019-02-16 RX ADMIN — LEVOTHYROXINE SODIUM 50 MCG: 50 TABLET ORAL at 07:18

## 2019-02-16 RX ADMIN — OXYCODONE HYDROCHLORIDE AND ACETAMINOPHEN 250 MG: 500 TABLET ORAL at 08:54

## 2019-02-16 RX ADMIN — HEPARIN SODIUM 5000 UNITS: 5000 INJECTION, SOLUTION INTRAVENOUS; SUBCUTANEOUS at 15:18

## 2019-02-16 RX ADMIN — NYSTATIN: 100000 POWDER TOPICAL at 18:45

## 2019-02-16 RX ADMIN — OXYBUTYNIN CHLORIDE 5 MG: 5 TABLET, EXTENDED RELEASE ORAL at 21:44

## 2019-02-16 RX ADMIN — HEPARIN SODIUM 5000 UNITS: 5000 INJECTION, SOLUTION INTRAVENOUS; SUBCUTANEOUS at 02:34

## 2019-02-16 NOTE — H&P
[facility] Internal Medicine  H&P- Mary Chavira 1945, 68 y o  female MRN: [id number]    Unit/Bed#: ED 15 Encounter: [id number]    Primary Care Provider: Oscar Shah MD   Date and time admitted to hospital: 2/15/2019  5:10 PM        * Acute on chronic systolic congestive heart failure ([facility] )  Assessment & Plan  -Present to ER with complaints of bilateral LE edema X 2-3 days  -Probably secondary to worsening CHF   -Reports that it appears to be getting worst   -No respiratory distess today but states that she does have occasional increased SOB when she exerts herself  -Chest X-ray completed Official report pending  -Last ECHO on 10/17/2018 reviewed EF at 40% which was improved from 7/2018 with ER of 25% -30%  -pro-BNP at 10,568  -She received lasix 40 mg IV in ER  -Admit on observation  -Continue home dose of lasix of 20 mg PO for now pending cardiology consult  -AM labs  -Check ECHO  -cardiology consult  -Supportive care    Bilateral leg edema  Assessment & Plan  -Present to ER with complaint of bilateral LE edema that appears to be worsening over past 2 days  -She denies pain or discomfort  -Probably secondary to worsening CHF  -pro-BNP at 10,568  -received lasix 40 mg IV in ER  -See plan for acute on chronic CHF    MANJEET (acute kidney injury) ([facility] )  Assessment & Plan  -Creatinine at 1 36  -Her last documented level 1 year ago at 1 44  -She has hx of solitary kidney  -Avoid nephrotoxic agents  -AM CMP  -nephrology Consult if no improvement    Diabetes mellitus [facility])  Assessment & Plan  Lab Results   Component Value Date    HGBA1C 6 2 12/23/2017     -Glucose at 102  -sliding scale insulin  -Fingerstick glucose 4 times daily AC/HS  -Check A1C  -AM CMP        Essential hypertension  Assessment & Plan  -Blood pressure is stable  -Continue metoprolol   Will hold lisinopril in setting of MANJEET  -Close monitoring      VTE Prophylaxis: Heparin  / sequential compression device   Code Status: level 1- Full Code  POLST: POLST form is not discussed and not completed at this time  Discussion with family: None    Anticipated Length of Stay:  Patient will be admitted on an Observation basis with an anticipated length of stay of  < 2 midnights  Justification for Hospital Stay:  Acute on chronic systolic CHF, acute kidney injury, bilateral lower extremity edema    Total Time for Visit, including Counseling / Coordination of Care: 30 minutes  Greater than 50% of this total time spent on direct patient counseling and coordination of care  Chief Complaint:   Lower extremity edema    History of Present Illness:    Robin Marx is a 68 y o  female who presents to the emergency room with complaints of bilateral lower extremity swelling over the past 2-3 days getting progressively worse  She does have a history of congestive heart failure, CAD hypertension hypothyroidism and renal disorder  She reports that she has had swelling in the lower extremity before however her legs appears worse than she has seen it in prior occasions  She also reports that she has increased shortness of breath with exertion  She denies chest pain, fever, chills, nausea, vomiting, diarrhea, abdominal pain  She also denies history of DVT or PE no recent trauma no recent travel she also denies pain or numbness in her lower extremity  Labs completed in emergency room with results as shown below  Chest x-ray completed with official report pending  She received Lasix 40 mg IV in the ER  At bedside she appeared comfortable no reports of chest pain or shortness of breath  She is concerned however that she has never seen her leg with a swollen in the past      Review of Systems:    Review of Systems   Constitutional: Positive for activity change  Negative for chills, fatigue and fever  HENT: Negative for sore throat, tinnitus, trouble swallowing and voice change  Eyes: Negative for photophobia, pain, redness and visual disturbance  Respiratory: Positive for shortness of breath  Negative for cough and chest tightness  Cardiovascular: Positive for leg swelling  Negative for chest pain and palpitations  Gastrointestinal: Negative for abdominal pain, diarrhea, nausea and vomiting  Endocrine: Negative for polydipsia, polyphagia and polyuria  Genitourinary: Negative for difficulty urinating, dysuria, flank pain and hematuria  Musculoskeletal: Negative for arthralgias, back pain, gait problem and joint swelling  Skin: Negative for color change, pallor and rash  Neurological: Negative for dizziness, weakness, light-headedness and headaches  Psychiatric/Behavioral: Negative for agitation and confusion  The patient is not nervous/anxious  Past Medical and Surgical History:     Past Medical History:   Diagnosis Date    Aortic valve stenosis     Cancer ([place] [address] )     Cardiac disease     Cardiomyopathy ([place] [address] )     [facility]     Chronic systolic heart failure (HCC)     Coronary artery disease     [facility] mellitus [facility]     Diastolic dysfunction     Hypertension     Mitral regurgitation     Pulmonary HTN (HCC)     Renal disorder     Stroke ([place] [address] )     Tricuspid regurgitation        Past Surgical History:   Procedure Laterality Date    CARDIAC SURGERY      CORONARY ANGIOPLASTY WITH STENT PLACEMENT      KIDNEY SURGERY      MASTECTOMY         Meds/Allergies:    Prior to Admission medications    Medication Sig Start Date End Date Taking?  Authorizing Provider   ascorbic acid (VITAMIN C) 250 mg tablet Take 250 mg by mouth daily    Historical Provider, MD   atorvastatin (LIPITOR) 40 mg tablet Take 40 mg by mouth daily    Historical Provider, MD   Calcium Polycarbophil (FIBER) 625 MG TABS Take 0 8 tablets by mouth daily 12/29/17   Anum Luu,    ferrous sulfate 325 (65 Fe) mg tablet Take 325 mg by mouth daily with breakfast    Historical Provider, MD   furosemide (LASIX) 40 mg tablet Take 0 5 tablets (20 mg total) by mouth daily 18   Rose Taylor MD   levothyroxine 50 mcg tablet Take 50 mcg by mouth daily    Historical Provider, MD   lisinopril (ZESTRIL) 2 5 mg tablet Take 1 tablet (2 5 mg total) by mouth daily 18   Jenifer Grady PA-C   metoprolol succinate (TOPROL-XL) 50 mg 24 hr tablet Take 1 tablet by mouth daily 17   Shalom Szymanski MD   Multiple Vitamin (MULTIVITAMIN) tablet Take 1 tablet by mouth daily    Historical Provider, MD   nystatin (nystatin) powder Apply 1 application topically 3 (three) times a day for 5 days 12/29/17 1/3/18  [place] Dec, DO   Omega-3 Fatty Acids (FISH OIL) 1,000 mg Take 1,000 mg by mouth 2 (two) times a day    Historical Provider, MD   oxybutynin (DITROPAN-XL) 10 MG 24 hr tablet Take 1 tablet by mouth daily at bedtime  Patient taking differently: Take 5 mg by mouth daily at bedtime   17   Foster Dec, DO   sertraline (ZOLOFT) 50 mg tablet Take 50 mg by mouth daily    Historical Provider, MD     I have reviewed home medications with patient personally  Allergies: Allergies   Allergen Reactions    Penicillins Rash       Social History:     Marital Status:     Occupation:  Retired  Patient Pre-hospital Living Situation:  Lives with family  Patient Pre-hospital Level of Mobility:  Active  Patient Pre-hospital Diet Restrictions:  None reported  Substance Use History:   Social History     Substance and Sexual Activity   Alcohol Use No     Social History     Tobacco Use   Smoking Status Former Smoker    Packs/day: 0 50    Years: 45 00    Pack years: 22 50    Last attempt to quit: 2009    Years since quittin 1   Smokeless Tobacco Never Used     Social History     Substance and Sexual Activity   Drug Use No       Family History:    Family History   Family history unknown: Yes       Physical Exam:     Vitals:   Blood Pressure: 158/71 (02/15/19 2249)  Pulse: 72 (02/15/19 2249)  Temperature: (!) 97 3 °F (36 3 °C) (02/15/19 1629)  Temp Source: Oral (02/15/19 1629)  Respirations: (!) 24 (02/15/19 2249)  Weight - Scale: 93 6 kg (206 lb 5 6 oz) (02/15/19 1630)  SpO2: 95 % (02/15/19 2249)    Physical Exam   Constitutional: She is oriented to person, place, and time  No distress  HENT:   Head: Normocephalic and atraumatic  Eyes: Pupils are equal, round, and reactive to light  EOM are normal  Right eye exhibits no discharge  Left eye exhibits no discharge  Neck: Normal range of motion  Neck supple  No JVD present  Cardiovascular: Normal rate and regular rhythm  Pulmonary/Chest: Effort normal and breath sounds normal  No stridor  No respiratory distress  She has no wheezes  Abdominal: Soft  Bowel sounds are normal  She exhibits no distension  There is no tenderness  There is no guarding  Musculoskeletal: Normal range of motion  She exhibits edema  She exhibits no tenderness or deformity  Bilateral lower extremity edema, brownish discoloration   Neurological: She is oriented to person, place, and time  Skin: Skin is warm  Capillary refill takes less than 2 seconds  No rash noted  She is not diaphoretic  No erythema  No pallor  Psychiatric: She has a normal mood and affect  Vitals reviewed  Additional Data:     Lab Results: I have personally reviewed pertinent reports        Results from last 7 days   Lab Units 02/16/19  0250 02/15/19  1818   WBC Thousand/uL  --  5 78   HEMOGLOBIN g/dL  --  14 4   HEMATOCRIT %  --  47 3*   PLATELETS Thousands/uL 144* 116*   NEUTROS PCT %  --  70   LYMPHS PCT %  --  18   MONOS PCT %  --  8   EOS PCT %  --  2     Results from last 7 days   Lab Units 02/15/19  2033   SODIUM mmol/L 139   POTASSIUM mmol/L 5 1   CHLORIDE mmol/L 107   CO2 mmol/L 23   BUN mg/dL 24   CREATININE mg/dL 1 36*   ANION GAP mmol/L 9   CALCIUM mg/dL 9 0   ALBUMIN g/dL 2 8*   TOTAL BILIRUBIN mg/dL 0 80   ALK PHOS U/L 218*   ALT U/L 27   AST U/L 45   GLUCOSE RANDOM mg/dL 102         Results from last 7 days   Lab Units 02/16/19  0232   POC GLUCOSE mg/dl 148*               Imaging: I have personally reviewed pertinent reports  XR chest 2 views    (Results Pending)       EKG, Pathology, and Other Studies Reviewed on Admission:   · EKG:  Sinus rhythm with first-degree AV block at rate of 60 beats per minute  Allscripts / Epic Records Reviewed: Yes     ** Please Note: This note has been constructed using a voice recognition system   **

## 2019-02-16 NOTE — UTILIZATION REVIEW
Initial Clinical Review    Admission: Date/Time/Statement: Observation 2/15 @ 2119    Orders Placed This Encounter   Procedures    Place in Observation     Standing Status:   Standing     Number of Occurrences:   1     Order Specific Question:   Admitting Physician     Answer:   GENESIS Maier [id number]     Order Specific Question:   Level of Care     Answer:   Med Surg [16]     ED: Date/Time/Mode of Arrival:   ED Arrival Information     Expected Arrival Acuity Means of Arrival Escorted By Service Admission Type    - 2/15/2019 16:24 Urgent Walk-In Family Member Hospitalist Urgent    Arrival Complaint    [facility] [address]        Chief Complaint:   Chief Complaint   Patient presents with    Ankle Swelling     Pt presents with family for c/o bilateral LE swelling, hx CHF     History of Illness: 68 y o  female w PMH CAD, HTN, HLD, DM, hypothryoidism, renal disorder who presents for evaluation of lower leg swelling  ED Vital Signs:   ED Triage Vitals [02/15/19 1629]   Temperature Pulse Respirations Blood Pressure SpO2   (!) 97 3 °F (36 3 °C) 71 20 148/74 99 %      Temp Source Heart Rate Source Patient Position - Orthostatic VS BP Location FiO2 (%)   Oral Monitor Sitting Left arm --      Pain Score       No Pain        Wt Readings from Last 1 Encounters:   02/15/19 93 6 kg (206 lb 5 6 oz)     Vital Signs (abnormal): WNL    Pertinent Labs/Diagnostic Test Results:   CXR - Mild pulmonary vascular congestion and enlargement of the cardiac silhouette      Bun/Cr = 28/ 1 49  Alk Phos = 218  BNP = 10,568     ED Treatment:   Medication Administration from 02/15/2019 1624 to 02/16/2019 0836       Date/Time Order Dose Route Action     02/15/2019 2253 furosemide (LASIX) injection 40 mg 40 mg Intravenous Given     02/16/2019 0718 levothyroxine tablet 50 mcg 50 mcg Oral Given     02/16/2019 0227 oxybutynin (DITROPAN-XL) 24 hr tablet 5 mg 5 mg Oral Given     02/16/2019 0718 heparin (porcine) subcutaneous injection 5,000 Units 5,000 Units Subcutaneous Given     02/16/2019 0234 heparin (porcine) subcutaneous injection 5,000 Units 5,000 Units Subcutaneous Given        Past Medical/Surgical History: Active Ambulatory Problems     Diagnosis Date Noted    [facility]JEET [facility]acute kidney injury) ([facility] ) 07/14/2017    Normocytic anemia 07/14/2017    Essential hypertension 07/14/2017    Diabetes mellitus ([place] [address] ) 07/14/2017    Solitary kidney 12/25/2017     Resolved Ambulatory Problems     Diagnosis Date Noted    Supratherapeutic INR 07/14/2017    Constipation 07/14/2017    Leukocytosis 07/14/2017    CHF, acute ([facility] ) 07/14/2017    Right low back pain 07/14/2017    Weakness 07/14/2017    Elevated bilirubin 07/14/2017    Hyponatremia 12/23/2017    Hyperkalemia 12/23/2017    Thrombocytopenia ([place] [address] ) 12/23/2017    UTI (urinary tract infection) 12/23/2017    Encephalopathy 12/24/2017     Past Medical History:   Diagnosis Date    Aortic valve stenosis     Cancer ([place] [address] )     Cardiac disease     Cardiomyopathy ([place] [address] )     [facility])     Chronic systolic heart failure (HCC)     Coronary artery disease     Diabetes mellitus ([place] [address] )     Diastolic dysfunction     Hypertension     Mitral regurgitation     Pulmonary HTN (HCC)     Renal disorder     Stroke ([place] [address] )     Tricuspid regurgitation      Admitting Diagnosis: Ankle swelling [M25 [id number]]     Age/Sex: 68 y o  female     Assessment/Plan:   80y Female to Ed with Lower extremity edema over the past 2-3 days getting progressively worse  She does have a history of congestive heart failure, CAD hypertension hypothyroidism and renal disorder  She reports that she has had swelling in the lower extremity before however her legs appears worse than she has seen it in prior occasions  She also reports that she has increased shortness of breath with exertion       Acute on chronic systolic congestive heart failure/ Bilateral leg edema/ Acute kidney injury  Probably secondary to worsening CHF CXR  Last ECHO on 10/17/2018 reviewed EF at 40% which was improved from 7/2018 with ER of 25% -30  pro-BNP at 10,568  Lasix Iv in ER  Continue home dose of lasix of 20 mg PO  Cardiology consult  AM Labs  Echo  Creat 1 36  Avoid nephrotoxic agents   Check CMP in AM  Nephrology consult if no improvement      Admission Orders:  VAS lower limb venous duplex study  Echo  Cardiology cons  PT/OT eval and treat    Scheduled Meds:   Current Facility-Administered Medications:  ascorbic acid 250 mg Oral Daily   aspirin 81 mg Oral Daily   atorvastatin 40 mg Oral Daily   ferrous sulfate 325 mg Oral Daily With Breakfast   fish oil 1,000 mg Oral BID   furosemide 40 mg Intravenous Daily   heparin (porcine) 5,000 Units Subcutaneous Q8H [address]   insulin lispro 1-6 Units Subcutaneous TID AC   insulin lispro 1-6 Units Subcutaneous HS   levothyroxine 50 mcg Oral Early Morning   lisinopril 2 5 mg Oral Daily   metoprolol succinate 50 mg Oral Daily   nystatin  Topical BID   oxybutynin 5 mg Oral HS   sertraline 50 mg Oral Daily     Continuous Infusions:    PRN Meds:   acetaminophen    ondansetron

## 2019-02-16 NOTE — PLAN OF CARE
Problem: OCCUPATIONAL THERAPY ADULT  Goal: Performs self-care activities at highest level of function for planned discharge setting  See evaluation for individualized goals  Description  Treatment Interventions: ADL retraining, Functional transfer training, Endurance training, Equipment evaluation/education, Patient/family training, Continued evaluation, Energy conservation, Activityengagement          See flowsheet documentation for full assessment, interventions and recommendations  Note:   Limitation: Decreased ADL status, Decreased endurance, Decreased self-care trans, Decreased high-level ADLs  Prognosis: Good  Assessment: Patient is a 68 y o  female seen for OT evaluation s/p admit to [address] [facility] on 2/15/2019 w/Acute on chronic systolic congestive heart failure ([facility] )  Commorbidities affecting patient's functional performance at time of assessment include:  Bilateral leg edema, MANJEET, DM, HTN, CAD, hypothyroidism  Orders placed for OT evaluation and treatment  Performed at least two patient identifiers during session including name and wristband  Prior to admission, Patient reporting independent with basic self care and light meals, daughter assists with showers, ambulates with SPC, Lives in a multilevel house [address], has 1st floor set up,  and full flight to second floor for showers only  Personal factors affecting patient at time of initial evaluation include: steps to enter, decreased initiation and engagement, difficulty performing ADLs and difficulty performing IADLs  Upon evaluation, patient requires supervision, set up and contact guard assist for UB ADLs, minimal  assist for LB ADLs, transfers and functional ambulation in room and bathroom with minimal  assist, with the use of Rolling Walker   Occupational performance is affected by the following deficits: degenerative arthritic joint changes, dynamic sit/ stand balance deficit with poor standing tolerance time for self care and functional mobility, decreased activity tolerance and postural control and postural alignment deficit, requiring external assistance to complete transitional movementsTherapist completed expanded review of medical records and additional review of physical, cognitive or psychosocial history, clinical examination identifying 3-5 performance deficits, clinical decision making of a moderate complexity, consistent with moderate complexity level evaluation  Patient to benefit from continued Occupational Therapy treatment while in the hospital to address deficits as defined above and maximize level of functional independence with ADLs and functional mobility  Occupational Performance areas to address include: bathing/ shower, dressing, toilet hygiene, transfer to all surfaces, functional mobility, emergency response, health maintenance, medication routine/ management, IADLs: safety procedures, Leisure Participation and Social participation  From OT standpoint, recommendation at time of d/c would be Home with family support, [facility] [address] and Home OT         OT Discharge Recommendation: Home OT

## 2019-02-16 NOTE — PHYSICAL THERAPY NOTE
PT Evaluation (20min)  (8:20-8:40)    Past Medical History:   Diagnosis Date    Aortic valve stenosis     Cancer ([facility] )     Cardiac disease     Cardiomyopathy ([facility] )     CHF (congestive heart failure) (HCC)     Chronic systolic heart failure (HCC)     Coronary artery disease     Diabetes mellitus ([facility] )     Diastolic dysfunction     Hypertension     Mitral regurgitation     Pulmonary HTN (HCC)     Renal disorder     Stroke ([place] [address] )     Tricuspid regurgitation         02/16/19 0840   Note Type   Note type Eval only   Pain Assessment   Pain Assessment No/denies pain   Home Living   Type of [address] Multi-level;1/2 bath on main level;Performs ADLs on one level;Stairs to enter with rails  ([address])   Bathroom Shower/Tub Tub/shower unit   Bathroom Toilet Standard   Bathroom Equipment Shower chair   Bathroom Accessibility   (shower on 2nd floor)   Home Equipment [place]   Prior Function   Level of [place] Independent with ADLs and functional mobility  (ambulates c SPC)   Lives With Daughter;Family  (occassionally alone)   Receives Help From Family   ADL Assistance Independent  (except showers)   IADLs Needs assistance   Falls in the last 6 months 0   Vocational Retired   Restrictions/Precautions   Other Precautions Multiple lines;Telemetry; Fall Risk   General   Additional Pertinent History pt presents to [facility] c LE edema  dx: acute on chronic CHF  PT consulted for mobility + d/c planning  up c (A)  Family/Caregiver Present No   Cognition   Orientation Level Oriented to person;Oriented to place;Oriented to situation   RUE Assessment   RUE Assessment WFL   LUE Assessment   LUE Assessment WFL   RLE Assessment   RLE Assessment WFL  (4-/5)   LLE Assessment   LLE Assessment WFL  (4-/5)   Coordination   Sensation WFL   Bed Mobility   Sit to Supine 4  Minimal assistance   Additional items Assist x 1;HOB elevated; Increased time required;Verbal cues   Transfers   Sit to Stand 5  Supervision Additional items Verbal cues; Increased time required   Stand to Sit 5  Supervision   Additional items Verbal cues; Increased time required   Ambulation/Elevation   Gait pattern Antalgic;Narrow WILBERTO; Forward Flexion;Decreased foot clearance   Gait Assistance 4  Minimal assist   Additional items Assist x 1;Verbal cues   Assistive Device Rolling walker   Distance 100'   Balance   Static Sitting Good   Dynamic Sitting Fair +   Static Standing Fair +   Dynamic Standing Fair   Ambulatory Fair -   Activity Tolerance   Activity Tolerance Patient limited by fatigue   Nurse Made Aware [address]   Assessment   Prognosis Good   Problem List Decreased strength;Decreased endurance; Impaired balance;Decreased mobility; Decreased skin integrity   Assessment pt is a 74y/o f who [place] [facility] [place] chronic CHF  PMH significant for MANJEET, DM, LE edema, normocytic anemia, + CVA  at baseline, pt mod (I) c functional mobility c SPC  resides c dtr in multilevel home c [address]  currently requires min (A)x1 to complete OOB mobility tasks 2* deficits in strength, balance, gait quality, skin integrity, + activity tolerance noted in PT exam above  Barthel Index 50/100  ambulated 100' c RW c min verbal cues to remain safe distance c in WILBERTO of RW  would benefit from skilled PT to maximize functional mobility + return home safely  upon d/c, recommend hhpt  will also need RW for home use  PT eval of high complexity 2* unstable med status c pt requiring ongoing medical management 2* CHF + pending cardiology c/s  resides c dtr who works occassionally  pt now requires RW for safe mobility; also c multiple co-morbidities including MANJEET, DM, CVA, + normocytic anemia impacting PT  Barriers to Discharge Inaccessible home environment;Decreased caregiver support   Goals   Patient Goals "to walk c my cane"  STG Expiration Date 02/26/19   Short Term Goal #1 1   increase strength 1/2 grade to improve overall functional mobility, 2  perform bed mobility mod (I) to decrease caregiver burden, 3  perform transfers mod (I) to safely perform ADLs, 4  ambulate 250' mod (I) c least restrictive AD to safely navigate home environment, 5  negotiate 10 stairs c (S) to safely enter home   Plan   Treatment/Interventions Functional transfer training;LE strengthening/ROM; Elevations; Therapeutic exercise; Endurance training;Patient/family training;Bed mobility;Gait training;Equipment eval/education;Spoke to nursing;OT   PT Frequency 5x/wk   Recommendation   Recommendation Home PT   Equipment Recommended Walker  (RW)   Barthel Index   Feeding 10   Bathing 0   Grooming Score 5   Dressing Score 5   Bladder Score 5   Bowels Score 10   Toilet Use Score 5   Transfers (Bed/Chair) Score 10   Mobility (Level Surface) Score 0   Stairs Score 0   Barthel Index Score 50     Julio Byrne, PT, DPT

## 2019-02-16 NOTE — QUICK NOTE
Pt admitted for acute on chronic systolic and diastolic CHF  Continue IV lasix and monitor I/o and daily weights  Cardiology consult placed and pending  Venous dopplers pending to r/o DVT  Pt seen and examined  Currently feels better  No sob or chest pain  On exam, alert, oriented  Lungs- b/l crepts+ Heart sounds- regular  Abd-soft, NT   Ext- pitting edema b/l

## 2019-02-16 NOTE — ASSESSMENT & PLAN NOTE
-Creatinine at 1 36  -Her last documented level 1 year ago at 1 44  -She has hx of solitary kidney  -Avoid nephrotoxic agents  -AM CMP  -nephrology Consult if no improvement

## 2019-02-16 NOTE — CONSULTS
Consultation - Cardiology   Erlin Tao 68 y o  female MRN: [id number]  Unit/Bed#: -01 Encounter: [id number]  02/16/19  1:53 PM    Assessment/ Plan:  1  Acute on chronic systolic and diastolic heart failure  · Will increase Lasix to 80 mg IV b i d  · Check daily weights and maintain strict I/O  · Fluid and sodium restriction were advised  2  Cardiomyopathy with EF: 40%  · Repeat echocardiogram  · Cont lisinopril and toprol-xl    3  CAD s/p PCIx 2 to the RCA in 2011   · Continue aspirin, atorvastatin and metoprolol    4  HTN- continue lisinopril and metoprolol      History of Present Illness   Physician Requesting Consult: Clemente Early MD  Reason for Consult / Principal Problem: lower extremity edema  HPI: Erlin Tao is a 68y o  year old female with history of CAD status post PCI, ischemic cardiomyopathy with EF 35% who presents with progressive bilateral lower extremity edema  She denies any chest pain, dyspnea, palpitations or any other associated concerns  She reports compliance with her outpatient medications including Lasix 20 mg p o  daily  She denies any recent viral illnesses or excessive sodium intake  She denies any other concerns at this time  Inpatient consult to Cardiology  Consult performed by: Charmayne Begin, MD  Consult ordered by: Alda Saleh PA-C          Review of Systems   Constitutional: Negative for activity change, diaphoresis, fatigue, fever and unexpected weight change  Respiratory: Negative for cough, chest tightness and shortness of breath  Cardiovascular: Positive for leg swelling  Negative for chest pain and palpitations  Gastrointestinal: Negative for nausea and vomiting  Neurological: Negative for syncope and light-headedness  All other systems reviewed and are negative        Historical Information   Past Medical History:   Diagnosis Date    Aortic valve stenosis     [facility]     Cardiac disease     Cardiomyopathy ([facility] )     [facility]congest[facility]) ([facility] )     Chronic systolic heart failure (HCC)     Coronary artery disease     Diabetes mellitus ([place] [address] )     Diastolic dysfunction     Hypertension     Mitral regurgitation     Pulmonary HTN (HCC)     Renal disorder     Stroke ([place] [address] )     Tricuspid regurgitation      Past Surgical History:   Procedure Laterality Date    CARDIAC SURGERY      CORONARY ANGIOPLASTY WITH STENT PLACEMENT      KIDNEY SURGERY      MASTECTOMY       Social History     Substance and Sexual Activity   Alcohol Use No     Social History     Substance and Sexual Activity   Drug Use No     Social History     Tobacco Use   Smoking Status Former Smoker    Packs/day: 0 50    Years: 45 00    Pack years: 22 50    Last attempt to quit: 2009    Years since quittin 1   Smokeless Tobacco Never Used       Family History:   Family History   Family history unknown: Yes       Meds/Allergies   all current active meds have been reviewed  Allergies   Allergen Reactions    Penicillins Rash       Objective   Vitals: Blood pressure 167/79, pulse 92, temperature 97 6 °F (36 4 °C), temperature source Oral, resp  rate 18, weight 93 6 kg (206 lb 5 6 oz), SpO2 98 %  , Body mass index is 32 32 kg/m² ,   Orthostatic Blood Pressures      Most Recent Value   Blood Pressure  167/79 filed at 2019 1129   Patient Position - Orthostatic VS  Sitting filed at 2019 0595          Systolic (31HTY), VCX:919 , Min:148 , SKM:090     Diastolic (28KHT), ESF:21, Min:70, Max:79      No intake or output data in the 24 hours ending 19 1353    Invasive Devices     Peripheral Intravenous Line            Peripheral IV 02/15/19 Left Forearm less than 1 day    Peripheral IV 02/15/19 Left Hand less than 1 day                    Physical Exam:  GEN: Alert and oriented x 3, in no acute distress  Well appearing and well nourished  HEENT: Sclera anicteric, conjunctivae pink, mucous membranes moist  Oropharynx clear     NECK: Supple, no carotid bruits, + JVD  Trachea midline, no thyromegaly  HEART: Regular rhythm, normal S1 and S2, no murmurs, clicks, gallops or rubs  PMI nondisplaced, no thrills  LUNGS: Clear to auscultation bilaterally; no wheezes, rales, or rhonchi  No increased work of breathing or signs of respiratory distress  ABDOMEN: Soft, nontender, nondistended, normoactive bowel sounds  EXTREMITIES: Skin warm and well perfused, no clubbing, or cyanosis, 2-3+ LE edema  NEURO: No focal findings  Normal speech  Mood and affect normal    SKIN: Normal without suspicious lesions on exposed skin        Lab Results:     Troponins:   Results from last 7 days   Lab Units 02/16/19  0554 02/16/19  0250 02/15/19  1818   TROPONIN I ng/mL 0 02 0 02 <0 02       CBC with diff:   Results from last 7 days   Lab Units 02/16/19  0554 02/16/19  0250 02/15/19  1818   WBC Thousand/uL 7 18  --  5 78   HEMOGLOBIN g/dL 13 7  --  14 4   HEMATOCRIT % 43 6  --  47 3*   MCV fL 96  --  99*   PLATELETS Thousands/uL 138* 144* 116*   MCH pg 30 2  --  30 3   MCHC g/dL 31 4  --  30 4*   RDW % 16 2*  --  16 3*   MPV fL 11 7 12 1 11 5   NRBC AUTO /100 WBCs  --   --  0         CMP:   Results from last 7 days   Lab Units 02/16/19  0554 02/15/19  2033   POTASSIUM mmol/L 4 6 5 1   CHLORIDE mmol/L 109* 107   CO2 mmol/L 21 23   BUN mg/dL 28* 24   CREATININE mg/dL 1 49* 1 36*   CALCIUM mg/dL 9 0 9 0   AST U/L 41 45   ALT U/L 37 27   ALK PHOS U/L 213* 218*   EGFR ml/min/1 73sq m 40 45

## 2019-02-16 NOTE — ASSESSMENT & PLAN NOTE
Lab Results   Component Value Date    HGBA1C 6 2 12/23/2017     -Glucose at 102  -sliding scale insulin  -Fingerstick glucose 4 times daily AC/HS  -Check A1C  -AM CMP

## 2019-02-16 NOTE — PLAN OF CARE
Problem: PHYSICAL THERAPY ADULT  Goal: Performs mobility at highest level of function for planned discharge setting  See evaluation for individualized goals  Description  Treatment/Interventions: Functional transfer training, LE strengthening/ROM, Elevations, Therapeutic exercise, Endurance training, Patient/family training, Bed mobility, Gait training, Equipment eval/education, Spoke to nursing, OT  Equipment Recommended: Walker(RW)       See flowsheet documentation for full assessment, interventions and recommendations  Note:   Prognosis: Good  Problem List: Decreased strength, Decreased endurance, Impaired balance, Decreased mobility, Decreased skin integrity  Assessment: pt is a 72y/o f who presents to [facility] c acute on chronic CHF  PMH significant for MANJEET, DM, LE edema, normocytic anemia, + CVA  at baseline, pt mod (I) c functional mobility c SPC  resides c dtr in multilevel home c [address]  currently requires min (A)x1 to complete OOB mobility tasks 2* deficits in strength, balance, gait quality, skin integrity, + activity tolerance noted in PT exam above  Barthel Index 50/100  ambulated 100' c RW c min verbal cues to remain safe distance c in WILBERTO of RW  would benefit from skilled PT to maximize functional mobility + return home safely  upon d/c, recommend hhpt  will also need RW for home use  PT eval of high complexity 2* unstable med status c pt requiring ongoing medical management 2* CHF + pending cardiology c/s  resides c dtr who works occassionally  pt now requires RW for safe mobility; also c multiple co-morbidities including MANJEET, DM, CVA, + normocytic anemia impacting PT  Barriers to Discharge: Inaccessible home environment, Decreased caregiver support     Recommendation: Home PT          See flowsheet documentation for full assessment

## 2019-02-16 NOTE — ASSESSMENT & PLAN NOTE
-Blood pressure is stable  -Continue metoprolol   Will hold lisinopril in setting of MANJEET  -Close monitoring

## 2019-02-16 NOTE — ASSESSMENT & PLAN NOTE
-Present to ER with complaints of bilateral LE edema X 2-3 days  -Probably secondary to worsening CHF   -Reports that it appears to be getting worst   -No respiratory distess today but states that she does have occasional increased SOB when she exerts herself  -Chest X-ray completed Official report pending  -Last ECHO on 10/17/2018 reviewed EF at 40% which was improved from 7/2018 with ER of 25% -30%  -pro-BNP at 10,568  -She received lasix 40 mg IV in ER  -Admit on observation  -Continue home dose of lasix of 20 mg PO for now pending cardiology consult  -AM labs  -Check ECHO  -cardiology consult  -Supportive care

## 2019-02-16 NOTE — OCCUPATIONAL THERAPY NOTE
Occupational Therapy Evaluation        Patient Name: Marco Antonio Thrasher  FXLJE'H Date: 2/16/2019 02/16/19 0820   Note Type   Note type Eval only   Restrictions/Precautions   Weight Bearing Precautions Per Order No   Pain Assessment   Pain Assessment No/denies pain   Pain Score No Pain   Home Living   [address] Multi-[address];1/2 bath on main level;Performs ADLs on one level;Stairs to enter with rails  (10 CODEY)   Bathroom Shower/Tub Tub/shower unit   Bathroom Toilet Standard   Bathroom Equipment Shower chair   Bathroom Accessibility Accessible  (2nd floor shower)   Home Equipment Cane   Prior Function   Level of [place] Independent with ADLs and functional mobility  (except showers)   Lives With Daughter;Family   Receives Help From Family   ADL Assistance Independent   IADLs Needs assistance   Falls in the last 6 months 0   Vocational Retired   Comments ambulatory with [address] Patient reporting independent with basic self care and light meals, daughter assists with showers, ambulates with SPC, Lives in a multilevel house 10 [address]Y, has 1st floor set up,  and full flight to second floor for showers only  Reciprocal Relationships Supportive Family   Psychosocial   Psychosocial (WDL) WDL   ADL   Eating Assistance 6  Modified independent   Grooming Assistance 5  Supervision/Setup   UB Bathing Assistance 4  Minimal Assistance   LB Bathing Assistance 4  Minimal Assistance   UB Dressing Assistance 5  Supervision/Setup   LB Dressing Assistance 4  Minimal Assistance   Toileting Assistance  4  [facility] 4  Minimal Assistance   Bed Mobility   Sit to Supine 4  Minimal assistance   Additional items Assist x 1;Bedrails; Increased time required;Verbal cues   Additional Comments received patient sitting at edge of bed  Transfers   Sit to Stand 5  Supervision   Additional items Assist x 1; Armrests; Increased time required   Stand to Sit 5  Supervision Additional items Assist x 1; Increased time required;Verbal cues   Functional Mobility   Functional Mobility 4  Minimal assistance   Additional items Rolling walker   Balance   Static Sitting Good   Dynamic Sitting Fair +   Static Standing Fair +   Dynamic Standing Fair   Activity Tolerance   Activity Tolerance Patient tolerated treatment well   Nurse Made Aware ROMELIA Avalos verbalized patient appropriate to see, made aware of  therapy outcome and  recommendations   RUE Assessment   RUE Assessment WFL   LUE Assessment   LUE Assessment WFL   Hand Function   Gross Motor Coordination Functional   Fine Motor Coordination Functional   Sensation   Light Touch No apparent deficits  (BUEs)   Vision-Basic Assessment   Current Vision Does not wear glasses   Patient Visual Report Other (Comment)  (No significant changes reported)   Cognition   Overall Cognitive Status WFL   Arousal/Participation Alert; Cooperative   Attention Within functional limits   Orientation Level Oriented to person;Oriented to place;Oriented to situation  (inconsistent orientation to time/ date)   Memory Decreased recall of biographical information   Following Commands Follows all commands and directions without difficulty   Assessment   Limitation Decreased ADL status; Decreased endurance;Decreased self-care trans;Decreased high-level ADLs   Prognosis Good   Assessment Patient is a 68 y o  female seen for OT evaluation s/p admit to [id number] [place] [address] on 2/15/2019 w/Acute on chronic systolic congestive heart failure ([facility] [address] )  Commorbidities affecting patient's functional performance at time of assessment include:  Bilateral leg edema, MANJEET, DM, HTN, CAD, hypothyroidism  Orders placed for OT evaluation and treatment  Performed at least two patient identifiers during session including name and wristband    Prior to admission, Patient reporting independent with basic self care and light meals, daughter assists with showers, ambulates with SPC, Lives in a multilevel house 10 [address], has 1st floor set up,  and full flight to second floor for showers only  Personal factors affecting patient at time of initial evaluation include: steps to enter, decreased initiation and engagement, difficulty performing ADLs and difficulty performing IADLs  Upon evaluation, patient requires supervision, set up and contact guard assist for UB ADLs, minimal  assist for LB ADLs, transfers and functional ambulation in room and bathroom with minimal  assist, with the use of Rolling Walker  Occupational performance is affected by the following deficits: degenerative arthritic joint changes, dynamic sit/ stand balance deficit with poor standing tolerance time for self care and functional mobility, decreased activity tolerance and postural control and postural alignment deficit, requiring external assistance to complete transitional movementsTherapist completed expanded review of medical records and additional review of physical, cognitive or psychosocial history, clinical examination identifying 3-5 performance deficits, clinical decision making of a moderate complexity, consistent with moderate complexity level evaluation  Patient to benefit from continued Occupational Therapy treatment while in the hospital to address deficits as defined above and maximize level of functional independence with ADLs and functional mobility  Occupational Performance areas to address include: bathing/ shower, dressing, toilet hygiene, transfer to all surfaces, functional mobility, emergency response, health maintenance, medication routine/ management, IADLs: safety procedures, Leisure Participation and Social participation  From OT standpoint, recommendation at time of d/c would be Home with family support, [address] and Home OT  Plan   Treatment Interventions ADL retraining;Functional transfer training; Endurance training;Equipment evaluation/education;Patient/family training;Continued evaluation; Energy conservation; Activityengagement   Goal Expiration Date 03/02/19   OT Frequency 2-3x/wk   Recommendation   OT Discharge Recommendation Home OT   Barthel Index   Feeding 10   Bathing 0   Grooming Score 5   Dressing Score 5   Bladder Score 5   Bowels Score 10   Toilet Use Score 5   Transfers (Bed/Chair) Score 10   Mobility (Level Surface) Score 0   Stairs Score 0   Barthel Index Score 50   Modified Avalon Scale   Modified Avalon Scale 3       Occupational Therapy goals: In 7-14 days:     1- Patient will verbalize and demonstrate use of energy conservation/ deep breathing technique and work simplification skills during functional activity with no verbal cues  2-Patient will verbalize and demonstrate good body mechanics and joint protection techniques during  ADLs/ IADLs with no verbal cues  3- Patient will increase OOB/ sitting tolerance to 2-4 hours per day for increased participation in self care and leisure tasks with no s/s of exertion  4-Patient will increase standing tolerance time to 5  minutes with unilateral UE support to complete sink level ADLs@ mod I level   5- Patient will increase sitting tolerance at edge of bed to 20 minutes to complete UB ADLs @ set up assist level  6- Patient will transfer bed to Chair / toilet at Set up assist level with AD as indicated  7- Patient will complete UB ADLs with set up assist  8- Patient will complete LB ADLs with min assist with the use of adaptive equipment  9- Patient will complete toileting hygiene with set up assist/ supervision for thoroughness    10-Patient/ Family  will demonstrate competency with UE Home Exercise Program

## 2019-02-16 NOTE — ASSESSMENT & PLAN NOTE
-Present to ER with complaint of bilateral LE edema that appears to be worsening over past 2 days  -She denies pain or discomfort  -Probably secondary to worsening CHF  -pro-BNP at 10,568  -received lasix 40 mg IV in ER  -See plan for acute on chronic CHF

## 2019-02-17 ENCOUNTER — APPOINTMENT (INPATIENT)
Dept: NON INVASIVE DIAGNOSTICS | Facility: HOSPITAL | Age: 74
DRG: 286 | End: 2019-02-17
Payer: MEDICARE

## 2019-02-17 LAB
ANION GAP SERPL CALCULATED.3IONS-SCNC: 12 MMOL/L (ref 4–13)
APTT PPP: 36 SECONDS (ref 26–38)
BUN SERPL-MCNC: 32 MG/DL (ref 5–25)
CALCIUM SERPL-MCNC: 8.9 MG/DL (ref 8.3–10.1)
CHLORIDE SERPL-SCNC: 103 MMOL/L (ref 100–108)
CO2 SERPL-SCNC: 24 MMOL/L (ref 21–32)
CREAT SERPL-MCNC: 1.52 MG/DL (ref 0.6–1.3)
GFR SERPL CREATININE-BSD FRML MDRD: 39 ML/MIN/1.73SQ M
GLUCOSE SERPL-MCNC: 102 MG/DL (ref 65–140)
GLUCOSE SERPL-MCNC: 130 MG/DL (ref 65–140)
GLUCOSE SERPL-MCNC: 138 MG/DL (ref 65–140)
GLUCOSE SERPL-MCNC: 152 MG/DL (ref 65–140)
GLUCOSE SERPL-MCNC: 175 MG/DL (ref 65–140)
INR PPP: 1.94 (ref 0.86–1.17)
POTASSIUM SERPL-SCNC: 5.2 MMOL/L (ref 3.5–5.3)
PROTHROMBIN TIME: 21.9 SECONDS (ref 11.8–14.2)
SODIUM SERPL-SCNC: 139 MMOL/L (ref 136–145)

## 2019-02-17 PROCEDURE — 99232 SBSQ HOSP IP/OBS MODERATE 35: CPT | Performed by: INTERNAL MEDICINE

## 2019-02-17 PROCEDURE — 80048 BASIC METABOLIC PNL TOTAL CA: CPT | Performed by: INTERNAL MEDICINE

## 2019-02-17 PROCEDURE — 93970 EXTREMITY STUDY: CPT | Performed by: SURGERY

## 2019-02-17 PROCEDURE — 82948 REAGENT STRIP/BLOOD GLUCOSE: CPT

## 2019-02-17 PROCEDURE — 85610 PROTHROMBIN TIME: CPT | Performed by: PHYSICIAN ASSISTANT

## 2019-02-17 PROCEDURE — 85730 THROMBOPLASTIN TIME PARTIAL: CPT | Performed by: PHYSICIAN ASSISTANT

## 2019-02-17 RX ORDER — TORSEMIDE 20 MG/1
20 TABLET ORAL
Status: DISCONTINUED | OUTPATIENT
Start: 2019-02-17 | End: 2019-02-21 | Stop reason: HOSPADM

## 2019-02-17 RX ADMIN — LEVOTHYROXINE SODIUM 50 MCG: 50 TABLET ORAL at 05:43

## 2019-02-17 RX ADMIN — Medication 1000 MG: at 10:21

## 2019-02-17 RX ADMIN — INSULIN LISPRO 1 UNITS: 100 INJECTION, SOLUTION INTRAVENOUS; SUBCUTANEOUS at 21:45

## 2019-02-17 RX ADMIN — SERTRALINE HYDROCHLORIDE 50 MG: 50 TABLET ORAL at 10:22

## 2019-02-17 RX ADMIN — TORSEMIDE 20 MG: 20 TABLET ORAL at 17:35

## 2019-02-17 RX ADMIN — NYSTATIN: 100000 POWDER TOPICAL at 17:36

## 2019-02-17 RX ADMIN — ASPIRIN 81 MG: 81 TABLET, COATED ORAL at 10:22

## 2019-02-17 RX ADMIN — OXYBUTYNIN CHLORIDE 5 MG: 5 TABLET, EXTENDED RELEASE ORAL at 20:34

## 2019-02-17 RX ADMIN — HEPARIN SODIUM 5000 UNITS: 5000 INJECTION, SOLUTION INTRAVENOUS; SUBCUTANEOUS at 20:34

## 2019-02-17 RX ADMIN — METOPROLOL SUCCINATE 50 MG: 50 TABLET, EXTENDED RELEASE ORAL at 10:21

## 2019-02-17 RX ADMIN — HEPARIN SODIUM 5000 UNITS: 5000 INJECTION, SOLUTION INTRAVENOUS; SUBCUTANEOUS at 05:43

## 2019-02-17 RX ADMIN — Medication 1000 MG: at 17:35

## 2019-02-17 RX ADMIN — ATORVASTATIN CALCIUM 40 MG: 40 TABLET, FILM COATED ORAL at 10:22

## 2019-02-17 RX ADMIN — OXYCODONE HYDROCHLORIDE AND ACETAMINOPHEN 250 MG: 500 TABLET ORAL at 10:22

## 2019-02-17 RX ADMIN — HEPARIN SODIUM 5000 UNITS: 5000 INJECTION, SOLUTION INTRAVENOUS; SUBCUTANEOUS at 14:34

## 2019-02-17 RX ADMIN — LISINOPRIL 5 MG: 5 TABLET ORAL at 10:21

## 2019-02-17 RX ADMIN — FUROSEMIDE 80 MG: 10 INJECTION, SOLUTION INTRAVENOUS at 10:17

## 2019-02-17 RX ADMIN — FERROUS SULFATE TAB 325 MG (65 MG ELEMENTAL FE) 325 MG: 325 (65 FE) TAB at 10:21

## 2019-02-17 RX ADMIN — NYSTATIN: 100000 POWDER TOPICAL at 10:23

## 2019-02-17 NOTE — PROGRESS NOTES
[facility] Internal Medicine Progress Note  Patient: Robin Marx 68 y o  female   MRN: [id number]  PCP: Bess West MD  Unit/Bed#: MS [facility] Encounter: [id number]  Date Of Visit: 19    Assessment:    Principal Problem:    Acute on chronic systolic congestive heart failure ([facility] [address] )  Active Problems:    [facility]    Essential hypertension    Diabetes mellitus ([facility] )    Bilateral leg edema      Plan:  Acute on chronic systolic and diastolic heart failure  Clinically improving  Discussed with Cardiology, will be transitioned to p o  Torsemide today  Continue to monitor I&Os and daily weights  Echocardiogram ordered and pending    Coronary artery disease  Continue aspirin statin and metoprolol  Stable    Hypertension  Blood pressure acceptable  Continue lisinopril metoprolol    Acute kidney injury  Likely from diuretics, will be transitioned to p o  Today  Continue to monitor BMP    VTE Pharmacologic Prophylaxis:   Pharmacologic: Heparin  Mechanical VTE Prophylaxis in Place: Yes    Patient Centered Rounds: I have performed bedside rounds with nursing staff today  Discussions with Specialists or Other Care Team Provider:  Cardiac    Education and Discussions with Family / Patient:  Patient    Time Spent for Care: 30 minutes  More than 50% of total time spent on counseling and coordination of care as described above  Current Length of Stay: 1 day(s)    Current Patient Status: Inpatient   Certification Statement: The patient will continue to require additional inpatient hospital stay due to Heart failure    Discharge Plan:  Likely next 24 hours    Code Status: Level 1 - Full Code      Subjective:   Patient seen and examined  No complaints at this time  She feels significantly better      Objective:     Vitals:   Temp (24hrs), Av 3 °F (36 8 °C), Min:98 24 °F (36 8 °C), Max:98 4 °F (36 9 °C)    Temp:  [98 24 °F (36 8 °C)-98 4 °F (36 9 °C)] 98 4 °F (36 9 °C)  HR:  [] 56  Resp:  [18] 18  BP: (137-155)/(73-86) 155/86  SpO2:  [80 %-100 %] 100 %  Body mass index is 31 78 kg/m²  Input and Output Summary (last 24 hours): Intake/Output Summary (Last 24 hours) at 2/17/2019 1528  Last data filed at 2/17/2019 1437  Gross per 24 hour   Intake 590 ml   Output 3300 ml   Net -2710 ml       Physical Exam:     Alert and oriented x3  Mucous membranes are moist  Lungs mild Creps at the bases otherwise clear to auscultation  Heart sounds are regular S1-S2  Abdomen soft Nontender  Extremities no edema    Additional Data:     Labs:    Results from last 7 days   Lab Units 02/16/19  0554  02/15/19  1818   WBC Thousand/uL 7 18  --  5 78   HEMOGLOBIN g/dL 13 7  --  14 4   HEMATOCRIT % 43 6  --  47 3*   PLATELETS Thousands/uL 138*   < > 116*   NEUTROS PCT %  --   --  70   LYMPHS PCT %  --   --  18   MONOS PCT %  --   --  8   EOS PCT %  --   --  2    < > = values in this interval not displayed  Results from last 7 days   Lab Units 02/17/19  1033 02/16/19  0554   POTASSIUM mmol/L 5 2 4 6   CHLORIDE mmol/L 103 109*   CO2 mmol/L 24 21   BUN mg/dL 32* 28*   CREATININE mg/dL 1 52* 1 49*   CALCIUM mg/dL 8 9 9 0   ALK PHOS U/L  --  213*   ALT U/L  --  37   AST U/L  --  41     Results from last 7 days   Lab Units 02/17/19  0059   INR  1 94*       * I Have Reviewed All Lab Data Listed Above  * Additional Pertinent Lab Tests Reviewed:  [facility] Admission Reviewed    Imaging:    Imaging Reports Reviewed Today Include:   Imaging Personally Reviewed by Myself Includes:      Recent Cultures (last 7 days):           Last 24 Hours Medication List:     Current Facility-Administered Medications:  acetaminophen 650 mg Oral Q6H PRN Renny Hu PA-C   ascorbic acid 250 mg Oral Daily Renny Hu PA-C   aspirin 81 mg Oral Daily Lupe Martin MD   atorvastatin 40 mg Oral Daily Renny Hu PA-C   ferrous sulfate 325 mg Oral Daily With Breakfast Renny Hu PA-C   fish oil 1,000 mg Oral BID Renny ISAAC Hu   heparin (porcine) 5,000 Units Subcutaneous Q8H [address] Renny Hu PA-C   insulin lispro 1-6 Units Subcutaneous TID AC Renny Hu PA-C   insulin lispro 1-6 Units Subcutaneous HS Renny Hu PA-C   levothyroxine 50 mcg Oral Early Morning Renny Hu PA-C   lisinopril 5 mg Oral Daily Orville Parra MD   metoprolol succinate 50 mg Oral Daily Renny Hu PA-C   nystatin  Topical BID Lupe Macario MD   ondansetron 4 mg Intravenous Q8H PRN Renny Hu PA-C   oxybutynin 5 mg Oral HS Renny Hu PA-C   sertraline 50 mg Oral Daily Renny Hu PA-C   torsemide 20 mg Oral BID (diuretic) Orville Parra MD        Today, Patient Was Seen By: Mila Arriola MD    ** Please Note: Dragon 360 Dictation voice to text software may have been used in the creation of this document   **

## 2019-02-17 NOTE — PROGRESS NOTES
Cardiology Progress Note - Terri Llamas 68 y o  female MRN: [id number]    Unit/Bed#: -01 Encounter: [id number]      Assessment/Plan:  1  Acute on chronic systolic and diastolic heart failure  · She diuresed very well with IV Lasix  Will transition to torsemide 20 mg p o  b i d  Oswald Cerna · Check daily weights and maintain strict I/O  · Fluid and sodium restriction were advised      2  Cardiomyopathy with EF: 40%  · Repeat echocardiogram pending  · Cont lisinopril and toprol-xl     3  CAD s/p PCIx 2 to the RCA in 2011   · Continue aspirin, atorvastatin and metoprolol     4  HTN- continue lisinopril and metoprolol    5  MANJEET- back off IV diuretics as noted above  Subjective:   Patient seen and examined  She diuresed well overnight  She denies any chest pain, dyspnea or palpitations at this time  Objective:     Vitals: Blood pressure 155/86, pulse 56, temperature 98 4 °F (36 9 °C), temperature source Oral, resp  rate 18, height 5' 6" (1 676 m), weight 89 3 kg (196 lb 14 4 oz), SpO2 100 %  , Body mass index is 31 78 kg/m² ,   Orthostatic Blood Pressures      Most Recent Value   Blood Pressure  155/86 filed at 02/17/2019 0810   Patient Position - Orthostatic VS  Lying filed at 02/17/2019 0701            Intake/Output Summary (Last 24 hours) at 2/17/2019 1047  Last data filed at 2/17/2019 1018  Gross per 24 hour   Intake 390 ml   Output 2100 ml   Net -1710 ml             Physical Exam:  GEN: Alert and oriented x 3, in no acute distress  Well appearing and well nourished  HEENT: Sclera anicteric, conjunctivae pink, mucous membranes moist  Oropharynx clear  NECK: Supple, no carotid bruits, no significant JVD  Trachea midline, no thyromegaly  HEART: Regular rhythm, normal S1 and S2, no murmurs, clicks, gallops or rubs  PMI nondisplaced, no thrills  LUNGS: Clear to auscultation bilaterally; no wheezes, rales, or rhonchi  No increased work of breathing or signs of respiratory distress     ABDOMEN: Soft, nontender, nondistended, normoactive bowel sounds  EXTREMITIES: Skin warm and well perfused, no clubbing, or cyanosis, trace - 1+ edema  NEURO: No focal findings  Normal speech  Mood and affect normal    SKIN: Normal without suspicious lesions on exposed skin          Medications:      Current Facility-Administered Medications:     acetaminophen (TYLENOL) tablet 650 mg, 650 mg, Oral, Q6H PRN, Renny Hu PA-C    ascorbic acid (VITAMIN C) tablet 250 mg, 250 mg, Oral, Daily, Renny Hu PA-C, 250 mg at 02/17/19 1022    aspirin (ECOTRIN LOW STRENGTH) EC tablet 81 mg, 81 mg, Oral, Daily, Danish Ulloa MD, 81 mg at 02/17/19 1022    atorvastatin (LIPITOR) tablet 40 mg, 40 mg, Oral, Daily, Renny Hu PA-C, 40 mg at 02/17/19 1022    ferrous sulfate tablet 325 mg, 325 mg, Oral, Daily With Breakfast, Renny Hu PA-C, 325 mg at 02/17/19 1021    fish oil capsule 1,000 mg, 1,000 mg, Oral, BID, Renny Hu PA-C, 1,000 mg at 02/17/19 1021    furosemide (LASIX) injection 80 mg, 80 mg, Intravenous, BID (diuretic), Martha Romeo MD, 80 mg at 02/17/19 1017    heparin (porcine) subcutaneous injection 5,000 Units, 5,000 Units, Subcutaneous, Q8H [address], 5,000 Units at 02/17/19 0543 **AND** [COMPLETED] Platelet count, , , Once, Renny Hu PA-C    insulin lispro (HumaLOG) 100 units/mL subcutaneous injection 1-6 Units, 1-6 Units, Subcutaneous, TID AC **AND** Fingerstick Glucose (POCT), , , TID AC, Renny Hu PA-C    insulin lispro (HumaLOG) 100 units/mL subcutaneous injection 1-6 Units, 1-6 Units, Subcutaneous, HS, Renny Hu PA-C, 1 Units at 02/16/19 2144    levothyroxine tablet 50 mcg, 50 mcg, Oral, Early Morning, Renny Hu PA-C, 50 mcg at 02/17/19 0543    lisinopril (ZESTRIL) tablet 5 mg, 5 mg, Oral, Daily, Martha Romeo MD, 5 mg at 02/17/19 1021    metoprolol succinate (TOPROL-XL) 24 hr tablet 50 mg, 50 mg, Oral, Daily, Renny Hu PA-C, 50 mg at 02/17/19 1021    nystatin (MYCOSTATIN) powder, , Topical, BID, Rikki Drew, MD    ondansetron [facility] injection 4 mg, 4 mg, Intravenous, Q8H PRN, Renny Hu PA-C    oxybutynin (DITROPAN-XL) 24 hr tablet 5 mg, 5 mg, Oral, HS, Renny Hu PA-C, 5 mg at 02/16/19 2144    sertraline (ZOLOFT) tablet 50 mg, 50 mg, Oral, Daily, Renny Hu PA-C, 50 mg at 02/17/19 1022     Labs & Results:    Results from last 7 days   Lab Units 02/16/19  0554 02/16/19  0250 02/15/19  1818   TROPONIN I ng/mL 0 02 0 02 <0 02     Results from last 7 days   Lab Units 02/16/19  0554 02/16/19  0250 02/15/19  1818   WBC Thousand/uL 7 18  --  5 78   HEMOGLOBIN g/dL 13 7  --  14 4   HEMATOCRIT % 43 6  --  47 3*   PLATELETS Thousands/uL 138* 144* 116*         Results from last 7 days   Lab Units 02/16/19  0554 02/15/19  2033   POTASSIUM mmol/L 4 6 5 1   CHLORIDE mmol/L 109* 107   CO2 mmol/L 21 23   BUN mg/dL 28* 24   CREATININE mg/dL 1 49* 1 36*   CALCIUM mg/dL 9 0 9 0   ALK PHOS U/L 213* 218*   ALT U/L 37 27   AST U/L 41 45     Results from last 7 days   Lab Units 02/17/19  0059   INR  1 94*   PTT seconds 36     Results from last 7 days   Lab Units 02/16/19  0554   MAGNESIUM mg/dL 1 9             Counseling / Coordination of Care  Total floor / unit time spent today 30 minutes  Greater than 50% of total time was spent with the patient and / or family counseling and / or coordination of care

## 2019-02-17 NOTE — PHYSICIAN ADVISOR
Current patient class: Inpatient  The patient is currently on Hospital Day: 2 at [address]      The patient was admitted to the hospital at [phone] on 2/16/19 for the following diagnosis:  Ankle swelling [M25 473]  Acute exacerbation of CHF (congestive heart failure) ([facility] [address] ) [I50 9]       There is documentation in the medical record of an expected length of stay of at least 2 midnights  The patient is therefore expected to satisfy the 2 midnight benchmark and given the 2 midnight presumption is appropriate for INPATIENT ADMISSION  Given this expectation of a satisfying stay, CMS instructs us that the patient is most often appropriate for inpatient admission under part A provided medical necessity is documented in the chart  After review of the relevant documentation, labs, vital signs and test results, the patient is appropriate for INPATIENT ADMISSION  Admission to the hospital as an inpatient is a complex decision making process which requires the practitioner to consider the patients presenting complaint, history and physical examination and all relevant testing  With this in mind, in this case, the patient was deemed appropriate for INPATIENT ADMISSION  After review of the documentation and testing available at the time of the admission I concur with this clinical determination of medical necessity  Rationale is as follows: The patient is a 68 yrs old Female who presented to the ED at 2/15/2019  5:10 PM with a chief complaint of Ankle Swelling (Pt presents with family for c/o bilateral LE swelling, hx CHF)     Given the need for further hospitalization, and along with the documentation of medical necessity present in the chart, the patient is appropriate for inpatient admission  The patient is expected to satisfy the 2 midnight benchmark, and will require further acute medical care   The patient does have comorbid conditions which increases the risk for significant adverse outcome  Given this the patient is appropriate for inpatient admission        The patients vitals on arrival were ED Triage Vitals [02/15/19 1629]   Temperature Pulse Respirations Blood Pressure SpO2   (!) 97 3 °F (36 3 °C) 71 20 148/74 99 %      Temp Source Heart Rate Source Patient Position - Orthostatic VS BP Location FiO2 (%)   Oral Monitor Sitting Left arm --      Pain Score       No Pain           Past Medical History:   Diagnosis Date    Aortic valve stenosis     Cancer (HCC)     Cardiac disease     Cardiomyopathy ([facility] )     CHF (congestive heart failure) (HCC)     Chronic systolic heart failure (HCC)     Coronary artery disease     Diabetes mellitus (HCC)     Diastolic dysfunction     Hypertension     Mitral regurgitation     Pulmonary HTN (HCC)     Renal disorder     Stroke ([facility] )     Tricuspid regurgitation      Past Surgical History:   Procedure Laterality Date    CARDIAC SURGERY      CORONARY ANGIOPLASTY WITH STENT PLACEMENT      KIDNEY SURGERY      MASTECTOMY             Consults have been placed to:   IP CONSULT TO CASE MANAGEMENT  IP CONSULT TO CARDIOLOGY    Vitals:    02/16/19 0630 02/16/19 1129 02/16/19 1340 02/16/19 1432   BP: 150/70 167/79     BP Location: Left arm Right arm     Pulse: 66 92     Resp: 18 18     Temp:       TempSrc:       SpO2: 97% 98%     Weight:    89 3 kg (196 lb 14 4 oz)   Height:   5' 6" (1 676 m)        Most recent labs:    Recent Labs     02/16/19  0554   WBC 7 18   HGB 13 7   HCT 43 6   *   K 4 6   CALCIUM 9 0   BUN 28*   CREATININE 1 49*   TROPONINI 0 02   AST 41   ALT 37   ALKPHOS 213*       Scheduled Meds:  Current Facility-Administered Medications:  acetaminophen 650 mg Oral Q6H PRN Renny Hu PA-C   ascorbic acid 250 mg Oral Daily Renny Hu PA-C   aspirin 81 mg Oral Daily Lupe Parks MD   atorvastatin 40 mg Oral Daily Renny Hu PA-C   ferrous sulfate 325 mg Oral Daily With Breakfast Renny Hu PA-C   fish oil 1,000 mg Oral BID Renny Hu ISAAC   furosemide 80 mg Intravenous BID (diuretic) Britta Coyle MD   heparin (porcine) 5,000 Units Subcutaneous Q8H [address] Renny Hu PA-C   insulin lispro 1-6 Units Subcutaneous TID AC Renny Hu PA-C   insulin lispro 1-6 Units Subcutaneous HS Renny Hu PA-C   levothyroxine 50 mcg Oral Early Morning Renny Hu PA-C   [START ON 2/17/2019] lisinopril 5 mg Oral Daily Britta Coyle MD   metoprolol succinate 50 mg Oral Daily Renny Hu PA-C   nystatin  Topical BID Lupe Calvo MD   ondansetron 4 mg Intravenous Q8H PRN Renny Hu PA-C   oxybutynin 5 mg Oral HS Renny Hu PA-C   sertraline 50 mg Oral Daily Renny Hu PA-C     Continuous Infusions:   PRN Meds:   acetaminophen    ondansetron    Surgical procedures (if appropriate):

## 2019-02-18 ENCOUNTER — APPOINTMENT (INPATIENT)
Dept: NON INVASIVE DIAGNOSTICS | Facility: HOSPITAL | Age: 74
DRG: 286 | End: 2019-02-18
Payer: MEDICARE

## 2019-02-18 LAB
ANION GAP SERPL CALCULATED.3IONS-SCNC: 12 MMOL/L (ref 4–13)
BUN SERPL-MCNC: 31 MG/DL (ref 5–25)
CALCIUM SERPL-MCNC: 8.9 MG/DL (ref 8.3–10.1)
CHLORIDE SERPL-SCNC: 105 MMOL/L (ref 100–108)
CO2 SERPL-SCNC: 24 MMOL/L (ref 21–32)
CREAT SERPL-MCNC: 1.42 MG/DL (ref 0.6–1.3)
GFR SERPL CREATININE-BSD FRML MDRD: 43 ML/MIN/1.73SQ M
GLUCOSE SERPL-MCNC: 111 MG/DL (ref 65–140)
GLUCOSE SERPL-MCNC: 116 MG/DL (ref 65–140)
GLUCOSE SERPL-MCNC: 122 MG/DL (ref 65–140)
GLUCOSE SERPL-MCNC: 179 MG/DL (ref 65–140)
GLUCOSE SERPL-MCNC: 265 MG/DL (ref 65–140)
POTASSIUM SERPL-SCNC: 3.6 MMOL/L (ref 3.5–5.3)
SODIUM SERPL-SCNC: 141 MMOL/L (ref 136–145)

## 2019-02-18 PROCEDURE — 99232 SBSQ HOSP IP/OBS MODERATE 35: CPT | Performed by: INTERNAL MEDICINE

## 2019-02-18 PROCEDURE — 82948 REAGENT STRIP/BLOOD GLUCOSE: CPT

## 2019-02-18 PROCEDURE — 93306 TTE W/DOPPLER COMPLETE: CPT | Performed by: INTERNAL MEDICINE

## 2019-02-18 PROCEDURE — 80048 BASIC METABOLIC PNL TOTAL CA: CPT | Performed by: INTERNAL MEDICINE

## 2019-02-18 PROCEDURE — 93306 TTE W/DOPPLER COMPLETE: CPT

## 2019-02-18 RX ORDER — METHYLPREDNISOLONE 16 MG/1
32 TABLET ORAL
Status: DISCONTINUED | OUTPATIENT
Start: 2019-02-18 | End: 2019-02-18

## 2019-02-18 RX ORDER — TORSEMIDE 20 MG/1
20 TABLET ORAL
Qty: 60 TABLET | Refills: 0 | Status: SHIPPED | OUTPATIENT
Start: 2019-02-18 | End: 2019-05-02 | Stop reason: SDUPTHER

## 2019-02-18 RX ORDER — WARFARIN SODIUM 7.5 MG/1
TABLET ORAL DAILY
COMMUNITY
End: 2019-03-07 | Stop reason: ALTCHOICE

## 2019-02-18 RX ORDER — ASPIRIN 81 MG/1
81 TABLET ORAL DAILY
Qty: 30 TABLET | Refills: 0 | Status: SHIPPED | OUTPATIENT
Start: 2019-02-19

## 2019-02-18 RX ORDER — DIPHENHYDRAMINE HCL 25 MG
50 TABLET ORAL
Status: DISCONTINUED | OUTPATIENT
Start: 2019-02-18 | End: 2019-02-18

## 2019-02-18 RX ORDER — WARFARIN SODIUM 5 MG/1
5 TABLET ORAL
Status: DISCONTINUED | OUTPATIENT
Start: 2019-02-18 | End: 2019-02-19

## 2019-02-18 RX ORDER — WARFARIN SODIUM 5 MG/1
5 TABLET ORAL EVERY OTHER DAY
COMMUNITY
End: 2019-03-07 | Stop reason: ALTCHOICE

## 2019-02-18 RX ADMIN — TORSEMIDE 20 MG: 20 TABLET ORAL at 16:05

## 2019-02-18 RX ADMIN — LEVOTHYROXINE SODIUM 50 MCG: 50 TABLET ORAL at 05:49

## 2019-02-18 RX ADMIN — FERROUS SULFATE TAB 325 MG (65 MG ELEMENTAL FE) 325 MG: 325 (65 FE) TAB at 08:44

## 2019-02-18 RX ADMIN — ATORVASTATIN CALCIUM 40 MG: 40 TABLET, FILM COATED ORAL at 08:44

## 2019-02-18 RX ADMIN — ASPIRIN 81 MG: 81 TABLET, COATED ORAL at 08:43

## 2019-02-18 RX ADMIN — HEPARIN SODIUM 5000 UNITS: 5000 INJECTION, SOLUTION INTRAVENOUS; SUBCUTANEOUS at 05:49

## 2019-02-18 RX ADMIN — INSULIN LISPRO 1 UNITS: 100 INJECTION, SOLUTION INTRAVENOUS; SUBCUTANEOUS at 11:41

## 2019-02-18 RX ADMIN — HEPARIN SODIUM 5000 UNITS: 5000 INJECTION, SOLUTION INTRAVENOUS; SUBCUTANEOUS at 13:38

## 2019-02-18 RX ADMIN — HEPARIN SODIUM 5000 UNITS: 5000 INJECTION, SOLUTION INTRAVENOUS; SUBCUTANEOUS at 22:52

## 2019-02-18 RX ADMIN — TORSEMIDE 20 MG: 20 TABLET ORAL at 08:43

## 2019-02-18 RX ADMIN — INSULIN LISPRO 3 UNITS: 100 INJECTION, SOLUTION INTRAVENOUS; SUBCUTANEOUS at 22:53

## 2019-02-18 RX ADMIN — METOPROLOL SUCCINATE 50 MG: 50 TABLET, EXTENDED RELEASE ORAL at 08:43

## 2019-02-18 RX ADMIN — OXYBUTYNIN CHLORIDE 5 MG: 5 TABLET, EXTENDED RELEASE ORAL at 22:52

## 2019-02-18 RX ADMIN — WARFARIN SODIUM 5 MG: 5 TABLET ORAL at 19:43

## 2019-02-18 RX ADMIN — NYSTATIN: 100000 POWDER TOPICAL at 08:45

## 2019-02-18 RX ADMIN — LISINOPRIL 5 MG: 5 TABLET ORAL at 08:43

## 2019-02-18 RX ADMIN — Medication 1000 MG: at 08:43

## 2019-02-18 RX ADMIN — Medication 1000 MG: at 17:45

## 2019-02-18 RX ADMIN — NYSTATIN: 100000 POWDER TOPICAL at 17:45

## 2019-02-18 RX ADMIN — OXYCODONE HYDROCHLORIDE AND ACETAMINOPHEN 250 MG: 500 TABLET ORAL at 08:45

## 2019-02-18 RX ADMIN — SERTRALINE HYDROCHLORIDE 50 MG: 50 TABLET ORAL at 08:44

## 2019-02-18 NOTE — SOCIAL WORK
Per report from nursing patient will not be discharged today due to echo results  Patient will have a cardiac cath scheduled for tomorrow

## 2019-02-18 NOTE — UTILIZATION REVIEW
OBS 2/15 @ 2119 converted to IP on 2/16 @ 1685 for treatment of CHF      [facility]    Level of Care Med Surg    Estimated length of stay More than 2 Midnights    Certification I certify that inpatient services are medically necessary for this patient for a duration of greater than two midnights  See H&P and MD Progress Notes for additional information about the patient's course of treatment

## 2019-02-18 NOTE — PLAN OF CARE
Problem: DISCHARGE PLANNING - CARE MANAGEMENT  Goal: Discharge to post-acute care or home with appropriate resources  Description  INTERVENTIONS:  - Conduct assessment to determine patient/family and health care team treatment goals, and need for post-acute services based on payer coverage, community resources, and patient preferences, and barriers to discharge  - Address psychosocial, clinical, and financial barriers to discharge as identified in assessment in conjunction with the patient/family and health care team  - Arrange appropriate level of post-acute services according to patient?s   needs and preference and payer coverage in collaboration with the physician and health care team  - Communicate with and update the patient/family, physician, and health care team regarding progress on the discharge plan  - Arrange appropriate transportation to post-acute venues  Outcome: CompletedLOS 2  Lace 72  No 30 day readmit  CM met with patient at bedside  Patient lives with her daughter-Lauren in three story house  Patient ambulates with a cane which she states she lost in the ED  Patient states her dtr -Stuart Villegas assists with  ADLs  Patient states she has hx with rehab and [facility]  [facility]  [place]  Patient denies mental health dx hx and substance abuse hx  Patient is retired  Patient does not drive  Patient's dtr drives patient to doctor's appointments  Upon clearance for discharge patient will be transported home via dtr-Lauren  Patient states dtr Mason Alvarado is her POA  Patient is referred to Nadia GARCIAA  Nurse and SLIM notified

## 2019-02-18 NOTE — SOCIAL WORK
LOS 2  Lace 72  No 30 day readmit  CM met with patient at bedside  Patient lives with her daughter-Lauren in three story house  Patient ambulates with a cane which she states she lost in the ED  Patient states her dtr -[facility] assists with  ADLs  Patient states she has hx with rehab and [facility]  [facility]  [place]  Patient denies mental health dx hx and substance abuse hx  Patient is retired  Patient does not drive  Patient's dtr drives patient to doctor's appointments  Upon clearance for discharge patient will be transported home via dtr-Lauren  Patient states dtr Mason Alvarado is her POA  Patient is referred to Nadia [facility] A  Nurse and SLIM notified  CM reviewed discharge planning process including the following: identifying help at home, patient preference for discharge planning needs, pharmacy preference, and availability of treatment team to discuss questions or concerns patient and/or family may have regarding understanding medications and recognizing signs and symptoms once discharged  CM also encouraged patient to follow up with all recommended appointments after discharge  Patient advised of importance for patient and family to participate in managing patients medical well being  CM name and role reviewed  Discharge Checklist reviewed and CM will continue to monitor for progress toward discharge goals in nursing and provider rounds

## 2019-02-18 NOTE — PLAN OF CARE
Problem: Potential for Falls  Goal: Patient will remain free of falls  Description  INTERVENTIONS:  - Assess patient frequently for physical needs  -  Identify cognitive and physical deficits and behaviors that affect risk of falls    -  [place] fall precautions as indicated by assessment   - Educate patient/family on patient safety including physical limitations  - Instruct patient to call for assistance with activity based on assessment  - Modify environment to reduce risk of injury  - Consider OT/PT consult to assist with strengthening/mobility  Outcome: Progressing     Problem: PAIN - ADULT  Goal: Verbalizes/displays adequate comfort level or baseline comfort level  Description  Interventions:  - Encourage patient to monitor pain and request assistance  - Assess pain using appropriate pain scale  - Administer analgesics based on type and severity of pain and evaluate response  - Implement non-pharmacological measures as appropriate and evaluate response  - Consider cultural and social influences on pain and pain management  - Notify physician/advanced practitioner if interventions unsuccessful or patient reports new pain  Outcome: Progressing     Problem: INFECTION - ADULT  Goal: Absence or prevention of progression during hospitalization  Description  INTERVENTIONS:  - Assess and monitor for signs and symptoms of infection  - Monitor lab/diagnostic results  - Monitor all insertion sites, i e  indwelling lines, tubes, and drains  - Monitor endotracheal (as able) and nasal secretions for changes in amount and color  - [place] appropriate cooling/warming therapies per order  - Administer medications as ordered  - Instruct and encourage patient and family to use good hand hygiene technique  - Identify and instruct in appropriate isolation precautions for identified infection/condition  Outcome: Progressing  Goal: Absence of fever/infection during neutropenic period  Description  INTERVENTIONS:  - Monitor WBC  - Implement neutropenic guidelines  Outcome: Progressing     Problem: SAFETY ADULT  Goal: Patient will remain free of falls  Description  INTERVENTIONS:  - Assess patient frequently for physical needs  -  Identify cognitive and physical deficits and behaviors that affect risk of falls    -  [place] fall precautions as indicated by assessment   - Educate patient/family on patient safety including physical limitations  - Instruct patient to call for assistance with activity based on assessment  - Modify environment to reduce risk of injury  - Consider OT/PT consult to assist with strengthening/mobility  Outcome: Progressing  Goal: Maintain or return to baseline ADL function  Description  INTERVENTIONS:  -  Assess patient's ability to carry out ADLs; assess patient's baseline for ADL function and identify physical deficits which impact ability to perform ADLs (bathing, care of mouth/teeth, toileting, grooming, dressing, etc )  - Assess/evaluate cause of self-care deficits   - Assess range of motion  - Assess patient's mobility; develop plan if impaired  - Assess patient's need for assistive devices and provide as appropriate  - Encourage maximum independence but intervene and supervise when necessary  ¯ Involve family in performance of ADLs  ¯ Assess for home care needs following discharge   ¯ Request OT consult to assist with ADL evaluation and planning for discharge  ¯ Provide patient education as appropriate  Outcome: Progressing  Goal: Maintain or return mobility status to optimal level  Description  INTERVENTIONS:  - Assess patient's baseline mobility status (ambulation, transfers, stairs, etc )    - Identify cognitive and physical deficits and behaviors that affect mobility  - Identify mobility aids required to assist with transfers and/or ambulation (gait belt, sit-to-stand, lift, walker, cane, etc )  - [place] fall precautions as indicated by assessment  - Record patient progress and toleration of activity level on Mobility SBAR; progress patient to next Phase/Stage  - Instruct patient to call for assistance with activity based on assessment  - Request Rehabilitation consult to assist with strengthening/weightbearing, etc   Outcome: Progressing     Problem: DISCHARGE PLANNING  Goal: Discharge to home or other facility with appropriate resources  Description  INTERVENTIONS:  - Identify barriers to discharge w/patient and caregiver  - Arrange for needed discharge resources and transportation as appropriate  - Identify discharge learning needs (meds, wound care, etc )  - Arrange for interpretive services to assist at discharge as needed  - Refer to Case Management Department for coordinating discharge planning if the patient needs post-hospital services based on physician/advanced practitioner order or complex needs related to functional status, cognitive ability, or social support system  Outcome: Progressing     Problem: Knowledge Deficit  Goal: Patient/family/caregiver demonstrates understanding of disease process, treatment plan, medications, and discharge instructions  Description  Complete learning assessment and assess knowledge base    Interventions:  - Provide teaching at level of understanding  - Provide teaching via preferred learning methods  Outcome: Progressing

## 2019-02-18 NOTE — PROGRESS NOTES
Cardiology Progress Note - Kevin Clancy 68 y o  female MRN: [id number]    Unit/Bed#: -01 Encounter: [id number]      Assessment/Plan:  1  Acute on chronic systolic and diastolic heart failure  · She is euvolemic  Cont torsemide 20 mg p o  b i d  Amber Noam · Check daily weights and maintain strict I/O  · Fluid and sodium restriction were advised      2  Cardiomyopathy with EF: 40%  · Repeat echocardiogram pending  · Cont lisinopril and toprol-xl     3  CAD s/p PCIx 2 to the RCA in 2011   · Continue aspirin, atorvastatin and metoprolol     4  HTN- continue lisinopril and metoprolol    5  MANJEET-cr improved  Addendum: Her echocardiography today revealed a decline in her LV systolic function with EF 25%  She also has severe aortic stenosis as well as moderate to severe mitral regurgitation and moderate to severe tricuspid regurgitation  Given her decline in her LVEF will proceed with a cardiac catheterization tomorrow morning  This was explained in detail to the patient and her daughter  Subjective:   Patient seen and examined  She denies any concerns today  Objective:     Vitals: Blood pressure 166/78, pulse 70, temperature 97 7 °F (36 5 °C), temperature source Oral, resp  rate 18, height 5' 6" (1 676 m), weight 88 9 kg (195 lb 15 8 oz), SpO2 98 %  , Body mass index is 31 63 kg/m² ,   Orthostatic Blood Pressures      Most Recent Value   Blood Pressure  166/78 filed at 02/18/2019 0700   Patient Position - Orthostatic VS  Lying filed at 02/18/2019 0700            Intake/Output Summary (Last 24 hours) at 2/18/2019 1151  Last data filed at 2/18/2019 1029  Gross per 24 hour   Intake 440 ml   Output 2650 ml   Net -2210 ml         Physical Exam:  GEN: Alert and oriented x 3, in no acute distress  Well appearing and well nourished  HEENT: Sclera anicteric, conjunctivae pink, mucous membranes moist  Oropharynx clear  NECK: Supple, no carotid bruits, no significant JVD  Trachea midline, no thyromegaly     HEART: Regular rhythm, normal S1 and S2, no murmurs, clicks, gallops or rubs  PMI nondisplaced, no thrills  LUNGS: Clear to auscultation bilaterally; no wheezes, rales, or rhonchi  No increased work of breathing or signs of respiratory distress  ABDOMEN: Soft, nontender, nondistended, normoactive bowel sounds  EXTREMITIES: Skin warm and well perfused, no clubbing, cyanosis, or edema  NEURO: No focal findings  Normal speech  Mood and affect normal    SKIN: Normal without suspicious lesions on exposed skin            Medications:      Current Facility-Administered Medications:     acetaminophen (TYLENOL) tablet 650 mg, 650 mg, Oral, Q6H PRN, Renny Hu PA-C    ascorbic acid (VITAMIN C) tablet 250 mg, 250 mg, Oral, Daily, Renny Hu PA-C, 250 mg at 02/18/19 0845    aspirin (ECOTRIN LOW STRENGTH) EC tablet 81 mg, 81 mg, Oral, Daily, Vincenzo Chavarria MD, 81 mg at 02/18/19 0843    atorvastatin (LIPITOR) tablet 40 mg, 40 mg, Oral, Daily, Renny Hu PA-C, 40 mg at 02/18/19 0844    ferrous sulfate tablet 325 mg, 325 mg, Oral, Daily With Breakfast, Renny Hu PA-C, 325 mg at 02/18/19 0844    fish oil capsule 1,000 mg, 1,000 mg, Oral, BID, Renny Hu PA-C, 1,000 mg at 02/18/19 0843    heparin (porcine) subcutaneous injection 5,000 Units, 5,000 Units, Subcutaneous, Q8H [address], 5,000 Units at 02/18/19 0549 **AND** [COMPLETED] Platelet count, , , Once, Renny Hu PA-C    insulin lispro (HumaLOG) 100 units/mL subcutaneous injection 1-6 Units, 1-6 Units, Subcutaneous, TID AC, 1 Units at 02/18/19 1141 **AND** Fingerstick Glucose (POCT), , , TID AC, Renny Hu PA-C    insulin lispro (HumaLOG) 100 units/mL subcutaneous injection 1-6 Units, 1-6 Units, Subcutaneous, HS, Renny Hu PA-C, 1 Units at 02/17/19 2145    levothyroxine tablet 50 mcg, 50 mcg, Oral, Early Morning, Renny Hu PA-C, 50 mcg at 02/18/19 0575    lisinopril (ZESTRIL) tablet 5 mg, 5 mg, Oral, Daily, Vivienne Hilario MD, 5 mg at 02/18/19 4717    metoprolol succinate (TOPROL-XL) 24 hr tablet 50 mg, 50 mg, Oral, Daily, Renny Hu PA-C, 50 mg at 02/18/19 0843    nystatin (MYCOSTATIN) powder, , Topical, BID, Lupe Whittaker MD    ondansetron [facility]) injection 4 mg, 4 mg, Intravenous, Q8H PRN, Renny Hu PA-C    oxybutynin (DITROPAN-XL) 24 hr tablet 5 mg, 5 mg, Oral, HS, Renny Hu PA-C, 5 mg at 02/17/19 2034    sertraline (ZOLOFT) tablet 50 mg, 50 mg, Oral, Daily, Renny Hu PA-C, 50 mg at 02/18/19 0844    torsemide (DEMADEX) tablet 20 mg, 20 mg, Oral, BID (diuretic), Heather Villatoro MD, 20 mg at 02/18/19 0843     Labs & Results:    Results from last 7 days   Lab Units 02/16/19  0554 02/16/19  0250 02/15/19  1818   TROPONIN I ng/mL 0 02 0 02 <0 02     Results from last 7 days   Lab Units 02/16/19  0554 02/16/19  0250 02/15/19  1818   WBC Thousand/uL 7 18  --  5 78   HEMOGLOBIN g/dL 13 7  --  14 4   HEMATOCRIT % 43 6  --  47 3*   PLATELETS Thousands/uL 138* 144* 116*         Results from last 7 days   Lab Units 02/18/19  0542 02/17/19  1033 02/16/19  0554 02/15/19  2033   POTASSIUM mmol/L 3 6 5 2 4 6 5 1   CHLORIDE mmol/L 105 103 109* 107   CO2 mmol/L 24 24 21 23   BUN mg/dL 31* 32* 28* 24   CREATININE mg/dL 1 42* 1 52* 1 49* 1 36*   CALCIUM mg/dL 8 9 8 9 9 0 9 0   ALK PHOS U/L  --   --  213* 218*   ALT U/L  --   --  37 27   AST U/L  --   --  41 45     Results from last 7 days   Lab Units 02/17/19  0059   INR  1 94*   PTT seconds 36     Results from last 7 days   Lab Units 02/16/19  0554   MAGNESIUM mg/dL 1 9             Counseling / Coordination of Care  Total floor / unit time spent today 30 minutes  Greater than 50% of total time was spent with the patient and / or family counseling and / or coordination of care

## 2019-02-19 LAB
ANION GAP SERPL CALCULATED.3IONS-SCNC: 10 MMOL/L (ref 4–13)
BACTERIA UR QL AUTO: ABNORMAL /HPF
BACTERIA UR QL AUTO: ABNORMAL /HPF
BILIRUB UR QL STRIP: NEGATIVE
BILIRUB UR QL STRIP: NEGATIVE
BUN SERPL-MCNC: 33 MG/DL (ref 5–25)
CALCIUM SERPL-MCNC: 8.5 MG/DL (ref 8.3–10.1)
CHLORIDE SERPL-SCNC: 102 MMOL/L (ref 100–108)
CLARITY UR: ABNORMAL
CLARITY UR: ABNORMAL
CO2 SERPL-SCNC: 28 MMOL/L (ref 21–32)
COLOR UR: YELLOW
COLOR UR: YELLOW
CREAT SERPL-MCNC: 1.49 MG/DL (ref 0.6–1.3)
GFR SERPL CREATININE-BSD FRML MDRD: 40 ML/MIN/1.73SQ M
GLUCOSE SERPL-MCNC: 109 MG/DL (ref 65–140)
GLUCOSE SERPL-MCNC: 118 MG/DL (ref 65–140)
GLUCOSE SERPL-MCNC: 167 MG/DL (ref 65–140)
GLUCOSE SERPL-MCNC: 84 MG/DL (ref 65–140)
GLUCOSE SERPL-MCNC: 90 MG/DL (ref 65–140)
GLUCOSE UR STRIP-MCNC: NEGATIVE MG/DL
GLUCOSE UR STRIP-MCNC: NEGATIVE MG/DL
HGB UR QL STRIP.AUTO: ABNORMAL
HGB UR QL STRIP.AUTO: ABNORMAL
INR PPP: 1.43 (ref 0.86–1.17)
KETONES UR STRIP-MCNC: NEGATIVE MG/DL
KETONES UR STRIP-MCNC: NEGATIVE MG/DL
LEUKOCYTE ESTERASE UR QL STRIP: ABNORMAL
LEUKOCYTE ESTERASE UR QL STRIP: ABNORMAL
NITRITE UR QL STRIP: POSITIVE
NITRITE UR QL STRIP: POSITIVE
NON-SQ EPI CELLS URNS QL MICRO: ABNORMAL /HPF
NON-SQ EPI CELLS URNS QL MICRO: ABNORMAL /HPF
PH UR STRIP.AUTO: 5.5 [PH] (ref 4.5–8)
PH UR STRIP.AUTO: 7 [PH] (ref 4.5–8)
POTASSIUM SERPL-SCNC: 3.4 MMOL/L (ref 3.5–5.3)
PROT UR STRIP-MCNC: ABNORMAL MG/DL
PROT UR STRIP-MCNC: ABNORMAL MG/DL
PROTHROMBIN TIME: 17.2 SECONDS (ref 11.8–14.2)
RBC #/AREA URNS AUTO: ABNORMAL /HPF
RBC #/AREA URNS AUTO: ABNORMAL /HPF
SODIUM SERPL-SCNC: 140 MMOL/L (ref 136–145)
SP GR UR STRIP.AUTO: 1.01 (ref 1–1.03)
SP GR UR STRIP.AUTO: 1.01 (ref 1–1.03)
UROBILINOGEN UR QL STRIP.AUTO: 1 E.U./DL
UROBILINOGEN UR QL STRIP.AUTO: 1 E.U./DL
WBC #/AREA URNS AUTO: ABNORMAL /HPF
WBC #/AREA URNS AUTO: ABNORMAL /HPF

## 2019-02-19 PROCEDURE — 80048 BASIC METABOLIC PNL TOTAL CA: CPT | Performed by: INTERNAL MEDICINE

## 2019-02-19 PROCEDURE — 82948 REAGENT STRIP/BLOOD GLUCOSE: CPT

## 2019-02-19 PROCEDURE — 82043 UR ALBUMIN QUANTITATIVE: CPT | Performed by: INTERNAL MEDICINE

## 2019-02-19 PROCEDURE — 4A023N8 MEASUREMENT OF CARDIAC SAMPLING AND PRESSURE, BILATERAL, PERCUTANEOUS APPROACH: ICD-10-PCS | Performed by: INTERNAL MEDICINE

## 2019-02-19 PROCEDURE — 85610 PROTHROMBIN TIME: CPT | Performed by: INTERNAL MEDICINE

## 2019-02-19 PROCEDURE — 97530 THERAPEUTIC ACTIVITIES: CPT

## 2019-02-19 PROCEDURE — 99232 SBSQ HOSP IP/OBS MODERATE 35: CPT | Performed by: INTERNAL MEDICINE

## 2019-02-19 PROCEDURE — B2161ZZ FLUOROSCOPY OF RIGHT AND LEFT HEART USING LOW OSMOLAR CONTRAST: ICD-10-PCS | Performed by: INTERNAL MEDICINE

## 2019-02-19 PROCEDURE — B2111ZZ FLUOROSCOPY OF MULTIPLE CORONARY ARTERIES USING LOW OSMOLAR CONTRAST: ICD-10-PCS | Performed by: INTERNAL MEDICINE

## 2019-02-19 PROCEDURE — 81001 URINALYSIS AUTO W/SCOPE: CPT | Performed by: INTERNAL MEDICINE

## 2019-02-19 PROCEDURE — 82570 ASSAY OF URINE CREATININE: CPT | Performed by: INTERNAL MEDICINE

## 2019-02-19 PROCEDURE — 99223 1ST HOSP IP/OBS HIGH 75: CPT | Performed by: INTERNAL MEDICINE

## 2019-02-19 PROCEDURE — 97535 SELF CARE MNGMENT TRAINING: CPT

## 2019-02-19 RX ORDER — WARFARIN SODIUM 7.5 MG/1
7.5 TABLET ORAL
Status: DISCONTINUED | OUTPATIENT
Start: 2019-02-19 | End: 2019-02-21 | Stop reason: HOSPADM

## 2019-02-19 RX ADMIN — WARFARIN SODIUM 7.5 MG: 7.5 TABLET ORAL at 17:45

## 2019-02-19 RX ADMIN — LEVOTHYROXINE SODIUM 50 MCG: 50 TABLET ORAL at 06:00

## 2019-02-19 RX ADMIN — OXYCODONE HYDROCHLORIDE AND ACETAMINOPHEN 250 MG: 500 TABLET ORAL at 09:32

## 2019-02-19 RX ADMIN — TORSEMIDE 20 MG: 20 TABLET ORAL at 09:31

## 2019-02-19 RX ADMIN — Medication 1000 MG: at 17:44

## 2019-02-19 RX ADMIN — ATORVASTATIN CALCIUM 40 MG: 40 TABLET, FILM COATED ORAL at 09:31

## 2019-02-19 RX ADMIN — Medication 1000 MG: at 09:32

## 2019-02-19 RX ADMIN — OXYBUTYNIN CHLORIDE 5 MG: 5 TABLET, EXTENDED RELEASE ORAL at 23:02

## 2019-02-19 RX ADMIN — NYSTATIN: 100000 POWDER TOPICAL at 17:44

## 2019-02-19 RX ADMIN — HEPARIN SODIUM 5000 UNITS: 5000 INJECTION, SOLUTION INTRAVENOUS; SUBCUTANEOUS at 06:00

## 2019-02-19 RX ADMIN — HEPARIN SODIUM 5000 UNITS: 5000 INJECTION, SOLUTION INTRAVENOUS; SUBCUTANEOUS at 23:05

## 2019-02-19 RX ADMIN — SERTRALINE HYDROCHLORIDE 50 MG: 50 TABLET ORAL at 09:31

## 2019-02-19 RX ADMIN — METOPROLOL SUCCINATE 50 MG: 50 TABLET, EXTENDED RELEASE ORAL at 09:31

## 2019-02-19 RX ADMIN — TORSEMIDE 20 MG: 20 TABLET ORAL at 16:21

## 2019-02-19 RX ADMIN — FERROUS SULFATE TAB 325 MG (65 MG ELEMENTAL FE) 325 MG: 325 (65 FE) TAB at 09:31

## 2019-02-19 RX ADMIN — INSULIN LISPRO 1 UNITS: 100 INJECTION, SOLUTION INTRAVENOUS; SUBCUTANEOUS at 23:03

## 2019-02-19 RX ADMIN — LISINOPRIL 5 MG: 5 TABLET ORAL at 09:31

## 2019-02-19 RX ADMIN — HEPARIN SODIUM 5000 UNITS: 5000 INJECTION, SOLUTION INTRAVENOUS; SUBCUTANEOUS at 13:24

## 2019-02-19 RX ADMIN — NYSTATIN: 100000 POWDER TOPICAL at 09:36

## 2019-02-19 RX ADMIN — ASPIRIN 81 MG: 81 TABLET, COATED ORAL at 09:31

## 2019-02-19 NOTE — CONSULTS
Consultation - Nephrology   Mary Chavira 68 y o  female MRN: [id number]  Unit/Bed#: -01 Encounter: [id number]    Referring PHYSICIAN: Jameson Cerda    REASON FOR THE CONSULTATION:  CKD stage 3 and need for contrast    DATE OF CONSULTATION:  February 19, 2019    ADMISSION DIAGNOSIS: Acute on chronic systolic congestive heart failure ([facility] [address] )     CHIEF COMPLAINT     Patient with history of CKD after nephrectomy came to the hospital his leg swelling shortness of breath    HPI     Patient with history of CKD who was seen by us in 2017  She came to the hospital with worsening leg swelling  Was given diuresis  Workup in hospital reveal aortic stenosis with cardiomyopathy and she will need cardiac catheterization as part of the further workup    Talking to the patient she is feeling better her leg swelling is much better  She denies any shortness of at this point  No chest pain no dizziness, no nausea no vomiting no abdominal discomfort  No urinary problem    Patient does history of CKD  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    Creatinine is high since then    Talking to the daughter she claims she was told she also had a contrast ATN at that time    PAST MEDICAL HISTORY     Past Medical History:   Diagnosis Date    Aortic valve stenosis     Cancer ([facility] [address] )     Cardiac disease     Cardiomyopathy ([facility] [address] )     CHF (congestive heart failure) (HCC)     Chronic systolic heart failure (HCC)     Coronary artery disease     Diabetes mellitus ([facility] [address] )     Diastolic dysfunction     Hypertension     Mitral regurgitation     Pulmonary HTN ([address]     Renal disorder     Stroke ([facility] [address] )     Tricuspid regurgitation        PAST SURGICAL HISTORY     Past Surgical History:   Procedure Laterality Date    CARDIAC SURGERY      CORONARY ANGIOPLASTY WITH STENT PLACEMENT      KIDNEY SURGERY      MASTECTOMY         ALLERGIES     Allergies   Allergen Reactions    Penicillins Rash       SOCIAL HISTORY Social History     Substance and Sexual Activity   Alcohol Use Not Currently    Frequency: Never     Social History     Substance and Sexual Activity   Drug Use No     Social History     Tobacco Use   Smoking Status Former Smoker    Packs/day: 0 50    Years: 45 00    Pack years: 22 50    Last attempt to quit: 2009    Years since quittin 1   Smokeless Tobacco Never Used       FAMILY HISTORY     Family History   Family history unknown: Yes       CURRENT MEDICATIONS       Current Facility-Administered Medications:     acetaminophen (TYLENOL) tablet 650 mg, 650 mg, Oral, Q6H PRN, Renny Hu PA-C    ascorbic acid (VITAMIN C) tablet 250 mg, 250 mg, Oral, Daily, Renny Hu PA-C, 250 mg at 19 0932    aspirin (ECOTRIN LOW STRENGTH) EC tablet 81 mg, 81 mg, Oral, Daily, Euesbio Butler MD, 81 mg at 19 0931    atorvastatin (LIPITOR) tablet 40 mg, 40 mg, Oral, Daily, Renny Hu PA-C, 40 mg at 19 0931    ferrous sulfate tablet 325 mg, 325 mg, Oral, Daily With Breakfast, Renny Hu PA-C, 325 mg at 19 0931    fish oil capsule 1,000 mg, 1,000 mg, Oral, BID, Renny Hu PA-C, 1,000 mg at 19 0932    heparin (porcine) subcutaneous injection 5,000 Units, 5,000 Units, Subcutaneous, Q8H [facility], 5,000 Units at 19 0600 **AND** [COMPLETED] Platelet count, , , Once, Renny Hu PA-C    insulin lispro (HumaLOG) 100 units/mL subcutaneous injection 1-6 Units, 1-6 Units, Subcutaneous, TID AC, 1 Units at 19 1141 **AND** Fingerstick Glucose (POCT), , , TID AC, Renny Hu PA-C    insulin lispro (HumaLOG) 100 units/mL subcutaneous injection 1-6 Units, 1-6 Units, Subcutaneous, HS, Renny Hu PA-C, 3 Units at 19 2253    levothyroxine tablet 50 mcg, 50 mcg, Oral, Early Morning, Renny Hu PA-C, 50 mcg at 19 0600    lisinopril (ZESTRIL) tablet 5 mg, 5 mg, Oral, Daily, Juana Beltran MD, 5 mg at 19 0931    metoprolol succinate (TOPROL-XL) 24 hr tablet 50 mg, 50 mg, Oral, Daily, Renny Hu PA-C, 50 mg at 02/19/19 0931    nystatin (MYCOSTATIN) powder, , Topical, BID, Lupe Garces MD    ondansetron (ZOFRAN) injection 4 mg, 4 mg, Intravenous, Q8H PRN, Renny Hu PA-C    oxybutynin (DITROPAN-XL) 24 hr tablet 5 mg, 5 mg, Oral, HS, Renny Hu PA-C, 5 mg at 02/18/19 2252    sertraline (ZOLOFT) tablet 50 mg, 50 mg, Oral, Daily, Renny Hu PA-C, 50 mg at 02/19/19 0931    torsemide (DEMADEX) tablet 20 mg, 20 mg, Oral, BID (diuretic), Bandar Whatley MD, 20 mg at 02/19/19 0931    warfarin (COUMADIN) tablet 5 mg, 5 mg, Oral, Daily (warfarin), Sunil Harris MD, 5 mg at 02/18/19 1943    REVIEW OF SYSTEMS     Review of Systems   Constitutional: Positive for fatigue  Negative for activity change and appetite change  HENT: Negative for congestion, ear discharge, rhinorrhea, sinus pressure and sinus pain  Eyes: Negative for photophobia, pain and visual disturbance  Respiratory: Positive for cough, chest tightness and shortness of breath  Negative for apnea, choking and wheezing  Cardiovascular: Positive for leg swelling  Negative for chest pain and palpitations  Gastrointestinal: Negative for abdominal distention, abdominal pain, blood in stool, constipation, diarrhea, nausea and vomiting  Endocrine: Negative for heat intolerance, polyphagia and polyuria  Genitourinary: Negative for difficulty urinating, flank pain, hematuria and urgency  Musculoskeletal: Positive for arthralgias and back pain  Negative for neck pain and neck stiffness  Skin: Negative for color change and wound  Allergic/Immunologic: Negative for food allergies and immunocompromised state  Neurological: Positive for dizziness  Negative for seizures, facial asymmetry, weakness, light-headedness and headaches  Hematological: Negative for adenopathy  Does not bruise/bleed easily  Psychiatric/Behavioral: Negative for self-injury and suicidal ideas         LAB RESULTS        Results from last 7 days   Lab Units 02/19/19  0500 02/18/19  0542 02/17/19  1033 02/16/19  0554 02/16/19  0250 02/15/19  2033 02/15/19  1818   WBC Thousand/uL  --   --   --  7 18  --   --  5 78   HEMOGLOBIN g/dL  --   --   --  13 7  --   --  14 4   HEMATOCRIT %  --   --   --  43 6  --   --  47 3*   PLATELETS Thousands/uL  --   --   --  138* 144*  --  116*   POTASSIUM mmol/L 3 4* 3 6 5 2 4 6  --  5 1  --    CHLORIDE mmol/L 102 105 103 109*  --  107  --    CO2 mmol/L 28 24 24 21  --  23  --    BUN mg/dL 33* 31* 32* 28*  --  24  --    CREATININE mg/dL 1 49* 1 42* 1 52* 1 49*  --  1 36*  --    EGFR ml/min/1 73sq m 40 43 39 40  --  45  --    CALCIUM mg/dL 8 5 8 9 8 9 9 0  --  9 0  --    MAGNESIUM mg/dL  --   --   --  1 9  --   --   --    PHOSPHORUS mg/dL  --   --   --  3 7  --   --   --        I have personally reviewed the old medical records and patient's previously known baseline creatinine level is ~ 1 4    RADIOLOGY RESULTS     Results for orders placed during the hospital encounter of 07/13/17   XR chest 1 view portable    Narrative CHEST     INDICATION:  Shortness of breath  Elevated INR  COMPARISON:  Chest film 9/1/2005 and report of CT chest also from that date    VIEWS:   AP frontal    IMAGES:  1    FINDINGS:         The cardiac silhouette is enlarged  Prominent, indistinct pulmonary vasculature and faint reticular interstitial densities in the lung bases consistent with element of CHF  Mild azygous distention  No discernible pleural effusions or pneumothorax  Visualized osseous structures appear within normal limits for the patient's age  Impression Cardiomegaly with mild CHF  Workstation performed: [id number]       Results for orders placed during the hospital encounter of 02/15/19   XR chest 2 views    Narrative CHEST     INDICATION:   Shortness of breath and leg swelling      COMPARISON:  7/14/2017    EXAM PERFORMED/VIEWS:  XR CHEST PA & LATERAL      FINDINGS:    Heart shadow is enlarged but unchanged from prior exam     There is no focal infiltrate or pleural effusion  No pneumothorax  Pulmonary vasculature mildly prominent    Osseous structures appear within normal limits for patient age  Impression Mild pulmonary vascular congestion and enlargement of the cardiac silhouette  Workstation performed: [id number]       No results found for this or any previous visit  No results found for this or any previous visit  No results found for this or any previous visit  No results found for this or any previous visit  OBJECTIVE     Current Weight: Weight - Scale: 89 9 kg (198 lb 3 1 oz)  Vitals:    02/19/19 0751   BP: 123/58   Pulse: 56   Resp:    Temp:    SpO2: 92%       Intake/Output Summary (Last 24 hours) at 2/19/2019 1051  Last data filed at 2/19/2019 1020  Gross per 24 hour   Intake 560 ml   Output 2300 ml   Net -1740 ml       PHYSICAL EXAMINATION     Physical Exam   Constitutional: She is oriented to person, place, and time  She appears well-developed  No distress  HENT:   Head: Normocephalic  Mouth/Throat: Oropharynx is clear and moist    Eyes: Pupils are equal, round, and reactive to light  Conjunctivae and EOM are normal  No scleral icterus  Neck: Normal range of motion  Neck supple  No JVD present  Cardiovascular: Normal rate and regular rhythm  Exam reveals no friction rub  Murmur heard  Pulmonary/Chest: Effort normal and breath sounds normal  No respiratory distress  She has no wheezes  Abdominal: Soft  Bowel sounds are normal  She exhibits no distension  There is no tenderness  Musculoskeletal: Normal range of motion  She exhibits no edema  Lymphadenopathy:     She has no cervical adenopathy  Neurological: She is alert and oriented to person, place, and time  Skin: Skin is warm  No rash noted  Psychiatric: She has a normal mood and affect  Her behavior is normal         PLAN / RECOMMENDATIONS      CKD stage 3:  Seems to be at baseline    Likely etiology single functioning kidneys with possible hypertensive nephrosclerosis  I will do some basic workup to rule out any other etiology  Cardiomyopathy with aortic stenosis:  Patient does need cardiac catheterization for further management she will be high risk but she is stable can be done at this point  I discussed at length with the patient daughter about risk and necessity of cardiac catheterization  She still not sure about it  If she agrees I will give IV hydration as well as Mucomyst at this point for contrast prevention of ATN    Hypertension:  Very well control     Discussed with slim as well as cardiologist at length        Thank you for the consultation to participate in patient's care  I have personally discussed my plan with the referring physician  Manuel Segal MD  Nephrology  2/19/2019        Portions of the record may have been created with voice recognition software  Occasional wrong word or "sound a like" substitutions may have occurred due to the inherent limitations of voice recognition software  Read the chart carefully and recognize, using context, where substitutions have occurred

## 2019-02-19 NOTE — PROGRESS NOTES
[address] Internal Medicine Progress Note  Patient: Audra Paula 68 y o  female   MRN: [id number]  PCP: Ashleigh Madden MD  Unit/Bed#: -01 Encounter: [id number]  Date Of Visit: 02/19/19    Assessment:    Principal Problem:    Acute on chronic systolic congestive heart failure ([facility] [address] )  Active Problems:    [facility]acute [facility] )    Essential hypertension    Diabetes mellitus ([facility] )    Bilateral leg edema      Plan:  Acute on chronic systolic and diastolic congestive heart failure  Currently euvolemic  Continue torsemide  Monitor I&Os and daily weights    Severe cardiomyopathy  Echocardiogram demonstrated ejection fraction of 25%  Discussed with Cardiology and Nephrology  Scheduled for cardiac catheterization tomorrow  Will need life vest on discharge  Continue lisinopril and beta-blocker    Coronary artery disease status post PCI  Stable  Continue aspirin statin beta-blocker    Hypertension  Blood pressure is stable    Severe aortic stenosis, moderate to severe mitral regurgitation and tricuspid regurgitation  Outpatient CT surgery follow-up    History of DVT  Continue Coumadin    VTE Pharmacologic Prophylaxis:   Pharmacologic: Warfarin (Coumadin)  Mechanical VTE Prophylaxis in Place: Yes    Patient Centered Rounds: I have performed bedside rounds with nursing staff today  Discussions with Specialists or Other Care Team Provider: cardiology and nephrology    Education and Discussions with Family / Patient: patient     Time Spent for Care: 30 minutes  More than 50% of total time spent on counseling and coordination of care as described above  Current Length of Stay: 3 day(s)    Current Patient Status: Inpatient   Certification Statement: The patient will continue to require additional inpatient hospital stay due to cardiac cath tomorrow    Discharge Plan:  Cardiac catheterization    Code Status: Level 1 - Full Code      Subjective:   Patient seen and examined    No complaints at this time     Objective:     Vitals:   Temp (24hrs), Av 6 °F (37 °C), Min:98 2 °F (36 8 °C), Max:99 °F (37 2 °C)    Temp:  [98 2 °F (36 8 °C)-99 °F (37 2 °C)] 98 2 °F (36 8 °C)  HR:  [56-72] 56  Resp:  [17-18] 17  BP: (123-154)/(58-79) 123/58  SpO2:  [91 %-99 %] 92 %  Body mass index is 31 99 kg/m²  Input and Output Summary (last 24 hours): Intake/Output Summary (Last 24 hours) at 2019 1507  Last data filed at 2019 1300  Gross per 24 hour   Intake 420 ml   Output 1500 ml   Net -1080 ml       Physical Exam:     Alert and oriented x3  Mucous membranes are moist  Lungs are clear to auscultation  Heart sounds are regular S1-S2  Abdomen soft nontender  Extremities no edema    Additional Data:     Labs:    Results from last 7 days   Lab Units 19  0554  02/15/19  1818   WBC Thousand/uL 7 18  --  5 78   HEMOGLOBIN g/dL 13 7  --  14 4   HEMATOCRIT % 43 6  --  47 3*   PLATELETS Thousands/uL 138*   < > 116*   NEUTROS PCT %  --   --  70   LYMPHS PCT %  --   --  18   MONOS PCT %  --   --  8   EOS PCT %  --   --  2    < > = values in this interval not displayed  Results from last 7 days   Lab Units 19  0500  19  0554   POTASSIUM mmol/L 3 4*   < > 4 6   CHLORIDE mmol/L 102   < > 109*   CO2 mmol/L 28   < > 21   BUN mg/dL 33*   < > 28*   CREATININE mg/dL 1 49*   < > 1 49*   CALCIUM mg/dL 8 5   < > 9 0   ALK PHOS U/L  --   --  213*   ALT U/L  --   --  37   AST U/L  --   --  41    < > = values in this interval not displayed  Results from last 7 days   Lab Units 19  1044   INR  1 43*       * I Have Reviewed All Lab Data Listed Above  * Additional Pertinent Lab Tests Reviewed:  [address] Admission Reviewed    Imaging:    Imaging Reports Reviewed Today Include:  Imaging Personally Reviewed by Myself Includes:      Recent Cultures (last 7 days):           Last 24 Hours Medication List:     Current Facility-Administered Medications:  acetaminophen 650 mg Oral Q6H PRN Renny Hu PA-C   ascorbic acid 250 mg Oral Daily Renny Hu PA-C   aspirin 81 mg Oral Daily Lupe Matute MD   atorvastatin 40 mg Oral Daily Renny Hu PA-C   ferrous sulfate 325 mg Oral Daily With Breakfast Renny Hu PA-C   fish oil 1,000 mg Oral BID Renny Hu PA-C   heparin (porcine) 5,000 Units Subcutaneous Q8H [address] Renny Hu PA-C   insulin lispro 1-6 Units Subcutaneous TID AC Renny Hu PA-C   insulin lispro 1-6 Units Subcutaneous HS Renny Hu PA-C   levothyroxine 50 mcg Oral Early Morning Renny Hu PA-C   lisinopril 5 mg Oral Daily Shelby Levy MD   metoprolol succinate 50 mg Oral Daily eRnny Hu PA-C   nystatin  Topical BID Lupe Matute MD   ondansetron 4 mg Intravenous Q8H PRN Renny Hu PA-C   oxybutynin 5 mg Oral HS Renny Hu PA-C   sertraline 50 mg Oral Daily Renny Hu PA-C   torsemide 20 mg Oral BID (diuretic) Shelby Levy MD   warfarin 5 mg Oral Daily (warfarin) Khurram Valerio MD        Today, Patient Was Seen By: Khurram Valerio MD    ** Please Note: Dragon 360 Dictation voice to text software may have been used in the creation of this document   **

## 2019-02-19 NOTE — PROGRESS NOTES
[address] Internal Medicine Progress Note  Patient: Pooja Robison 68 y o  female   MRN: [id number]  PCP: Silvana Guy MD  Unit/Bed#: MS [place] Encounter: [id number]  Date Of Visit: 19    Assessment:    Principal Problem:    Acute on chronic systolic congestive heart failure ([facility] [address] )  Active Problems:    MANJEET (acute kidney injury) ([facility] [address] )    Essential hypertension    Diabetes mellitus ([facility] )    Bilateral leg edema      Plan:    Acute on chronic systolic and diastolic CHF  Continue torsemide  Monitor I/o and daily weights  Cardiology consult pending    Severe cardiomyopathy  Echo-EF 25%  Life vest on d/c  As per cardiology pt will need cardiac cath to look for lesions    History of DVT  Continue coumadin  Monitor inr    VTE Pharmacologic Prophylaxis:   Pharmacologic: Heparin  Mechanical VTE Prophylaxis in Place: Yes    Patient Centered Rounds: I have performed bedside rounds with nursing staff today  Discussions with Specialists or Other Care Team Provider: cardiology    Education and Discussions with Family / Patient: pts daughter    Time Spent for Care: 30 minutes  More than 50% of total time spent on counseling and coordination of care as described above  Current Length of Stay: 2 day(s)    Current Patient Status: Inpatient   Certification Statement: The patient will continue to require additional inpatient hospital stay due to heart failure and cardiomyopathy    Discharge Plan: depending on cath results    Code Status: Level 1 - Full Code      Subjective:   Pt seen and examined, feels better, no acute complaints    Objective:     Vitals:   Temp (24hrs), Av °F (36 7 °C), Min:97 7 °F (36 5 °C), Max:98 2 °F (36 8 °C)    Temp:  [97 7 °F (36 5 °C)-98 2 °F (36 8 °C)] 97 7 °F (36 5 °C)  HR:  [63-70] 70  Resp:  [18] 18  BP: (146-166)/(78-80) 166/78  SpO2:  [97 %-98 %] 98 %  Body mass index is 31 63 kg/m²  Input and Output Summary (last 24 hours):        Intake/Output Summary (Last 24 hours) at 2/18/2019 1958  Last data filed at 2/18/2019 1300  Gross per 24 hour   Intake 840 ml   Output 2250 ml   Net -1410 ml       Physical Exam:     Alert and oriented x3  Mucous membranes are moist  Lungs are clear to auscultation   Heart sounds are regular S1-S2  Abdomen soft nontender  Extremities no edema    Additional Data:     Labs:    Results from last 7 days   Lab Units 02/16/19  0554  02/15/19  1818   WBC Thousand/uL 7 18  --  5 78   HEMOGLOBIN g/dL 13 7  --  14 4   HEMATOCRIT % 43 6  --  47 3*   PLATELETS Thousands/uL 138*   < > 116*   NEUTROS PCT %  --   --  70   LYMPHS PCT %  --   --  18   MONOS PCT %  --   --  8   EOS PCT %  --   --  2    < > = values in this interval not displayed  Results from last 7 days   Lab Units 02/18/19  0542  02/16/19  0554   POTASSIUM mmol/L 3 6   < > 4 6   CHLORIDE mmol/L 105   < > 109*   CO2 mmol/L 24   < > 21   BUN mg/dL 31*   < > 28*   CREATININE mg/dL 1 42*   < > 1 49*   CALCIUM mg/dL 8 9   < > 9 0   ALK PHOS U/L  --   --  213*   ALT U/L  --   --  37   AST U/L  --   --  41    < > = values in this interval not displayed  Results from last 7 days   Lab Units 02/17/19  0059   INR  1 94*       * I Have Reviewed All Lab Data Listed Above  * Additional Pertinent Lab Tests Reviewed:  [address] Admission Reviewed    Imaging:    Imaging Reports Reviewed Today Include:   Imaging Personally Reviewed by Myself Includes:    Recent Cultures (last 7 days):           Last 24 Hours Medication List:     Current Facility-Administered Medications:  acetaminophen 650 mg Oral Q6H PRN Renny Hu PA-C   ascorbic acid 250 mg Oral Daily Renny Hu PA-C   aspirin 81 mg Oral Daily Lupe Humphreys MD   atorvastatin 40 mg Oral Daily Renny Hu PA-C   ferrous sulfate 325 mg Oral Daily With Breakfast Renny Hu PA-C   fish oil 1,000 mg Oral BID Renny Hu PA-C   heparin (porcine) 5,000 Units Subcutaneous Q8H [address] Renny Hu PA-C   insulin lispro 1-6 Units Subcutaneous TID AC Renny ISAAC Hu   insulin lispro 1-6 Units Subcutaneous HS Renny Hu PA-C   levothyroxine 50 mcg Oral Early Morning Renny Hu PA-C   lisinopril 5 mg Oral Daily Nate Gordillo MD   metoprolol succinate 50 mg Oral Daily Renny Hu PA-C   nystatin  Topical BID Lupe Young MD   ondansetron 4 mg Intravenous Q8H PRN Renny Hu PA-C   oxybutynin 5 mg Oral HS Renny Hu PA-C   sertraline 50 mg Oral Daily Renny Hu PA-C   torsemide 20 mg Oral BID (diuretic) Nate Gordillo MD   warfarin 5 mg Oral Daily (warfarin) Kellie Wallace MD        Today, Patient Was Seen By: Kellie Wallace MD    ** Please Note: Dragon 360 Dictation voice to text software may have been used in the creation of this document   **

## 2019-02-19 NOTE — OCCUPATIONAL THERAPY NOTE
OCCUPATIONAL THERAPY TREATMENT  NOTE       02/19/19 7898   Restrictions/Precautions   Weight Bearing Precautions Per Order No   Other Precautions Multiple lines;Telemetry; Fall Risk   Pain Assessment   Pain Assessment 0-10   Pain Score No Pain   ADL   Where Assessed Chair   Equipment Provided Reacher;Sock aid;Long-handled shoe horn;Long-handled sponge  (demo'd LH AE for LB ADL + shower bench benefits)   Transfers   Sit to Stand 5  Supervision   Additional items Assist x 1; Armrests; Increased time required;Verbal cues   Stand to Sit 5  Supervision   Additional items Assist x 1; Armrests; Increased time required;Verbal cues   Functional Mobility   Functional Mobility 4  Minimal assistance  (CGA)   Additional items Rolling walker   Cognition   Overall Cognitive Status WFL   Arousal/Participation Alert; Cooperative   Attention Within functional limits   Orientation Level Oriented X4   Memory Decreased recall of recent events   Following Commands Follows all commands and directions without difficulty   Additional Activities   Additional Activities Other (Comment)   Additional Activities Comments ed on PLB techn  Activity Tolerance   Activity Tolerance Patient tolerated treatment well   Medical Staff Made Aware yes, RN Nany Salgado made aware   Assessment   Assessment Pt cooperative and agreeable to skilled OT services with focus on improving independence and safety during functional mobility and self care skills  Pt seated in recliner upon start of session with no c/o pain    Pt able to perform sit <>stand at [employer] with S and cues for hand placement  Pt stated that she uses SPC at home and thinks that she had [facility] in hospital, however therapist could not locate [facility]  Therapist contacted RN who stated that she has not been able to locate cane and is unsure whether pt bought cane to hospital  Pt able to ambulate in hallway using RW with cues for posture and CGA  No LOB noted   Pt stand > sit to recliner with cue for hand placement  Pt states that her daughter assists her with bathing  Therapist recommended shower bench rather than shower chair for increased safety during shower txfes    Therapist demonstrated [facility] AE for LB ADL to assist with increased independence during ADLs  Pt demonstrated good activity tolerance during presented tasks  Pt  educated in energy conservation techniques and safe mobility technique  Pt performance demonstrated fair carryover of learned techniques and strategies to facilitate safety during self care and daily routine, however pt continues to demonstrate deficits in the areas of self care and functional mobility  Pt would continue to benefit from skilled OT POC for decreased BOC for caregivers  Plan   Treatment Interventions ADL retraining;Visual perceptual retraining;Functional transfer training;UE strengthening/ROM; Endurance training;Patient/family training;Equipment evaluation/education; Fine motor coordination activities; Compensatory technique education;Continued evaluation; Energy conservation; Activityengagement   Goal Expiration Date 03/02/19   OT Frequency 2-3x/wk   Recommendation   OT Discharge Recommendation Home OT                                                       SILVESTRE Vidales/WILL

## 2019-02-19 NOTE — PROGRESS NOTES
Progress Note - Cardiology     Rajesh Dk 68 y o  female MRN: [id number]  Unit/Bed#: -01 Encounter: [id number]      Subjective:   Feeling better  Patient denies chest pain, SOB, palpitations, lightheadedness  REVIEW OF SYSTEMS:  Constitutional: Denies fever or chills  Eyes: Denies eye discharge   HENT: Denies sneezing, nasal congestion or sore throat   Respiratory: Denies cough, expectoration   Cardiovascular: Denies chest pain, palpitations or lower extremity swelling   GI: Denies abdominal pain, nausea, vomiting, bloody stools or diarrhea   : Denies dysuria, frequency, difficulty in micturition or nocturia  Musculoskeletal: Denies back pain or joint pain   Neurologic: Denies lightheadedness, syncope, headache, focal weakness or sensory changes   Endocrine: Denies polyuria or polydipsia   Lymphatic: Denies swollen glands   Psychiatric: Denies depression, anxiety or panic       Objective:   Vitals:  Vitals:    02/19/19 0751   BP: 123/58   Pulse: 56   Resp:    Temp:    SpO2: 92%       Body mass index is 31 99 kg/m²  Systolic (17BMT), DRN:950 , Min:123 , PSR:838     Diastolic (37MZE), QWY:67, Min:58, Max:79      Intake/Output Summary (Last 24 hours) at 2/19/2019 1258  Last data filed at 2/19/2019 1020  Gross per 24 hour   Intake 560 ml   Output 1800 ml   Net -1240 ml     Weight (last 2 days)     Date/Time   Weight    02/19/19 0532   89 9 (198 19)    02/18/19 0537   88 9 (195 99)              PHYSICAL EXAM:  General: Patient is not in acute distress  Awake, alert, responding to commands  Head: Normocephalic  Atraumatic  HEENT: Nonicteric  Neck: JVP not raised  Trachea central  No thyromegaly  Respiratory: Bilateral bronchovascular breath sounds with no crackles or rhonchi  Cardiovascular: RRR  S1 and S2 normal  No rub or gallop  Grade 3/6 harsh LIBIA at base  Grade 3/6 PSM at apex  GI: Abdomen soft, nontender  No guarding or rigidity  Liver and spleen not palpable   Bowel sounds present  Neurologic: Patient is awake, alert, responding to command   Moving all extremities  Integumentary:  No skin rash  Lymphatic: No cervical lymphadenopathy  Extremities: No clubbing, cyanosis or edema      LABORATORY RESULTS:  Results from last 7 days   Lab Units 02/16/19  0554 02/16/19  0250 02/15/19  1818   TROPONIN I ng/mL 0 02 0 02 <0 02     CBC with diff:   Results from last 7 days   Lab Units 02/16/19  0554 02/16/19  0250 02/15/19  1818   WBC Thousand/uL 7 18  --  5 78   HEMOGLOBIN g/dL 13 7  --  14 4   HEMATOCRIT % 43 6  --  47 3*   MCV fL 96  --  99*   PLATELETS Thousands/uL 138* 144* 116*   MCH pg 30 2  --  30 3   MCHC g/dL 31 4  --  30 4*   RDW % 16 2*  --  16 3*   MPV fL 11 7 12 1 11 5   NRBC AUTO /100 WBCs  --   --  0       CMP:  Results from last 7 days   Lab Units 02/19/19  0500 02/18/19  0542 02/17/19  1033 02/16/19  0554 02/15/19  2033   POTASSIUM mmol/L 3 4* 3 6 5 2 4 6 5 1   CHLORIDE mmol/L 102 105 103 109* 107   CO2 mmol/L 28 24 24 21 23   BUN mg/dL 33* 31* 32* 28* 24   CREATININE mg/dL 1 49* 1 42* 1 52* 1 49* 1 36*   CALCIUM mg/dL 8 5 8 9 8 9 9 0 9 0   AST U/L  --   --   --  41 45   ALT U/L  --   --   --  37 27   ALK PHOS U/L  --   --   --  213* 218*   EGFR ml/min/1 73sq m 40 43 39 40 45       BMP:  Results from last 7 days   Lab Units 02/19/19  0500 02/18/19  0542 02/17/19  1033   POTASSIUM mmol/L 3 4* 3 6 5 2   CHLORIDE mmol/L 102 105 103   CO2 mmol/L 28 24 24   BUN mg/dL 33* 31* 32*   CREATININE mg/dL 1 49* 1 42* 1 52*   CALCIUM mg/dL 8 5 8 9 8 9       Results from last 7 days   Lab Units 02/15/19  2033   NT-PRO BNP pg/mL 10,568*        Results from last 7 days   Lab Units 02/16/19  0554   MAGNESIUM mg/dL 1 9       Results from last 7 days   Lab Units 02/16/19  0554   HEMOGLOBIN A1C % 7 4*             Results from last 7 days   Lab Units 02/19/19  1044 02/17/19  0059   INR  1 43* 1 94*       Lipid Profile:   No results found for: CHOL  Lab Results   Component Value Date    HDL 9 (L) 07/14/2017     Lab Results Component Value Date    [address]  2017      Comment:      Unable to calculate     Lab Results   Component Value Date    TRIG 57 2017       Cardiac testing:   Results for orders placed during the hospital encounter of 02/15/19   Echo complete with contrast if indicated    Narrative [address] [place] [id number] [phone]    Transthoracic Echocardiogram  2D, M-mode, Doppler, and Color Doppler    Study date:  2019    Patient: Snow Tucker  MR number: [id number]  Account number: [de-identified]  : 1945  Age: 68 years  Gender: Female  Status: Inpatient  Location: Bedside  Height: 66 in  Weight: 196 lb  BP: 146/ 80 mmHg    Indications: Heart failure    Diagnoses: I50 9 - Heart failure, unspecified    Sonographer:  Antonieta Sanford  Referring Physician:  Jocelynn Mckeon MD  Group:  [facility]  Interpreting Physician:  Oanh Hunter MD    SUMMARY    LEFT VENTRICLE:  The ventricle was dilated  Systolic function was severely reduced  Ejection fraction was estimated to be 25 %  There was severe diffuse hypokinesis with regional variations  Doppler parameters were consistent with restrictive physiology, indicative of decreased left ventricular diastolic compliance and/or increased left atrial pressure  RIGHT VENTRICLE:  The ventricle was moderately dilated  Systolic function was moderately reduced  LEFT ATRIUM:  The atrium was mildly to moderately dilated  RIGHT ATRIUM:  The atrium was moderately to markedly dilated  MITRAL VALVE:  There was mild annular dilatation  There was mild annular calcification  There was mildly reduced leaflet separation  There was moderate to severe regurgitation  The regurgitant jet was eccentric and directed posteriorly  AORTIC VALVE:  The valve was trileaflet   Leaflets exhibited markedly increased thickness, marked calcification, and markedly reduced cuspal separation  Transaortic velocity and gradient were increased due to stenosis, but lower than expected for the degree of stenosis due to concomitant decreased transaortic flow (decreased stroke volume)  There was severe stenosis  Valve mean gradient was 10 mmHg  The aortic valve obstructive index (by VTI) was 0 25  Estimated aortic valve area (by VTI) was 0 79 cm squared  TRICUSPID VALVE:  There was moderate to severe regurgitation  Estimated peak PA pressure was 70 mmHg  The findings suggest severe pulmonary hypertension  PULMONIC VALVE:  There was mild regurgitation  IVC, HEPATIC VEINS:  The inferior vena cava was dilated  Respirophasic changes were blunted (less than 50% variation)  HISTORY: PRIOR HISTORY: Congestive heart failure  Cardiomyopathy  Risk factors: CAD, hypertension and diabetes  Remote stroke  PRIOR PROCEDURES: Stent  PROCEDURE: The procedure was performed at the bedside  This was a routine study  The transthoracic approach was used  The study included complete 2D imaging, M-mode, complete spectral Doppler, and color Doppler  The heart rate was 57 bpm,  at the start of the study  Images were obtained from the parasternal, apical, subcostal, and suprasternal notch acoustic windows  Echocardiographic views were limited due to lung interference  Image quality was adequate  LEFT VENTRICLE: The ventricle was dilated  Systolic function was severely reduced  Ejection fraction was estimated to be 25 %  There was severe diffuse hypokinesis with regional variations  Wall thickness was normal  DOPPLER: Doppler  parameters were consistent with restrictive physiology, indicative of decreased left ventricular diastolic compliance and/or increased left atrial pressure  RIGHT VENTRICLE: The ventricle was moderately dilated  Systolic function was moderately reduced  LEFT ATRIUM: The atrium was mildly to moderately dilated      RIGHT ATRIUM: The atrium was moderately to markedly dilated  MITRAL VALVE: There was mild annular dilatation  There was mild annular calcification  Valve structure was normal  There was mildly reduced leaflet separation  DOPPLER: The transmitral velocity was within the normal range  There was no  evidence for stenosis  There was moderate to severe regurgitation  The regurgitant jet was eccentric and directed posteriorly  AORTIC VALVE: The valve was trileaflet  Leaflets exhibited markedly increased thickness, marked calcification, and markedly reduced cuspal separation  DOPPLER: Transaortic velocity and gradient were increased due to stenosis, but lower  than expected for the degree of stenosis due to concomitant decreased transaortic flow (decreased stroke volume)  There was severe stenosis  TRICUSPID VALVE: The valve structure was normal  There was normal leaflet separation  DOPPLER: The transtricuspid velocity was within the normal range  There was no evidence for stenosis  There was moderate to severe regurgitation  Estimated peak PA pressure was 70 mmHg  The findings suggest severe pulmonary hypertension  PULMONIC VALVE: Leaflets exhibited normal thickness, no calcification, and normal cuspal separation  DOPPLER: The transpulmonic velocity was within the normal range  There was mild regurgitation  PERICARDIUM: There was no pericardial effusion  The pericardium was normal in appearance  AORTA: The root exhibited normal size  SYSTEMIC VEINS: IVC: The inferior vena cava was dilated  Respirophasic changes were blunted (less than 50% variation)      MEASUREMENT TABLES    2D MEASUREMENTS  LVOT   (Reference normals)  Diam   20 mm   (--)    DOPPLER MEASUREMENTS  LVOT   (Reference normals)  Peak javy   61 cm/s   (--)  Mean javy   46 cm/s   (--)  VTI   11 cm   (--)  Stroke vol   34 56 ml   (--)  Stroke index   0 18 ml/m squared   (--)  Aortic valve   (Reference normals)  Peak javy   200 cm/s   (--)  Mean javy   147 cm/s   (--)  VTI   44 cm   (--)  Peak gradient   16 mmHg   (--)  Mean gradient   10 mmHg   (--)  Obstr index, VTI   0 25    (--)  Valve area, VTI   0 79 cm squared   (--)  Area index, VTI   0 4 cm squared/m squared   (--)  Obstr index, Vmax   0 31    (--)  Valve area, Vmax   0 97 cm squared   (--)  Area index, Vmax   0 49 cm squared/m squared   (--)  Obstr index, Vmean   0 31    (--)  Valve area, Vmean   0 97 cm squared   (--)  Area index, Vmean   0 49 cm squared/m squared   (--)    SYSTEM MEASUREMENT TABLES    2D  %FS: 9 9 %  Ao Diam: 2 9 cm  EDV(Teich): 182 2 ml  EF(Teich): 21 3 %  ESV(Teich): 143 3 ml  IVSd: 1 cm  LA Area: 26 5 cm2  LA Diam: 4 6 cm  LVEDV MOD A4C: 190 6 ml  LVEF MOD A4C: 27 %  LVESV MOD A4C: 139 3 ml  LVIDd: 6 cm  LVIDs: 5 4 cm  LVLd A4C: 9 cm  LVLs A4C: 8 2 cm  LVOT Diam: 2 cm  LVPWd: 0 8 cm  RA Area: 27 4 cm2  RVIDd: 5 cm  SV MOD A4C: 51 4 ml  SV(Teich): 38 9 ml    CF  MR Als  Twan: 0 4 m/s  MR Flow: 140 6 ml/s  MR Rad: 0 8 cm    CW  AV Env  Ti: 315 9 ms  AV VTI: 47 2 cm  AV Vmax: 2 m/s  AV Vmean: 1 5 m/s  AV maxP 4 mmHg  AV meanP 8 mmHg  MR VTI: 237 8 cm  MR VTI: 250 4 cm  MR Vmax: 6 5 m/s  MR Vmax: 6 8 m/s  MR Vmean: 5 1 m/s  MR maxP 8 mmHg  MR meanP 9 mmHg  TR Vmax: 3 7 m/s  TR maxP 6 mmHg    MM  TAPSE: 1 5 cm    PW  KOFI (VTI): 0 8 cm2  KOFI Vmax: 1 1 cm2  E': 0 1 m/s  E/E': 12 3  LVOT Env  Ti: 243 6 ms  LVOT VTI: 12 cm  LVOT Vmax: 0 7 m/s  LVOT Vmean: 0 5 m/s  LVOT maxP 8 mmHg  LVOT meanP 1 mmHg  LVSV Dopp: 39 4 ml  MR ERO: 0 2 cm2  MR RV: 51 ml  MV A Twan: 0 3 m/s  MV Dec Clare: 3 m/s2  MV DecT: 284 8 ms  MV E Twan: 0 8 m/s  MV E/A Ratio: 3  MV PHT: 82 6 ms  MVA By PHT: 2 7 cm2    [address] Accredited [facility]    Prepared and electronically signed by    Malaika Resendez MD  Signed 2019 14:50:11         Imaging: I have personally reviewed pertinent reports      Procedure: Vas Lower Limb Venous Duplex Study, Complete Bilateral    Result Date: 2/17/2019  Narrative:  [facility] REPORT [facility]INICAL: Indications: Chronic Bilateral Swelling of Limb [R22 4]  Patient is on Heparin  No recent travel or injury  Patient states she has a history of DVT many years ago  Risk Factors The patient has history of DVT  She has no history of Recent Trauma  FINDINGS:  Segment   Right            Left                Impression       Impression       CFV       Normal (Patent)  Normal (Patent)  Peroneal                   Not Visualized      CONCLUSION:  Impression: RIGHT LOWER LIMB: No evidence of acute or chronic deep vein thrombosis  No evidence of superficial thrombophlebitis noted  Doppler evaluation shows a normal response to augmentation maneuvers  Popliteal, posterior tibial and anterior tibial arterial Doppler waveforms are hyperemic  LEFT LOWER LIMB: No evidence of acute or chronic deep vein thrombosis  No evidence of superficial thrombophlebitis noted  The peroneal vein is not visualized due to edema  Doppler evaluation shows a normal response to augmentation maneuvers  Popliteal, posterior tibial and anterior tibial arterial Doppler waveforms are hyperemic  Technical findings were given to [place], RN at time of study    SIGNATURE: Electronically Signed by: Mauricio Encinas on 2019-02-17 08:28:46 AM      Meds/Allergies   current meds:   Current Facility-Administered Medications   Medication Dose Route Frequency    acetaminophen (TYLENOL) tablet 650 mg  650 mg Oral Q6H PRN    ascorbic acid (VITAMIN C) tablet 250 mg  250 mg Oral Daily    aspirin (ECOTRIN LOW STRENGTH) EC tablet 81 mg  81 mg Oral Daily    atorvastatin (LIPITOR) tablet 40 mg  40 mg Oral Daily    ferrous sulfate tablet 325 mg  325 mg Oral Daily With Breakfast    fish oil capsule 1,000 mg  1,000 mg Oral BID    heparin (porcine) subcutaneous injection 5,000 Units  5,000 Units Subcutaneous Q8H [facility]    insulin lispro (HumaLOG) 100 units/mL subcutaneous injection 1-6 Units  1-6 Units Subcutaneous TID AC    insulin lispro (HumaLOG) 100 units/mL subcutaneous injection 1-6 Units  1-6 Units Subcutaneous HS    levothyroxine tablet 50 mcg  50 mcg Oral Early Morning    lisinopril (ZESTRIL) tablet 5 mg  5 mg Oral Daily    metoprolol succinate (TOPROL-XL) 24 hr tablet 50 mg  50 mg Oral Daily    nystatin (MYCOSTATIN) powder   Topical BID    ondansetron (ZOFRAN) injection 4 mg  4 mg Intravenous Q8H PRN    oxybutynin (DITROPAN-XL) 24 hr tablet 5 mg  5 mg Oral HS    sertraline (ZOLOFT) tablet 50 mg  50 mg Oral Daily    torsemide (DEMADEX) tablet 20 mg  20 mg Oral BID (diuretic)    warfarin (COUMADIN) tablet 5 mg  5 mg Oral Daily (warfarin)     Medications Prior to Admission   Medication    ascorbic acid (VITAMIN C) 250 mg tablet    atorvastatin (LIPITOR) 40 mg tablet    Calcium Polycarbophil (FIBER) 625 MG TABS    ferrous sulfate 325 (65 Fe) mg tablet    furosemide (LASIX) 40 mg tablet    levothyroxine 50 mcg tablet    lisinopril (ZESTRIL) 2 5 mg tablet    metoprolol succinate (TOPROL-XL) 50 mg 24 hr tablet    Multiple Vitamin (MULTIVITAMIN) tablet    Omega-3 Fatty Acids (FISH OIL) 1,000 mg    oxybutynin (DITROPAN-XL) 10 MG 24 hr tablet    sertraline (ZOLOFT) 50 mg tablet    warfarin (COUMADIN) 5 mg tablet    warfarin (COUMADIN) 7 5 mg tablet    nystatin (nystatin) powder              Assessment and Plan:  Acute on chronic systolic and diastolic congestive heart failure - volume status euvolemic  Continue diuretic (dose being managed by Nephrology), Ace inhibitor, beta-blocker  Low-salt diet  Severe cardiomyopathy - EF has dropped to 25% again  Being planned for cardiac catheterization for a m  Will likely need LifeVest on discharge  Continue lisinopril, beta-blocker    CAD s/p PCI x2 to RCA in 2011 - stable  Continue aspirin, statin, beta-blocker    Essential hypertension - BP stable    Continue current medications    Mixed hyperlipidemia - continue statin and diet control    Severe aortic stenosis, moderate to severe mitral regurgitation, moderate to severe tricuspid regurgitation - referred to CT surgery for evaluation    Severe pulmonary hypertension- likely from left heart disease      Sue Teran MD  2/19/2019,12:58 PM      Portions of the record may have been created with voice recognition software  Occasional wrong words or sound alike substitutions may have occurred due to the inherent limitations of voice recognition software  Please read the chart carefully and recognize, using context, where substitutions may have occurred

## 2019-02-20 ENCOUNTER — APPOINTMENT (INPATIENT)
Dept: INTERVENTIONAL RADIOLOGY/VASCULAR | Facility: HOSPITAL | Age: 74
DRG: 286 | End: 2019-02-20
Attending: INTERNAL MEDICINE
Payer: MEDICARE

## 2019-02-20 LAB
25(OH)D3 SERPL-MCNC: 32.3 NG/ML (ref 30–100)
ANION GAP SERPL CALCULATED.3IONS-SCNC: 8 MMOL/L (ref 4–13)
BASOPHILS # BLD AUTO: 0.04 THOUSANDS/ΜL (ref 0–0.1)
BASOPHILS NFR BLD AUTO: 1 % (ref 0–1)
BUN SERPL-MCNC: 32 MG/DL (ref 5–25)
CALCIUM SERPL-MCNC: 8.6 MG/DL (ref 8.3–10.1)
CHLORIDE SERPL-SCNC: 103 MMOL/L (ref 100–108)
CO2 SERPL-SCNC: 28 MMOL/L (ref 21–32)
CREAT SERPL-MCNC: 1.38 MG/DL (ref 0.6–1.3)
CREAT UR-MCNC: 36 MG/DL
EOSINOPHIL # BLD AUTO: 0.15 THOUSAND/ΜL (ref 0–0.61)
EOSINOPHIL NFR BLD AUTO: 3 % (ref 0–6)
ERYTHROCYTE [DISTWIDTH] IN BLOOD BY AUTOMATED COUNT: 15.7 % (ref 11.6–15.1)
GFR SERPL CREATININE-BSD FRML MDRD: 44 ML/MIN/1.73SQ M
GLUCOSE SERPL-MCNC: 135 MG/DL (ref 65–140)
GLUCOSE SERPL-MCNC: 135 MG/DL (ref 65–140)
GLUCOSE SERPL-MCNC: 219 MG/DL (ref 65–140)
GLUCOSE SERPL-MCNC: 97 MG/DL (ref 65–140)
HCT VFR BLD AUTO: 39.6 % (ref 34.8–46.1)
HGB BLD-MCNC: 12.9 G/DL (ref 11.5–15.4)
IMM GRANULOCYTES # BLD AUTO: 0.02 THOUSAND/UL (ref 0–0.2)
IMM GRANULOCYTES NFR BLD AUTO: 0 % (ref 0–2)
INR PPP: 1.59 (ref 0.86–1.17)
LYMPHOCYTES # BLD AUTO: 1.13 THOUSANDS/ΜL (ref 0.6–4.47)
LYMPHOCYTES NFR BLD AUTO: 19 % (ref 14–44)
MCH RBC QN AUTO: 30.1 PG (ref 26.8–34.3)
MCHC RBC AUTO-ENTMCNC: 32.6 G/DL (ref 31.4–37.4)
MCV RBC AUTO: 93 FL (ref 82–98)
MICROALBUMIN UR-MCNC: 186 MG/L (ref 0–20)
MICROALBUMIN/CREAT 24H UR: 517 MG/G CREATININE (ref 0–30)
MONOCYTES # BLD AUTO: 0.63 THOUSAND/ΜL (ref 0.17–1.22)
MONOCYTES NFR BLD AUTO: 10 % (ref 4–12)
NEUTROPHILS # BLD AUTO: 4.1 THOUSANDS/ΜL (ref 1.85–7.62)
NEUTS SEG NFR BLD AUTO: 67 % (ref 43–75)
NRBC BLD AUTO-RTO: 0 /100 WBCS
PHOSPHATE SERPL-MCNC: 3.4 MG/DL (ref 2.3–4.1)
PLATELET # BLD AUTO: 153 THOUSANDS/UL (ref 149–390)
PMV BLD AUTO: 12.1 FL (ref 8.9–12.7)
POTASSIUM SERPL-SCNC: 3.5 MMOL/L (ref 3.5–5.3)
PROTHROMBIN TIME: 18.8 SECONDS (ref 11.8–14.2)
PTH-INTACT SERPL-MCNC: 287 PG/ML (ref 18.4–80.1)
PTH-INTACT SERPL-MCNC: 292.6 PG/ML (ref 18.4–80.1)
RBC # BLD AUTO: 4.28 MILLION/UL (ref 3.81–5.12)
SODIUM SERPL-SCNC: 139 MMOL/L (ref 136–145)
URATE SERPL-MCNC: 10.4 MG/DL (ref 2–6.8)
WBC # BLD AUTO: 6.07 THOUSAND/UL (ref 4.31–10.16)

## 2019-02-20 PROCEDURE — C1769 GUIDE WIRE: HCPCS | Performed by: INTERNAL MEDICINE

## 2019-02-20 PROCEDURE — 80048 BASIC METABOLIC PNL TOTAL CA: CPT | Performed by: INTERNAL MEDICINE

## 2019-02-20 PROCEDURE — 93460 R&L HRT ART/VENTRICLE ANGIO: CPT | Performed by: INTERNAL MEDICINE

## 2019-02-20 PROCEDURE — 84100 ASSAY OF PHOSPHORUS: CPT | Performed by: INTERNAL MEDICINE

## 2019-02-20 PROCEDURE — C1887 CATHETER, GUIDING: HCPCS | Performed by: INTERNAL MEDICINE

## 2019-02-20 PROCEDURE — 85610 PROTHROMBIN TIME: CPT | Performed by: INTERNAL MEDICINE

## 2019-02-20 PROCEDURE — 84550 ASSAY OF BLOOD/URIC ACID: CPT | Performed by: INTERNAL MEDICINE

## 2019-02-20 PROCEDURE — 82948 REAGENT STRIP/BLOOD GLUCOSE: CPT

## 2019-02-20 PROCEDURE — 85025 COMPLETE CBC W/AUTO DIFF WBC: CPT | Performed by: INTERNAL MEDICINE

## 2019-02-20 PROCEDURE — C1894 INTRO/SHEATH, NON-LASER: HCPCS | Performed by: INTERNAL MEDICINE

## 2019-02-20 PROCEDURE — 99152 MOD SED SAME PHYS/QHP 5/>YRS: CPT | Performed by: INTERNAL MEDICINE

## 2019-02-20 PROCEDURE — 99232 SBSQ HOSP IP/OBS MODERATE 35: CPT | Performed by: INTERNAL MEDICINE

## 2019-02-20 PROCEDURE — 99153 MOD SED SAME PHYS/QHP EA: CPT | Performed by: INTERNAL MEDICINE

## 2019-02-20 PROCEDURE — 82810 BLOOD GASES O2 SAT ONLY: CPT | Performed by: INTERNAL MEDICINE

## 2019-02-20 PROCEDURE — 83970 ASSAY OF PARATHORMONE: CPT | Performed by: INTERNAL MEDICINE

## 2019-02-20 PROCEDURE — 82306 VITAMIN D 25 HYDROXY: CPT | Performed by: INTERNAL MEDICINE

## 2019-02-20 RX ORDER — SODIUM CHLORIDE 9 MG/ML
75 INJECTION, SOLUTION INTRAVENOUS CONTINUOUS
Status: DISCONTINUED | OUTPATIENT
Start: 2019-02-20 | End: 2019-02-21

## 2019-02-20 RX ORDER — HYDRALAZINE HYDROCHLORIDE 25 MG/1
25 TABLET, FILM COATED ORAL EVERY 8 HOURS SCHEDULED
Status: DISCONTINUED | OUTPATIENT
Start: 2019-02-20 | End: 2019-02-21 | Stop reason: HOSPADM

## 2019-02-20 RX ORDER — MIDAZOLAM HYDROCHLORIDE 1 MG/ML
INJECTION INTRAMUSCULAR; INTRAVENOUS CODE/TRAUMA/SEDATION MEDICATION
Status: COMPLETED | OUTPATIENT
Start: 2019-02-20 | End: 2019-02-20

## 2019-02-20 RX ORDER — LIDOCAINE HYDROCHLORIDE 10 MG/ML
INJECTION, SOLUTION INFILTRATION; PERINEURAL CODE/TRAUMA/SEDATION MEDICATION
Status: COMPLETED | OUTPATIENT
Start: 2019-02-20 | End: 2019-02-20

## 2019-02-20 RX ORDER — NITROGLYCERIN 20 MG/100ML
INJECTION INTRAVENOUS CODE/TRAUMA/SEDATION MEDICATION
Status: COMPLETED | OUTPATIENT
Start: 2019-02-20 | End: 2019-02-20

## 2019-02-20 RX ORDER — ISOSORBIDE DINITRATE 10 MG/1
10 TABLET ORAL
Status: DISCONTINUED | OUTPATIENT
Start: 2019-02-20 | End: 2019-02-21 | Stop reason: HOSPADM

## 2019-02-20 RX ORDER — FUROSEMIDE 10 MG/ML
INJECTION INTRAMUSCULAR; INTRAVENOUS CODE/TRAUMA/SEDATION MEDICATION
Status: COMPLETED | OUTPATIENT
Start: 2019-02-20 | End: 2019-02-20

## 2019-02-20 RX ORDER — VERAPAMIL HCL 2.5 MG/ML
AMPUL (ML) INTRAVENOUS CODE/TRAUMA/SEDATION MEDICATION
Status: COMPLETED | OUTPATIENT
Start: 2019-02-20 | End: 2019-02-20

## 2019-02-20 RX ADMIN — FUROSEMIDE 40 MG: 10 INJECTION, SOLUTION INTRAMUSCULAR; INTRAVENOUS at 09:26

## 2019-02-20 RX ADMIN — Medication 1000 MG: at 17:23

## 2019-02-20 RX ADMIN — WARFARIN SODIUM 7.5 MG: 7.5 TABLET ORAL at 17:23

## 2019-02-20 RX ADMIN — INSULIN LISPRO 2 UNITS: 100 INJECTION, SOLUTION INTRAVENOUS; SUBCUTANEOUS at 17:53

## 2019-02-20 RX ADMIN — NITROGLYCERIN 200 MCG: 20 INJECTION INTRAVENOUS at 08:55

## 2019-02-20 RX ADMIN — OXYCODONE HYDROCHLORIDE AND ACETAMINOPHEN 250 MG: 500 TABLET ORAL at 11:16

## 2019-02-20 RX ADMIN — LIDOCAINE HYDROCHLORIDE 2 ML: 10 INJECTION, SOLUTION INFILTRATION; PERINEURAL at 08:48

## 2019-02-20 RX ADMIN — NYSTATIN: 100000 POWDER TOPICAL at 12:45

## 2019-02-20 RX ADMIN — HEPARIN SODIUM 5000 UNITS: 5000 INJECTION, SOLUTION INTRAVENOUS; SUBCUTANEOUS at 21:26

## 2019-02-20 RX ADMIN — SERTRALINE HYDROCHLORIDE 50 MG: 50 TABLET ORAL at 11:19

## 2019-02-20 RX ADMIN — TORSEMIDE 20 MG: 20 TABLET ORAL at 16:25

## 2019-02-20 RX ADMIN — HYDRALAZINE HYDROCHLORIDE 25 MG: 25 TABLET ORAL at 16:25

## 2019-02-20 RX ADMIN — IODIXANOL 75 ML: 320 INJECTION, SOLUTION INTRAVASCULAR at 09:27

## 2019-02-20 RX ADMIN — OXYBUTYNIN CHLORIDE 5 MG: 5 TABLET, EXTENDED RELEASE ORAL at 21:32

## 2019-02-20 RX ADMIN — FERROUS SULFATE TAB 325 MG (65 MG ELEMENTAL FE) 325 MG: 325 (65 FE) TAB at 11:18

## 2019-02-20 RX ADMIN — LIDOCAINE HYDROCHLORIDE 8 ML: 10 INJECTION, SOLUTION INFILTRATION; PERINEURAL at 08:48

## 2019-02-20 RX ADMIN — SODIUM CHLORIDE 75 ML/HR: 0.9 INJECTION, SOLUTION INTRAVENOUS at 10:38

## 2019-02-20 RX ADMIN — HEPARIN SODIUM 5000 UNITS: 5000 INJECTION, SOLUTION INTRAVENOUS; SUBCUTANEOUS at 05:58

## 2019-02-20 RX ADMIN — ISOSORBIDE DINITRATE 10 MG: 10 TABLET ORAL at 17:23

## 2019-02-20 RX ADMIN — METOPROLOL SUCCINATE 50 MG: 50 TABLET, EXTENDED RELEASE ORAL at 07:37

## 2019-02-20 RX ADMIN — LEVOTHYROXINE SODIUM 50 MCG: 50 TABLET ORAL at 05:58

## 2019-02-20 RX ADMIN — LISINOPRIL 5 MG: 5 TABLET ORAL at 07:39

## 2019-02-20 RX ADMIN — HEPARIN SODIUM 5000 UNITS: 5000 INJECTION, SOLUTION INTRAVENOUS; SUBCUTANEOUS at 16:09

## 2019-02-20 RX ADMIN — NYSTATIN 1 APPLICATION: 100000 POWDER TOPICAL at 17:23

## 2019-02-20 RX ADMIN — ASPIRIN 81 MG: 81 TABLET, COATED ORAL at 11:18

## 2019-02-20 RX ADMIN — HYDRALAZINE HYDROCHLORIDE 25 MG: 25 TABLET ORAL at 21:27

## 2019-02-20 RX ADMIN — ATORVASTATIN CALCIUM 40 MG: 40 TABLET, FILM COATED ORAL at 11:19

## 2019-02-20 RX ADMIN — TORSEMIDE 20 MG: 20 TABLET ORAL at 07:37

## 2019-02-20 RX ADMIN — VERAPAMIL HYDROCHLORIDE 2.5 MG: 2.5 INJECTION, SOLUTION INTRAVENOUS at 08:55

## 2019-02-20 RX ADMIN — MIDAZOLAM HYDROCHLORIDE 1 MG: 1 INJECTION, SOLUTION INTRAMUSCULAR; INTRAVENOUS at 08:48

## 2019-02-20 RX ADMIN — Medication 1000 MG: at 11:15

## 2019-02-20 NOTE — PROGRESS NOTES
Patient's belongings brought down from [address] [place] [address]  Clothing and wallet at bedside  Recommended that wallet be sent home with family member

## 2019-02-20 NOTE — PROGRESS NOTES
NEPHROLOGY PROGRESS NOTE    Patient: Yasmany Parra               Sex: female          DOA: 2/15/2019  5:10 PM   YOB: 1945        Age:  68 y o         LOS:  LOS: 4 days       HPI     Ortegajosé Sage the patient is stage III CKD and coronary artery disease    SUBJECTIVE     Patient is status post catheterization which was significant for cardiomyopathy and nonoperable coronary artery disease  Patient is comfortable    Denies shortness of breath chest pain or palpitation    CURRENT MEDICATIONS       Current Facility-Administered Medications:     acetaminophen (TYLENOL) tablet 650 mg, 650 mg, Oral, Q6H PRN, Renny Hu PA-C    ascorbic acid (VITAMIN C) tablet 250 mg, 250 mg, Oral, Daily, Renny Hu PA-C, 250 mg at 02/20/19 1116    aspirin (ECOTRIN LOW STRENGTH) EC tablet 81 mg, 81 mg, Oral, Daily, Linda Brock MD, 81 mg at 02/20/19 1118    atorvastatin (LIPITOR) tablet 40 mg, 40 mg, Oral, Daily, Renny Hu PA-C, 40 mg at 02/20/19 1119    ferrous sulfate tablet 325 mg, 325 mg, Oral, Daily With Breakfast, Renny Hu PA-C, 325 mg at 02/20/19 1118    fish oil capsule 1,000 mg, 1,000 mg, Oral, BID, Renny Hu PA-C, 1,000 mg at 02/20/19 1115    heparin (porcine) subcutaneous injection 5,000 Units, 5,000 Units, Subcutaneous, Q8H [facility], 5,000 Units at 02/20/19 0558 **AND** [COMPLETED] Platelet count, , , Once, Renny Hu PA-C    insulin lispro (HumaLOG) 100 units/mL subcutaneous injection 1-6 Units, 1-6 Units, Subcutaneous, TID AC, 1 Units at 02/18/19 1141 **AND** Fingerstick Glucose (POCT), , , TID AC, Renny Hu PA-C    insulin lispro (HumaLOG) 100 units/mL subcutaneous injection 1-6 Units, 1-6 Units, Subcutaneous, HS, Renny Hu PA-C, 1 Units at 02/19/19 2303    levothyroxine tablet 50 mcg, 50 mcg, Oral, Early Morning, Renny Hu PA-C, 50 mcg at 02/20/19 0558    lisinopril (ZESTRIL) tablet 5 mg, 5 mg, Oral, Daily, Abhi Kaplan MD, 5 mg at 02/20/19 0739    metoprolol succinate (TOPROL-XL) 24 hr tablet 50 mg, 50 mg, Oral, Daily, Renny Hu PA-C, 50 mg at 02/20/19 0737    nystatin (MYCOSTATIN) powder, , Topical, BID, Lupe Santillan MD    ondansetron (ZOFRAN) injection 4 mg, 4 mg, Intravenous, Q8H PRN, Renny Hu PA-C    oxybutynin (DITROPAN-XL) 24 hr tablet 5 mg, 5 mg, Oral, HS, Renny Hu PA-C, 5 mg at 02/19/19 2302    sertraline (ZOLOFT) tablet 50 mg, 50 mg, Oral, Daily, Renny Hu PA-C, 50 mg at 02/20/19 1119    sodium chloride 0 9 % infusion, 75 mL/hr, Intravenous, Continuous, Jessica Dee MD, Last Rate: 75 mL/hr at 02/20/19 1038, 75 mL/hr at 02/20/19 1038    torsemide (DEMADEX) tablet 20 mg, 20 mg, Oral, BID (diuretic), Kalyn Bah MD, 20 mg at 02/20/19 0737    warfarin (COUMADIN) tablet 7 5 mg, 7 5 mg, Oral, Daily (warfarin), Jesse Saab MD, 7 5 mg at 02/19/19 1745    OBJECTIVE     Current Weight: Weight - Scale: 89 9 kg (198 lb 3 1 oz)  Vitals:    02/20/19 1145   BP: (!) 144/103   Pulse: 66   Resp:    Temp:    SpO2:        Intake/Output Summary (Last 24 hours) at 2/20/2019 1151  Last data filed at 2/20/2019 0321  Gross per 24 hour   Intake 600 ml   Output 500 ml   Net 100 ml       PHYSICAL EXAMINATION     Physical Exam   Constitutional: She is oriented to person, place, and time  She appears well-developed  No distress  HENT:   Head: Normocephalic  Mouth/Throat: Oropharynx is clear and moist    Eyes: Conjunctivae are normal  No scleral icterus  Neck: Neck supple  No JVD present  Cardiovascular: Normal rate and normal heart sounds  Pulmonary/Chest: Effort normal  She has no wheezes  Abdominal: Soft  There is no tenderness  Musculoskeletal: Normal range of motion  She exhibits no edema  Neurological: She is alert and oriented to person, place, and time  Skin: Skin is warm  No rash noted  Psychiatric: She has a normal mood and affect   Her behavior is normal         LAB RESULTS     Results from last 7 days   Lab Units 02/20/19  0514 02/19/19  0500 02/18/19  0542 02/17/19  1033 02/16/19  0554 02/16/19  0250 02/15/19  2033 02/15/19  1818   WBC Thousand/uL 6 07  --   --   --  7 18  --   --  5 78   HEMOGLOBIN g/dL 12 9  --   --   --  13 7  --   --  14 4   HEMATOCRIT % 39 6  --   --   --  43 6  --   --  47 3*   PLATELETS Thousands/uL 153  --   --   --  138* 144*  --  116*   POTASSIUM mmol/L 3 5 3 4* 3 6 5 2 4 6  --  5 1  --    CHLORIDE mmol/L 103 102 105 103 109*  --  107  --    CO2 mmol/L 28 28 24 24 21  --  23  --    BUN mg/dL 32* 33* 31* 32* 28*  --  24  --    CREATININE mg/dL 1 38* 1 49* 1 42* 1 52* 1 49*  --  1 36*  --    EGFR ml/min/1 73sq m 44 40 43 39 40  --  45  --    CALCIUM mg/dL 8 6 8 5 8 9 8 9 9 0  --  9 0  --    MAGNESIUM mg/dL  --   --   --   --  1 9  --   --   --    PHOSPHORUS mg/dL 3 4  --   --   --  3 7  --   --   --        RADIOLOGY RESULTS      Results for orders placed during the hospital encounter of 07/13/17   XR chest 1 view portable    Narrative CHEST     INDICATION:  Shortness of breath  Elevated INR  COMPARISON:  Chest film 9/1/2005 and report of CT chest also from that date    VIEWS:   AP frontal    IMAGES:  1    FINDINGS:         The cardiac silhouette is enlarged  Prominent, indistinct pulmonary vasculature and faint reticular interstitial densities in the lung bases consistent with element of CHF  Mild azygous distention  No discernible pleural effusions or pneumothorax  Visualized osseous structures appear within normal limits for the patient's age  Impression Cardiomegaly with mild CHF  Workstation performed: [id number]       Results for orders placed during the hospital encounter of 02/15/19   XR chest 2 views    Narrative CHEST     INDICATION:   Shortness of breath and leg swelling  COMPARISON:  7/14/2017    EXAM PERFORMED/VIEWS:  XR CHEST PA & LATERAL      FINDINGS:    Heart shadow is enlarged but unchanged from prior exam     There is no focal infiltrate or pleural effusion  No pneumothorax    Pulmonary vasculature mildly prominent    Osseous structures appear within normal limits for patient age  Impression Mild pulmonary vascular congestion and enlargement of the cardiac silhouette  Workstation performed: [id number]       No results found for this or any previous visit  No results found for this or any previous visit  No results found for this or any previous visit  No results found for this or any previous visit  PLAN / RECOMMENDATIONS      CKD stage 3: Will give gentle hydration for contrast prevention  If she get fluid overload will use diuretic and stop IV fluid    Coronary artery disease:  Being monitored by cardiologist at present seems asymptomatic    Cardiomyopathy:  Will need diuretic before discharge and will continue to monitor    Speedy Rodriguez MD  Nephrology  2/20/2019        Portions of the record may have been created with voice recognition software  Occasional wrong word or "sound a like" substitutions may have occurred due to the inherent limitations of voice recognition software  Read the chart carefully and recognize, using context, where substitutions have occurred

## 2019-02-20 NOTE — PROGRESS NOTES
[facility] Progress Note  Patient: Marybeth Up 68 y o  female   MRN: [id number]  PCP: Armida Diaz MD  Unit/Bed#: -01 Encounter: [id number]  Date Of Visit: 02/20/19    Assessment:    Principal Problem:    Acute on chronic systolic congestive heart failure ([facility] [address] )  Active Problems:    MANJEET (acute kidney injury) ([facility] )    Essential hypertension    Diabetes mellitus ([facility] )    Bilateral leg edema      Plan:  Acute on chronic systolic and diastolic heart failure  Currently euvolemic  Continue torsemide  Monitor I&Os and daily weights    Severe cardiomyopathy  Echocardiogram demonstrated ejection fraction of 25%  Patient underwent cardiac catheterization, reportedly no significant blockages  Did not have any stents  Final catheterization report pending  Patient will need life vest on discharge  Discussed with Cardiology and case management  Continue lisinopril beta-blocker    Coronary artery disease status post PCI  Stable  Continue aspirin and beta-blocker    Hypertension next number pressure is stable    Severe aortic stenosis, moderate to severe mitral regurgitation, tricuspid regurgitation, pulmonary hypertension   outpatient CT surgery followup    history of DVT  Continue Coumadin    VTE Pharmacologic Prophylaxis:   Pharmacologic: Heparin  Mechanical VTE Prophylaxis in Place: Yes    Patient Centered Rounds: I have performed bedside rounds with nursing staff today  Discussions with Specialists or Other Care Team Provider: cardiology, cm    Education and Discussions with Family / Patient:  Patient's daughter over the phone    Time Spent for Care: 30 minutes  More than 50% of total time spent on counseling and coordination of care as described above      Current Length of Stay: 4 day(s)    Current Patient Status: Inpatient   Certification Statement: The patient will continue to require additional inpatient hospital stay due to monitoring kidney function    Discharge Plan:  Monitoring kidney function    Code Status: Level 1 - Full Code      Subjective:   Patient seen and examined  She came back from cardiac catheterization  Denies any acute complaints at this time  Objective:     Vitals:   Temp (24hrs), Av 2 °F (36 8 °C), Min:97 7 °F (36 5 °C), Max:98 6 °F (37 °C)    Temp:  [97 7 °F (36 5 °C)-98 6 °F (37 °C)] 97 9 °F (36 6 °C)  HR:  [52-72] 72  Resp:  [18-20] 20  BP: (132-150)/() 150/96  SpO2:  [92 %-100 %] 100 %  Body mass index is 31 99 kg/m²  Input and Output Summary (last 24 hours): Intake/Output Summary (Last 24 hours) at 2019 1213  Last data filed at 2019 0321  Gross per 24 hour   Intake 600 ml   Output 500 ml   Net 100 ml       Physical Exam:     Alert and oriented x3  Mucous membranes are moist  Lungs are clear to auscultation  Abdomen soft nontender  Extremities no edema    Additional Data:     Labs:    Results from last 7 days   Lab Units 19  0514   WBC Thousand/uL 6 07   HEMOGLOBIN g/dL 12 9   HEMATOCRIT % 39 6   PLATELETS Thousands/uL 153   NEUTROS PCT % 67   LYMPHS PCT % 19   MONOS PCT % 10   EOS PCT % 3     Results from last 7 days   Lab Units 19  0514  19  0554   POTASSIUM mmol/L 3 5   < > 4 6   CHLORIDE mmol/L 103   < > 109*   CO2 mmol/L 28   < > 21   BUN mg/dL 32*   < > 28*   CREATININE mg/dL 1 38*   < > 1 49*   CALCIUM mg/dL 8 6   < > 9 0   ALK PHOS U/L  --   --  213*   ALT U/L  --   --  37   AST U/L  --   --  41    < > = values in this interval not displayed  Results from last 7 days   Lab Units 19  0837   INR  1 59*       * I Have Reviewed All Lab Data Listed Above  * Additional Pertinent Lab Tests Reviewed:  [facility] Admission Reviewed    Imaging:    Imaging Reports Reviewed Today Include:  Imaging Personally Reviewed by Myself Includes:     Recent Cultures (last 7 days):           Last 24 Hours Medication List:     Current Facility-Administered Medications:  acetaminophen 650 mg Oral Q6H PRN Renny Hu PA-C    ascorbic acid 250 mg Oral Daily Renny Hu PA-C    aspirin 81 mg Oral Daily Lupe Lara MD    atorvastatin 40 mg Oral Daily Renny Hu PA-C    ferrous sulfate 325 mg Oral Daily With Breakfast Renny Hu PA-C    fish oil 1,000 mg Oral BID Renny Hu PA-C    heparin (porcine) 5,000 Units Subcutaneous Q8H [address] Renny Hu PA-C    insulin lispro 1-6 Units Subcutaneous TID AC Renny Hu PA-C    insulin lispro 1-6 Units Subcutaneous HS Renny Hu PA-C    levothyroxine 50 mcg Oral Early Morning Renny Hu PA-C    lisinopril 5 mg Oral Daily Shauna Park MD    metoprolol succinate 50 mg Oral Daily Renny Hu PA-C    nystatin  Topical BID Lupe Lara MD    ondansetron 4 mg Intravenous Q8H PRN Renny Hu PA-C    oxybutynin 5 mg Oral HS Renny Hu PA-C    sertraline 50 mg Oral Daily Renny Hu PA-C    sodium chloride 75 mL/hr Intravenous Continuous Cristina Sage MD Last Rate: 75 mL/hr (02/20/19 1038)   torsemide 20 mg Oral BID (diuretic) Shauna Park MD    warfarin 7 5 mg Oral Daily (warfarin) Cody Ennis MD         Today, Patient Was Seen By: Cody Ennis MD    ** Please Note: Dragon 360 Dictation voice to text software may have been used in the creation of this document   **

## 2019-02-20 NOTE — PROGRESS NOTES
Report given to Yumiko Bee  Rt wrist with TR band at 10mL intact no signs of bleeding or hematoma  Rt groin with dry dressing dry and intat  No signs of bleeding or hematoma/ Both puncture sites witnessed by receiving Rn   Pt denies any c/p no sob

## 2019-02-20 NOTE — DISCHARGE INSTRUCTIONS
After Radial Heart Catheterization   WHAT YOU NEED TO KNOW:   What will happen after a radial heart catheterization? · You will be attached to a heart monitor until you are fully awake  A heart monitor is an EKG that stays on continuously to record your heart's electrical activity  Healthcare providers will monitor your vital signs and pulses in your arm  They will frequently check your pressure bandage for bleeding or swelling  · You may have a band wrapped tightly around your wrist  The band puts pressure on your wound and helps prevent bleeding  A healthcare provider can put air into the band or remove air from the band  A healthcare provider will gradually remove air from the band and decrease pressure on your wrist  The band may be removed in 2 hours or when your wound stops bleeding  · You will need to keep your wrist straight for 2 to 4 hours  Do not  push or pull with your arm  Arm movements can cause serious bleeding  After you are monitored for several hours, you may go home or may need to stay in the hospital overnight  What do I need to know before I go home? · Care for your wound as directed  Remove the pressure bandage in 24 hours or as directed  Mild bruising is normal and expected  A small bandage can be placed on your wound after you remove the pressure bandage  Do not put powders, lotions, or creams on your wound  They may cause your wound to get infected  Monitor your wound every day for signs of infection, such as redness, swelling, or pus  · Shower the day after your procedure or as directed  Remove your pressure bandage before you shower  Do not take baths or go in hot tubs or pools  Carefully wash the wound with soap and water  Pat the area dry  A small bandage can be placed on your wound after you shower  · Apply firm, steady pressure to your wound if it bleeds  Apply pressure with a clean gauze or towel for 5 to 10 minutes   Call 911 if bleeding becomes heavy or does not stop  · Drink liquids as directed  Liquids will help flush the contrast liquid from your body  Ask how much liquid to drink each day and which liquids are best for you  · Do not lift anything heavier than 5 pounds until directed by your healthcare provider  Heavy lifting can put stress on your wound and cause bleeding  Do not push or pull with the arm that was used for the procedure  Do not do vigorous activity for at least 48 hours  Vigorous activity may cause bleeding from your wound  Rest and do quiet activities  Take short walks around the house to prevent a blood clot  Ask your healthcare provider when you can return to your normal activities  · Do not drive or return to work until your healthcare provider says it is okay  Your healthcare provider may tell you to wait 48 hours before you drive to decrease your risk for bleeding  You may not be able to return to work for at least 2 days after your procedure if your job involves heavy lifting  What medicines may I need? You may need any of the following:  · Blood thinners    help prevent blood clots  Examples of blood thinners include heparin and warfarin  Clots can cause strokes, heart attacks, and death  The following are general safety guidelines to follow while you are taking a blood thinner:    ¨ Watch for bleeding and bruising while you take blood thinners  Watch for bleeding from your gums or nose  Watch for blood in your urine and bowel movements  Use a soft washcloth on your skin, and a soft toothbrush to brush your teeth  This can keep your skin and gums from bleeding  If you shave, use an electric shaver  Do not play contact sports  ¨ Tell your dentist and other healthcare providers that you take anticoagulants  Wear a bracelet or necklace that says you take this medicine  ¨ Do not start or stop any medicines unless your healthcare provider tells you to  Many medicines cannot be used with blood thinners       ¨ Tell your healthcare provider right away if you forget to take the medicine, or if you take too much  ¨ Warfarin  is a blood thinner that you may need to take  The following are things you should be aware of if you take warfarin  § Foods and medicines can affect the amount of warfarin in your blood  Do not make major changes to your diet while you take warfarin  Warfarin works best when you eat about the same amount of vitamin K every day  Vitamin K is found in green leafy vegetables and certain other foods  Ask for more information about what to eat when you are taking warfarin  § You will need to see your healthcare provider for follow-up visits when you are on warfarin  You will need regular blood tests  These tests are used to decide how much medicine you need  · Acetaminophen  helps decrease pain and fever  This medicine is available without a doctor's order  Ask how much medicine is safe to take, and how often to take it  Acetaminophen can cause liver damage if not taken correctly  · Take your medicine as directed  Contact your healthcare provider if you think your medicine is not helping or if you have side effects  Tell him or her if you are allergic to any medicine  Keep a list of the medicines, vitamins, and herbs you take  Include the amounts, and when and why you take them  Bring the list or the pill bottles to follow-up visits  Carry your medicine list with you in case of an emergency    Call 911 for any of the following:   · You have any of the following signs of a heart attack:      ¨ Squeezing, pressure, or pain in your chest that lasts longer than 5 minutes or returns    ¨ Discomfort or pain in your back, neck, jaw, stomach, or arm     ¨ Trouble breathing    ¨ Nausea or vomiting    ¨ Lightheadedness or a sudden cold sweat, especially with chest pain or trouble breathing    · You have any of the following signs of a stroke:      ¨ Numbness or drooping on one side of your face ¨ Weakness in an arm or leg    ¨ Confusion or difficulty speaking    ¨ Dizziness, a severe headache, or vision loss    · You feel lightheaded, short of breath, and have chest pain  · You cough up blood  · You have trouble breathing  · You cannot stop the bleeding from your wound even after you hold firm pressure for 10 minutes  When should I seek immediate care? · Blood soaks through your bandage  · Your stitches come apart  · Your hand or arm feels numb, cool, or looks pale  · Your wound gets swollen quickly  When should I contact my healthcare provider? · You have a fever or chills  · Your wound is red, swollen, or draining pus  · Your wound looks more bruised or you have new bruising on the side of your wrist      · You have nausea or are vomiting  · Your skin is itchy, swollen, or you have a rash  · You have questions or concerns about your condition or care  CARE AGREEMENT:   You have the right to help plan your care  Learn about your health condition and how it may be treated  Discuss treatment options with your caregivers to decide what care you want to receive  You always have the right to refuse treatment  The above information is an  only  It is not intended as medical advice for individual conditions or treatments  Talk to your doctor, nurse or pharmacist before following any medical regimen to see if it is safe and effective for you  © 2017 [employer] Information is for End User's use only and may not be sold, redistributed or otherwise used for commercial purposes  All illustrations and images included in CareNotes® are the copyrighted property of [employer]  or [employer]

## 2019-02-20 NOTE — PROGRESS NOTES
Received patient post cardiac cath  AAOx3  No distress  Report received from Sparkle Olson  New orders reviewed and acknowledged  Site clean, dry, intact  +CCSM   Will continue to monitor

## 2019-02-21 VITALS
SYSTOLIC BLOOD PRESSURE: 123 MMHG | OXYGEN SATURATION: 94 % | WEIGHT: 190.92 LBS | BODY MASS INDEX: 30.68 KG/M2 | TEMPERATURE: 97.34 F | DIASTOLIC BLOOD PRESSURE: 75 MMHG | RESPIRATION RATE: 19 BRPM | HEART RATE: 58 BPM | HEIGHT: 66 IN

## 2019-02-21 PROBLEM — N18.30 CKD (CHRONIC KIDNEY DISEASE) STAGE 3, GFR 30-59 ML/MIN (HCC): Status: ACTIVE | Noted: 2019-02-21

## 2019-02-21 LAB
ANION GAP SERPL CALCULATED.3IONS-SCNC: 9 MMOL/L (ref 4–13)
BASOPHILS # BLD AUTO: 0.02 THOUSANDS/ΜL (ref 0–0.1)
BASOPHILS NFR BLD AUTO: 0 % (ref 0–1)
BUN SERPL-MCNC: 28 MG/DL (ref 5–25)
CALCIUM SERPL-MCNC: 8.1 MG/DL (ref 8.3–10.1)
CHLORIDE SERPL-SCNC: 103 MMOL/L (ref 100–108)
CO2 SERPL-SCNC: 26 MMOL/L (ref 21–32)
CREAT SERPL-MCNC: 1.42 MG/DL (ref 0.6–1.3)
EOSINOPHIL # BLD AUTO: 0.17 THOUSAND/ΜL (ref 0–0.61)
EOSINOPHIL NFR BLD AUTO: 3 % (ref 0–6)
ERYTHROCYTE [DISTWIDTH] IN BLOOD BY AUTOMATED COUNT: 15.7 % (ref 11.6–15.1)
GFR SERPL CREATININE-BSD FRML MDRD: 43 ML/MIN/1.73SQ M
GLUCOSE SERPL-MCNC: 126 MG/DL (ref 65–140)
GLUCOSE SERPL-MCNC: 131 MG/DL (ref 65–140)
GLUCOSE SERPL-MCNC: 149 MG/DL (ref 65–140)
GLUCOSE SERPL-MCNC: 224 MG/DL (ref 65–140)
HCT VFR BLD AUTO: 38.5 % (ref 34.8–46.1)
HGB BLD-MCNC: 12.3 G/DL (ref 11.5–15.4)
IMM GRANULOCYTES # BLD AUTO: 0.02 THOUSAND/UL (ref 0–0.2)
IMM GRANULOCYTES NFR BLD AUTO: 0 % (ref 0–2)
LYMPHOCYTES # BLD AUTO: 1.17 THOUSANDS/ΜL (ref 0.6–4.47)
LYMPHOCYTES NFR BLD AUTO: 20 % (ref 14–44)
MCH RBC QN AUTO: 29.6 PG (ref 26.8–34.3)
MCHC RBC AUTO-ENTMCNC: 31.9 G/DL (ref 31.4–37.4)
MCV RBC AUTO: 93 FL (ref 82–98)
MONOCYTES # BLD AUTO: 0.58 THOUSAND/ΜL (ref 0.17–1.22)
MONOCYTES NFR BLD AUTO: 10 % (ref 4–12)
NEUTROPHILS # BLD AUTO: 3.85 THOUSANDS/ΜL (ref 1.85–7.62)
NEUTS SEG NFR BLD AUTO: 67 % (ref 43–75)
NRBC BLD AUTO-RTO: 0 /100 WBCS
PLATELET # BLD AUTO: 147 THOUSANDS/UL (ref 149–390)
PMV BLD AUTO: 11.4 FL (ref 8.9–12.7)
POTASSIUM SERPL-SCNC: 3.3 MMOL/L (ref 3.5–5.3)
RBC # BLD AUTO: 4.15 MILLION/UL (ref 3.81–5.12)
SODIUM SERPL-SCNC: 138 MMOL/L (ref 136–145)
WBC # BLD AUTO: 5.81 THOUSAND/UL (ref 4.31–10.16)

## 2019-02-21 PROCEDURE — 99232 SBSQ HOSP IP/OBS MODERATE 35: CPT | Performed by: INTERNAL MEDICINE

## 2019-02-21 PROCEDURE — 99239 HOSP IP/OBS DSCHRG MGMT >30: CPT | Performed by: INTERNAL MEDICINE

## 2019-02-21 PROCEDURE — 85025 COMPLETE CBC W/AUTO DIFF WBC: CPT | Performed by: INTERNAL MEDICINE

## 2019-02-21 PROCEDURE — 82948 REAGENT STRIP/BLOOD GLUCOSE: CPT

## 2019-02-21 PROCEDURE — 80048 BASIC METABOLIC PNL TOTAL CA: CPT | Performed by: INTERNAL MEDICINE

## 2019-02-21 RX ORDER — ISOSORBIDE DINITRATE 10 MG/1
10 TABLET ORAL
Qty: 90 TABLET | Refills: 0 | Status: SHIPPED | OUTPATIENT
Start: 2019-02-21 | End: 2019-03-07

## 2019-02-21 RX ORDER — HYDRALAZINE HYDROCHLORIDE 25 MG/1
25 TABLET, FILM COATED ORAL EVERY 8 HOURS SCHEDULED
Qty: 90 TABLET | Refills: 0 | Status: SHIPPED | OUTPATIENT
Start: 2019-02-21 | End: 2021-01-06 | Stop reason: SDUPTHER

## 2019-02-21 RX ORDER — METOPROLOL SUCCINATE 25 MG/1
25 TABLET, EXTENDED RELEASE ORAL DAILY
Status: DISCONTINUED | OUTPATIENT
Start: 2019-02-22 | End: 2019-02-21

## 2019-02-21 RX ORDER — METOPROLOL SUCCINATE 25 MG/1
25 TABLET, EXTENDED RELEASE ORAL DAILY
Status: DISCONTINUED | OUTPATIENT
Start: 2019-02-21 | End: 2019-02-21 | Stop reason: HOSPADM

## 2019-02-21 RX ADMIN — WARFARIN SODIUM 7.5 MG: 7.5 TABLET ORAL at 17:31

## 2019-02-21 RX ADMIN — FERROUS SULFATE TAB 325 MG (65 MG ELEMENTAL FE) 325 MG: 325 (65 FE) TAB at 09:42

## 2019-02-21 RX ADMIN — LEVOTHYROXINE SODIUM 50 MCG: 50 TABLET ORAL at 06:00

## 2019-02-21 RX ADMIN — LISINOPRIL 5 MG: 5 TABLET ORAL at 09:43

## 2019-02-21 RX ADMIN — TORSEMIDE 20 MG: 20 TABLET ORAL at 09:41

## 2019-02-21 RX ADMIN — HEPARIN SODIUM 5000 UNITS: 5000 INJECTION, SOLUTION INTRAVENOUS; SUBCUTANEOUS at 06:00

## 2019-02-21 RX ADMIN — OXYCODONE HYDROCHLORIDE AND ACETAMINOPHEN 250 MG: 500 TABLET ORAL at 09:41

## 2019-02-21 RX ADMIN — ISOSORBIDE DINITRATE 10 MG: 10 TABLET ORAL at 17:31

## 2019-02-21 RX ADMIN — Medication 1000 MG: at 09:41

## 2019-02-21 RX ADMIN — ATORVASTATIN CALCIUM 40 MG: 40 TABLET, FILM COATED ORAL at 09:42

## 2019-02-21 RX ADMIN — Medication 1000 MG: at 17:31

## 2019-02-21 RX ADMIN — ASPIRIN 81 MG: 81 TABLET, COATED ORAL at 09:41

## 2019-02-21 RX ADMIN — INSULIN LISPRO 2 UNITS: 100 INJECTION, SOLUTION INTRAVENOUS; SUBCUTANEOUS at 12:40

## 2019-02-21 RX ADMIN — METOPROLOL SUCCINATE 25 MG: 25 TABLET, EXTENDED RELEASE ORAL at 10:09

## 2019-02-21 RX ADMIN — HEPARIN SODIUM 5000 UNITS: 5000 INJECTION, SOLUTION INTRAVENOUS; SUBCUTANEOUS at 14:06

## 2019-02-21 RX ADMIN — ISOSORBIDE DINITRATE 10 MG: 10 TABLET ORAL at 14:06

## 2019-02-21 RX ADMIN — NYSTATIN: 100000 POWDER TOPICAL at 09:44

## 2019-02-21 RX ADMIN — NYSTATIN: 100000 POWDER TOPICAL at 17:32

## 2019-02-21 RX ADMIN — SERTRALINE HYDROCHLORIDE 50 MG: 50 TABLET ORAL at 09:41

## 2019-02-21 RX ADMIN — HYDRALAZINE HYDROCHLORIDE 25 MG: 25 TABLET ORAL at 15:11

## 2019-02-21 RX ADMIN — ISOSORBIDE DINITRATE 10 MG: 10 TABLET ORAL at 09:42

## 2019-02-21 RX ADMIN — SODIUM CHLORIDE 75 ML/HR: 0.9 INJECTION, SOLUTION INTRAVENOUS at 02:40

## 2019-02-21 RX ADMIN — TORSEMIDE 20 MG: 20 TABLET ORAL at 17:38

## 2019-02-21 RX ADMIN — HYDRALAZINE HYDROCHLORIDE 25 MG: 25 TABLET ORAL at 06:00

## 2019-02-21 NOTE — SOCIAL WORK
CARLEY spoke with Irving Ramirez from [place] and he said that a life vest fitter is coming to hospital at [address] [facility]

## 2019-02-21 NOTE — PROGRESS NOTES
General Cardiology   Progress Note   Ry Morelos 68 y o  female MRN: [id number]  Unit/Bed#: -01 Encounter: [id number]      SUBJECTIVE:   No significant events overnight  Im feeling good  Denies chest pain    REVIEW OF SYSTEMS:  Constitutional:  Denies fever or chills   Eyes:  Denies change in visual acuity   HENT:  Denies nasal congestion or sore throat   Respiratory:  Denies cough or shortness of breath   Cardiovascular:  Denies chest pain or edema   GI:  Denies abdominal pain, nausea, vomiting, bloody stools or diarrhea   :  Denies dysuria, frequency, difficulty in micturition and nocturia  Musculoskeletal:  Denies back pain or joint pain   Neurologic:  Denies headache, focal weakness or sensory changes   Endocrine:  Denies polyuria or polydipsia   Lymphatic:  Denies swollen glands   Psychiatric:  Denies depression or anxiety     OBJECTIVE:   Vitals:  Vitals:    02/21/19 0922   BP: 134/63   Pulse: 67   Resp:    Temp:    SpO2: 95%     Body mass index is 30 81 kg/m²  Systolic (83MVC), ZMP:146 , Min:109 , ZAH:288     Diastolic (64GYI), OEP:61, Min:57, Max:103      Intake/Output Summary (Last 24 hours) at 2/21/2019 1144  Last data filed at 2/21/2019 0825  Gross per 24 hour   Intake 1040 ml   Output 2075 ml   Net -1035 ml     Weight (last 2 days)     Date/Time   Weight    02/21/19 0600   86 6 (190 92)    02/20/19 0600   89 9 (198 19)    02/19/19 0532   89 9 (198 19)              PHYSICAL EXAMS:  General:  Patient is not in acute distress, laying in the bed comfortably, awake, alert responding to commands  Head: Normocephalic, Atraumatic  HEENT:  Both pupils normal-size atraumatic, normocephalic, nonicteric  Neck:  JVP not raised  Trachea central  Respiratory:  Bronchovascular breathing all over the chest without any accompaniment  Cardiovascular:  S1 S2 normal RRR +3/6 murmur LIBIA  GI:  Abdomen soft nontender   Liver and spleen normal size  Lymphatic:  No cervical or inguinal lymphadenopathy  Neurologic: Patient is awake alert, responding to command, well-oriented to time and place and person moving     LABORATORY RESULTS:  Results from last 7 days   Lab Units 02/16/19  0554 02/16/19  0250 02/15/19  1818   TROPONIN I ng/mL 0 02 0 02 <0 02       CBC with diff:   Results from last 7 days   Lab Units 02/21/19  0524 02/20/19  0514 02/16/19  0554  02/15/19  1818   WBC Thousand/uL 5 81 6 07 7 18  --  5 78   HEMOGLOBIN g/dL 12 3 12 9 13 7  --  14 4   HEMATOCRIT % 38 5 39 6 43 6  --  47 3*   MCV fL 93 93 96  --  99*   PLATELETS Thousands/uL 147* 153 138*   < > 116*   MCH pg 29 6 30 1 30 2  --  30 3   MCHC g/dL 31 9 32 6 31 4  --  30 4*   RDW % 15 7* 15 7* 16 2*  --  16 3*   MPV fL 11 4 12 1 11 7   < > 11 5   NRBC AUTO /100 WBCs 0 0  --   --  0    < > = values in this interval not displayed  CMP:  Results from last 7 days   Lab Units 02/21/19  0524 02/20/19  0514 02/19/19  0500  02/16/19  0554 02/15/19  2033   POTASSIUM mmol/L 3 3* 3 5 3 4*   < > 4 6 5 1   CHLORIDE mmol/L 103 103 102   < > 109* 107   CO2 mmol/L 26 28 28   < > 21 23   BUN mg/dL 28* 32* 33*   < > 28* 24   CREATININE mg/dL 1 42* 1 38* 1 49*   < > 1 49* 1 36*   CALCIUM mg/dL 8 1* 8 6 8 5   < > 9 0 9 0   AST U/L  --   --   --   --  41 45   ALT U/L  --   --   --   --  37 27   ALK PHOS U/L  --   --   --   --  213* 218*   EGFR ml/min/1 73sq m 43 44 40   < > 40 45    < > = values in this interval not displayed         BMP:  Results from last 7 days   Lab Units 02/21/19  0524 02/20/19  0514 02/19/19  0500   POTASSIUM mmol/L 3 3* 3 5 3 4*   CHLORIDE mmol/L 103 103 102   CO2 mmol/L 26 28 28   BUN mg/dL 28* 32* 33*   CREATININE mg/dL 1 42* 1 38* 1 49*   CALCIUM mg/dL 8 1* 8 6 8 5       Results from last 7 days   Lab Units 02/15/19  2033   NT-PRO BNP pg/mL 10,568*      Results from last 7 days   Lab Units 02/16/19  0554   MAGNESIUM mg/dL 1 9     Results from last 7 days   Lab Units 02/16/19  0554   HEMOGLOBIN A1C % 7 4*         Results from last 7 days   Lab Units 19  0837 19  1044 19  0059   INR  1 59* 1 43* 1 94*       Lipid Profile:   No results found for: CHOL  Lab Results   Component Value Date    HDL 9 (L) 2017     Lab Results   Component Value Date    [facility]  2017      Comment:      Unable to calculate     Lab Results   Component Value Date    TRIG 57 2017       Cardiac testing:  Results for orders placed during the hospital encounter of 02/15/19   Echo complete with contrast if indicated    Narrative [address] [place] [address] [phone]    Transthoracic Echocardiogram  2D, M-mode, Doppler, and Color Doppler    Study date:  2019    Patient: Abby Grady  MR number: [id number]  Account number: [de-identified]  : 1945  Age: 68 years  Gender: Female  Status: Inpatient  Location: Bedside  Height: 66 in  Weight: 196 lb  BP: 146/ 80 mmHg    Indications: Heart failure    Diagnoses: I50 9 - Heart failure, unspecified    Sonographer:  Lien Torres  Referring Physician:  Emery Carreon MD  Group:  [facility]  Interpreting Physician:  Marian Nixon MD    SUMMARY    LEFT VENTRICLE:  The ventricle was dilated  Systolic function was severely reduced  Ejection fraction was estimated to be 25 %  There was severe diffuse hypokinesis with regional variations  Doppler parameters were consistent with restrictive physiology, indicative of decreased left ventricular diastolic compliance and/or increased left atrial pressure  RIGHT VENTRICLE:  The ventricle was moderately dilated  Systolic function was moderately reduced  LEFT ATRIUM:  The atrium was mildly to moderately dilated  RIGHT ATRIUM:  The atrium was moderately to markedly dilated  MITRAL VALVE:  There was mild annular dilatation  There was mild annular calcification  There was mildly reduced leaflet separation  There was moderate to severe regurgitation    The regurgitant jet was eccentric and directed posteriorly  AORTIC VALVE:  The valve was trileaflet  Leaflets exhibited markedly increased thickness, marked calcification, and markedly reduced cuspal separation  Transaortic velocity and gradient were increased due to stenosis, but lower than expected for the degree of stenosis due to concomitant decreased transaortic flow (decreased stroke volume)  There was severe stenosis  Valve mean gradient was 10 mmHg  The aortic valve obstructive index (by VTI) was 0 25  Estimated aortic valve area (by VTI) was 0 79 cm squared  TRICUSPID VALVE:  There was moderate to severe regurgitation  Estimated peak PA pressure was 70 mmHg  The findings suggest severe pulmonary hypertension  PULMONIC VALVE:  There was mild regurgitation  IVC, HEPATIC VEINS:  The inferior vena cava was dilated  Respirophasic changes were blunted (less than 50% variation)  HISTORY: PRIOR HISTORY: Congestive heart failure  Cardiomyopathy  Risk factors: CAD, hypertension and diabetes  Remote stroke  PRIOR PROCEDURES: Stent  PROCEDURE: The procedure was performed at the bedside  This was a routine study  The transthoracic approach was used  The study included complete 2D imaging, M-mode, complete spectral Doppler, and color Doppler  The heart rate was 57 bpm,  at the start of the study  Images were obtained from the parasternal, apical, subcostal, and suprasternal notch acoustic windows  Echocardiographic views were limited due to lung interference  Image quality was adequate  LEFT VENTRICLE: The ventricle was dilated  Systolic function was severely reduced  Ejection fraction was estimated to be 25 %  There was severe diffuse hypokinesis with regional variations  Wall thickness was normal  DOPPLER: Doppler  parameters were consistent with restrictive physiology, indicative of decreased left ventricular diastolic compliance and/or increased left atrial pressure      RIGHT VENTRICLE: The ventricle was moderately dilated  Systolic function was moderately reduced  LEFT ATRIUM: The atrium was mildly to moderately dilated  RIGHT ATRIUM: The atrium was moderately to markedly dilated  MITRAL VALVE: There was mild annular dilatation  There was mild annular calcification  Valve structure was normal  There was mildly reduced leaflet separation  DOPPLER: The transmitral velocity was within the normal range  There was no  evidence for stenosis  There was moderate to severe regurgitation  The regurgitant jet was eccentric and directed posteriorly  AORTIC VALVE: The valve was trileaflet  Leaflets exhibited markedly increased thickness, marked calcification, and markedly reduced cuspal separation  DOPPLER: Transaortic velocity and gradient were increased due to stenosis, but lower  than expected for the degree of stenosis due to concomitant decreased transaortic flow (decreased stroke volume)  There was severe stenosis  TRICUSPID VALVE: The valve structure was normal  There was normal leaflet separation  DOPPLER: The transtricuspid velocity was within the normal range  There was no evidence for stenosis  There was moderate to severe regurgitation  Estimated peak PA pressure was 70 mmHg  The findings suggest severe pulmonary hypertension  PULMONIC VALVE: Leaflets exhibited normal thickness, no calcification, and normal cuspal separation  DOPPLER: The transpulmonic velocity was within the normal range  There was mild regurgitation  PERICARDIUM: There was no pericardial effusion  The pericardium was normal in appearance  AORTA: The root exhibited normal size  SYSTEMIC VEINS: IVC: The inferior vena cava was dilated  Respirophasic changes were blunted (less than 50% variation)      MEASUREMENT TABLES    2D MEASUREMENTS  LVOT   (Reference normals)  Diam   20 mm   (--)    DOPPLER MEASUREMENTS  LVOT   (Reference normals)  Peak javy   61 cm/s   (--)  Mean javy   46 cm/s   (--)  VTI   11 cm   (--)  Stroke vol 34 56 ml   (--)  Stroke index   0 18 ml/m squared   (--)  Aortic valve   (Reference normals)  Peak twan   200 cm/s   (--)  Mean twan   147 cm/s   (--)  VTI   44 cm   (--)  Peak gradient   16 mmHg   (--)  Mean gradient   10 mmHg   (--)  Obstr index, VTI   0 25    (--)  Valve area, VTI   0 79 cm squared   (--)  Area index, VTI   0 4 cm squared/m squared   (--)  Obstr index, Vmax   0 31    (--)  Valve area, Vmax   0 97 cm squared   (--)  Area index, Vmax   0 49 cm squared/m squared   (--)  Obstr index, Vmean   0 31    (--)  Valve area, Vmean   0 97 cm squared   (--)  Area index, Vmean   0 49 cm squared/m squared   (--)    SYSTEM MEASUREMENT TABLES    2D  %FS: 9 9 %  Ao Diam: 2 9 cm  EDV(Teich): 182 2 ml  EF(Teich): 21 3 %  ESV(Teich): 143 3 ml  IVSd: 1 cm  LA Area: 26 5 cm2  LA Diam: 4 6 cm  LVEDV MOD A4C: 190 6 ml  LVEF MOD A4C: 27 %  LVESV MOD A4C: 139 3 ml  LVIDd: 6 cm  LVIDs: 5 4 cm  LVLd A4C: 9 cm  LVLs A4C: 8 2 cm  LVOT Diam: 2 cm  LVPWd: 0 8 cm  RA Area: 27 4 cm2  RVIDd: 5 cm  SV MOD A4C: 51 4 ml  SV(Teich): 38 9 ml    CF  MR Als  Twan: 0 4 m/s  MR Flow: 140 6 ml/s  MR Rad: 0 8 cm    CW  AV Env  Ti: 315 9 ms  AV VTI: 47 2 cm  AV Vmax: 2 m/s  AV Vmean: 1 5 m/s  AV maxP 4 mmHg  AV meanP 8 mmHg  MR VTI: 237 8 cm  MR VTI: 250 4 cm  MR Vmax: 6 5 m/s  MR Vmax: 6 8 m/s  MR Vmean: 5 1 m/s  MR maxP 8 mmHg  MR meanP 9 mmHg  TR Vmax: 3 7 m/s  TR maxP 6 mmHg    MM  TAPSE: 1 5 cm    PW  KOFI (VTI): 0 8 cm2  KOFI Vmax: 1 1 cm2  E': 0 1 m/s  E/E': 12 3  LVOT Env  Ti: 243 6 ms  LVOT VTI: 12 cm  LVOT Vmax: 0 7 m/s  LVOT Vmean: 0 5 m/s  LVOT maxP 8 mmHg  LVOT meanP 1 mmHg  LVSV Dopp: 39 4 ml  MR ERO: 0 2 cm2  MR RV: 51 ml  MV A Twan: 0 3 m/s  MV Dec Obion: 3 m/s2  MV DecT: 284 8 ms  MV E Twan: 0 8 m/s  MV E/A Ratio: 3  MV PHT: 82 6 ms  MVA By PHT: 2 7 cm2    [employer] Accredited [facility]    Prepared and electronically signed by    Magali Suh MD  Signed 2019 14:50:11       No results found for this or any previous visit  No results found for this or any previous visit  No procedure found  No results found for this or any previous visit      Meds/Allergies   all current active meds have been reviewed and current meds:   Current Facility-Administered Medications   Medication Dose Route Frequency    acetaminophen (TYLENOL) tablet 650 mg  650 mg Oral Q6H PRN    ascorbic acid (VITAMIN C) tablet 250 mg  250 mg Oral Daily    aspirin (ECOTRIN LOW STRENGTH) EC tablet 81 mg  81 mg Oral Daily    atorvastatin (LIPITOR) tablet 40 mg  40 mg Oral Daily    ferrous sulfate tablet 325 mg  325 mg Oral Daily With Breakfast    fish oil capsule 1,000 mg  1,000 mg Oral BID    heparin (porcine) subcutaneous injection 5,000 Units  5,000 Units Subcutaneous Q8H [address]    hydrALAZINE (APRESOLINE) tablet 25 mg  25 mg Oral Q8H [address]    insulin lispro (HumaLOG) 100 units/mL subcutaneous injection 1-6 Units  1-6 Units Subcutaneous TID AC    insulin lispro (HumaLOG) 100 units/mL subcutaneous injection 1-6 Units  1-6 Units Subcutaneous HS    isosorbide dinitrate (ISORDIL) tablet 10 mg  10 mg Oral TID after meals    levothyroxine tablet 50 mcg  50 mcg Oral Early Morning    lisinopril (ZESTRIL) tablet 5 mg  5 mg Oral Daily    metoprolol succinate (TOPROL-XL) 24 hr tablet 25 mg  25 mg Oral Daily    nystatin (MYCOSTATIN) powder   Topical BID    ondansetron (ZOFRAN) injection 4 mg  4 mg Intravenous Q8H PRN    oxybutynin (DITROPAN-XL) 24 hr tablet 5 mg  5 mg Oral HS    sertraline (ZOLOFT) tablet 50 mg  50 mg Oral Daily    torsemide (DEMADEX) tablet 20 mg  20 mg Oral BID (diuretic)    warfarin (COUMADIN) tablet 7 5 mg  7 5 mg Oral Daily (warfarin)     Medications Prior to Admission   Medication    ascorbic acid (VITAMIN C) 250 mg tablet    atorvastatin (LIPITOR) 40 mg tablet    Calcium Polycarbophil (FIBER) 625 MG TABS    ferrous sulfate 325 (65 Fe) mg tablet    furosemide (LASIX) 40 mg tablet    levothyroxine 50 mcg tablet    lisinopril (ZESTRIL) 2 5 mg tablet    metoprolol succinate (TOPROL-XL) 50 mg 24 hr tablet    Multiple Vitamin (MULTIVITAMIN) tablet    Omega-3 Fatty Acids (FISH OIL) 1,000 mg    oxybutynin (DITROPAN-XL) 10 MG 24 hr tablet    sertraline (ZOLOFT) 50 mg tablet    warfarin (COUMADIN) 5 mg tablet    warfarin (COUMADIN) 7 5 mg tablet    nystatin (nystatin) powder          ASSESSMENT & PLAN   1-acute on chronic systolic and diastolic congestive heart failure, continue with torsemide  Daily weights  Salt restriction  Strict I&Os  Continue all other medications  2-cardiomyopathy with EF at 25%, recently decreased from 40%; cardiac catheterization done 100% lesion of the proximal circumflex and OM, 60% stenosis of the distal LAD, no intervention performed  Continue lisinopril and Toprol  Patient was low output heart failure with CO/CI 2 2/1 1, started on Isordil and hydralazine  Patient will need LifeVest at discharge  Will arrange for follow-up with heart failure specialist Dr Hank Silva  3-CAD with history of PCI x2 to the RCA 2011  Continue aspirin, Lipitor, metoprolol  4-hypertension, continue lisinopril and metoprolol    5-moderate to severe mitral and tricuspid regurgitation  Continue to monitor closely  6-severe pulmonary hypertension  Continue to follow closely  7-acute kidney injury, creatinine today 1 42    8-history of DVT on Coumadin  Goal INR 2-3, INR today 1 59    Sumit Reece PA-C  2/21/2019,11:44 AM    Portions of the record may have been created with voice recognition software   Occasional wrong word or "sound a like" substitutions may have occurred due to the inherent limitations of voice recognition software   Read the chart carefully and recognize, using context, where substitutions have occurred

## 2019-02-21 NOTE — PROGRESS NOTES
NEPHROLOGY PROGRESS NOTE    Patient: Rajesh Mcbride               Sex: female          DOA: 2/15/2019  5:10 PM   YOB: 1945        Age:  68 y o         LOS:  LOS: 5 days       HPI     Patient admitted with chest pain and CKD    SUBJECTIVE     She is feeling well  Denies any complaint    No shortness of breath no chest pain no palpitation he    CURRENT MEDICATIONS       Current Facility-Administered Medications:     acetaminophen (TYLENOL) tablet 650 mg, 650 mg, Oral, Q6H PRN, Renny Hu PA-C    ascorbic acid (VITAMIN C) tablet 250 mg, 250 mg, Oral, Daily, Renny Hu PA-C, 250 mg at 02/21/19 0941    aspirin (ECOTRIN LOW STRENGTH) EC tablet 81 mg, 81 mg, Oral, Daily, Jose Chou MD, 81 mg at 02/21/19 0941    atorvastatin (LIPITOR) tablet 40 mg, 40 mg, Oral, Daily, Renny Hu PA-C, 40 mg at 02/21/19 9983    ferrous sulfate tablet 325 mg, 325 mg, Oral, Daily With Breakfast, Renny Hu PA-C, 325 mg at 02/21/19 0480    fish oil capsule 1,000 mg, 1,000 mg, Oral, BID, Renny Hu PA-C, 1,000 mg at 02/21/19 0941    heparin (porcine) subcutaneous injection 5,000 Units, 5,000 Units, Subcutaneous, Q8H [facility], 5,000 Units at 02/21/19 0600 **AND** [COMPLETED] Platelet count, , , Once, Renny Hu PA-C    hydrALAZINE (APRESOLINE) tablet 25 mg, 25 mg, Oral, Q8H [facility], Jazzy Mcclure MD, 25 mg at 02/21/19 0600    insulin lispro (HumaLOG) 100 units/mL subcutaneous injection 1-6 Units, 1-6 Units, Subcutaneous, TID AC, 2 Units at 02/20/19 1753 **AND** Fingerstick Glucose (POCT), , , TID AC, Renny Hu PA-C    insulin lispro (HumaLOG) 100 units/mL subcutaneous injection 1-6 Units, 1-6 Units, Subcutaneous, HS, Renny Hu PA-C, 1 Units at 02/19/19 2303    isosorbide dinitrate (ISORDIL) tablet 10 mg, 10 mg, Oral, TID after meals, Jazzy Mcclure MD, 10 mg at 02/21/19 0942    levothyroxine tablet 50 mcg, 50 mcg, Oral, Early Morning, Renny Hu PA-C, 50 mcg at 02/21/19 0600    lisinopril (ZESTRIL) tablet 5 mg, 5 mg, Oral, Daily, Nate Gordillo MD, 5 mg at 02/21/19 0943    metoprolol succinate (TOPROL-XL) 24 hr tablet 25 mg, 25 mg, Oral, Daily, Faiza Brunner PA-C    nystatin (MYCOSTATIN) powder, , Topical, BID, [place] MD Luigi    ondansetron (ZOFRAN) injection 4 mg, 4 mg, Intravenous, Q8H PRN, Renny Hu PA-C    oxybutynin (DITROPAN-XL) 24 hr tablet 5 mg, 5 mg, Oral, HS, Renny Hu PA-C, 5 mg at 02/20/19 2132    sertraline (ZOLOFT) tablet 50 mg, 50 mg, Oral, Daily, Renny Hu PA-C, 50 mg at 02/21/19 0941    torsemide (DEMADEX) tablet 20 mg, 20 mg, Oral, BID (diuretic), Nate Gordillo MD, 20 mg at 02/21/19 0941    warfarin (COUMADIN) tablet 7 5 mg, 7 5 mg, Oral, Daily (warfarin), Kellie Wallace MD, 7 5 mg at 02/20/19 1723    OBJECTIVE     Current Weight: Weight - Scale: 86 6 kg (190 lb 14 7 oz)  Vitals:    02/21/19 0922   BP: 134/63   Pulse: 67   Resp:    Temp:    SpO2: 95%       Intake/Output Summary (Last 24 hours) at 2/21/2019 0957  Last data filed at 2/21/2019 0825  Gross per 24 hour   Intake 1040 ml   Output 2075 ml   Net -1035 ml       PHYSICAL EXAMINATION     Physical Exam   Constitutional: She is oriented to person, place, and time  She appears well-developed  No distress  HENT:   Head: Normocephalic  Mouth/Throat: Oropharynx is clear and moist    Eyes: Conjunctivae are normal  No scleral icterus  Neck: Neck supple  No JVD present  Cardiovascular: Normal rate  Murmur heard  Pulmonary/Chest: Effort normal and breath sounds normal  No respiratory distress  She has no wheezes  Abdominal: Soft  Bowel sounds are normal  There is no tenderness  Musculoskeletal: Normal range of motion  She exhibits no edema  Neurological: She is alert and oriented to person, place, and time  Skin: Skin is warm  No rash noted  Psychiatric: She has a normal mood and affect   Her behavior is normal         LAB RESULTS     Results from last 7 days   Lab Units 02/21/19  0524 02/20/19  0514 02/19/19  0501 02/18/19  0542 02/17/19  1033 02/16/19  0554 02/16/19  0250 02/15/19  2033 02/15/19  1818   WBC Thousand/uL 5 81 6 07  --   --   --  7 18  --   --  5 78   HEMOGLOBIN g/dL 12 3 12 9  --   --   --  13 7  --   --  14 4   HEMATOCRIT % 38 5 39 6  --   --   --  43 6  --   --  47 3*   PLATELETS Thousands/uL 147* 153  --   --   --  138* 144*  --  116*   POTASSIUM mmol/L 3 3* 3 5 3 4* 3 6 5 2 4 6  --  5 1  --    CHLORIDE mmol/L 103 103 102 105 103 109*  --  107  --    CO2 mmol/L 26 28 28 24 24 21  --  23  --    BUN mg/dL 28* 32* 33* 31* 32* 28*  --  24  --    CREATININE mg/dL 1 42* 1 38* 1 49* 1 42* 1 52* 1 49*  --  1 36*  --    EGFR ml/min/1 73sq m 43 44 40 43 39 40  --  45  --    CALCIUM mg/dL 8 1* 8 6 8 5 8 9 8 9 9 0  --  9 0  --    MAGNESIUM mg/dL  --   --   --   --   --  1 9  --   --   --    PHOSPHORUS mg/dL  --  3 4  --   --   --  3 7  --   --   --        RADIOLOGY RESULTS      Results for orders placed during the hospital encounter of 07/13/17   XR chest 1 view portable    Narrative CHEST     INDICATION:  Shortness of breath  Elevated INR  COMPARISON:  Chest film 9/1/2005 and report of CT chest also from that date    VIEWS:   AP frontal    IMAGES:  1    FINDINGS:         The cardiac silhouette is enlarged  Prominent, indistinct pulmonary vasculature and faint reticular interstitial densities in the lung bases consistent with element of CHF  Mild azygous distention  No discernible pleural effusions or pneumothorax  Visualized osseous structures appear within normal limits for the patient's age  Impression Cardiomegaly with mild CHF  Workstation performed: [id number]       Results for orders placed during the hospital encounter of 02/15/19   XR chest 2 views    Narrative CHEST     INDICATION:   Shortness of breath and leg swelling      COMPARISON:  7/14/2017    EXAM PERFORMED/VIEWS:  XR CHEST PA & LATERAL      FINDINGS:    Heart shadow is enlarged but unchanged from prior exam     There is no focal infiltrate or pleural effusion  No pneumothorax  Pulmonary vasculature mildly prominent    Osseous structures appear within normal limits for patient age  Impression Mild pulmonary vascular congestion and enlargement of the cardiac silhouette  Workstation performed: [id number]       No results found for this or any previous visit  No results found for this or any previous visit  No results found for this or any previous visit  No results found for this or any previous visit  PLAN / RECOMMENDATIONS      CKD stage 3:  Stable at creatinine 1 4  Multifactorial with hypertension and cardiorenal syndrome seems to main reason    The cardiomyopathy:  EF is 25%  Will stop IV fluid and continue diuretic    Aortic stenosis and other valvuler  heart disease:  Being monitored closely by cardiologist    Will continue to monitor    Shaheen Mejía MD  Nephrology  2/21/2019        Portions of the record may have been created with voice recognition software  Occasional wrong word or "sound a like" substitutions may have occurred due to the inherent limitations of voice recognition software  Read the chart carefully and recognize, using context, where substitutions have occurred

## 2019-02-21 NOTE — DISCHARGE SUMMARY
Discharge Summary - [address] [facility]    Patient Information: Ry Morelos 68 y o  female MRN: [id number]  Unit/Bed#: -01 Encounter: [id number]    Discharging Physician / Practitioner: Luis Enrique Rogers MD  PCP: Vivienne Block MD  Admission Date: 2/15/2019  Discharge Date: 02/21/19    Disposition:     Home with VNA Services (Reminder: Complete face to face encounter)    Reason for Admission: chf    Discharge Diagnoses:     Principal Problem:    Acute on chronic systolic congestive heart failure ([facility] )  Active Problems:    MANJEET (acute kidney injury) ([facility] )    Essential hypertension    Diabetes mellitus ([facility] )    Bilateral leg edema    CKD (chronic kidney disease) stage 3, GFR 30-59 ml/min ([facility] )  Resolved Problems:    * No resolved hospital problems  *      Consultations During Hospital Stay:  · Cardiology  · Nephrology    Procedures Performed:     · Cardiac catheterization 100% stenosis of proximal circumflex and OM 1, 60% stenosis of distal LAD no intervention performed    Significant Findings / Test Results:     · Echocardiogram  Ejection fraction 20% diffuse severe hypokinesis with regional variations  Decreased left ventricular diastolic compliance increased left atrial pressure  Right and left atrium dilated  Severe pulmonary hypertension  Incidental Findings:   · None    Test Results Pending at Discharge (will require follow up): · None     Outpatient Tests Requested:  · None    Complications:  None    Hospital Course:     Ry Morelos is a 68 y o  female patient with past medical history of hypertension, hyperlipidemia, type 2 diabetes, coronary artery disease status post PCI, chronic systolic and diastolic heart failure, CKD, history of DVT who originally presented to the hospital on 2/15/2019 due to worsening shortness of breath, bilateral lower extremity edema  She was found to be in acute heart failure, was diuresed aggressively after which improved clinically    Echocardiogram demonstrated decline in ejection fraction from 40% to 25%, likely believed secondary to progressive valvular heart disease  She underwent cardiac catheterization again demonstrated 100% stenosis of proximal circumflex and OM1, 60% stenosis of distal LAD no intervention was performed  Cardiology recommended to continue medical management at this time for low output heart failure  She was started on Isordil and hydralazine  She was discharged on life vest   Patient will need close outpatient follow-up with heart failure specialist Dr Haroon Del Cid  She was also evaluated by Nephrology for CKD  Acute on chronic systolic and diastolic heart failure  Continue torsemide  Fluid and sodium restriction advanced    Cardiomyopathy with ejection fraction of 25%  Continue lisinopril metoprolol Isordil and hydralazine  Life vest on discharge    Coronary artery disease status post PCI  Continue aspirin atorvastatin and metoprolol    History of DVT  Continue Coumadin    Condition at Discharge: stable     Discharge Day Visit / Exam:     Subjective:  Patient seen and examined  No complaints at this time  Vitals: Blood Pressure: 132/63 (02/21/19 1209)  Pulse: 75 (02/21/19 1209)  Temperature: 98 6 °F (37 °C) (02/21/19 0335)  Temp Source: Oral (02/20/19 1002)  Respirations: 19 (02/21/19 0335)  Height: 5' 6" (167 6 cm) (02/16/19 1340)  Weight - Scale: 86 6 kg (190 lb 14 7 oz) (02/21/19 0600)  SpO2: 94 % (02/21/19 1209)  Exam:   Physical Exam   Constitutional: She is oriented to person, place, and time  She appears well-developed and well-nourished  No distress  HENT:   Head: Normocephalic and atraumatic  Eyes: Pupils are equal, round, and reactive to light  EOM are normal    Neck: Normal range of motion  Neck supple  Cardiovascular: Normal rate, regular rhythm and normal heart sounds  Pulmonary/Chest: Effort normal and breath sounds normal  She has no wheezes  She has no rales  Abdominal: Soft   Bowel sounds are normal  Musculoskeletal: Normal range of motion  Neurological: She is alert and oriented to person, place, and time  Skin: Skin is warm and dry  Discussion with Family: daughter at bedside    Discharge instructions/Information to patient and family:   See after visit summary for information provided to patient and family  Provisions for Follow-Up Care:  See after visit summary for information related to follow-up care and any pertinent home health orders  Planned Readmission: none     Discharge Statement:  I spent 35 minutes discharging the patient  This time was spent on the day of discharge  I had direct contact with the patient on the day of discharge  Greater than 50% of the total time was spent examining patient, answering all patient questions, arranging and discussing plan of care with patient as well as directly providing post-discharge instructions  Additional time then spent on discharge activities  Discharge Medications:  See after visit summary for reconciled discharge medications provided to patient and family        ** Please Note: This note has been constructed using a voice recognition system **

## 2019-02-25 ENCOUNTER — OFFICE VISIT (OUTPATIENT)
Dept: CARDIOLOGY CLINIC | Facility: CLINIC | Age: 74
End: 2019-02-25
Payer: MEDICARE

## 2019-02-25 ENCOUNTER — TELEPHONE (OUTPATIENT)
Dept: CARDIOLOGY CLINIC | Facility: CLINIC | Age: 74
End: 2019-02-25

## 2019-02-25 VITALS
HEIGHT: 66 IN | HEART RATE: 58 BPM | OXYGEN SATURATION: 96 % | SYSTOLIC BLOOD PRESSURE: 122 MMHG | WEIGHT: 174 LBS | BODY MASS INDEX: 27.97 KG/M2 | DIASTOLIC BLOOD PRESSURE: 70 MMHG

## 2019-02-25 DIAGNOSIS — I27.20 PULMONARY HYPERTENSION (HCC): ICD-10-CM

## 2019-02-25 DIAGNOSIS — I35.0 NON-RHEUMATIC AORTIC STENOSIS: ICD-10-CM

## 2019-02-25 DIAGNOSIS — I36.1 NONRHEUMATIC TRICUSPID VALVE REGURGITATION: ICD-10-CM

## 2019-02-25 DIAGNOSIS — I10 ESSENTIAL HYPERTENSION: ICD-10-CM

## 2019-02-25 DIAGNOSIS — Z98.61 CAD S/P PERCUTANEOUS CORONARY ANGIOPLASTY: ICD-10-CM

## 2019-02-25 DIAGNOSIS — I50.42 CHRONIC COMBINED SYSTOLIC AND DIASTOLIC CHF (CONGESTIVE HEART FAILURE) (HCC): Primary | ICD-10-CM

## 2019-02-25 DIAGNOSIS — I25.10 CAD S/P PERCUTANEOUS CORONARY ANGIOPLASTY: ICD-10-CM

## 2019-02-25 DIAGNOSIS — E78.2 MIXED HYPERLIPIDEMIA: ICD-10-CM

## 2019-02-25 DIAGNOSIS — I34.0 NONRHEUMATIC MITRAL VALVE REGURGITATION: ICD-10-CM

## 2019-02-25 DIAGNOSIS — I42.8 OTHER CARDIOMYOPATHY (HCC): ICD-10-CM

## 2019-02-25 PROCEDURE — 99215 OFFICE O/P EST HI 40 MIN: CPT | Performed by: INTERNAL MEDICINE

## 2019-02-25 NOTE — TELEPHONE ENCOUNTER
DR OROZCO Banner:  IT IS ADVISED THAT PT SEE'S YOU ON 3/7/19 BY THE PAWENDY THAT SEEN HER IN THE HOSP  YOU ARE BOOKED SOLID; CAN WE OVERBOOK YOU?

## 2019-02-25 NOTE — TELEPHONE ENCOUNTER
Answering service call-    Message # [phone]         2019 03:40p   [MR]  TO:  [facility]  CALLER:  Maribellparamsandra Sanchezkimon  CALLER TELE #:  [phone] Ext  HOSP NAME:  ----------------------------------------------------------------------  Dr:RUSTY Roa  :47  Emerg?:Y  Msg:SWELLING IN BOTH LEGS   NOT AS BAD AS IT WAS LAST WEEK WHEN SHE WAS ADMITTED TO HOSPITAL BUT IT IS SWELLING AND WOULD LIKE TO KNOW WHAT TO DO

## 2019-02-25 NOTE — PROGRESS NOTES
CARDIOLOGY OFFICE VISIT  [facility]  [address], [place], [address] [facility], [place], [facility] Beryl Brunner  Tel: [phone]      NAME: Víctor Murdock  AGE: 68 y o  SEX: female  : 1945   MRN: [id number]      Chief Complaint:  Chief Complaint   Patient presents with    Follow-up     hospital follow-up - CHF  Currently wearing life vest     Leg Swelling     bilateral         History of Present Illness:   Patient comes for follow up s/p another hospitalization for acute combined systolic and diastolic CHF  States she is doing better since d/c  SOB less  Ankle swelling less  Denies chest pain / pressure, palpitations, lightheadedness, syncope,  orthopnea, PND  Chronic combined systolic and diastolic heart failure - Pt was admitted to [facility] in 2760 for acute systolic CHF  Found to have severe CMP and was discharged on LifeVest  And was started on OMT  She then had her three monthly limited echo as OP and it showed EF of 35-40%  LifeVest was d/kaitlin and OMT was contd  Later she had another hospitalization for acute heart failure and the EF was seen to be down (25%) again  She is back on the LifeVest   She stays euvolemic on torsemide 20 milligrams b i d  Which she is currently on along with hydralazine, nitrate, ACE-inhibitor, beta-blocker  Low output heart failure with CO/CI: 2 / 1; SVR: 2656  Cardiac catheterization (19) - Hemodynamic assessment demonstrated severely elevated LVEDP, severely depressed cardiac output, and markedly elevated pulmonary capillary wedge pressure  There was severe left sided failure  There was severe pulmonary hypertension with LV failure  Cardiomyopathy -  EF 25%  On LifeVest  On lisinopril, metoprolol succinate, hydralazine, long-acting nitrate which she takes regularly  CAD s/p PCI with 2 stents to RCA in  - On ASA, BB, statin, ACEI, ISDN    Cardiac catheterization (19) - 100% stenosis at the site of a prior stent in proximal left circumflex -   OM1 100% stenosis  60% stenosis of distal LAD    HTN, DD -  Has been hypertensive for many years  Taking medications regularly  Denies lightheadedness, headache, medication side effects  HLP -  Has had hyperlipidemia for many years  Taking statin regularly along with diet control  Denies myalgia  PCP closely monitoring the blood work  Mod-severe MR, Mod AS per cath and severe AS per echo, Mod-severe TR - recent worsening    Severe pulmonary hypertension      Past Medical History:  Past Medical History:   Diagnosis Date    Aortic valve stenosis     Cancer ([place] [address] )     Cardiac disease     Cardiomyopathy ([place] [address] )     CHF [facility]congestive heart failure) ([facility])     Chronic systolic heart failure (HCC)     Coronary artery disease     Diabetes mellitus ([place] [address] )     Diastolic dysfunction     Hypertension     Mitral regurgitation     Pulmonary HTN ([facility])     Renal disorder     Stroke ([place] [address] )     Tricuspid regurgitation          Past Surgical History:  Past Surgical History:   Procedure Laterality Date    CARDIAC SURGERY      CORONARY ANGIOPLASTY WITH STENT PLACEMENT      KIDNEY SURGERY      MASTECTOMY           Family History:  Family History   Family history unknown: Yes         Social History:  Social History     Socioeconomic History    Marital status:       Spouse name: Not on file    Number of children: Not on file    Years of education: Not on file    Highest education level: Not on file   Occupational History    Not on file   Social Needs    Financial resource strain: Not on file    Food insecurity:     Worry: Not on file     Inability: Not on file    Transportation needs:     Medical: Not on file     Non-medical: Not on file   Tobacco Use    Smoking status: Former Smoker     Packs/day: 0 50     Years: 45 00     Pack years: 22 50     Last attempt to quit: 2009     Years since quittin 1    Smokeless tobacco: Never Used   Substance and Sexual Activity    Alcohol use: Not Currently     Frequency: Never    Drug use: No    Sexual activity: Not on file   Lifestyle    Physical activity:     Days per week: Not on file     Minutes per session: Not on file    Stress: Not on file   Relationships    Social connections:     Talks on phone: Not on file     Gets together: Not on file     Attends [employer] service: Not on file     Active member of club or organization: Not on file     Attends meetings of clubs or organizations: Not on file     Relationship status: Not on file    Intimate partner violence:     Fear of current or ex partner: Not on file     Emotionally abused: Not on file     Physically abused: Not on file     Forced sexual activity: Not on file   Other Topics Concern    Not on file   Social History Narrative    Lives with daughter         Active Problems:  Patient Active Problem List   Diagnosis    [facility]    Normocytic anemia    Essential hypertension    Diabetes mellitus ([facility] )    Solitary kidney    Bilateral leg edema    Acute on chronic systolic congestive heart failure ([facility] )    CKD (chronic kidney disease) stage 3, GFR 30-59 ml/min ([facility])         The following portions of the patient's history were reviewed and updated as appropriate: past medical history, past surgical history, past family history,  past social history, current medications, allergies and problem list       Review of Systems:  Constitutional: Denies fever, chills, fatigue  Eyes: Denies eye redness, eye discharge, double vision  ENT: Denies hearing loss, tinnitus, sneezing, nasal discharge, sore throat   Respiratory: Denies cough, expectoration, hemoptysis  +shortness of breath  Cardiovascular: Denies chest pain, palpitations   +lower extremity swelling  Gastrointestinal: Denies abdominal pain, nausea, vomiting, hematemesis, diarrhea, bloody stools  Genito-Urinary: Denies dysuria, incontinence  Musculoskeletal: Denies back pain, joint pain, muscle pain  Neurologic: Denies confusion, lightheadedness, syncope, headache, focal weakness, sensory changes, seizures  Endocrine: Denies polyuria, polydipsia, temperature intolerance  Allergy and Immunology: Denies hives, insect bite sensitivity  Hematological and Lymphatic: Denies bleeding problems, swollen glands   Psychological: Denies depression, suicidal ideation, anxiety, panic  Dermatological: Denies pruritus, rash, skin lesion changes      Vitals:  Vitals:    02/25/19 1525   BP: 122/70   Pulse: 58   SpO2: 96%       Body mass index is 28 08 kg/m²  Weight (last 2 days)     Date/Time   Weight    02/25/19 1525   78 9 (174)                Physical Examination:  General: Patient is not in acute distress  Awake, alert, oriented in time, place and person  Responding to commands  Head: Normocephalic  Atraumatic  Eyes: Both pupils normal sized, round and reactive to light  Nonicteric  ENT: Normal external ear canals  Nares normal, no drainage  Lips and oral mucosa normal  Neck: Supple  JVP not raised  Trachea central  No thyromegaly  Lungs: Bilateral bronchovascular breath sounds with no crackles or rhonchi  Chest wall: No tenderness  Cardiovascular: RRR  S1 and S2 normal  Grade 3/6 PSM at apex  Grade 3/6 LIBIA at base  No rub or gallop  Gastrointestinal: Abdomen soft, nontender  No guarding or rigidity  Liver and spleen not palpable  Bowel sounds present  Neurologic: Patient is awake, alert, oriented in time, place and person  Responding to command  Moving all extremities  Integumentary:  No skin rash  Lymphatic: No cervical lymphadenopathy  Back: Symmetric  No CVA tenderness  Extremities: No clubbing, cyanosis   Mild b/l ankle edema+      Laboratory Results:  CBC with diff:   Lab Results   Component Value Date    WBC 5 81 02/21/2019    RBC 4 15 02/21/2019    HGB 12 3 02/21/2019    HCT 38 5 02/21/2019    MCV 93 02/21/2019    MCH 29 6 02/21/2019    RDW 15 7 (H) 02/21/2019     (L) 02/21/2019 CMP:  Lab Results   Component Value Date    CREATININE 1 42 (H) 2019    BUN 28 (H) 2019    K 3 3 (L) 2019     2019    CO2 26 2019    ALKPHOS 213 (H) 2019    ALT 37 2019    AST 41 2019    BILIDIR 0 51 (H) 02/15/2019       Lab Results   Component Value Date    HGBA1C 7 4 (H) 2019    MG 1 9 2019    PHOS 3 4 2019       Lab Results   Component Value Date    TROPONINI 0 02 2019    TROPONINI 0 02 2019    TROPONINI <0 02 02/15/2019       Lipid Profile:   No results found for: CHOL  Lab Results   Component Value Date    HDL 9 (L) 2017     Lab Results   Component Value Date    [facility]  2017      Comment:      Unable to calculate     Lab Results   Component Value Date    TRIG 57 2017       Cardiac testing:   Results for orders placed during the hospital encounter of 17   Echo complete with contrast if indicated    Narrative [address]  [place], [place] [address]  [phone]    Transthoracic Echocardiogram  2D, M-mode, Doppler, and Color Doppler    Study date:  2017    Patient: Julia Jaffe  MR number: [id number]  Account number: [de-identified]  : 1945  Age: 80 years  Gender: Female  Status: Inpatient  Location: Bedside  Height: 66 in  Weight: 173 lb  BP: 149/ 65 mmHg    Indications: Heart Failure    Diagnoses: I50 9 - Heart failure, unspecified    Sonographer:  Loise Dakins, BS,RDCS  Interpreting Physician:  Symone Chavarria MD  Referring Physician:  Crista Wilkes MD  Group:  [facility]    SUMMARY    LEFT VENTRICLE:  Systolic function was severely reduced  Ejection fraction was estimated to be 25 % - 30%  There was severe diffuse hypokinesis  Doppler parameters were consistent with restrictive physiology, indicative of decreased left ventricular diastolic compliance and/or increased left atrial pressure    The ratio of mitral peak early diastolic velocity to medial annulus peak early diastolic velocity (E/Ea) was 19  RIGHT VENTRICLE:  The ventricle was dilated  Systolic function was normal     LEFT ATRIUM:  The atrium was mildly dilated  RIGHT ATRIUM:  The atrium was mildly dilated  MITRAL VALVE:  There was mild annular calcification  There was severe regurgitation  AORTIC VALVE:  The valve was trileaflet  Leaflets exhibited increased thickness and calcification with reduced cuspal separation  Transaortic velocity was increased due to valvular stenosis  There was mild to moderate stenosis  Peak and mean AV gradients were 20 and 10 mm Hg respectively  KOFI by the Vmax method was 1 4 sq cm  There was trace regurgitation  TRICUSPID VALVE:  There was severe regurgitation  Estimated peak PA pressure was 80 mmHg  The findings suggest severe pulmonary hypertension  PULMONIC VALVE:  There was mild regurgitation  IVC, HEPATIC VEINS:  The inferior vena cava was dilated  The respirophasic change in diameter was less than 50%  HISTORY: PRIOR HISTORY: Congestive Heart Failure, Hypertension, Diabetes Mellitus, Weakness, Back pain    PROCEDURE: The procedure was performed at the bedside  This was a routine study  The transthoracic approach was used  The study included complete 2D imaging, M-mode, complete spectral Doppler, and color Doppler  The heart rate was 73 bpm,  at the start of the study  Images were obtained from the parasternal, apical, subcostal, and suprasternal notch acoustic windows  Image quality was good  LEFT VENTRICLE: Size was normal  Systolic function was severely reduced  Ejection fraction was estimated to be 25 % - 30%  There was severe diffuse hypokinesis  Wall thickness was normal  No evidence of apical thrombus  DOPPLER: Doppler  parameters were consistent with restrictive physiology, indicative of decreased left ventricular diastolic compliance and/or increased left atrial pressure      RIGHT VENTRICLE: The ventricle was dilated  Systolic function was normal  Wall thickness was normal     LEFT ATRIUM: The atrium was mildly dilated  RIGHT ATRIUM: The atrium was mildly dilated  MITRAL VALVE: There was mild annular calcification  There was normal leaflet separation  DOPPLER: The transmitral velocity was within the normal range  There was no evidence for stenosis  There was severe regurgitation  AORTIC VALVE: The valve was trileaflet  Leaflets exhibited increased thickness and calcification with reduced cuspal separation  DOPPLER: Transaortic velocity was increased due to valvular stenosis  There was mild to moderate stenosis  Peak and mean AV gradients were 20 and 10 mm Hg respectively  KOFI by the Vmax method was 1 4 sq cm  There was trace regurgitation  TRICUSPID VALVE: The valve structure was normal  There was normal leaflet separation  DOPPLER: The transtricuspid velocity was within the normal range  There was no evidence for stenosis  There was severe regurgitation  Estimated peak PA  pressure was 80 mmHg  The findings suggest severe pulmonary hypertension  PULMONIC VALVE: Leaflets exhibited normal thickness, no calcification, and normal cuspal separation  DOPPLER: The transpulmonic velocity was within the normal range  There was mild regurgitation  PERICARDIUM: There was no pericardial effusion  The pericardium was normal in appearance  AORTA: The root exhibited normal size  SYSTEMIC VEINS: IVC: The inferior vena cava was dilated  The respirophasic change in diameter was less than 50%      MEASUREMENT TABLES    DOPPLER MEASUREMENTS  Left ventricle   (Reference normals)  E/Ea, med ho, tiss DP   19    (--)    SYSTEM MEASUREMENT TABLES    2D  %FS: 7 5 %  Ao Diam: 3 1 cm  EDV(Teich): 194 6 ml  EF Biplane: 26 8 %  EF(Teich): 16 4 %  ESV(Teich): 162 7 ml  IVSd: 0 8 cm  LA Area: 28 2 cm2  LA Diam: 4 3 cm  LVEDV MOD A2C: 190 9 ml  LVEDV MOD A4C: 150 7 ml  LVEDV MOD BP: 170 3 ml  LVEF MOD A2C: 18 9 %  LVEF MOD A4C: 38 1 %  LVESV MOD A2C: 154 9 ml  LVESV MOD A4C: 93 2 ml  LVESV MOD BP: 124 7 ml  LVIDd: 6 2 cm  LVIDs: 5 7 cm  LVLd A2C: 9 3 cm  LVLd A4C: 9 4 cm  LVLs A2C: 8 9 cm  LVLs A4C: 8 2 cm  LVOT Diam: 1 9 cm  LVPWd: 0 6 cm  RA Area: 29 7 cm2  RVIDd: 4 4 cm  SV MOD A2C: 36 ml  SV MOD A4C: 57 5 ml  SV(Teich): 32 ml    CF  MR Als  Twan: 0 3 m/s  MR Flow: 152 5 ml/s  MR Rad: 0 9 cm  TR Als  Twan: 0 3 m/s  TR Flow: 200 3 ml/s  TR Rad: 1 1 cm    CW  AV Env  Ti: 350 1 ms  AV VTI: 49 8 cm  AV Vmax: 2 2 m/s  AV Vmean: 1 4 m/s  AV maxP 7 mmHg  AV meanP 5 mmHg  MR VTI: 188 4 cm  MR Vmax: 5 8 m/s  TR VTI: 137 5 cm  TR Vmax: 4 m/s  TR Vmax: 4 1 m/s  TR maxP 6 mmHg    MM  TAPSE: 2 2 cm    PW  KOFI (VTI): 1 2 cm2  KOFI Vmax: 1 4 cm2  E': 0 1 m/s  E/E': 19 1  LVOT Env  Ti: 310 2 ms  LVOT VTI: 22 cm  LVOT Vmax: 1 1 m/s  LVOT Vmean: 0 7 m/s  LVOT maxP 3 mmHg  LVOT meanP 4 mmHg  LVSV Dopp: 60 7 ml  MR ERO: 0 3 cm2  MR RV: 49 7 ml  MV A Twan: 0 4 m/s  MV Dec [place]: 7 5 m/s2  MV DecT: 145 9 ms  MV E Twan: 1 1 m/s  MV E/A Ratio: 2 6  MV PHT: 42 3 ms  MVA By PHT: 5 2 cm2  TR ERO: 0 5 cm2  TR RV: 66 4 ml    [facility] Accredited [facility]chocardi[facility]    Prepared and electronically signed by    Alicia Myers MD  Signed 2017 18:18:10         Medications:    Current Outpatient Medications:     ascorbic acid (VITAMIN C) 250 mg tablet, Take 250 mg by mouth daily, Disp: , Rfl:     aspirin (ECOTRIN LOW STRENGTH) 81 mg EC tablet, Take 1 tablet (81 mg total) by mouth daily, Disp: 30 tablet, Rfl: 0    atorvastatin (LIPITOR) 40 mg tablet, Take 40 mg by mouth daily, Disp: , Rfl:     Calcium Polycarbophil (FIBER) 625 MG TABS, Take 0 8 tablets by mouth daily, Disp: 14 each, Rfl: 0    ferrous sulfate 325 (65 Fe) mg tablet, Take 325 mg by mouth daily with breakfast, Disp: , Rfl:     hydrALAZINE (APRESOLINE) 25 mg tablet, Take 1 tablet (25 mg total) by mouth every 8 (eight) hours, Disp: 90 tablet, Rfl: 0    isosorbide dinitrate (ISORDIL) 10 mg tablet, Take 1 tablet (10 mg total) by mouth 3 (three) times daily after meals, Disp: 90 tablet, Rfl: 0    levothyroxine 50 mcg tablet, Take 50 mcg by mouth daily, Disp: , Rfl:     lisinopril (ZESTRIL) 2 5 mg tablet, Take 1 tablet (2 5 mg total) by mouth daily, Disp: 90 tablet, Rfl: 3    metoprolol succinate (TOPROL-XL) 50 mg 24 hr tablet, Take 1 tablet by mouth daily, Disp: 30 tablet, Rfl: 2    Multiple Vitamin (MULTIVITAMIN) tablet, Take 1 tablet by mouth daily, Disp: , Rfl:     nystatin (nystatin) powder, Apply 1 application topically 3 (three) times a day for 5 days, Disp: 15 g, Rfl: 0    Omega-3 Fatty Acids (FISH OIL) 1,000 mg, Take 1,000 mg by mouth 2 (two) times a day, Disp: , Rfl:     oxybutynin (DITROPAN-XL) 10 MG 24 hr tablet, Take 1 tablet by mouth daily at bedtime (Patient taking differently: Take 5 mg by mouth daily at bedtime  ), Disp: , Rfl: 0    sertraline (ZOLOFT) 50 mg tablet, Take 50 mg by mouth daily, Disp: , Rfl:     torsemide (DEMADEX) 20 mg tablet, Take 1 tablet (20 mg total) by mouth 2 (two) times a day, Disp: 60 tablet, Rfl: 0    warfarin (COUMADIN) 5 mg tablet, Take 5 mg by mouth every other day, Disp: , Rfl:     warfarin (COUMADIN) 7 5 mg tablet, Take by mouth daily, Disp: , Rfl:       Allergies: Allergies   Allergen Reactions    Penicillins Rash         Assessment and Plan:  1  Chronic combined systolic and diastolic heart failure (HCC)  Almost euvolemic  Continue torsemide 20 milligram b i d   Continue ACE-inhibitor, beta-blocker, hydralazine and ISDN  Low-salt diet  Daily weights  If weight increases by 3 lb in a day or 5 lb in a week please increase the diuretic dose as explained    2  Other cardiomyopathy ([address] )   EF 25  Continue Ace inhibitor, beta-blocker, hydralazine, ISDN  Continue LifeVest   Follow-up with Dr Hill Rai    3  CAD S/P percutaneous coronary angioplasty  Cath findings d/w pt and family  Continue ASA, beta-blocker, statin, ACE-inhibitor  4  Essential hypertension, Diastolic dysfunction    5  Nonrheumatic mitral valve regurgitation, Non-rheumatic aortic stenosis, Nonrheumatic tricuspid valve regurgitation     6  HLP   continue statin and diet control  Her PCP closely monitor the blood work    7  Severe pulmonary hypertension  Follow up with Dr Gillian Flower    Recommend aggressive risk factor modification and therapeutic lifestyle changes  Low-salt, low-calorie, low-fat, low-cholesterol diet with regular exercise and to optimize weight  I will defer the ordering and monitoring of necessity lab studies to you, but I am available and happy to review and manage any of the data at your request in the future  Discussed concepts of atherosclerosis, including signs and symptoms of cardiac disease  Previous studies were reviewed  Safety measures were reviewed  Questions were entertained and answered  Patient was advised to report any problems requiring medical attention  Follow-up with PCP and appropriate specialist and lab work as discussed  Return for follow up visit as scheduled or earlier, if needed  Thank you for allowing me to participate in the care and evaluation of your patient  Should you have any questions, please feel free to contact me        Jerman Cornell MD  1/58/0781,4:42 PM

## 2019-02-25 NOTE — TELEPHONE ENCOUNTER
S/w pt's daughter Shelby Range  She advised me beginning on Saturday 2/23/19 swelling in both legs started  Analia Mcguire does not complain of any other symptoms  Please advise

## 2019-03-06 NOTE — PROGRESS NOTES
Heart Failure Outpatient Consult Note - Rajesh Mcbride 68 y o  female MRN: [id number]    @ Encounter: [id number]  Assessment/Plan:    Patient Active Problem List    Diagnosis Date Noted    CKD (chronic kidney disease) stage 3, GFR 30-59 ml/min ([facility]) 02/21/2019    Bilateral leg edema 02/16/2019    Acute on chronic systolic congestive heart failure ([facility] ) 02/16/2019    Solitary kidney 12/25/2017    MANJEET (acute kidney injury) ([place] [address] ) 07/14/2017    Normocytic anemia 07/14/2017    Essential hypertension 07/14/2017    Diabetes mellitus ([place] [address] ) 07/14/2017     # Chronic BiV HFrEF, likely combined iCM and NICM, LVEF 25%, LVIDd 6 cm, NYHA III, ACC/AHA Stage C/D:  Diag:  LVH Cannon:  --TTE 2/23/2016: LVEF 40-45%  LVIDd 5 5 cm  RCA and LCx region WMA  PASP 55 mmHg  Mild AS w/ KOFI 1 7 cm2  Mild to mod MR    --Pharm Nuc 10/10/2016: No e/o ischemia  LVEF 46%  SL Cannon:  --TTE 7/14/17: LVEF 25-30%, LVIDd 6 2 cm  Mild RVE w/ preserved RVSF (TAPSE>2 and S'>10 cm/s)  Sev MR and TR  Mod VICTORIANO  AV Vmax 2 2 m/s  Mild to mod AS w/ mean grad 10, KOFI 1 1-1 3 cm2  Mid to late systolic RVOT notching  --TTE 10/17/17: LVEF 40-45%, LVIDd 5 6 cm, mild RVE  Normal RVSF (TAPSE>2 cm, S'>10 cm/s)  Mild to mod AS w/ mean 15 mmHg  KOFI ~1 4 cm2  @ least mod MR  Mild TR  PASP 46 mmHg  Mid to late systolic RVOT notching  LVOT VTI ~25 cm    --TTE 2/18/19: LVEF ~20%, LVIDd 6 cm, mod RVE w/ mod reduced RVSF (S'<10 cm/s, TAPSE 1 5 cm), mild IVS flattening in diastole  Mod VICTORIANO  @ least moderate eccentric MR  Likely low output based on noninvasive assessment (LVOT VTI reduced<13 cm)  Mod to severe AS (0 8 to 1 cm2)  AV Vmax 2 m/s  Transaortic velocity likely reduced 2/2 to low SV  PASP 70 mmHg  --R+LHC 2/20/19: 60% distal LAD  prox LCx 100% CTRO @ site of prior stent  OM1 100%   RCA dominant, mod nonobs CAD  Mod AS (KOFI 1 cm2)  RHC (independently reviewed)-> RA 25, RV 78/27, PA 75/34/48, PCWP 38; LVEDP ~38 mmHg; CO/CI 2 3/1 1 (Luisa)  TPG 10, PVR 4 3   SVR 2655  PVR:SVR 0 13; CVP:PCWP 0 66; Byron 1 64     Impression: LVEF initially increased from 7/2017 to 10/2017 on medical therapy optimization  Now has re-reduced on 2/18/19 TTE  There may have been some LV reverse remodelling based on changing LV dimension in diastole  Patient was taken off spironolactone when LVEF improved due to low GFR  Per chart review, patient has had issues with compliance in the past  Etiologies include: medication noncompliance +/- ischemic disease +/- progressive valvular disease (AV and MR -> although AS progression may be more c/w pseudo AS and MR)  An additional nonischemic contributor may be from chemotherapy for prior Breast CA, likely doxorubicin (patient does not know)  Likely combination of iCM and NICM based on above  Will do the following: To do:  --cMRI to assess further if no recovery in the future (has solitary kidney, but GFR acceptable)  --SPEP, ferritin, HIV, CHRISTINA  --Dobutamine stress to assess AV further (possible pseudo AS based on low gradients)    Therapeutic:  --2g sodium diet  --2000 ml fluid restriction    Neurohormonal Blockade:  --Beta-Blocker: Continue metoprolol succinate 50 mg PO daily  --ACEi, ARB or ARNi: Continue lisinopril 2 5 mg PO daily  --Vasodilators: Increase isosorbide to 20 mg q8hrs; continue hydralazine 25 mg q8hrs for further SVR reduction; would continue to uptitrate as tolerated  --Aldosterone Receptor Blocker: Currently off MRA 2/2 to GFR<30 while on MRA   Now >30 off MRA    --Diuretic: Continue torsemid 20 mg PO daily (as weight is stable on that @ 174 lbs)    Sudden Cardiac Death Risk Reduction:  --ICD: Continue LifeVest for now as bridge to recovery versus     Electrical Resynchronization:  --Candidacy for BiV device: Narrow QRS    Advanced Therapies: Will continue to monitor    # CAD s/p PCI x 2 to RCA in 2011  # HTN  # HLD  # Group II PH w/ e/o pulmonary vascular remodeling based on noninvasive assessment  # Hx of DVT: Per patient wishes, will stop coumadin, and start Eliquis 2 5 mg PO BID  # Hx of breast CA  # CKD III    RTC in 6 weeks    HPI: Very pleasant 69 y/o woman w/ PMHx as noted above presents for evaluation of decreased LVEF  The patient was admitted in 7/2017 for volume overload found to have LVEF 25-30%  She was started on GDMT and d/cd with a LifeVest  LVEF improved to 35-40% on subsequent TTE and so LifeVest was D/Cd  She then represented in 2/2019 with another HF exacerbation  LVEF had decreased again to 20%  LHC as noted above  She was diuresed, stopped spironolactone 2/2 to     She does have a hx of L breast CA s/p lymph node dissection with chemotherapy >25 years ago  She states she is about 174 lbs currently, was 169 lbs on D/C  Weight is stable on home scale  Past Medical History:   Diagnosis Date    Aortic valve stenosis     [facility] [place] [facility] )     Cardiac disease     Cardiomyopathy ([facility] )     CHF (congestive heart failure) (HCC)     Chronic systolic heart failure (HCC)     Coronary artery disease     Diabetes mellitus (HCC)     Diastolic dysfunction     Hypertension     Mitral regurgitation     Pulmonary HTN (HCC)     Renal disorder     Stroke ([facility] [place] )     Tricuspid regurgitation      Review of Systems - 12 point ROS was done and is negative, except as noted above       Allergies   Allergen Reactions    Penicillins Rash       Current Outpatient Medications:     ascorbic acid (VITAMIN C) 250 mg tablet, Take 250 mg by mouth daily, Disp: , Rfl:     aspirin (ECOTRIN LOW STRENGTH) 81 mg EC tablet, Take 1 tablet (81 mg total) by mouth daily, Disp: 30 tablet, Rfl: 0    atorvastatin (LIPITOR) 40 mg tablet, Take 40 mg by mouth daily, Disp: , Rfl:     ferrous sulfate 325 (65 Fe) mg tablet, Take 325 mg by mouth daily with breakfast, Disp: , Rfl:     hydrALAZINE (APRESOLINE) 25 mg tablet, Take 1 tablet (25 mg total) by mouth every 8 (eight) hours, Disp: 90 tablet, Rfl: 0    isosorbide dinitrate (ISORDIL) 10 mg tablet, Take 2 tablets (20 mg total) by mouth 3 (three) times daily after meals, Disp: 540 tablet, Rfl: 3    levothyroxine 50 mcg tablet, Take 50 mcg by mouth daily, Disp: , Rfl:     lisinopril (ZESTRIL) 2 5 mg tablet, Take 1 tablet (2 5 mg total) by mouth daily, Disp: 90 tablet, Rfl: 3    metoprolol succinate (TOPROL-XL) 50 mg 24 hr tablet, Take 1 tablet by mouth daily, Disp: 30 tablet, Rfl: 2    Multiple Vitamin (MULTIVITAMIN) tablet, Take 1 tablet by mouth daily, Disp: , Rfl:     Omega-3 Fatty Acids (FISH OIL) 1,000 mg, Take 1,000 mg by mouth 2 (two) times a day, Disp: , Rfl:     oxybutynin (DITROPAN-XL) 10 MG 24 hr tablet, Take 1 tablet by mouth daily at bedtime (Patient taking differently: Take 5 mg by mouth daily at bedtime  ), Disp: , Rfl: 0    sertraline (ZOLOFT) 50 mg tablet, Take 50 mg by mouth daily, Disp: , Rfl:     torsemide (DEMADEX) 20 mg tablet, Take 1 tablet (20 mg total) by mouth 2 (two) times a day, Disp: 60 tablet, Rfl: 0    Calcium Polycarbophil (FIBER) 625 MG TABS, Take 0 8 tablets by mouth daily (Patient not taking: Reported on 3/7/2019), Disp: 14 each, Rfl: 0    nystatin (nystatin) powder, Apply 1 application topically 3 (three) times a day for 5 days, Disp: 15 g, Rfl: 0    Social History     Socioeconomic History    Marital status:       Spouse name: Not on file    Number of children: Not on file    Years of education: Not on file    Highest education level: Not on file   Occupational History    Not on file   Social Needs    Financial resource strain: Not on file    Food insecurity:     Worry: Not on file     Inability: Not on file    Transportation needs:     Medical: Not on file     Non-medical: Not on file   Tobacco Use    Smoking status: Former Smoker     Packs/day: 0 50     Years: 45 00     Pack years: 22 50     Last attempt to quit: 2009     Years since quittin 2    Smokeless tobacco: Never Used   Substance and Sexual Activity    Alcohol use: Not Currently Frequency: Never    Drug use: No    Sexual activity: Not on file   Lifestyle    Physical activity:     Days per week: Not on file     Minutes per session: Not on file    Stress: Not on file   Relationships    Social connections:     Talks on phone: Not on file     Gets together: Not on file     Attends Judaism service: Not on file     Active member of club or organization: Not on file     Attends meetings of clubs or organizations: Not on file     Relationship status: Not on file    Intimate partner violence:     Fear of current or ex partner: Not on file     Emotionally abused: Not on file     Physically abused: Not on file     Forced sexual activity: Not on file   Other Topics Concern    Not on file   Social History Narrative    Lives with daughter       Family History   Family history unknown: Yes     Physical Exam:    Vitals: Blood pressure 144/80, pulse 72, height 5' 6" (1 676 m), weight 78 9 kg (174 lb), SpO2 97 %  , Body mass index is 28 08 kg/m² ,   Wt Readings from Last 3 Encounters:   03/07/19 78 9 kg (174 lb)   02/25/19 78 9 kg (174 lb)   02/21/19 86 6 kg (190 lb 14 7 oz)     Vitals:    03/07/19 1106   BP: 144/80   BP Location: Left arm   Patient Position: Sitting   Cuff Size: Adult   Pulse: 72   SpO2: 97%   Weight: 78 9 kg (174 lb)   Height: 5' 6" (1 676 m)       GEN: Nella Oconnor appears well, alert and oriented x 3, pleasant and cooperative   HEENT: pupils equal, round, and reactive to light; extraocular muscles intact  NECK: supple, no carotid bruits   HEART: regular rhythm, normal S1 and S2, no murmurs, clicks, gallops or rubs, JVP is    LUNGS: clear to auscultation bilaterally; no wheezes, rales, or rhonchi   ABDOMEN: normal bowel sounds, soft, no tenderness, no distention  EXTREMITIES: peripheral pulses normal; no clubbing, cyanosis, or edema  NEURO: no focal findings   SKIN: normal without suspicious lesions on exposed skin    Labs & Results:  Lab Results   Component Value Date    WBC 5 81 02/21/2019    HGB 12 3 02/21/2019    HCT 38 5 02/21/2019    MCV 93 02/21/2019     (L) 02/21/2019       Chemistry        Component Value Date/Time    K 3 3 (L) 02/21/2019 0524     02/21/2019 0524    CO2 26 02/21/2019 0524    BUN 28 (H) 02/21/2019 0524    CREATININE 1 42 (H) 02/21/2019 0524        Component Value Date/Time    CALCIUM 8 1 (L) 02/21/2019 0524    ALKPHOS 213 (H) 02/16/2019 0554    AST 41 02/16/2019 0554    ALT 37 02/16/2019 0554        Lab Results   Component Value Date    INR 1 59 (H) 02/20/2019    INR 1 43 (H) 02/19/2019    INR 1 94 (H) 02/17/2019    PROTIME 18 8 (H) 02/20/2019    PROTIME 17 2 (H) 02/19/2019    PROTIME 21 9 (H) 02/17/2019     EKG personally reviewed by Mayra Briones MD      Counseling / Coordination of Care  Total floor / unit time spent today 40 minutes  Greater than 50% of total time was spent with the patient and / or family counseling and / or coordination of care  A description of the counseling / coordination of care: 25 min  Thank you for the opportunity to participate in the care of this patient      Mayra Briones MD, PhD   Bipin Bear

## 2019-03-07 ENCOUNTER — OFFICE VISIT (OUTPATIENT)
Dept: CARDIOLOGY CLINIC | Facility: CLINIC | Age: 74
End: 2019-03-07
Payer: MEDICARE

## 2019-03-07 VITALS
BODY MASS INDEX: 27.97 KG/M2 | SYSTOLIC BLOOD PRESSURE: 144 MMHG | OXYGEN SATURATION: 97 % | WEIGHT: 174 LBS | DIASTOLIC BLOOD PRESSURE: 80 MMHG | HEIGHT: 66 IN | HEART RATE: 72 BPM

## 2019-03-07 DIAGNOSIS — I82.4Y9 DEEP VEIN THROMBOSIS (DVT) OF PROXIMAL LOWER EXTREMITY, UNSPECIFIED CHRONICITY, UNSPECIFIED LATERALITY (HCC): Primary | ICD-10-CM

## 2019-03-07 DIAGNOSIS — I50.23 ACUTE ON CHRONIC SYSTOLIC CONGESTIVE HEART FAILURE (HCC): ICD-10-CM

## 2019-03-07 PROCEDURE — 99215 OFFICE O/P EST HI 40 MIN: CPT | Performed by: INTERNAL MEDICINE

## 2019-03-07 RX ORDER — ISOSORBIDE DINITRATE 10 MG/1
20 TABLET ORAL
Qty: 540 TABLET | Refills: 3 | Status: SHIPPED | OUTPATIENT
Start: 2019-03-07 | End: 2019-05-02

## 2019-03-07 NOTE — LETTER
March 7, 2019     Arnaldo Gutierrez MD  [address] [facility]  [address] [place] [address]    Patient: Enrique Lagunas   YOB: 1945   Date of Visit: 3/7/2019       Dear Dr Chew Core: Thank you for referring Enrique Lagunas to me for evaluation  Below are my notes for this consultation  If you have questions, please do not hesitate to call me  I look forward to following your patient along with you  Sincerely,        Toribio Degroot MD        CC: MD Toribio Tierney MD  3/7/2019 11:24 AM  Sign at close encounter  Heart Failure Outpatient Consult Note - Enrique Lagunas 68 y o  female MRN: [id number]    @ Encounter: [id number]  Assessment/Plan:    Patient Active Problem List    Diagnosis Date Noted    CKD (chronic kidney disease) stage 3, GFR 30-59 ml/min ([facility] ) 02/21/2019    Bilateral leg edema 02/16/2019    Acute on chronic systolic congestive heart failure ([facility] ) 02/16/2019    Solitary kidney 12/25/2017    [facility] 07/14/2017    Normocytic anemia 07/14/2017    Essential hypertension 07/14/2017    Diabetes mellitus ([facility] ) 07/14/2017     # Chronic BiV HFrEF, likely combined iCM and NICM, LVEF 25%, LVIDd 6 cm, NYHA III, ACC/AHA Stage C/D:  Diag:  LVH Cannon:  --TTE 2/23/2016: LVEF 40-45%  LVIDd 5 5 cm  RCA and LCx region WMA  PASP 55 mmHg  Mild AS w/ KOFI 1 7 cm2  Mild to mod MR    --Pharm Nuc 10/10/2016: No e/o ischemia  LVEF 46%  SL Cannon:  --TTE 7/14/17: LVEF 25-30%, LVIDd 6 2 cm  Mild RVE w/ preserved RVSF (TAPSE>2 and S'>10 cm/s)  Sev MR and TR  Mod VICTORIANO  AV Vmax 2 2 m/s  Mild to mod AS w/ mean grad 10, KOFI 1 1-1 3 cm2  Mid to late systolic RVOT notching  --TTE 10/17/17: LVEF 40-45%, LVIDd 5 6 cm, mild RVE  Normal RVSF (TAPSE>2 cm, S'>10 cm/s)  Mild to mod AS w/ mean 15 mmHg  KOFI ~1 4 cm2  @ least mod MR  Mild TR  PASP 46 mmHg  Mid to late systolic RVOT notching   LVOT VTI ~25 cm    --TTE 2/18/19: LVEF ~20%, LVIDd 6 cm, mod RVE w/ mod reduced RVSF (S'<10 cm/s, TAPSE 1 5 cm), mild IVS flattening in diastole  Mod VICTORIANO  @ least moderate eccentric MR  Likely low output based on noninvasive assessment (LVOT VTI reduced<13 cm)  Mod to severe AS (0 8 to 1 cm2)  AV Vmax 2 m/s  Transaortic velocity likely reduced 2/2 to low SV  PASP 70 mmHg  --R+LHC 2/20/19: 60% distal LAD  prox LCx 100% CTRO @ site of prior stent  OM1 100%   RCA dominant, mod nonobs CAD  Mod AS (KOFI 1 cm2)  RHC (independently reviewed)-> RA 25, RV 78/27, PA 75/34/48, PCWP 38; LVEDP ~38 mmHg; CO/CI 2 3/1 1 (Luisa)  TPG 10, PVR 4 3  SVR 2655  PVR:SVR 0 13; CVP:PCWP 0 66; Byron 1 64     Impression: LVEF initially increased from 7/2017 to 10/2017 on medical therapy optimization  Now has re-reduced on 2/18/19 TTE  There may have been some LV reverse remodelling based on changing LV dimension in diastole  Patient was taken off spironolactone when LVEF improved due to low GFR  Per chart review, patient has had issues with compliance in the past  Etiologies include: medication noncompliance +/- ischemic disease +/- progressive valvular disease (AV and MR -> although AS progression may be more c/w pseudo AS and MR)  An additional nonischemic contributor may be from chemotherapy for prior Breast CA, likely doxorubicin (patient does not know)  Likely combination of iCM and NICM based on above  Will do the following:     To do:  --cMRI to assess further if no recovery in the future (has solitary kidney, but GFR acceptable)  --SPEP, ferritin, HIV, CHRISTINA  --Dobutamine stress to assess AV further (possible pseudo AS based on low gradients)    Therapeutic:  --2g sodium diet  --2000 ml fluid restriction    Neurohormonal Blockade:  --Beta-Blocker: Continue metoprolol succinate 50 mg PO daily  --ACEi, ARB or ARNi: Continue lisinopril 2 5 mg PO daily  --Vasodilators: Increase isosorbide to 20 mg q8hrs; continue hydralazine 25 mg q8hrs for further SVR reduction; would continue to uptitrate as tolerated  --Aldosterone Receptor Blocker: Currently off MRA 2/2 to GFR<30 while on MRA  Now >30 off MRA    --Diuretic: Continue torsemid 20 mg PO daily (as weight is stable on that @ 174 lbs)    Sudden Cardiac Death Risk Reduction:  --ICD: Continue LifeVest for now as bridge to recovery versus     Electrical Resynchronization:  --Candidacy for BiV device: Narrow QRS    Advanced Therapies: Will continue to monitor    # CAD s/p PCI x 2 to RCA in 2011  # HTN  # HLD  # Group II PH w/ e/o pulmonary vascular remodeling based on noninvasive assessment  # Hx of DVT: Per patient wishes, will stop coumadin, and start Eliquis 2 5 mg PO BID  # Hx of breast CA  # CKD III    RTC in 6 weeks    HPI: Very pleasant 69 y/o woman w/ PMHx as noted above presents for evaluation of decreased LVEF  The patient was admitted in 7/2017 for volume overload found to have LVEF 25-30%  She was started on GDMT and d/cd with a LifeVest  LVEF improved to 35-40% on subsequent TTE and so LifeVest was D/Cd  She then represented in 2/2019 with another HF exacerbation  LVEF had decreased again to 20%  LHC as noted above  She was diuresed, stopped spironolactone 2/2 to     She does have a hx of L breast CA s/p lymph node dissection with chemotherapy >25 years ago  She states she is about 174 lbs currently, was 169 lbs on D/C  Weight is stable on home scale  Past Medical History:   Diagnosis Date    Aortic valve stenosis     [facility] ([facility] )     Cardiac disease     Cardiomyopathy ([facility] )     CHF (congestive heart failure) (HCC)     Chronic systolic heart failure (HCC)     Coronary artery disease     Diabetes mellitus (HCC)     Diastolic dysfunction     Hypertension     Mitral regurgitation     Pulmonary HTN (HCC)     Renal disorder     [facility] [place]     Tricuspid regurgitation      Review of Systems - 12 point ROS was done and is negative, except as noted above       Allergies   Allergen Reactions    Penicillins Rash       Current Outpatient Medications:     ascorbic acid (VITAMIN C) 250 mg tablet, Take 250 mg by mouth daily, Disp: , Rfl:     aspirin (ECOTRIN LOW STRENGTH) 81 mg EC tablet, Take 1 tablet (81 mg total) by mouth daily, Disp: 30 tablet, Rfl: 0    atorvastatin (LIPITOR) 40 mg tablet, Take 40 mg by mouth daily, Disp: , Rfl:     ferrous sulfate 325 (65 Fe) mg tablet, Take 325 mg by mouth daily with breakfast, Disp: , Rfl:     hydrALAZINE (APRESOLINE) 25 mg tablet, Take 1 tablet (25 mg total) by mouth every 8 (eight) hours, Disp: 90 tablet, Rfl: 0    isosorbide dinitrate (ISORDIL) 10 mg tablet, Take 2 tablets (20 mg total) by mouth 3 (three) times daily after meals, Disp: 540 tablet, Rfl: 3    levothyroxine 50 mcg tablet, Take 50 mcg by mouth daily, Disp: , Rfl:     lisinopril (ZESTRIL) 2 5 mg tablet, Take 1 tablet (2 5 mg total) by mouth daily, Disp: 90 tablet, Rfl: 3    metoprolol succinate (TOPROL-XL) 50 mg 24 hr tablet, Take 1 tablet by mouth daily, Disp: 30 tablet, Rfl: 2    Multiple Vitamin (MULTIVITAMIN) tablet, Take 1 tablet by mouth daily, Disp: , Rfl:     Omega-3 Fatty Acids (FISH OIL) 1,000 mg, Take 1,000 mg by mouth 2 (two) times a day, Disp: , Rfl:     oxybutynin (DITROPAN-XL) 10 MG 24 hr tablet, Take 1 tablet by mouth daily at bedtime (Patient taking differently: Take 5 mg by mouth daily at bedtime  ), Disp: , Rfl: 0    sertraline (ZOLOFT) 50 mg tablet, Take 50 mg by mouth daily, Disp: , Rfl:     torsemide (DEMADEX) 20 mg tablet, Take 1 tablet (20 mg total) by mouth 2 (two) times a day, Disp: 60 tablet, Rfl: 0    Calcium Polycarbophil (FIBER) 625 MG TABS, Take 0 8 tablets by mouth daily (Patient not taking: Reported on 3/7/2019), Disp: 14 each, Rfl: 0    nystatin (nystatin) powder, Apply 1 application topically 3 (three) times a day for 5 days, Disp: 15 g, Rfl: 0    Social History     Socioeconomic History    Marital status:       Spouse name: Not on file    Number of children: Not on file    Years of education: Not on file    Highest education level: Not on file   Occupational History    Not on file   Social Needs    Financial resource strain: Not on file    Food insecurity:     Worry: Not on file     Inability: Not on file    Transportation needs:     Medical: Not on file     Non-medical: Not on file   Tobacco Use    Smoking status: Former Smoker     Packs/day: 0 50     Years: 45 00     Pack years: 22 50     Last attempt to quit: 2009     Years since quittin 2    Smokeless tobacco: Never Used   Substance and Sexual Activity    Alcohol use: Not Currently     Frequency: Never    Drug use: No    Sexual activity: Not on file   Lifestyle    Physical activity:     Days per week: Not on file     Minutes per session: Not on file    Stress: Not on file   Relationships    Social connections:     Talks on phone: Not on file     Gets together: Not on file     Attends Alevism service: Not on file     Active member of club or organization: Not on file     Attends meetings of clubs or organizations: Not on file     Relationship status: Not on file    Intimate partner violence:     Fear of current or ex partner: Not on file     Emotionally abused: Not on file     Physically abused: Not on file     Forced sexual activity: Not on file   Other Topics Concern    Not on file   Social History Narrative    Lives with daughter       Family History   Family history unknown: Yes     Physical Exam:    Vitals: Blood pressure 144/80, pulse 72, height 5' 6" (1 676 m), weight 78 9 kg (174 lb), SpO2 97 %  , Body mass index is 28 08 kg/m² ,   Wt Readings from Last 3 Encounters:   19 78 9 kg (174 lb)   19 78 9 kg (174 lb)   19 86 6 kg (190 lb 14 7 oz)     Vitals:    19 1106   BP: 144/80   BP Location: Left arm   Patient Position: Sitting   Cuff Size: Adult   Pulse: 72   SpO2: 97%   Weight: 78 9 kg (174 lb)   Height: 5' 6" (1 676 m)       GEN: Charisma Mcclure appears well, alert and oriented x 3, pleasant and cooperative HEENT: pupils equal, round, and reactive to light; extraocular muscles intact  NECK: supple, no carotid bruits   HEART: regular rhythm, normal S1 and S2, no murmurs, clicks, gallops or rubs, JVP is    LUNGS: clear to auscultation bilaterally; no wheezes, rales, or rhonchi   ABDOMEN: normal bowel sounds, soft, no tenderness, no distention  EXTREMITIES: peripheral pulses normal; no clubbing, cyanosis, or edema  NEURO: no focal findings   SKIN: normal without suspicious lesions on exposed skin    Labs & Results:  Lab Results   Component Value Date    WBC 5 81 02/21/2019    HGB 12 3 02/21/2019    HCT 38 5 02/21/2019    MCV 93 02/21/2019     (L) 02/21/2019       Chemistry        Component Value Date/Time    K 3 3 (L) 02/21/2019 0524     02/21/2019 0524    CO2 26 02/21/2019 0524    BUN 28 (H) 02/21/2019 0524    CREATININE 1 42 (H) 02/21/2019 0524        Component Value Date/Time    CALCIUM 8 1 (L) 02/21/2019 0524    ALKPHOS 213 (H) 02/16/2019 0554    AST 41 02/16/2019 0554    ALT 37 02/16/2019 0554        Lab Results   Component Value Date    INR 1 59 (H) 02/20/2019    INR 1 43 (H) 02/19/2019    INR 1 94 (H) 02/17/2019    PROTIME 18 8 (H) 02/20/2019    PROTIME 17 2 (H) 02/19/2019    PROTIME 21 9 (H) 02/17/2019     EKG personally reviewed by Lowell Torres MD      Counseling / Coordination of Care  Total floor / unit time spent today 40 minutes  Greater than 50% of total time was spent with the patient and / or family counseling and / or coordination of care  A description of the counseling / coordination of care: 25 min  Thank you for the opportunity to participate in the care of this patient      Lowell Torres MD, PhD   Ladarius Meek

## 2019-04-23 ENCOUNTER — TELEPHONE (OUTPATIENT)
Dept: CARDIOLOGY CLINIC | Facility: CLINIC | Age: 74
End: 2019-04-23

## 2019-04-26 ENCOUNTER — TELEPHONE (OUTPATIENT)
Dept: CARDIOLOGY CLINIC | Facility: CLINIC | Age: 74
End: 2019-04-26

## 2019-04-29 ENCOUNTER — TRANSCRIBE ORDERS (OUTPATIENT)
Dept: LAB | Facility: CLINIC | Age: 74
End: 2019-04-29

## 2019-04-29 ENCOUNTER — APPOINTMENT (OUTPATIENT)
Dept: LAB | Facility: CLINIC | Age: 74
End: 2019-04-29
Payer: MEDICARE

## 2019-04-29 DIAGNOSIS — I50.23 ACUTE ON CHRONIC SYSTOLIC CONGESTIVE HEART FAILURE (HCC): ICD-10-CM

## 2019-04-29 LAB — FERRITIN SERPL-MCNC: 712 NG/ML (ref 8–388)

## 2019-04-29 PROCEDURE — 87389 HIV-1 AG W/HIV-1&-2 AB AG IA: CPT

## 2019-04-29 PROCEDURE — 36415 COLL VENOUS BLD VENIPUNCTURE: CPT | Performed by: INTERNAL MEDICINE

## 2019-04-29 PROCEDURE — 84165 PROTEIN E-PHORESIS SERUM: CPT | Performed by: PATHOLOGY

## 2019-04-29 PROCEDURE — 82728 ASSAY OF FERRITIN: CPT | Performed by: INTERNAL MEDICINE

## 2019-04-29 PROCEDURE — 86039 ANTINUCLEAR ANTIBODIES (ANA): CPT | Performed by: INTERNAL MEDICINE

## 2019-04-29 PROCEDURE — 86038 ANTINUCLEAR ANTIBODIES: CPT | Performed by: INTERNAL MEDICINE

## 2019-04-29 PROCEDURE — 84165 PROTEIN E-PHORESIS SERUM: CPT

## 2019-04-30 ENCOUNTER — HOSPITAL ENCOUNTER (OUTPATIENT)
Dept: NON INVASIVE DIAGNOSTICS | Facility: HOSPITAL | Age: 74
Discharge: HOME | End: 2019-04-30
Payer: MEDICARE

## 2019-04-30 VITALS — SYSTOLIC BLOOD PRESSURE: 150 MMHG | BODY MASS INDEX: 27.12 KG/M2 | WEIGHT: 168 LBS | DIASTOLIC BLOOD PRESSURE: 90 MMHG

## 2019-04-30 DIAGNOSIS — I50.23 ACUTE ON CHRONIC SYSTOLIC CONGESTIVE HEART FAILURE (HCC): ICD-10-CM

## 2019-04-30 LAB
ALBUMIN SERPL ELPH-MCNC: 4.36 G/DL (ref 3.5–5)
ALBUMIN SERPL ELPH-MCNC: 50.1 % (ref 52–65)
ALPHA1 GLOB SERPL ELPH-MCNC: 0.3 G/DL (ref 0.1–0.4)
ALPHA1 GLOB SERPL ELPH-MCNC: 3.5 % (ref 2.5–5)
ALPHA2 GLOB SERPL ELPH-MCNC: 0.7 G/DL (ref 0.4–1.2)
ALPHA2 GLOB SERPL ELPH-MCNC: 8 % (ref 7–13)
BETA GLOB ABNORMAL SERPL ELPH-MCNC: 0.42 G/DL (ref 0.4–0.8)
BETA1 GLOB SERPL ELPH-MCNC: 4.8 % (ref 5–13)
BETA2 GLOB SERPL ELPH-MCNC: 6.8 % (ref 2–8)
BETA2+GAMMA GLOB SERPL ELPH-MCNC: 0.59 G/DL (ref 0.2–0.5)
GAMMA GLOB ABNORMAL SERPL ELPH-MCNC: 2.33 G/DL (ref 0.5–1.6)
GAMMA GLOB SERPL ELPH-MCNC: 26.8 % (ref 12–22)
HIV 1+2 AB+HIV1 P24 AG SERPL QL IA: NORMAL
IGG/ALB SER: 1 {RATIO} (ref 1.1–1.8)
PROT PATTERN SERPL ELPH-IMP: ABNORMAL
PROT SERPL-MCNC: 8.7 G/DL (ref 6.4–8.2)

## 2019-04-30 PROCEDURE — 93350 STRESS TTE ONLY: CPT

## 2019-04-30 RX ORDER — DOBUTAMINE HYDROCHLORIDE 200 MG/100ML
5 INJECTION INTRAVENOUS CONTINUOUS
Status: DISCONTINUED | OUTPATIENT
Start: 2019-04-30 | End: 2019-05-04 | Stop reason: HOSPADM

## 2019-04-30 RX ADMIN — DOBUTAMINE HYDROCHLORIDE 15 MCG/KG/MIN: 200 INJECTION INTRAVENOUS at 12:06

## 2019-04-30 RX ADMIN — DOBUTAMINE HYDROCHLORIDE 10 MCG/KG/MIN: 200 INJECTION INTRAVENOUS at 12:03

## 2019-04-30 RX ADMIN — DOBUTAMINE HYDROCHLORIDE 5 MCG/KG/MIN: 200 INJECTION INTRAVENOUS at 12:00

## 2019-04-30 RX ADMIN — DOBUTAMINE HYDROCHLORIDE 20 MCG/KG/MIN: 200 INJECTION INTRAVENOUS at 12:09

## 2019-05-01 PROCEDURE — 93351 STRESS TTE COMPLETE: CPT | Performed by: INTERNAL MEDICINE

## 2019-05-02 ENCOUNTER — OFFICE VISIT (OUTPATIENT)
Dept: CARDIOLOGY CLINIC | Facility: CLINIC | Age: 74
End: 2019-05-02
Payer: MEDICARE

## 2019-05-02 VITALS
SYSTOLIC BLOOD PRESSURE: 144 MMHG | BODY MASS INDEX: 27.48 KG/M2 | DIASTOLIC BLOOD PRESSURE: 64 MMHG | HEIGHT: 66 IN | OXYGEN SATURATION: 95 % | WEIGHT: 171 LBS | HEART RATE: 67 BPM

## 2019-05-02 DIAGNOSIS — I50.23 ACUTE ON CHRONIC SYSTOLIC CONGESTIVE HEART FAILURE (HCC): ICD-10-CM

## 2019-05-02 DIAGNOSIS — I50.9 ACUTE EXACERBATION OF CHF (CONGESTIVE HEART FAILURE) (HCC): ICD-10-CM

## 2019-05-02 DIAGNOSIS — I10 ESSENTIAL HYPERTENSION: ICD-10-CM

## 2019-05-02 DIAGNOSIS — I82.4Y9 DEEP VEIN THROMBOSIS (DVT) OF PROXIMAL LOWER EXTREMITY, UNSPECIFIED CHRONICITY, UNSPECIFIED LATERALITY (HCC): ICD-10-CM

## 2019-05-02 LAB
ANA HOMOGEN SER QL IF: NORMAL
ANA HOMOGEN TITR SER: NORMAL {TITER}
RYE IGE QN: POSITIVE

## 2019-05-02 PROCEDURE — 99214 OFFICE O/P EST MOD 30 MIN: CPT | Performed by: INTERNAL MEDICINE

## 2019-05-02 RX ORDER — ISOSORBIDE DINITRATE 10 MG/1
30 TABLET ORAL
Qty: 540 TABLET | Refills: 3 | Status: SHIPPED | OUTPATIENT
Start: 2019-05-02 | End: 2019-07-11

## 2019-05-02 RX ORDER — ISOSORBIDE DINITRATE 10 MG/1
20 TABLET ORAL
Qty: 540 TABLET | Refills: 3 | Status: CANCELLED | OUTPATIENT
Start: 2019-05-02

## 2019-05-02 RX ORDER — LISINOPRIL 2.5 MG/1
2.5 TABLET ORAL DAILY
Qty: 90 TABLET | Refills: 3 | Status: SHIPPED | OUTPATIENT
Start: 2019-05-02

## 2019-05-02 RX ORDER — TORSEMIDE 20 MG/1
20 TABLET ORAL
Qty: 180 TABLET | Refills: 3 | Status: SHIPPED | OUTPATIENT
Start: 2019-05-02

## 2019-05-03 LAB
CHEST PAIN STATEMENT: NORMAL
MAX DIASTOLIC BP: 75 MMHG
MAX HEART RATE: 98 BPM
MAX PREDICTED HEART RATE: 148 BPM
MAX. SYSTOLIC BP: 180 MMHG
PROTOCOL NAME: NORMAL
REASON FOR TERMINATION: NORMAL
TARGET HR FORMULA: NORMAL
TEST INDICATION: NORMAL
TIME IN EXERCISE PHASE: NORMAL

## 2019-06-06 NOTE — PROGRESS NOTES
Cardiology Progress Note - Porsche Cahn 68 y o  female MRN: [id number]    Unit/Bed#: -01 Encounter: [id number]      Assessment/Plan:  1  Acute on chronic systolic and diastolic heart failure  · Cont torsemide 20 mg p o  B i d  · LVEDP: 30- was given an extra dose of lasix in the cath lab  · Check daily weights and maintain strict I/O  · Fluid and sodium restriction were advised      2  Cardiomyopathy with EF: 25%  · Recent decline from 40% in 10/2017- likely secondary to progressive valvular heart disease  · Cath with 100% lesion of the pLCx and OM1; 60% stenosis of dLAD  No intervention was performed  · Cont lisinopril and toprol-xl  · Low output heart failure with CO/CI: 2 2/1 1- will start isordil and hydralazine  · Will arrange for a lifevest    · Will arrange for follow with Dr Travis Oliva with advanced heart failure      3  CAD s/p PCIx 2 to the RCA in 2011   · Continue aspirin, atorvastatin and metoprolol     4  HTN- continue lisinopril and metoprolol    5  Moderate to severe mitral and tricuspid regurgitation     6  Severe pulmonary hypertension     7  MANJEET    8  Hx of DVT- on coumadin     Subjective:   Patient seen and examined  She is s/p cardiac cath this morning  She denies any chest pain and dyspnea at rest      Objective:     Vitals: Blood pressure 140/74, pulse (!) 51, temperature 97 7 °F (36 5 °C), resp  rate 20, height 5' 6" (1 676 m), weight 89 9 kg (198 lb 3 1 oz), SpO2 100 %  , Body mass index is 31 99 kg/m² ,   Orthostatic Blood Pressures      Most Recent Value   Blood Pressure  140/74 filed at 02/20/2019 1407   Patient Position - Orthostatic VS  Lying filed at 02/20/2019 1048            Intake/Output Summary (Last 24 hours) at 2/20/2019 1511  Last data filed at 2/20/2019 1354  Gross per 24 hour   Intake 420 ml   Output 700 ml   Net -280 ml         Physical Exam:  GEN: Alert and oriented x 3, in no acute distress  Well appearing and well nourished     HEENT: Sclera anicteric, conjunctivae LWm to return call.   pink, mucous membranes moist  Oropharynx clear  NECK: Supple, no carotid bruits, no significant JVD  Trachea midline, no thyromegaly  HEART: Regular rhythm, 3/6 LIBIA, No clicks, gallops or rubs  PMI nondisplaced, no thrills  LUNGS: Clear to auscultation anteriorly  ABDOMEN: Soft, nontender, nondistended, normoactive bowel sounds  EXTREMITIES: Skin warm and well perfused, no clubbing, cyanosis, or edema  SKIN: Normal without suspicious lesions on exposed skin        Medications:      Current Facility-Administered Medications:     acetaminophen (TYLENOL) tablet 650 mg, 650 mg, Oral, Q6H PRN, Renny Hu PA-C    ascorbic acid (VITAMIN C) tablet 250 mg, 250 mg, Oral, Daily, Renny Hu PA-C, 250 mg at 02/20/19 1116    aspirin (ECOTRIN LOW STRENGTH) EC tablet 81 mg, 81 mg, Oral, Daily, Mathew Hay MD, 81 mg at 02/20/19 1118    atorvastatin (LIPITOR) tablet 40 mg, 40 mg, Oral, Daily, Renny Hu PA-C, 40 mg at 02/20/19 1119    ferrous sulfate tablet 325 mg, 325 mg, Oral, Daily With Breakfast, Renny Hu PA-C, 325 mg at 02/20/19 1118    fish oil capsule 1,000 mg, 1,000 mg, Oral, BID, Renny Hu PA-C, 1,000 mg at 02/20/19 1115    heparin (porcine) subcutaneous injection 5,000 Units, 5,000 Units, Subcutaneous, Q8H [address], 5,000 Units at 02/20/19 0558 **AND** [COMPLETED] Platelet count, , , Once, Renny Hu PA-C    insulin lispro (HumaLOG) 100 units/mL subcutaneous injection 1-6 Units, 1-6 Units, Subcutaneous, TID AC, 1 Units at 02/18/19 1141 **AND** Fingerstick Glucose (POCT), , , TID AC, Renny Hu PA-C    insulin lispro (HumaLOG) 100 units/mL subcutaneous injection 1-6 Units, 1-6 Units, Subcutaneous, HS, Renny Hu PA-C, 1 Units at 02/19/19 2303    levothyroxine tablet 50 mcg, 50 mcg, Oral, Early Morning, Renny Hu PA-C, 50 mcg at 02/20/19 0558    lisinopril (ZESTRIL) tablet 5 mg, 5 mg, Oral, Daily, Leighann Sloan MD, 5 mg at 02/20/19 0739    metoprolol succinate (TOPROL-XL) 24 hr tablet 50 mg, 50 mg, Oral, Daily, Renny Hu PA-C, 50 mg at 02/20/19 0737    nystatin (MYCOSTATIN) powder, , Topical, BID, Vincenzo Chavarria MD    ondansetron [facility]) injection 4 mg, 4 mg, Intravenous, Q8H PRN, Renny Hu PA-C    oxybutynin (DITROPAN-XL) 24 hr tablet 5 mg, 5 mg, Oral, HS, Renny Hu PA-C, 5 mg at 02/19/19 2302    sertraline (ZOLOFT) tablet 50 mg, 50 mg, Oral, Daily, Renny Hu PA-C, 50 mg at 02/20/19 1119    sodium chloride 0 9 % infusion, 75 mL/hr, Intravenous, Continuous, Krystal Potts MD, Last Rate: 75 mL/hr at 02/20/19 1038, 75 mL/hr at 02/20/19 1038    torsemide (DEMADEX) tablet 20 mg, 20 mg, Oral, BID (diuretic), Vivienne Hilario MD, 20 mg at 02/20/19 0737    warfarin (COUMADIN) tablet 7 5 mg, 7 5 mg, Oral, Daily (warfarin), Luis Enrique Rogers MD, 7 5 mg at 02/19/19 1745     Labs & Results:    Results from last 7 days   Lab Units 02/16/19  0554 02/16/19  0250 02/15/19  1818   TROPONIN I ng/mL 0 02 0 02 <0 02     Results from last 7 days   Lab Units 02/20/19  0514 02/16/19  0554 02/16/19  0250 02/15/19  1818   WBC Thousand/uL 6 07 7 18  --  5 78   HEMOGLOBIN g/dL 12 9 13 7  --  14 4   HEMATOCRIT % 39 6 43 6  --  47 3*   PLATELETS Thousands/uL 153 138* 144* 116*         Results from last 7 days   Lab Units 02/20/19  0514 02/19/19  0500 02/18/19  0542  02/16/19  0554 02/15/19  2033   POTASSIUM mmol/L 3 5 3 4* 3 6   < > 4 6 5 1   CHLORIDE mmol/L 103 102 105   < > 109* 107   CO2 mmol/L 28 28 24   < > 21 23   BUN mg/dL 32* 33* 31*   < > 28* 24   CREATININE mg/dL 1 38* 1 49* 1 42*   < > 1 49* 1 36*   CALCIUM mg/dL 8 6 8 5 8 9   < > 9 0 9 0   ALK PHOS U/L  --   --   --   --  213* 218*   ALT U/L  --   --   --   --  37 27   AST U/L  --   --   --   --  41 45    < > = values in this interval not displayed       Results from last 7 days   Lab Units 02/20/19  0837 02/19/19  1044 02/17/19  0059   INR  1 59* 1 43* 1 94*   PTT seconds  --   --  36     Results from last 7 days   Lab Units 02/16/19  0554   MAGNESIUM mg/dL 1 9             Counseling / Coordination of Care  Total floor / unit time spent today 30 minutes  Greater than 50% of total time was spent with the patient and / or family counseling and / or coordination of care
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
## 2019-07-11 ENCOUNTER — OFFICE VISIT (OUTPATIENT)
Dept: CARDIOLOGY CLINIC | Facility: CLINIC | Age: 74
End: 2019-07-11
Payer: MEDICARE

## 2019-07-11 ENCOUNTER — HOSPITAL ENCOUNTER (OUTPATIENT)
Dept: NON INVASIVE DIAGNOSTICS | Facility: CLINIC | Age: 74
Discharge: HOME | End: 2019-07-11
Payer: MEDICARE

## 2019-07-11 VITALS
BODY MASS INDEX: 26.2 KG/M2 | SYSTOLIC BLOOD PRESSURE: 138 MMHG | DIASTOLIC BLOOD PRESSURE: 60 MMHG | OXYGEN SATURATION: 98 % | HEIGHT: 66 IN | HEART RATE: 54 BPM | WEIGHT: 163 LBS

## 2019-07-11 DIAGNOSIS — I49.3 PVC (PREMATURE VENTRICULAR CONTRACTION): Primary | ICD-10-CM

## 2019-07-11 DIAGNOSIS — I49.3 PVC (PREMATURE VENTRICULAR CONTRACTION): ICD-10-CM

## 2019-07-11 DIAGNOSIS — I50.23 ACUTE ON CHRONIC SYSTOLIC CONGESTIVE HEART FAILURE (HCC): ICD-10-CM

## 2019-07-11 PROCEDURE — 99214 OFFICE O/P EST MOD 30 MIN: CPT | Performed by: INTERNAL MEDICINE

## 2019-07-11 PROCEDURE — 93226 XTRNL ECG REC<48 HR SCAN A/R: CPT

## 2019-07-11 PROCEDURE — 1124F ACP DISCUSS-NO DSCNMKR DOCD: CPT | Performed by: INTERNAL MEDICINE

## 2019-07-11 PROCEDURE — 93225 XTRNL ECG REC<48 HRS REC: CPT

## 2019-07-11 RX ORDER — MIRTAZAPINE 15 MG/1
TABLET, FILM COATED ORAL
COMMUNITY
End: 2021-02-10

## 2019-07-11 RX ORDER — POTASSIUM CHLORIDE 20 MEQ/1
TABLET, EXTENDED RELEASE ORAL
COMMUNITY
End: 2021-02-10

## 2019-07-11 RX ORDER — SPIRONOLACTONE 25 MG/1
TABLET ORAL
COMMUNITY
End: 2019-07-11 | Stop reason: ALTCHOICE

## 2019-07-11 RX ORDER — BALSAM PERU/CASTOR OIL
OINTMENT (GRAM) TOPICAL 2 TIMES DAILY
COMMUNITY
Start: 2016-11-22 | End: 2021-02-10

## 2019-07-11 RX ORDER — WARFARIN SODIUM 5 MG/1
TABLET ORAL
COMMUNITY
Start: 2019-02-13 | End: 2021-02-10

## 2019-07-11 RX ORDER — ISOSORBIDE DINITRATE 40 MG/1
40 TABLET ORAL
Qty: 270 TABLET | Refills: 3 | Status: SHIPPED | OUTPATIENT
Start: 2019-07-11 | End: 2019-12-12

## 2019-07-11 RX ORDER — FUROSEMIDE 40 MG/1
TABLET ORAL
COMMUNITY
End: 2019-07-11 | Stop reason: ALTCHOICE

## 2019-07-11 RX ORDER — OXYBUTYNIN CHLORIDE 5 MG/1
10 TABLET ORAL
Refills: 4 | COMMUNITY
Start: 2019-06-18 | End: 2021-02-10

## 2019-07-11 RX ORDER — NITROGLYCERIN 0.4 MG/1
TABLET SUBLINGUAL
COMMUNITY
Start: 2016-08-08

## 2019-07-11 RX ORDER — WARFARIN SODIUM 7.5 MG/1
TABLET ORAL
COMMUNITY
End: 2021-02-10

## 2019-07-11 RX ORDER — FUROSEMIDE 20 MG/1
TABLET ORAL
COMMUNITY
End: 2019-07-11 | Stop reason: ALTCHOICE

## 2019-07-11 NOTE — LETTER
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 02/21/2019    HGB 12 3 02/21/2019    HCT 38 5 02/21/2019    MCV 93 02/21/2019     (L) 02/21/2019       Chemistry        Component Value Date/Time    K 3 3 (L) 02/21/2019 0524     02/21/2019 0524    CO2 26 02/21/2019 0524    BUN 28 (H) 02/21/2019 0524    CREATININE 1 42 (H) 02/21/2019 0524        Component Value Date/Time    CALCIUM 8 1 (L) 02/21/2019 0524    ALKPHOS 213 (H) 02/16/2019 0554    AST 41 02/16/2019 0554    ALT 37 02/16/2019 0554        Lab Results   Component Value Date    INR 1 59 (H) 02/20/2019    INR 1 43 (H) 02/19/2019    INR 1 94 (H) 02/17/2019    PROTIME 18 8 (H) 02/20/2019    PROTIME 17 2 (H) 02/19/2019    PROTIME 21 9 (H) 02/17/2019     EKG personally reviewed by Daniel Vines MD      Counseling / Coordination of Care  Total floor / unit time spent today 40 minutes  Greater than 50% of total time was spent with the patient and / or family counseling and / or coordination of care  A description of the counseling / coordination of care: 25 min  Thank you for the opportunity to participate in the care of this patient      Daniel Vines MD, PhD   Reilly Espinoza

## 2019-07-26 PROCEDURE — 93227 XTRNL ECG REC<48 HR R&I: CPT | Performed by: INTERNAL MEDICINE

## 2019-10-14 ENCOUNTER — TELEPHONE (OUTPATIENT)
Dept: CARDIOLOGY CLINIC | Facility: CLINIC | Age: 74
End: 2019-10-14

## 2019-10-16 DIAGNOSIS — I20.8 ANGINA AT REST (HCC): Primary | ICD-10-CM

## 2019-10-16 RX ORDER — ISOSORBIDE MONONITRATE 30 MG/1
30 TABLET, EXTENDED RELEASE ORAL DAILY
Qty: 90 TABLET | Refills: 2 | Status: SHIPPED | OUTPATIENT
Start: 2019-10-16 | End: 2019-12-12

## 2019-12-12 ENCOUNTER — OFFICE VISIT (OUTPATIENT)
Dept: CARDIOLOGY CLINIC | Facility: CLINIC | Age: 74
End: 2019-12-12
Payer: MEDICARE

## 2019-12-12 VITALS
SYSTOLIC BLOOD PRESSURE: 140 MMHG | HEIGHT: 66 IN | DIASTOLIC BLOOD PRESSURE: 64 MMHG | BODY MASS INDEX: 27.8 KG/M2 | WEIGHT: 173 LBS | HEART RATE: 59 BPM | OXYGEN SATURATION: 99 %

## 2019-12-12 DIAGNOSIS — I20.8 ANGINA AT REST (HCC): ICD-10-CM

## 2019-12-12 DIAGNOSIS — I50.22 CHRONIC SYSTOLIC HEART FAILURE (HCC): Primary | ICD-10-CM

## 2019-12-12 PROCEDURE — 99214 OFFICE O/P EST MOD 30 MIN: CPT | Performed by: INTERNAL MEDICINE

## 2019-12-12 RX ORDER — ISOSORBIDE MONONITRATE 30 MG/1
60 TABLET, EXTENDED RELEASE ORAL DAILY
Qty: 180 TABLET | Refills: 3 | Status: SHIPPED | OUTPATIENT
Start: 2019-12-12 | End: 2020-07-07
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
## 2020-03-10 ENCOUNTER — HOSPITAL ENCOUNTER (OUTPATIENT)
Dept: NON INVASIVE DIAGNOSTICS | Facility: CLINIC | Age: 75
Discharge: HOME | End: 2020-03-10
Payer: MEDICARE

## 2020-03-10 DIAGNOSIS — I50.22 CHRONIC SYSTOLIC HEART FAILURE (HCC): ICD-10-CM

## 2020-03-10 PROCEDURE — 93306 TTE W/DOPPLER COMPLETE: CPT

## 2020-03-10 PROCEDURE — 93306 TTE W/DOPPLER COMPLETE: CPT | Performed by: INTERNAL MEDICINE

## 2020-03-12 ENCOUNTER — TELEPHONE (OUTPATIENT)
Dept: CARDIOLOGY CLINIC | Facility: CLINIC | Age: 75
End: 2020-03-12

## 2020-03-12 NOTE — TELEPHONE ENCOUNTER
Called Reji Mulligan a message about her echo results are in and to give us a call back      ===View-only below this line===    ----- Message -----  From: Sven Hopper MD  Sent: 3/11/2020   5:24 PM EDT  To: Cassandra Marlow MA    The echo shows that your ejection fraction has improved further and now appears normal  However, your aortic valve appears to be more narrow than prior  We can discuss the plan at your next visit  Continue medications as prescribed

## 2020-03-19 ENCOUNTER — TELEMEDICINE (OUTPATIENT)
Dept: CARDIOLOGY CLINIC | Facility: CLINIC | Age: 75
End: 2020-03-19
Payer: MEDICARE

## 2020-03-19 DIAGNOSIS — I35.0 AORTIC VALVE STENOSIS, ETIOLOGY OF CARDIAC VALVE DISEASE UNSPECIFIED: ICD-10-CM

## 2020-03-19 DIAGNOSIS — I50.9 CHRONIC CONGESTIVE HEART FAILURE, UNSPECIFIED HEART FAILURE TYPE (HCC): Primary | ICD-10-CM

## 2020-03-19 PROCEDURE — 99442 PR PHYS/QHP TELEPHONE EVALUATION 11-20 MIN: CPT | Performed by: INTERNAL MEDICINE
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
## 2020-07-07 DIAGNOSIS — I20.8 ANGINA AT REST (HCC): ICD-10-CM

## 2020-07-07 RX ORDER — ISOSORBIDE MONONITRATE 30 MG/1
TABLET, EXTENDED RELEASE ORAL
Qty: 180 TABLET | Refills: 3 | Status: SHIPPED | OUTPATIENT
Start: 2020-07-07

## 2020-08-19 DIAGNOSIS — I82.4Y9 DEEP VEIN THROMBOSIS (DVT) OF PROXIMAL LOWER EXTREMITY, UNSPECIFIED CHRONICITY, UNSPECIFIED LATERALITY (HCC): ICD-10-CM

## 2020-08-21 RX ORDER — APIXABAN 2.5 MG/1
TABLET, FILM COATED ORAL
Qty: 180 TABLET | Refills: 3 | Status: SHIPPED | OUTPATIENT
Start: 2020-08-21

## 2020-11-10 ENCOUNTER — OFFICE VISIT (OUTPATIENT)
Dept: CARDIOLOGY CLINIC | Facility: CLINIC | Age: 75
End: 2020-11-10
Payer: MEDICARE

## 2020-11-10 VITALS
TEMPERATURE: 97.7 F | HEART RATE: 61 BPM | BODY MASS INDEX: 27.87 KG/M2 | HEIGHT: 66 IN | SYSTOLIC BLOOD PRESSURE: 150 MMHG | OXYGEN SATURATION: 98 % | DIASTOLIC BLOOD PRESSURE: 70 MMHG | WEIGHT: 173.4 LBS

## 2020-11-10 DIAGNOSIS — I10 HTN (HYPERTENSION), BENIGN: ICD-10-CM

## 2020-11-10 DIAGNOSIS — I25.10 CORONARY ARTERY DISEASE INVOLVING NATIVE CORONARY ARTERY WITHOUT ANGINA PECTORIS, UNSPECIFIED WHETHER NATIVE OR TRANSPLANTED HEART: ICD-10-CM

## 2020-11-10 DIAGNOSIS — I50.32 CHRONIC DIASTOLIC CHF (CONGESTIVE HEART FAILURE), NYHA CLASS 3 (HCC): Primary | ICD-10-CM

## 2020-11-10 DIAGNOSIS — I27.20 PULMONARY HYPERTENSION (HCC): ICD-10-CM

## 2020-11-10 DIAGNOSIS — E78.5 HYPERLIPIDEMIA LDL GOAL <70: ICD-10-CM

## 2020-11-10 DIAGNOSIS — I35.0 SEVERE AORTIC STENOSIS: ICD-10-CM

## 2020-11-10 PROCEDURE — 99214 OFFICE O/P EST MOD 30 MIN: CPT | Performed by: INTERNAL MEDICINE

## 2021-01-06 ENCOUNTER — TELEPHONE (OUTPATIENT)
Dept: INTERNAL MEDICINE CLINIC | Facility: CLINIC | Age: 76
End: 2021-01-06

## 2021-01-06 DIAGNOSIS — I50.23 ACUTE ON CHRONIC SYSTOLIC CONGESTIVE HEART FAILURE (HCC): ICD-10-CM

## 2021-01-06 RX ORDER — HYDRALAZINE HYDROCHLORIDE 25 MG/1
25 TABLET, FILM COATED ORAL EVERY 8 HOURS SCHEDULED
Qty: 90 TABLET | Refills: 3 | Status: SHIPPED | OUTPATIENT
Start: 2021-01-06

## 2021-02-10 ENCOUNTER — APPOINTMENT (INPATIENT)
Dept: CT IMAGING | Facility: HOSPITAL | Age: 76
DRG: 871 | End: 2021-02-10
Payer: MEDICARE

## 2021-02-10 ENCOUNTER — APPOINTMENT (EMERGENCY)
Dept: RADIOLOGY | Facility: HOSPITAL | Age: 76
DRG: 871 | End: 2021-02-10
Payer: MEDICARE

## 2021-02-10 ENCOUNTER — HOSPITAL ENCOUNTER (INPATIENT)
Facility: HOSPITAL | Age: 76
LOS: 4 days | Discharge: HOME HEALTH | DRG: 871 | End: 2021-02-14
Attending: EMERGENCY MEDICINE | Admitting: ANESTHESIOLOGY
Payer: MEDICARE

## 2021-02-10 ENCOUNTER — APPOINTMENT (EMERGENCY)
Dept: CT IMAGING | Facility: HOSPITAL | Age: 76
DRG: 871 | End: 2021-02-10
Payer: MEDICARE

## 2021-02-10 DIAGNOSIS — I48.91 NEW ONSET ATRIAL FIBRILLATION (HCC): ICD-10-CM

## 2021-02-10 DIAGNOSIS — U07.1 PNEUMONIA DUE TO COVID-19 VIRUS: ICD-10-CM

## 2021-02-10 DIAGNOSIS — U07.1 COVID-19: Primary | ICD-10-CM

## 2021-02-10 DIAGNOSIS — N17.9 AKI (ACUTE KIDNEY INJURY) (HCC): ICD-10-CM

## 2021-02-10 DIAGNOSIS — N18.30 STAGE 3 CHRONIC KIDNEY DISEASE, UNSPECIFIED WHETHER STAGE 3A OR 3B CKD (HCC): ICD-10-CM

## 2021-02-10 DIAGNOSIS — I48.91 ATRIAL FIBRILLATION WITH RVR (HCC): ICD-10-CM

## 2021-02-10 DIAGNOSIS — IMO0002 SOLITARY KIDNEY: ICD-10-CM

## 2021-02-10 DIAGNOSIS — J12.82 PNEUMONIA DUE TO COVID-19 VIRUS: ICD-10-CM

## 2021-02-10 PROBLEM — R77.8 ELEVATED TROPONIN: Status: ACTIVE | Noted: 2021-02-10

## 2021-02-10 PROBLEM — I50.82 BIVENTRICULAR HEART FAILURE (HCC): Status: ACTIVE | Noted: 2021-02-10

## 2021-02-10 PROBLEM — E87.2 LACTIC ACIDOSIS: Status: ACTIVE | Noted: 2021-02-10

## 2021-02-10 PROBLEM — R65.20 SEVERE SEPSIS (HCC): Status: ACTIVE | Noted: 2021-02-10

## 2021-02-10 PROBLEM — A41.9 SEVERE SEPSIS (HCC): Status: ACTIVE | Noted: 2021-02-10

## 2021-02-10 PROBLEM — I27.21 SEVERE PULMONARY ARTERIAL SYSTOLIC HYPERTENSION (HCC): Status: ACTIVE | Noted: 2021-02-10

## 2021-02-10 PROBLEM — E78.5 HLD (HYPERLIPIDEMIA): Status: ACTIVE | Noted: 2021-02-10

## 2021-02-10 PROBLEM — I35.0 SEVERE AORTIC STENOSIS: Status: ACTIVE | Noted: 2021-02-10

## 2021-02-10 LAB
ALBUMIN SERPL BCP-MCNC: 2.9 G/DL (ref 3.5–5)
ALP SERPL-CCNC: 81 U/L (ref 46–116)
ALT SERPL W P-5'-P-CCNC: 30 U/L (ref 12–78)
ANION GAP SERPL CALCULATED.3IONS-SCNC: 16 MMOL/L (ref 4–13)
APTT PPP: 34 SECONDS (ref 23–37)
APTT PPP: 41 SECONDS (ref 23–37)
AST SERPL W P-5'-P-CCNC: 85 U/L (ref 5–45)
ATRIAL RATE: 129 BPM
BACTERIA UR QL AUTO: ABNORMAL /HPF
BASOPHILS # BLD AUTO: 0.01 THOUSANDS/ΜL (ref 0–0.1)
BASOPHILS NFR BLD AUTO: 0 % (ref 0–1)
BILIRUB SERPL-MCNC: 0.6 MG/DL (ref 0.2–1)
BILIRUB UR QL STRIP: NEGATIVE
BUN SERPL-MCNC: 84 MG/DL (ref 5–25)
CALCIUM ALBUM COR SERPL-MCNC: 9.7 MG/DL (ref 8.3–10.1)
CALCIUM SERPL-MCNC: 8.8 MG/DL (ref 8.3–10.1)
CHLORIDE SERPL-SCNC: 99 MMOL/L (ref 100–108)
CK MB SERPL-MCNC: 1.8 % (ref 0–2.5)
CK MB SERPL-MCNC: 16.4 NG/ML (ref 0–5)
CK SERPL-CCNC: 906 U/L (ref 26–192)
CLARITY UR: ABNORMAL
CO2 SERPL-SCNC: 19 MMOL/L (ref 21–32)
COLOR UR: YELLOW
CREAT SERPL-MCNC: 3.39 MG/DL (ref 0.6–1.3)
CRP SERPL QL: 231.1 MG/L
D DIMER PPP FEU-MCNC: 3.21 UG/ML FEU
EOSINOPHIL # BLD AUTO: 0 THOUSAND/ΜL (ref 0–0.61)
EOSINOPHIL NFR BLD AUTO: 0 % (ref 0–6)
ERYTHROCYTE [DISTWIDTH] IN BLOOD BY AUTOMATED COUNT: 15.6 % (ref 11.6–15.1)
ERYTHROCYTE [DISTWIDTH] IN BLOOD BY AUTOMATED COUNT: 15.8 % (ref 11.6–15.1)
FLUAV RNA RESP QL NAA+PROBE: NEGATIVE
FLUBV RNA RESP QL NAA+PROBE: NEGATIVE
GFR SERPL CREATININE-BSD FRML MDRD: 15 ML/MIN/1.73SQ M
GLUCOSE SERPL-MCNC: 151 MG/DL (ref 65–140)
GLUCOSE UR STRIP-MCNC: NEGATIVE MG/DL
HCT VFR BLD AUTO: 36.1 % (ref 34.8–46.1)
HCT VFR BLD AUTO: 37.8 % (ref 34.8–46.1)
HGB BLD-MCNC: 11.3 G/DL (ref 11.5–15.4)
HGB BLD-MCNC: 12 G/DL (ref 11.5–15.4)
HGB UR QL STRIP.AUTO: ABNORMAL
HIV 1+2 AB+HIV1 P24 AG SERPL QL IA: NORMAL
HIV1 P24 AG SER QL: NORMAL
IMM GRANULOCYTES # BLD AUTO: 0.03 THOUSAND/UL (ref 0–0.2)
IMM GRANULOCYTES NFR BLD AUTO: 1 % (ref 0–2)
INR PPP: 1.41 (ref 0.84–1.19)
INR PPP: 1.42 (ref 0.84–1.19)
KETONES UR STRIP-MCNC: NEGATIVE MG/DL
LACTATE SERPL-SCNC: 1.4 MMOL/L (ref 0.5–2)
LACTATE SERPL-SCNC: 2.4 MMOL/L (ref 0.5–2)
LEUKOCYTE ESTERASE UR QL STRIP: ABNORMAL
LYMPHOCYTES # BLD AUTO: 0.54 THOUSANDS/ΜL (ref 0.6–4.47)
LYMPHOCYTES NFR BLD AUTO: 9 % (ref 14–44)
MCH RBC QN AUTO: 29.8 PG (ref 26.8–34.3)
MCH RBC QN AUTO: 30.2 PG (ref 26.8–34.3)
MCHC RBC AUTO-ENTMCNC: 31.3 G/DL (ref 31.4–37.4)
MCHC RBC AUTO-ENTMCNC: 31.7 G/DL (ref 31.4–37.4)
MCV RBC AUTO: 95 FL (ref 82–98)
MCV RBC AUTO: 95 FL (ref 82–98)
MONOCYTES # BLD AUTO: 0.21 THOUSAND/ΜL (ref 0.17–1.22)
MONOCYTES NFR BLD AUTO: 4 % (ref 4–12)
NEUTROPHILS # BLD AUTO: 5.2 THOUSANDS/ΜL (ref 1.85–7.62)
NEUTS SEG NFR BLD AUTO: 86 % (ref 43–75)
NITRITE UR QL STRIP: NEGATIVE
NON-SQ EPI CELLS URNS QL MICRO: ABNORMAL /HPF
NRBC BLD AUTO-RTO: 0 /100 WBCS
NT-PROBNP SERPL-MCNC: ABNORMAL PG/ML
PH UR STRIP.AUTO: 6 [PH]
PLATELET # BLD AUTO: 101 THOUSANDS/UL (ref 149–390)
PLATELET # BLD AUTO: 94 THOUSANDS/UL (ref 149–390)
PMV BLD AUTO: 13.6 FL (ref 8.9–12.7)
PMV BLD AUTO: 13.8 FL (ref 8.9–12.7)
POTASSIUM SERPL-SCNC: 4.5 MMOL/L (ref 3.5–5.3)
PROT SERPL-MCNC: 8.5 G/DL (ref 6.4–8.2)
PROT UR STRIP-MCNC: NEGATIVE MG/DL
PROTHROMBIN TIME: 16.6 SECONDS (ref 11.6–14.5)
PROTHROMBIN TIME: 16.6 SECONDS (ref 11.6–14.5)
QRS AXIS: -4 DEGREES
QRSD INTERVAL: 100 MS
QT INTERVAL: 350 MS
QTC INTERVAL: 526 MS
RBC # BLD AUTO: 3.79 MILLION/UL (ref 3.81–5.12)
RBC # BLD AUTO: 3.98 MILLION/UL (ref 3.81–5.12)
RBC #/AREA URNS AUTO: ABNORMAL /HPF
RSV RNA RESP QL NAA+PROBE: NEGATIVE
SARS-COV-2 RNA RESP QL NAA+PROBE: POSITIVE
SODIUM SERPL-SCNC: 134 MMOL/L (ref 136–145)
SP GR UR STRIP.AUTO: 1.01 (ref 1–1.03)
T WAVE AXIS: 90 DEGREES
TROPONIN I SERPL-MCNC: 0.57 NG/ML
TROPONIN I SERPL-MCNC: 0.58 NG/ML
UROBILINOGEN UR QL STRIP.AUTO: 0.2 E.U./DL
VENTRICULAR RATE: 136 BPM
WBC # BLD AUTO: 5.19 THOUSAND/UL (ref 4.31–10.16)
WBC # BLD AUTO: 5.99 THOUSAND/UL (ref 4.31–10.16)
WBC #/AREA URNS AUTO: ABNORMAL /HPF

## 2021-02-10 PROCEDURE — 85025 COMPLETE CBC W/AUTO DIFF WBC: CPT | Performed by: EMERGENCY MEDICINE

## 2021-02-10 PROCEDURE — 84484 ASSAY OF TROPONIN QUANT: CPT | Performed by: PHYSICIAN ASSISTANT

## 2021-02-10 PROCEDURE — 85730 THROMBOPLASTIN TIME PARTIAL: CPT | Performed by: PHYSICIAN ASSISTANT

## 2021-02-10 PROCEDURE — 83880 ASSAY OF NATRIURETIC PEPTIDE: CPT | Performed by: EMERGENCY MEDICINE

## 2021-02-10 PROCEDURE — 1124F ACP DISCUSS-NO DSCNMKR DOCD: CPT | Performed by: INTERNAL MEDICINE

## 2021-02-10 PROCEDURE — 86140 C-REACTIVE PROTEIN: CPT | Performed by: PHYSICIAN ASSISTANT

## 2021-02-10 PROCEDURE — 85379 FIBRIN DEGRADATION QUANT: CPT | Performed by: PHYSICIAN ASSISTANT

## 2021-02-10 PROCEDURE — 0241U HB NFCT DS VIR RESP RNA 4 TRGT: CPT | Performed by: EMERGENCY MEDICINE

## 2021-02-10 PROCEDURE — 93005 ELECTROCARDIOGRAM TRACING: CPT

## 2021-02-10 PROCEDURE — 82728 ASSAY OF FERRITIN: CPT | Performed by: PHYSICIAN ASSISTANT

## 2021-02-10 PROCEDURE — 93010 ELECTROCARDIOGRAM REPORT: CPT | Performed by: INTERNAL MEDICINE

## 2021-02-10 PROCEDURE — 86803 HEPATITIS C AB TEST: CPT | Performed by: PHYSICIAN ASSISTANT

## 2021-02-10 PROCEDURE — 84145 PROCALCITONIN (PCT): CPT | Performed by: EMERGENCY MEDICINE

## 2021-02-10 PROCEDURE — 85730 THROMBOPLASTIN TIME PARTIAL: CPT | Performed by: EMERGENCY MEDICINE

## 2021-02-10 PROCEDURE — 85610 PROTHROMBIN TIME: CPT | Performed by: PHYSICIAN ASSISTANT

## 2021-02-10 PROCEDURE — 99285 EMERGENCY DEPT VISIT HI MDM: CPT

## 2021-02-10 PROCEDURE — 87806 HIV AG W/HIV1&2 ANTB W/OPTIC: CPT | Performed by: PHYSICIAN ASSISTANT

## 2021-02-10 PROCEDURE — 84484 ASSAY OF TROPONIN QUANT: CPT | Performed by: EMERGENCY MEDICINE

## 2021-02-10 PROCEDURE — 99291 CRITICAL CARE FIRST HOUR: CPT | Performed by: EMERGENCY MEDICINE

## 2021-02-10 PROCEDURE — 81001 URINALYSIS AUTO W/SCOPE: CPT | Performed by: EMERGENCY MEDICINE

## 2021-02-10 PROCEDURE — G1004 CDSM NDSC: HCPCS

## 2021-02-10 PROCEDURE — 87086 URINE CULTURE/COLONY COUNT: CPT | Performed by: EMERGENCY MEDICINE

## 2021-02-10 PROCEDURE — 80053 COMPREHEN METABOLIC PANEL: CPT | Performed by: EMERGENCY MEDICINE

## 2021-02-10 PROCEDURE — 99291 CRITICAL CARE FIRST HOUR: CPT | Performed by: PHYSICIAN ASSISTANT

## 2021-02-10 PROCEDURE — 70450 CT HEAD/BRAIN W/O DYE: CPT

## 2021-02-10 PROCEDURE — 82550 ASSAY OF CK (CPK): CPT | Performed by: PHYSICIAN ASSISTANT

## 2021-02-10 PROCEDURE — 85027 COMPLETE CBC AUTOMATED: CPT | Performed by: PHYSICIAN ASSISTANT

## 2021-02-10 PROCEDURE — 71045 X-RAY EXAM CHEST 1 VIEW: CPT

## 2021-02-10 PROCEDURE — 36415 COLL VENOUS BLD VENIPUNCTURE: CPT | Performed by: EMERGENCY MEDICINE

## 2021-02-10 PROCEDURE — 96360 HYDRATION IV INFUSION INIT: CPT

## 2021-02-10 PROCEDURE — 83605 ASSAY OF LACTIC ACID: CPT | Performed by: PHYSICIAN ASSISTANT

## 2021-02-10 PROCEDURE — 86705 HEP B CORE ANTIBODY IGM: CPT | Performed by: PHYSICIAN ASSISTANT

## 2021-02-10 PROCEDURE — 96365 THER/PROPH/DIAG IV INF INIT: CPT

## 2021-02-10 PROCEDURE — 82948 REAGENT STRIP/BLOOD GLUCOSE: CPT

## 2021-02-10 PROCEDURE — 82553 CREATINE MB FRACTION: CPT | Performed by: PHYSICIAN ASSISTANT

## 2021-02-10 PROCEDURE — 87040 BLOOD CULTURE FOR BACTERIA: CPT | Performed by: EMERGENCY MEDICINE

## 2021-02-10 PROCEDURE — 71250 CT THORAX DX C-: CPT

## 2021-02-10 PROCEDURE — XW033E5 INTRODUCTION OF REMDESIVIR ANTI-INFECTIVE INTO PERIPHERAL VEIN, PERCUTANEOUS APPROACH, NEW TECHNOLOGY GROUP 5: ICD-10-PCS | Performed by: ANESTHESIOLOGY

## 2021-02-10 PROCEDURE — 86704 HEP B CORE ANTIBODY TOTAL: CPT | Performed by: PHYSICIAN ASSISTANT

## 2021-02-10 PROCEDURE — 83605 ASSAY OF LACTIC ACID: CPT | Performed by: EMERGENCY MEDICINE

## 2021-02-10 PROCEDURE — 85610 PROTHROMBIN TIME: CPT | Performed by: EMERGENCY MEDICINE

## 2021-02-10 PROCEDURE — 87340 HEPATITIS B SURFACE AG IA: CPT | Performed by: PHYSICIAN ASSISTANT

## 2021-02-10 RX ORDER — MELATONIN
2000 DAILY
Status: DISCONTINUED | OUTPATIENT
Start: 2021-02-11 | End: 2021-02-14 | Stop reason: HOSPADM

## 2021-02-10 RX ORDER — DOXYCYCLINE HYCLATE 100 MG/1
100 CAPSULE ORAL EVERY 12 HOURS
Status: DISCONTINUED | OUTPATIENT
Start: 2021-02-10 | End: 2021-02-14 | Stop reason: HOSPADM

## 2021-02-10 RX ORDER — ATORVASTATIN CALCIUM 40 MG/1
40 TABLET, FILM COATED ORAL
Status: DISCONTINUED | OUTPATIENT
Start: 2021-02-10 | End: 2021-02-14 | Stop reason: HOSPADM

## 2021-02-10 RX ORDER — METOPROLOL TARTRATE 50 MG/1
50 TABLET, FILM COATED ORAL EVERY 12 HOURS SCHEDULED
Status: DISCONTINUED | OUTPATIENT
Start: 2021-02-11 | End: 2021-02-10

## 2021-02-10 RX ORDER — LEVOTHYROXINE SODIUM 0.05 MG/1
50 TABLET ORAL DAILY
Status: DISCONTINUED | OUTPATIENT
Start: 2021-02-11 | End: 2021-02-14 | Stop reason: HOSPADM

## 2021-02-10 RX ORDER — HEPARIN SODIUM 1000 [USP'U]/ML
4000 INJECTION, SOLUTION INTRAVENOUS; SUBCUTANEOUS
Status: DISCONTINUED | OUTPATIENT
Start: 2021-02-10 | End: 2021-02-11

## 2021-02-10 RX ORDER — ZINC SULFATE 50(220)MG
220 CAPSULE ORAL DAILY
Status: DISCONTINUED | OUTPATIENT
Start: 2021-02-11 | End: 2021-02-14 | Stop reason: HOSPADM

## 2021-02-10 RX ORDER — ASPIRIN 81 MG/1
81 TABLET ORAL DAILY
Status: DISCONTINUED | OUTPATIENT
Start: 2021-02-11 | End: 2021-02-14 | Stop reason: HOSPADM

## 2021-02-10 RX ORDER — HEPARIN SODIUM 1000 [USP'U]/ML
2000 INJECTION, SOLUTION INTRAVENOUS; SUBCUTANEOUS
Status: DISCONTINUED | OUTPATIENT
Start: 2021-02-10 | End: 2021-02-11

## 2021-02-10 RX ORDER — SODIUM CHLORIDE, SODIUM GLUCONATE, SODIUM ACETATE, POTASSIUM CHLORIDE, MAGNESIUM CHLORIDE, SODIUM PHOSPHATE, DIBASIC, AND POTASSIUM PHOSPHATE .53; .5; .37; .037; .03; .012; .00082 G/100ML; G/100ML; G/100ML; G/100ML; G/100ML; G/100ML; G/100ML
75 INJECTION, SOLUTION INTRAVENOUS CONTINUOUS
Status: DISCONTINUED | OUTPATIENT
Start: 2021-02-10 | End: 2021-02-11

## 2021-02-10 RX ORDER — HEPARIN SODIUM 10000 [USP'U]/100ML
3-20 INJECTION, SOLUTION INTRAVENOUS
Status: DISCONTINUED | OUTPATIENT
Start: 2021-02-10 | End: 2021-02-11

## 2021-02-10 RX ORDER — ASPIRIN 81 MG/1
324 TABLET, CHEWABLE ORAL ONCE
Status: COMPLETED | OUTPATIENT
Start: 2021-02-10 | End: 2021-02-10

## 2021-02-10 RX ORDER — ASCORBIC ACID 500 MG
1000 TABLET ORAL EVERY 12 HOURS SCHEDULED
Status: DISCONTINUED | OUTPATIENT
Start: 2021-02-10 | End: 2021-02-14 | Stop reason: HOSPADM

## 2021-02-10 RX ORDER — CHLORHEXIDINE GLUCONATE 0.12 MG/ML
15 RINSE ORAL EVERY 12 HOURS SCHEDULED
Status: DISCONTINUED | OUTPATIENT
Start: 2021-02-10 | End: 2021-02-14 | Stop reason: HOSPADM

## 2021-02-10 RX ORDER — ISOSORBIDE MONONITRATE 30 MG/1
60 TABLET, EXTENDED RELEASE ORAL DAILY
Status: DISCONTINUED | OUTPATIENT
Start: 2021-02-11 | End: 2021-02-14 | Stop reason: HOSPADM

## 2021-02-10 RX ORDER — MULTIVITAMIN/IRON/FOLIC ACID 18MG-0.4MG
1 TABLET ORAL DAILY
Status: DISCONTINUED | OUTPATIENT
Start: 2021-02-18 | End: 2021-02-14 | Stop reason: HOSPADM

## 2021-02-10 RX ORDER — ZINC GLUCONATE 50 MG
50 TABLET ORAL DAILY
COMMUNITY

## 2021-02-10 RX ORDER — DEXAMETHASONE SODIUM PHOSPHATE 4 MG/ML
6 INJECTION, SOLUTION INTRA-ARTICULAR; INTRALESIONAL; INTRAMUSCULAR; INTRAVENOUS; SOFT TISSUE EVERY 24 HOURS
Status: DISCONTINUED | OUTPATIENT
Start: 2021-02-10 | End: 2021-02-14 | Stop reason: HOSPADM

## 2021-02-10 RX ORDER — FUROSEMIDE 10 MG/ML
40 INJECTION INTRAMUSCULAR; INTRAVENOUS ONCE
Status: COMPLETED | OUTPATIENT
Start: 2021-02-10 | End: 2021-02-10

## 2021-02-10 RX ORDER — FUROSEMIDE 10 MG/ML
40 INJECTION INTRAMUSCULAR; INTRAVENOUS ONCE
Status: DISCONTINUED | OUTPATIENT
Start: 2021-02-10 | End: 2021-02-10

## 2021-02-10 RX ORDER — FAMOTIDINE 20 MG/1
20 TABLET, FILM COATED ORAL DAILY
Status: DISCONTINUED | OUTPATIENT
Start: 2021-02-11 | End: 2021-02-10

## 2021-02-10 RX ADMIN — Medication 12.5 MG: at 22:04

## 2021-02-10 RX ADMIN — CEFTRIAXONE SODIUM 2000 MG: 10 INJECTION, POWDER, FOR SOLUTION INTRAVENOUS at 19:17

## 2021-02-10 RX ADMIN — CHLORHEXIDINE GLUCONATE 0.12% ORAL RINSE 15 ML: 1.2 LIQUID ORAL at 21:17

## 2021-02-10 RX ADMIN — SODIUM CHLORIDE, SODIUM GLUCONATE, SODIUM ACETATE, POTASSIUM CHLORIDE, MAGNESIUM CHLORIDE, SODIUM PHOSPHATE, DIBASIC, AND POTASSIUM PHOSPHATE 75 ML/HR: .53; .5; .37; .037; .03; .012; .00082 INJECTION, SOLUTION INTRAVENOUS at 21:59

## 2021-02-10 RX ADMIN — ATORVASTATIN CALCIUM 40 MG: 40 TABLET, FILM COATED ORAL at 21:16

## 2021-02-10 RX ADMIN — OXYCODONE HYDROCHLORIDE AND ACETAMINOPHEN 1000 MG: 500 TABLET ORAL at 20:34

## 2021-02-10 RX ADMIN — FUROSEMIDE 40 MG: 10 INJECTION, SOLUTION INTRAVENOUS at 20:15

## 2021-02-10 RX ADMIN — SODIUM CHLORIDE 500 ML: 0.9 INJECTION, SOLUTION INTRAVENOUS at 17:46

## 2021-02-10 RX ADMIN — DOXYCYCLINE 100 MG: 100 CAPSULE ORAL at 20:34

## 2021-02-10 RX ADMIN — ASPIRIN 324 MG: 81 TABLET, CHEWABLE ORAL at 18:43

## 2021-02-10 RX ADMIN — DEXAMETHASONE SODIUM PHOSPHATE 6 MG: 4 INJECTION, SOLUTION INTRAMUSCULAR; INTRAVENOUS at 20:35

## 2021-02-10 RX ADMIN — REMDESIVIR 200 MG: 100 INJECTION, POWDER, LYOPHILIZED, FOR SOLUTION INTRAVENOUS at 21:53

## 2021-02-10 RX ADMIN — HEPARIN SODIUM 12 UNITS/KG/HR: 10000 INJECTION, SOLUTION INTRAVENOUS at 21:11

## 2021-02-10 RX ADMIN — SODIUM CHLORIDE 1300 ML: 0.9 INJECTION, SOLUTION INTRAVENOUS at 19:17

## 2021-02-10 NOTE — ED PROVIDER NOTES
History  Chief Complaint   Patient presents with    Weakness - Generalized     pt brought in by EMS for generalized weakness and less verbal repsonses     Patient is a 71-year-old female past medical history of hypertension, diabetes, CHF, CKD, CVA, CAD, pulmonary hypertension, breast cancer in 2005 status post mastectomy presenting for fatigue  Daughter at bedside states that she has noticed increasing fatigue and decreasing responsiveness over the last 1-2 days and states that her mother was not speaking as much as she normally would today, giving one-word answers, making sounds and response to question  She states yesterday she noticed an episode of shaking with states that she maintained consciousness throughout and states that they called EMS who has her vitals were all stable at the time stated this may be relative to medication changes  She states that they discontinued Sudafed at that time and began equate  States that she was having 2-3 days of chest congestion nasal congestion which she feels are improving as well as a fever of 101 2 days ago but none since  She notes decreased p o  Intake and denies any falls or head injuries  Patient denies abdominal pain, back pain, chest pain, nausea vomiting, shortness of breath, rashes, vision changes, headache, numbness or tingling, dysuria  Daughter states that she has not complained to her of any pain, has not noticed any vomiting, and has not noticed any shortness of breath  Prior to Admission Medications   Prescriptions Last Dose Informant Patient Reported? Taking?    Eliquis 2 5 MG 2/9/2021 at Unknown time  No Yes   Sig: TAKE 1 TABLET BY MOUTH TWICE A DAY   Multiple Vitamin (MULTIVITAMIN) tablet 2/9/2021 at Unknown time Self Yes Yes   Sig: Take 1 tablet by mouth daily   Omega-3 Fatty Acids (FISH OIL) 1,000 mg 2/9/2021 at Unknown time Self Yes Yes   Sig: Take 1,000 mg by mouth 2 (two) times a day   ascorbic acid (VITAMIN C) 250 mg tablet 2/9/2021 at Unknown time Self Yes Yes   Sig: Take 250 mg by mouth daily   aspirin (ECOTRIN LOW STRENGTH) 81 mg EC tablet 2/9/2021 at Unknown time Self No Yes   Sig: Take 1 tablet (81 mg total) by mouth daily   atorvastatin (LIPITOR) 40 mg tablet 2/9/2021 at Unknown time Self Yes Yes   Sig: Take 40 mg by mouth daily   hydrALAZINE (APRESOLINE) 25 mg tablet 2/9/2021 at Unknown time  No Yes   Sig: Take 1 tablet (25 mg total) by mouth every 8 (eight) hours   isosorbide mononitrate (IMDUR) 30 mg 24 hr tablet 2/9/2021 at Unknown time Family Member No Yes   Sig: TAKE 2 TABLETS BY MOUTH DAILY   Patient taking differently: Take 10 mg by mouth daily    levothyroxine 50 mcg tablet 2/10/2021 at Unknown time Self Yes Yes   Sig: Take 50 mcg by mouth daily   lisinopril (ZESTRIL) 2 5 mg tablet 2/9/2021 at Unknown time Self No Yes   Sig: Take 1 tablet (2 5 mg total) by mouth daily   metoprolol succinate (TOPROL-XL) 50 mg 24 hr tablet 2/9/2021 at Unknown time Self No Yes   Sig: Take 1 tablet by mouth daily   nitroglycerin (NITROSTAT) 0 4 mg SL tablet  Self Yes No   Sig: PLACE 1 TABLET SUBLINGUALLY EVERY 5 MINUTES X3 DOSES AS NEEDED FOR CHEST PAIN   oxybutynin (DITROPAN-XL) 10 MG 24 hr tablet 2/9/2021 at Unknown time Family Member No Yes   Sig: Take 1 tablet by mouth daily at bedtime   sertraline (ZOLOFT) 50 mg tablet 2/9/2021 at Unknown time Self Yes Yes   Sig: Take 50 mg by mouth daily   torsemide (DEMADEX) 20 mg tablet 2/9/2021 at Unknown time Self No Yes   Sig: Take 1 tablet (20 mg total) by mouth 2 (two) times a day   zinc gluconate 50 mg tablet  Family Member Yes Yes   Sig: Take 50 mg by mouth daily      Facility-Administered Medications: None       Past Medical History:   Diagnosis Date    Aortic valve stenosis     [facility]     Cardiac disease     Cardiomyopathy ([facility] )     CHF (congestive heart failure) (HCC)     Chronic systolic heart failure (HCC)     Coronary artery disease     Diabetes mellitus (HCC)     Diastolic dysfunction     Hypertension     Mitral regurgitation     Pulmonary HTN (HCC)     Renal disorder     Stroke ([facility] )     Tricuspid regurgitation        Past Surgical History:   Procedure Laterality Date    CARDIAC SURGERY      CORONARY ANGIOPLASTY WITH STENT PLACEMENT      KIDNEY SURGERY      MASTECTOMY         Family History   Family history unknown: Yes     I have reviewed and agree with the history as documented  E-Cigarette/Vaping     E-Cigarette/Vaping Substances     Social History     Tobacco Use    Smoking status: Former Smoker     Packs/day: 0 50     Years: 45 00     Pack years: 22 50     Quit date: 2009     Years since quittin 1    Smokeless tobacco: Never Used   Substance Use Topics    Alcohol use: Not Currently     Frequency: Never    Drug use: No       Review of Systems   All other systems reviewed and are negative  Physical Exam  Physical Exam  Vitals signs reviewed  Constitutional:       General: She is not in acute distress  Appearance: Normal appearance  She is not ill-appearing  HENT:      Mouth/Throat:      Mouth: Mucous membranes are dry  Eyes:      Conjunctiva/sclera: Conjunctivae normal    Neck:      Musculoskeletal: Neck supple  Cardiovascular:      Rate and Rhythm: Normal rate and regular rhythm  Heart sounds: Normal heart sounds  Pulmonary:      Comments: Rales to the right base and mid lung field  Abdominal:      General: Abdomen is flat  Palpations: Abdomen is soft  Tenderness: There is no abdominal tenderness  Musculoskeletal: Normal range of motion  General: No swelling  Skin:     General: Skin is warm and dry  Neurological:      General: No focal deficit present  Mental Status: She is alert  Motor: No weakness        Comments: Oriented x2, not oriented to time   Psychiatric:         Mood and Affect: Mood normal          Vital Signs  ED Triage Vitals [02/10/21 1711]   Temperature Pulse Respirations Blood Pressure SpO2   98 3 °F (36 8 °C) (!) 135 19 115/68 95 %      Temp Source Heart Rate Source Patient Position - Orthostatic VS BP Location FiO2 (%)   Oral Monitor Lying Left arm --      Pain Score       --           Vitals:    02/10/21 1830 02/10/21 1845 02/10/21 1915 02/10/21 1945   BP: 130/61 125/60 106/55 144/68   Pulse: (!) 119 (!) 130 (!) 120 (!) 118   Patient Position - Orthostatic VS: Lying Lying Lying Lying         Visual Acuity  Visual Acuity      Most Recent Value   L Pupil Size (mm)  3   R Pupil Size (mm)  3          ED Medications  Medications   sodium chloride 0 9 % bolus 1,300 mL (1,300 mL Intravenous New Bag 2/10/21 1917)   furosemide (LASIX) injection 40 mg (has no administration in time range)   ceftriaxone (ROCEPHIN) 1 g/50 mL in dextrose IVPB (has no administration in time range)   cholecalciferol (VITAMIN D3) tablet 2,000 Units (has no administration in time range)   doxycycline hyclate (VIBRAMYCIN) capsule 100 mg (has no administration in time range)   ascorbic acid (VITAMIN C) tablet 1,000 mg (has no administration in time range)   zinc sulfate (ZINCATE) capsule 220 mg (has no administration in time range)     Followed by   multivitamin-minerals (CENTRUM ADULTS) tablet 1 tablet (has no administration in time range)   atorvastatin (LIPITOR) tablet 40 mg (has no administration in time range)   dexamethasone (DECADRON) injection 6 mg (has no administration in time range)   famotidine (PEPCID) tablet 20 mg (has no administration in time range)   chlorhexidine (PERIDEX) 0 12 % oral rinse 15 mL (has no administration in time range)   sodium chloride 0 9 % bolus 500 mL (0 mL Intravenous Stopped 2/10/21 1846)   aspirin chewable tablet 324 mg (324 mg Oral Given 2/10/21 1843)   cefTRIAXone (ROCEPHIN) 2,000 mg in dextrose 5 % 50 mL IVPB (2,000 mg Intravenous New Bag 2/10/21 1917)       Diagnostic Studies  Results Reviewed     Procedure Component Value Units Date/Time    COVID19, Influenza A/B, RSV PCR, [facility] [place] [[id number]]  (Abnormal) Collected: 02/10/21 1751    Lab Status: Final result Specimen: Nares from Nasopharyngeal Swab Updated: 02/10/21 1923     SARS-CoV-2 Positive     INFLUENZA A PCR Negative     INFLUENZA B PCR Negative     RSV PCR Negative    Narrative: This test has been authorized by FDA under an EUA (Emergency Use Assay) for use by authorized laboratories  Clinical caution and judgement should be used with the interpretation of these results with consideration of the clinical impression and other laboratory testing  Testing reported as "Positive" or "Negative" has been proven to be accurate according to standard laboratory validation requirements  All testing is performed with control materials showing appropriate reactivity at standard intervals  Procalcitonin with AM Reflex [[id number]] Collected: 02/10/21 1919    Lab Status: In process Specimen: Blood from Arm, Left Updated: 02/10/21 1922    Lactic acid [[id number]]  (Abnormal) Collected: 02/10/21 1751    Lab Status: Final result Specimen: Blood from Arm, Left Updated: 02/10/21 1857     LACTIC ACID 2 4 mmol/L     Narrative:      Result may be elevated if tourniquet was used during collection      Lactic acid 2 Hours [[id number]]     Lab Status: No result Specimen: Blood     NT-BNP PRO [[id number]]  (Abnormal) Collected: 02/10/21 1751    Lab Status: Final result Specimen: Blood from Arm, Left Updated: 02/10/21 1829     NT-proBNP 15,152 pg/mL     Troponin I [[id number]]  (Abnormal) Collected: 02/10/21 1751    Lab Status: Final result Specimen: Blood from Arm, Left Updated: 02/10/21 1823     Troponin I 0 57 ng/mL     Comprehensive metabolic panel [[id number]]  (Abnormal) Collected: 02/10/21 1751    Lab Status: Final result Specimen: Blood from Arm, Left Updated: 02/10/21 1821     Sodium 134 mmol/L      Potassium 4 5 mmol/L      Chloride 99 mmol/L      CO2 19 mmol/L      ANION GAP 16 mmol/L      BUN 84 mg/dL      Creatinine 3 39 mg/dL      Glucose 151 mg/dL      Calcium 8 8 mg/dL      Corrected Calcium 9 7 mg/dL      AST 85 U/L      ALT 30 U/L      Alkaline Phosphatase 81 U/L      Total Protein 8 5 g/dL      Albumin 2 9 g/dL      Total Bilirubin 0 60 mg/dL      eGFR 15 ml/min/1 73sq m     Narrative:      National Kidney Disease Foundation guidelines for Chronic Kidney Disease (CKD):     Stage 1 with normal or high GFR (GFR > 90 mL/min/1 73 square meters)    Stage 2 Mild CKD (GFR = 60-89 mL/min/1 73 square meters)    Stage 3A Moderate CKD (GFR = 45-59 mL/min/1 73 square meters)    Stage 3B Moderate CKD (GFR = 30-44 mL/min/1 73 square meters)    Stage 4 Severe CKD (GFR = 15-29 mL/min/1 73 square meters)    Stage 5 End Stage CKD (GFR <15 mL/min/1 73 square meters)  Note: GFR calculation is accurate only with a steady state creatinine    Protime-INR [[id number]]  (Abnormal) Collected: 02/10/21 1751    Lab Status: Final result Specimen: Blood from Arm, Left Updated: 02/10/21 1820     Protime 16 6 seconds      INR 1 41    APTT [[id number]]  (Abnormal) Collected: 02/10/21 1751    Lab Status: Final result Specimen: Blood from Arm, Left Updated: 02/10/21 1820     PTT 41 seconds     CBC and differential [[id number]]  (Abnormal) Collected: 02/10/21 1751    Lab Status: Final result Specimen: Blood from Arm, Left Updated: 02/10/21 1816     WBC 5 99 Thousand/uL      RBC 3 98 Million/uL      Hemoglobin 12 0 g/dL      Hematocrit 37 8 %      MCV 95 fL      MCH 30 2 pg      MCHC 31 7 g/dL      RDW 15 6 %      MPV 13 8 fL      Platelets 452 Thousands/uL      nRBC 0 /100 WBCs      Neutrophils Relative 86 %      Immat GRANS % 1 %      Lymphocytes Relative 9 %      Monocytes Relative 4 %      Eosinophils Relative 0 %      Basophils Relative 0 %      Neutrophils Absolute 5 20 Thousands/µL      Immature Grans Absolute 0 03 Thousand/uL      Lymphocytes Absolute 0 54 Thousands/µL      Monocytes Absolute 0 21 Thousand/µL      Eosinophils Absolute 0 00 Thousand/µL      Basophils Absolute 0 01 Thousands/µL     Blood culture #2 [[id number]] Collected: 02/10/21 1751    Lab Status: In process Specimen: Blood from Arm, Left Updated: 02/10/21 1803    Blood culture #1 [[id number]] Collected: 02/10/21 1800    Lab Status: In process Specimen: Blood from Hand, Left Updated: 02/10/21 1803    Urine culture [[id number]]     Lab Status: No result Specimen: Urine     UA w Reflex to Microscopic w Reflex to Culture [[id number]]     Lab Status: No result Specimen: Urine                  XR chest 1 view portable    (Results Pending)   CT chest without contrast    (Results Pending)              Procedures  CriticalCare Time  Performed by: Polly Steen DO  Authorized by: Polly Steen DO     Critical care provider statement:     Critical care time (minutes):  40    Critical care time was exclusive of:  Separately billable procedures and treating other patients    Critical care was necessary to treat or prevent imminent or life-threatening deterioration of the following conditions:  Cardiac failure, circulatory failure, dehydration, renal failure and respiratory failure    Critical care was time spent personally by me on the following activities:  Obtaining history from patient or surrogate, development of treatment plan with patient or surrogate, discussions with consultants, discussions with primary provider, evaluation of patient's response to treatment, examination of patient, interpretation of cardiac output measurements, ordering and performing treatments and interventions, ordering and review of laboratory studies, re-evaluation of patient's condition and ordering and review of radiographic studies             ED Course  ED Course as of Feb 10 2012   Wed Feb 10, 2021   Sofia Patient had episode of hypotension, 69 systolic but continued to [facility] SERV appropriately  Received fluids on pressure bag and blood pressure rapidly responded and increased to 139 over 64   Patient with pulmonary edema on chest x-ray therefore have stopped fluids but patient continued to saturate above 95%  Patient with decreasing heart rate now between 1 teens and 120s  In the setting of transient hypotension will hold rate control at this time as patient may require sympathetic drive  Bedside ultrasound did she show some mild hypokinesis however I do not feel that this was severe enough to be the cause of patient's transient hypotension  1903 Patient with blood pressure trending down, now 98 systolic with continuing mentate appropriately  Patient has triggered sepsis alert however  patient is not experiencing sepsis at this time but rather is suffering decompensated heart failure, with elevated troponin, elevated BNP, pulmonary edema on chest x-ray, and daughter states had mild dry cough which has subsided within the past several days  In the setting of patient's MANJEET and transient hypotension will continue fluids as patient continues to saturate appropriately on room air  As patient has had episodes of hypotension have discussed with ICU who will evaluate the patient  2011 Patient is positive for COVID-19 which would explain appearance of pulmonary edema on chest x-ray  I have discussed with ICU who states will hold Lasix at this time as patient does appear intravascularly depleted and will continue fluids  SBIRT 22yo+      Most Recent Value   SBIRT (22 yo +)   In order to provide better care to our patients, we are screening all of our patients for alcohol and drug use  Would it be okay to ask you these screening questions? Yes Filed at: 02/10/2021 1817   Initial Alcohol Screen: US AUDIT-C    1  How often do you have a drink containing alcohol?  0 Filed at: 02/10/2021 1817   2  How many drinks containing alcohol do you have on a typical day you are drinking? 0 Filed at: 02/10/2021 1817   3a  Male UNDER 65: How often do you have five or more drinks on one occasion?   0 Filed at: 02/10/2021 1817   3b  FEMALE Any Age, or MALE 65+: How often do you have 4 or more drinks on one occassion? 0 Filed at: 02/10/2021 1817   Audit-C Score  0 Filed at: 02/10/2021 9782   LIZZIE: How many times in the past year have you    Used an illegal drug or used a prescription medication for non-medical reasons? Never Filed at: 02/10/2021 1817                    MDM  Number of Diagnoses or Management Options  MANJEET (acute kidney injury) [facility]):   Atrial fibrillation with RVR ([facility] [address] ):   COVID-19:   Diagnosis management comments: Patient is 18-year-old female past medical history of hypertension, diabetes, CAD, CHF, CKD, CVA, breast cancer status post mastectomy in 2005 presenting for weakness  Patient is well-appearing at bedside with stable vitals though tachycardic between 110 and 130 in AFib with RVR and daughter denies any history of atrial fibrillation  She is mentating appropriately though not oriented to time which daughter states is abnormal but following commands and in no acute distress  She does have rales to the right long decreased lung sounds on the left and no significant lower extremity edema  Will obtain cardiac workup, administer small fluid bolus in the setting of known heart failure, and will continue to monitor heart rate at this time, will consider rate control after fluid bolus        Disposition  Final diagnoses:   COVID-19   Atrial fibrillation with RVR ([facility] )   MANJEET (acute kidney injury) ([facility] )     Time reflects when diagnosis was documented in both MDM as applicable and the Disposition within this note     Time User Action Codes Description Comment    2/10/2021  8:05 PM Selene Cheung Add [U07 1] COVID-19     2/10/2021  8:05 PM Selene Cheung Add [I48 91] Atrial fibrillation with RVR ([facility] )     2/10/2021  8:05 PM Morales Church Add [N17 9] MANJEET (acute kidney injury) [facility])       ED Disposition     ED Disposition Condition Date/Time Comment    Admit Stable Wed Feb 10, 2021  8:04 PM Case was discussed with Dr Livia Zimmerman and the patient's admission status was agreed to be Admission Status: inpatient status to the service of Dr Livia Zimmerman   Follow-up Information    None         Patient's Medications   Discharge Prescriptions    No medications on file     No discharge procedures on file      PDMP Review     None          ED Provider  Electronically Signed by           Geraldo Toro DO  02/10/21 2012

## 2021-02-11 PROBLEM — E87.2 LACTIC ACIDOSIS: Status: RESOLVED | Noted: 2021-02-10 | Resolved: 2021-02-11

## 2021-02-11 PROBLEM — G93.41 METABOLIC ENCEPHALOPATHY: Status: ACTIVE | Noted: 2021-02-11

## 2021-02-11 PROBLEM — J12.82 PNEUMONIA DUE TO COVID-19 VIRUS: Status: ACTIVE | Noted: 2021-02-10

## 2021-02-11 LAB
ALBUMIN SERPL BCP-MCNC: 2.4 G/DL (ref 3.5–5)
ALP SERPL-CCNC: 68 U/L (ref 46–116)
ALT SERPL W P-5'-P-CCNC: 28 U/L (ref 12–78)
ANION GAP SERPL CALCULATED.3IONS-SCNC: 18 MMOL/L (ref 4–13)
APTT PPP: >210 SECONDS (ref 23–37)
AST SERPL W P-5'-P-CCNC: 92 U/L (ref 5–45)
ATRIAL RATE: 98 BPM
BASOPHILS # BLD MANUAL: 0 THOUSAND/UL (ref 0–0.1)
BASOPHILS NFR MAR MANUAL: 0 % (ref 0–1)
BILIRUB SERPL-MCNC: 0.3 MG/DL (ref 0.2–1)
BUN SERPL-MCNC: 79 MG/DL (ref 5–25)
BURR CELLS BLD QL SMEAR: PRESENT
CALCIUM ALBUM COR SERPL-MCNC: 9.6 MG/DL (ref 8.3–10.1)
CALCIUM SERPL-MCNC: 8.3 MG/DL (ref 8.3–10.1)
CHLORIDE SERPL-SCNC: 106 MMOL/L (ref 100–108)
CO2 SERPL-SCNC: 16 MMOL/L (ref 21–32)
CREAT SERPL-MCNC: 2.65 MG/DL (ref 0.6–1.3)
CREAT UR-MCNC: 86.7 MG/DL
CRP SERPL QL: 212.8 MG/L
D DIMER PPP FEU-MCNC: 2.43 UG/ML FEU
EOSINOPHIL # BLD MANUAL: 0 THOUSAND/UL (ref 0–0.4)
EOSINOPHIL NFR BLD MANUAL: 0 % (ref 0–6)
ERYTHROCYTE [DISTWIDTH] IN BLOOD BY AUTOMATED COUNT: 15.8 % (ref 11.6–15.1)
FERRITIN SERPL-MCNC: 7119 NG/ML (ref 8–388)
FERRITIN SERPL-MCNC: 7847 NG/ML (ref 8–388)
GFR SERPL CREATININE-BSD FRML MDRD: 20 ML/MIN/1.73SQ M
GLUCOSE SERPL-MCNC: 136 MG/DL (ref 65–140)
GLUCOSE SERPL-MCNC: 139 MG/DL (ref 65–140)
GLUCOSE SERPL-MCNC: 145 MG/DL (ref 65–140)
GLUCOSE SERPL-MCNC: 149 MG/DL (ref 65–140)
GLUCOSE SERPL-MCNC: 155 MG/DL (ref 65–140)
GLUCOSE SERPL-MCNC: 164 MG/DL (ref 65–140)
HBV CORE AB SER QL: NORMAL
HBV CORE IGM SER QL: NORMAL
HBV SURFACE AG SER QL: NORMAL
HCT VFR BLD AUTO: 32.7 % (ref 34.8–46.1)
HCV AB SER QL: NORMAL
HGB BLD-MCNC: 10.5 G/DL (ref 11.5–15.4)
LYMPHOCYTES # BLD AUTO: 0.51 THOUSAND/UL (ref 0.6–4.47)
LYMPHOCYTES # BLD AUTO: 14 % (ref 14–44)
MCH RBC QN AUTO: 30.8 PG (ref 26.8–34.3)
MCHC RBC AUTO-ENTMCNC: 32.1 G/DL (ref 31.4–37.4)
MCV RBC AUTO: 96 FL (ref 82–98)
MICROALBUMIN UR-MCNC: 130 MG/L (ref 0–20)
MICROALBUMIN/CREAT 24H UR: 150 MG/G CREATININE (ref 0–30)
MONOCYTES # BLD AUTO: 0.04 THOUSAND/UL (ref 0–1.22)
MONOCYTES NFR BLD: 1 % (ref 4–12)
NEUTROPHILS # BLD MANUAL: 3.07 THOUSAND/UL (ref 1.85–7.62)
NEUTS BAND NFR BLD MANUAL: 1 % (ref 0–8)
NEUTS SEG NFR BLD AUTO: 84 % (ref 43–75)
NRBC BLD AUTO-RTO: 0 /100 WBCS
PLATELET # BLD AUTO: 96 THOUSANDS/UL (ref 149–390)
PLATELET BLD QL SMEAR: ABNORMAL
PMV BLD AUTO: 13 FL (ref 8.9–12.7)
POTASSIUM SERPL-SCNC: 4.5 MMOL/L (ref 3.5–5.3)
PROCALCITONIN SERPL-MCNC: 6.21 NG/ML
PROCALCITONIN SERPL-MCNC: 7.52 NG/ML
PROT SERPL-MCNC: 7.3 G/DL (ref 6.4–8.2)
QRS AXIS: 49 DEGREES
QRSD INTERVAL: 102 MS
QT INTERVAL: 352 MS
QTC INTERVAL: 493 MS
RBC # BLD AUTO: 3.41 MILLION/UL (ref 3.81–5.12)
SODIUM 24H UR-SCNC: 27 MOL/L
SODIUM SERPL-SCNC: 140 MMOL/L (ref 136–145)
T WAVE AXIS: -3 DEGREES
TOTAL CELLS COUNTED SPEC: 100
TROPONIN I SERPL-MCNC: 0.45 NG/ML
TROPONIN I SERPL-MCNC: 0.5 NG/ML
TROPONIN I SERPL-MCNC: 0.54 NG/ML
TROPONIN I SERPL-MCNC: 0.62 NG/ML
UUN 24H UR-MCNC: 663 MG/DL
VENTRICULAR RATE: 118 BPM
WBC # BLD AUTO: 3.61 THOUSAND/UL (ref 4.31–10.16)

## 2021-02-11 PROCEDURE — 82728 ASSAY OF FERRITIN: CPT | Performed by: PHYSICIAN ASSISTANT

## 2021-02-11 PROCEDURE — 82043 UR ALBUMIN QUANTITATIVE: CPT | Performed by: INTERNAL MEDICINE

## 2021-02-11 PROCEDURE — 84484 ASSAY OF TROPONIN QUANT: CPT | Performed by: PHYSICIAN ASSISTANT

## 2021-02-11 PROCEDURE — 82948 REAGENT STRIP/BLOOD GLUCOSE: CPT

## 2021-02-11 PROCEDURE — 82570 ASSAY OF URINE CREATININE: CPT | Performed by: INTERNAL MEDICINE

## 2021-02-11 PROCEDURE — 93010 ELECTROCARDIOGRAM REPORT: CPT | Performed by: INTERNAL MEDICINE

## 2021-02-11 PROCEDURE — 84300 ASSAY OF URINE SODIUM: CPT | Performed by: INTERNAL MEDICINE

## 2021-02-11 PROCEDURE — 86140 C-REACTIVE PROTEIN: CPT | Performed by: PHYSICIAN ASSISTANT

## 2021-02-11 PROCEDURE — 80053 COMPREHEN METABOLIC PANEL: CPT | Performed by: PHYSICIAN ASSISTANT

## 2021-02-11 PROCEDURE — 99233 SBSQ HOSP IP/OBS HIGH 50: CPT | Performed by: ANESTHESIOLOGY

## 2021-02-11 PROCEDURE — 85027 COMPLETE CBC AUTOMATED: CPT | Performed by: PHYSICIAN ASSISTANT

## 2021-02-11 PROCEDURE — 85379 FIBRIN DEGRADATION QUANT: CPT | Performed by: PHYSICIAN ASSISTANT

## 2021-02-11 PROCEDURE — 99223 1ST HOSP IP/OBS HIGH 75: CPT | Performed by: INTERNAL MEDICINE

## 2021-02-11 PROCEDURE — 85007 BL SMEAR W/DIFF WBC COUNT: CPT | Performed by: PHYSICIAN ASSISTANT

## 2021-02-11 PROCEDURE — 84145 PROCALCITONIN (PCT): CPT | Performed by: EMERGENCY MEDICINE

## 2021-02-11 PROCEDURE — 84540 ASSAY OF URINE/UREA-N: CPT | Performed by: INTERNAL MEDICINE

## 2021-02-11 PROCEDURE — 85730 THROMBOPLASTIN TIME PARTIAL: CPT | Performed by: ANESTHESIOLOGY

## 2021-02-11 RX ORDER — METOPROLOL TARTRATE 50 MG/1
50 TABLET, FILM COATED ORAL EVERY 12 HOURS SCHEDULED
Status: DISCONTINUED | OUTPATIENT
Start: 2021-02-12 | End: 2021-02-14 | Stop reason: HOSPADM

## 2021-02-11 RX ORDER — SODIUM CHLORIDE 9 MG/ML
75 INJECTION, SOLUTION INTRAVENOUS CONTINUOUS
Status: DISCONTINUED | OUTPATIENT
Start: 2021-02-11 | End: 2021-02-12

## 2021-02-11 RX ADMIN — REMDESIVIR 100 MG: 100 INJECTION, POWDER, LYOPHILIZED, FOR SOLUTION INTRAVENOUS at 22:03

## 2021-02-11 RX ADMIN — DEXAMETHASONE SODIUM PHOSPHATE 6 MG: 4 INJECTION, SOLUTION INTRAMUSCULAR; INTRAVENOUS at 22:03

## 2021-02-11 RX ADMIN — ISOSORBIDE MONONITRATE 60 MG: 30 TABLET, EXTENDED RELEASE ORAL at 08:50

## 2021-02-11 RX ADMIN — ZINC SULFATE 220 MG (50 MG) CAPSULE 220 MG: CAPSULE at 08:50

## 2021-02-11 RX ADMIN — CHLORHEXIDINE GLUCONATE 0.12% ORAL RINSE 15 ML: 1.2 LIQUID ORAL at 08:50

## 2021-02-11 RX ADMIN — CEFTRIAXONE SODIUM 1000 MG: 10 INJECTION, POWDER, FOR SOLUTION INTRAVENOUS at 18:43

## 2021-02-11 RX ADMIN — ASPIRIN 81 MG: 81 TABLET, COATED ORAL at 08:50

## 2021-02-11 RX ADMIN — DOXYCYCLINE 100 MG: 100 CAPSULE ORAL at 22:03

## 2021-02-11 RX ADMIN — APIXABAN 2.5 MG: 2.5 TABLET, FILM COATED ORAL at 10:15

## 2021-02-11 RX ADMIN — ATORVASTATIN CALCIUM 40 MG: 40 TABLET, FILM COATED ORAL at 22:03

## 2021-02-11 RX ADMIN — SERTRALINE HYDROCHLORIDE 50 MG: 50 TABLET ORAL at 08:50

## 2021-02-11 RX ADMIN — DOXYCYCLINE 100 MG: 100 CAPSULE ORAL at 08:50

## 2021-02-11 RX ADMIN — APIXABAN 2.5 MG: 2.5 TABLET, FILM COATED ORAL at 18:43

## 2021-02-11 RX ADMIN — SODIUM CHLORIDE 75 ML/HR: 0.9 INJECTION, SOLUTION INTRAVENOUS at 12:22

## 2021-02-11 RX ADMIN — LEVOTHYROXINE SODIUM 50 MCG: 50 TABLET ORAL at 08:50

## 2021-02-11 RX ADMIN — METOPROLOL TARTRATE 25 MG: 25 TABLET, FILM COATED ORAL at 22:00

## 2021-02-11 RX ADMIN — Medication 2000 UNITS: at 08:50

## 2021-02-11 RX ADMIN — METOPROLOL TARTRATE 25 MG: 25 TABLET, FILM COATED ORAL at 08:50

## 2021-02-11 RX ADMIN — HEPARIN SODIUM 4000 UNITS: 1000 INJECTION INTRAVENOUS; SUBCUTANEOUS at 02:55

## 2021-02-11 RX ADMIN — OXYCODONE HYDROCHLORIDE AND ACETAMINOPHEN 1000 MG: 500 TABLET ORAL at 22:00

## 2021-02-11 RX ADMIN — OXYCODONE HYDROCHLORIDE AND ACETAMINOPHEN 1000 MG: 500 TABLET ORAL at 08:50

## 2021-02-11 RX ADMIN — INSULIN LISPRO 1 UNITS: 100 INJECTION, SOLUTION INTRAVENOUS; SUBCUTANEOUS at 17:17

## 2021-02-11 NOTE — ASSESSMENT & PLAN NOTE
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

## 2021-02-11 NOTE — ASSESSMENT & PLAN NOTE
· Continue BB for rate control  Metoprolol 25 mg b i d  as BP tolerates; titrate to home dose Toprol 50 mg XL  · Hold Eliquis for systemic anticoagulation    Heparin gtt

## 2021-02-11 NOTE — ASSESSMENT & PLAN NOTE
Lab Results   Component Value Date    HGBA1C 7 4 (H) 02/16/2019       No results for input(s): POCGLU in the last 72 hours      Blood Sugar Average: Last 72 hrs:     · Anticipate hyperglycemia with administration systemic steroid  · Sliding scale insulin coverage as needed

## 2021-02-11 NOTE — ASSESSMENT & PLAN NOTE
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

## 2021-02-11 NOTE — ASSESSMENT & PLAN NOTE
Lab Results   Component Value Date    HGBA1C 7 4 (H) 02/16/2019       Recent Labs     02/10/21  2254   POCGLU 139       Blood Sugar Average: Last 72 hrs:  (P) 139   · Anticipate hyperglycemia with administration systemic steroid  · Sliding scale insulin coverage as needed

## 2021-02-11 NOTE — ASSESSMENT & PLAN NOTE
· Evident by tachycardia, tachypnea, and lactic acidosis  · Covid +; treatment plan as noted  · Blood cultures x2, Urine culture  · Continue broad spectrum antibiotics  · Trend procalcitonin  Deescalate as able    · Cautious administration of IV fluid hydration

## 2021-02-11 NOTE — ASSESSMENT & PLAN NOTE
· Baseline Cr 1 3-1 4  · Hold further diuresis  · Strict I&Os  · Trend daily BUN/creatinine    · Nephrology consultation  · Maintain [place]

## 2021-02-11 NOTE — ASSESSMENT & PLAN NOTE
· Continue and beta-blockade reduced dose in setting transient hypotension  · Resume hydralazine for afterload reduction as BP tolerates  · Hold lisinopril in setting of MANJEET

## 2021-02-11 NOTE — ASSESSMENT & PLAN NOTE
Lab Results   Component Value Date    EGFR 15 02/10/2021    EGFR 43 02/21/2019    EGFR 44 02/20/2019    CREATININE 3 39 (H) 02/10/2021    CREATININE 1 42 (H) 02/21/2019    CREATININE 1 38 (H) 02/20/2019     · Plan as noted above

## 2021-02-11 NOTE — ASSESSMENT & PLAN NOTE
COVID Treatment (Moderate Pathway):  Continue supplement O2 as needed to maintain O2 saturations >92%  Vitamin C/Zinc/Statin x 10 days  Steroids:  Dexamethasone 6 mg q day  Convalescent Plasma: candidate for   Remdesivir: x 5 days  Day 2/5  Actemra:  Check inflammatory markers  Continue with systemic steroids times 24 hours  If patient fails to show improvement may be candidate for Actemra  Anticoagulation:  Chronically anticoagulated  Hold Eliquis  Heparin GTT    Encourage good spirometry/pulmonary toileting

## 2021-02-11 NOTE — PLAN OF CARE
Problem: Potential for Falls  Goal: Patient will remain free of falls  Description: INTERVENTIONS:  - Assess patient frequently for physical needs  -  Identify cognitive and physical deficits and behaviors that affect risk of falls    -  [place] fall precautions as indicated by assessment   - Educate patient/family on patient safety including physical limitations  - Instruct patient to call for assistance with activity based on assessment  - Modify environment to reduce risk of injury  - Consider OT/PT consult to assist with strengthening/mobility  Outcome: Progressing     Problem: PAIN - ADULT  Goal: Verbalizes/displays adequate comfort level or baseline comfort level  Description: Interventions:  - Encourage patient to monitor pain and request assistance  - Assess pain using appropriate pain scale  - Administer analgesics based on type and severity of pain and evaluate response  - Implement non-pharmacological measures as appropriate and evaluate response  - Consider cultural and social influences on pain and pain management  - Notify physician/advanced practitioner if interventions unsuccessful or patient reports new pain  Outcome: Progressing     Problem: INFECTION - ADULT  Goal: Absence or prevention of progression during hospitalization  Description: INTERVENTIONS:  - Assess and monitor for signs and symptoms of infection  - Monitor lab/diagnostic results  - Monitor all insertion sites, i e  indwelling lines, tubes, and drains  - Monitor endotracheal if appropriate and nasal secretions for changes in amount and color  - [place] appropriate cooling/warming therapies per order  - Administer medications as ordered  - Instruct and encourage patient and family to use good hand hygiene technique  - Identify and instruct in appropriate isolation precautions for identified infection/condition  Outcome: Progressing  Goal: Absence of fever/infection during neutropenic period  Description: INTERVENTIONS:  - Monitor WBC    Outcome: Progressing     Problem: Potential for Falls  Goal: Patient will remain free of falls  Description: INTERVENTIONS:  - Assess patient frequently for physical needs  -  Identify cognitive and physical deficits and behaviors that affect risk of falls    -  [place] fall precautions as indicated by assessment   - Educate patient/family on patient safety including physical limitations  - Instruct patient to call for assistance with activity based on assessment  - Modify environment to reduce risk of injury  - Consider OT/PT consult to assist with strengthening/mobility  Outcome: Progressing

## 2021-02-11 NOTE — CONSULTS
NEPHROLOGY CONSULTATION NOTE    Patient: Chilango Garcia               Sex: female          DOA: 2/10/2021  5:03 PM   Date of Birth: @        Age: @        LOS:  LOS: 1 day     REFERRING PHYSICIAN: Taz Bernal PA-C      [address] [place] [address] Drive / CONSULTATION:  Further management of MANJEET    DATE OF CONSULTATION / SERVICE: 2/11/2021    ADMISSION DIAGNOSIS: COVID-19     Chief Complaint   Patient presents with    Weakness - Generalized     pt brought in by EMS for generalized weakness and less verbal repsonses        HPI     This is 41-year-old female initially admitted for further management of weakness  On admission patient was found to have elevated creatinine and Nephrology were consulted for further management of MANJEET  I have personally seen and examined patient earlier today  I have also reviewed patient's old medical records and previously known baseline creatinine was thought to be around 1 4 from 2019  Admission creatinine was 3 39  Admission chest x-ray also showed multifocal bilateral glass ground opacity and patient is also found to be COVID positive  Patient has received fluid bolus on admission followed by 1 time dose of Lasix  Patient is also receiving COVID treatment protocol by ICU team     Patient also have underlying hypothyroidism which seems to be under good control with the use of levothyroxine  Currently patient denies nausea, vomiting, headache, dizziness, abdominal pain, constipation or rash      PAST MEDICAL HISTORY     Past Medical History:   Diagnosis Date    Aortic valve stenosis     Cancer (HCC)     Cardiac disease     Cardiomyopathy ([address] )     CHF (congestive heart failure) (HCC)     Chronic systolic heart failure (HCC)     Coronary artery disease     Diabetes mellitus (HCC)     Diastolic dysfunction     Hypertension     Mitral regurgitation     Pulmonary HTN (HCC)     Renal disorder     Stroke ([address] )     Tricuspid regurgitation        PAST SURGICAL HISTORY     Past Surgical History:   Procedure Laterality Date    CARDIAC SURGERY      CORONARY ANGIOPLASTY WITH STENT PLACEMENT      KIDNEY SURGERY      MASTECTOMY         ALLERGIES     Allergies   Allergen Reactions    Penicillins Rash       SOCIAL HISTORY     Social History     Substance and Sexual Activity   Alcohol Use Not Currently    Frequency: Never     Social History     Substance and Sexual Activity   Drug Use No     Social History     Tobacco Use   Smoking Status Former Smoker    Packs/day: 0 50    Years: 45 00    Pack years: 22 50    Quit date: 2009    Years since quittin 1   Smokeless Tobacco Never Used       FAMILY HISTORY     Family History   Family history unknown: Yes       CURRENT MEDICATIONS       Current Facility-Administered Medications:     apixaban (ELIQUIS) tablet 2 5 mg, 2 5 mg, Oral, BID, Earle Curling, CRNP    ascorbic acid (VITAMIN C) tablet 1,000 mg, 1,000 mg, Oral, Q12H [facility], Vannessa Wright PA-C, 1,000 mg at 21 0850    aspirin (ECOTRIN LOW STRENGTH) EC tablet 81 mg, 81 mg, Oral, Daily, Vannessa Wright PA-C, 81 mg at 21 0850    atorvastatin (LIPITOR) tablet 40 mg, 40 mg, Oral, HS, Vannessa Wright PA-C, 40 mg at 02/10/21 2116    ceftriaxone (ROCEPHIN) 1 g/50 mL in dextrose IVPB, 1,000 mg, Intravenous, Q24H, Vannessa Wright PA-C    chlorhexidine (PERIDEX) 0 12 % oral rinse 15 mL, 15 mL, Swish & Spit, Q12H [facility], Vannessa Wright PA-C, 15 mL at 21 0850    cholecalciferol (VITAMIN D3) tablet 2,000 Units, 2,000 Units, Oral, Daily, Vannessa Wright PA-C, 2,000 Units at 21 0850    dexamethasone (DECADRON) injection 6 mg, 6 mg, Intravenous, Q24H, Vannessa Wright PA-C, 6 mg at 02/10/21 2035    doxycycline hyclate (VIBRAMYCIN) capsule 100 mg, 100 mg, Oral, Q12H, Vannessa Wright PA-C, 100 mg at 21 0850    insulin lispro (HumaLOG) 100 units/mL subcutaneous injection 1-5 Units, 1-5 Units, Subcutaneous, HS, Vannessa Wright PA-C    insulin lispro (HumaLOG) 100 units/mL subcutaneous injection 1-6 Units, 1-6 Units, Subcutaneous, TID AC **AND** Fingerstick Glucose (POCT), , , TID AC, Yuan Montejo PA-C    isosorbide mononitrate (IMDUR) 24 hr tablet 60 mg, 60 mg, Oral, Daily, Yuan Montejo PA-C, 60 mg at 02/11/21 0850    levothyroxine tablet 50 mcg, 50 mcg, Oral, Daily, Yuan Montejo PA-C, 50 mcg at 02/11/21 0850    metoprolol tartrate (LOPRESSOR) tablet 25 mg, 25 mg, Oral, Q12H [facility], Yuan Montejo PA-C, 25 mg at 02/11/21 9296    zinc sulfate (ZINCATE) capsule 220 mg, 220 mg, Oral, Daily, 220 mg at 02/11/21 0850 **FOLLOWED BY** [START ON 2/18/2021] multivitamin-minerals (CENTRUM ADULTS) tablet 1 tablet, 1 tablet, Oral, Daily, Yuan Montejo PA-C    [COMPLETED] remdesivir Joavelina Zaidi) 200 mg in sodium chloride 0 9 % 250 mL IVPB, 200 mg, Intravenous, Q24H, 200 mg at 02/10/21 2153 **FOLLOWED BY** remdesivir (Veklury) 100 mg in sodium chloride 0 9 % 250 mL IVPB, 100 mg, Intravenous, Q24H, Yuan Montejo PA-C    sertraline (ZOLOFT) tablet 50 mg, 50 mg, Oral, Daily, Yuan Montejo PA-C, 50 mg at 02/11/21 0850    sodium chloride 0 9 % infusion, 75 mL/hr, Intravenous, Continuous, David Dunlap MD    REVIEW OF SYSTEMS     Complete 10 points of review of systems were obtained and discussed in length with patient today  Complete 10 points of review of systems were negative/unremarkable except mentioned in the HPI section  OBJECTIVE     Current Weight: Weight - Scale: 80 3 kg (177 lb 0 5 oz)  /56   Pulse 93   Temp 97 9 °F (36 6 °C) (Oral)   Resp (!) 26   Wt 80 3 kg (177 lb 0 5 oz)   SpO2 97%   BMI 28 57 kg/m²   Vitals:    02/11/21 0800   BP: 122/56   Pulse: 93   Resp: (!) 26   Temp:    SpO2: 97%     Body mass index is 28 57 kg/m²      Intake/Output Summary (Last 24 hours) at 2/11/2021 1048  Last data filed at 2/11/2021 0800  Gross per 24 hour   Intake 1896 1 ml   Output 510 ml   Net 1386 1 ml       PHYSICAL EXAMINATION     Physical Exam  Constitutional:       General: She is not in acute distress  HENT:      Head: Normocephalic and atraumatic  Eyes:      General: No scleral icterus  Neck:      Musculoskeletal: Neck supple  Vascular: No JVD  Cardiovascular:      Rate and Rhythm: Normal rate  Pulmonary:      Effort: No accessory muscle usage or respiratory distress  Abdominal:      General: There is no distension  Palpations: Abdomen is soft  Musculoskeletal:      Right hand: She exhibits no laceration  Left hand: She exhibits no laceration  Skin:     General: Skin is warm  Comments: No Jaundice    Neurological:      Mental Status: She is alert  Psychiatric:         Speech: She is communicative  Behavior: Behavior is not combative  LAB RESULTS        Results from last 7 days   Lab Units 02/11/21  0525 02/10/21  2112 02/10/21  1751   WBC Thousand/uL 3 61* 5 19 5 99   HEMOGLOBIN g/dL 10 5* 11 3* 12 0   HEMATOCRIT % 32 7* 36 1 37 8   PLATELETS Thousands/uL 96* 94* 101*   SODIUM mmol/L 140  --  134*   POTASSIUM mmol/L 4 5  --  4 5   CHLORIDE mmol/L 106  --  99*   CO2 mmol/L 16*  --  19*   BUN mg/dL 79*  --  84*   CREATININE mg/dL 2 65*  --  3 39*   EGFR ml/min/1 73sq m 20  --  15   CALCIUM mg/dL 8 3  --  8 8       I have personally reviewed the old medical records and patient's previously known baseline creatinine level is ~ 1 4 in 2019    RADIOLOGY RESULTS     CT head wo contrast   Final Result by Jazzy Monsalve MD (02/10 2247)      No acute intracranial abnormality  Workstation performed: [id number]         CT chest without contrast   Final Result by Gerry Chen MD (02/10 2016)      Multifocal groundglass opacities throughout the lungs bilaterally consistent with the history of Covid- 19  Workstation performed: [id number]         XR chest 1 view portable   Final Result by Juan José Thomas MD (02/11 2925)      Bilateral groundglass opacities   In the setting of clinically suspected/proven COVID-19, this plain film appearance while nonspecific, can be seen in cases of viral pneumonia such as COVID-19  Workstation performed: [id number]           12 lead EKG done on 2/10/2021 showed AFib with RVR with ventricular rate of 136/min    PLAN / RECOMMENDATIONS      1  MANJEET on top of CKD stage 3  Present on admission, suspected due to intravascular volume depletion  Patient is also found to be COVID positive  Upon review of old medical records, previously known baseline creatinine is around 1 4 from 2019  Admission creatinine was 3 39  Patient has received fluid resuscitation and renal function has improved to current creatinine of 2 65 which is still above the baseline suggesting patient may have component of intravascular volume depletion  Patient is also found to have elevated CK level of 906 (nontraumatic rhabdomyolysis) which can be secondary to underlying COVID infection  We will plan to start patient on normal saline at 75 mL/hour today  Plan to check urine lytes for further evaluation and plan to check CK and BMP with AM labs  Thank you for the consultation to participate in patient's care  I have personally discussed my overall above mentioned plan with the ICU team      Mylene Alas MD  Nephrology  2/11/2021        Portions of the record may have been created with voice recognition software  Occasional wrong word or "sound a like" substitutions may have occurred due to the inherent limitations of voice recognition software  Read the chart carefully and recognize, using context, where substitutions have occurred

## 2021-02-11 NOTE — PROGRESS NOTES
Progress Note Alvina Hidalgo 1945, 77 y o  female MRN: [id number]    Unit/Bed#:  Encounter: [id number]    Primary Care Provider: Lisa Mancera MD   Date and time admitted to hospital: 2/10/2021  5:03 PM        * COVID-19  Assessment & Plan  COVID Treatment (Moderate Pathway):  Continue supplement O2 as needed to maintain O2 saturations >92%  Vitamin C/Zinc/Statin x 10 days  Steroids:  Dexamethasone 6 mg q day  Convalescent Plasma: candidate for   Remdesivir: x 5 days  Day 2/5  Actemra:  Check inflammatory markers  Continue with systemic steroids times 24 hours  If patient fails to show improvement may be candidate for Actemra  Anticoagulation:  Chronically anticoagulated  Hold Eliquis  Heparin GTT  Encourage good spirometry/pulmonary toileting    Severe sepsis ([facility] )  Assessment & Plan  · Evident by tachycardia, tachypnea, and lactic acidosis  · Covid +; treatment plan as noted  · Blood cultures x2, Urine culture  · Continue broad spectrum antibiotics  · Trend procalcitonin  Deescalate as able  · Cautious administration of IV fluid hydration    MANJEET (acute kidney injury) ([facility] )  Assessment & Plan  · Baseline Cr 1 3-1 4  · Hold further diuresis  · Strict I&Os  · Trend daily BUN/creatinine  · Nephrology consultation  · Maintain [place]    New onset atrial fibrillation [facility])  Assessment & Plan  · Continue BB for rate control  Metoprolol 25 mg b i d  as BP tolerates; titrate to home dose Toprol 50 mg XL  · Hold Eliquis for systemic anticoagulation  Heparin gtt    Elevated troponin  Assessment & Plan  · Suspect secondary to demand and renal dysfunction  · Continuous cardiac monitoring  · Trend troponins  Daily EKG  Biventricular heart failure (HCC)  Assessment & Plan  · Chronic biventricular HFpHF, 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)  · Follows Dr Tahir [facility] Heart Failure team  · Continue BB    · Resume Imdur and hydralazine for afterload reduction as blood pressure tolerates  · Hold ACE-inhibitor in setting of MANJEET  · Hold diuretic for additional day  · Daily weights; Dry weight 174lb  · Low sodium diet      Severe aortic stenosis  Assessment & Plan  · Hold further IV fluid hydration  · Will need close follow-up with outpatient cardiology team   Ongoing discussions regarding valvular placement    Severe pulmonary arterial systolic hypertension ([address] )  Assessment & Plan  · Group II  · Optimize heart failure plan as noted above  Solitary kidney  Assessment & Plan  · 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     CKD (chronic kidney disease) stage 3, GFR 30-59 ml/min  Assessment & Plan  Lab Results   Component Value Date    EGFR 15 02/10/2021    EGFR 43 02/21/2019    EGFR 44 02/20/2019    CREATININE 3 39 (H) 02/10/2021    CREATININE 1 42 (H) 02/21/2019    CREATININE 1 38 (H) 02/20/2019     · Plan as noted above  HLD (hyperlipidemia)  Assessment & Plan  · Atorvastatin 40 mg daily  Essential hypertension  Assessment & Plan  · Continue and beta-blockade reduced dose in setting transient hypotension  · Resume hydralazine for afterload reduction as BP tolerates  · Hold lisinopril in setting of MANJEET    Diabetes mellitus [facility])  Assessment & Plan  Lab Results   Component Value Date    HGBA1C 7 4 (H) 02/16/2019       Recent Labs     02/10/21  2254   POCGLU 139       Blood Sugar Average: Last 72 hrs:  (P) 139   · Anticipate hyperglycemia with administration systemic steroid  · Sliding scale insulin coverage as needed    UTI (urinary tract infection)  Assessment & Plan  · UTI POA  · Follow-up urine culture    · Continue broad-spectrum antibiotics ceftriaxone antibiotic day 2      ----------------------------------------------------------------------------------------  HPI/24hr events:  Patient admitted to step-down to of care after being confirm positive with COVID-19 infection despite minimal FiO2 requirements patient displayed acute kidney injury in addition to baseline chronic illnesses  She received 1800 mL of crystalloid resuscitation while in emergency department per sepsis protocol  Following 40 mg of IV Lasix was administered  In addition to fluid hydration patient received 12 5 mg of p o  metoprolol with improvement in heart rate    Disposition: Transfer to Med-Surg   Code Status: Level 1 - Full Code  ---------------------------------------------------------------------------------------  SUBJECTIVE  "Can I eat now"    ---------------------------------------------------------------------------------------  OBJECTIVE    Vitals   Vitals:    21 0200 21 0300 21 0500 21 0532   BP: 99/56 102/56 100/59    BP Location: Right arm Right arm Right arm    Pulse: 93 92 86    Resp: 17 16 12    Temp:   97 9 °F (36 6 °C)    TempSrc:   Oral    SpO2: 94% 94% 95%    Weight:    80 3 kg (177 lb 0 5 oz)     Temp (24hrs), Av 5 °F (36 9 °C), Min:97 9 °F (36 6 °C), Max:99 1 °F (37 3 °C)  Current: Temperature: 97 9 °F (36 6 °C)          Respiratory:  Nasal Cannula O2 Flow Rate (L/min): 2 L/min    Invasive/non-invasive ventilation settings   Respiratory    Lab Data (Last 4 hours)    None         O2/Vent Data (Last 4 hours)    None                Physical Exam  Vitals signs reviewed  Constitutional:       Appearance: Normal appearance  She is not ill-appearing  HENT:      Head: Normocephalic  Mouth/Throat:      Mouth: Mucous membranes are moist    Eyes:      Extraocular Movements: Extraocular movements intact  Pupils: Pupils are equal, round, and reactive to light  Neck:      Musculoskeletal: Normal range of motion and neck supple  Comments: No JVD  Cardiovascular:      Rate and Rhythm: Normal rate and regular rhythm  Heart sounds: Murmur present  Pulmonary:      Effort: Pulmonary effort is normal  No respiratory distress  Comments: Diminished breath sounds bilaterally    Abdominal:      General: Abdomen is flat  Bowel sounds are normal       Palpations: Abdomen is soft  Skin:     General: Skin is warm  Capillary Refill: Capillary refill takes less than 2 seconds  Neurological:      General: No focal deficit present  Mental Status: She is alert and oriented to person, place, and time  Psychiatric:         Mood and Affect: Mood normal          Laboratory and Diagnostics:  Results from last 7 days   Lab Units 02/10/21  2112 02/10/21  1751   WBC Thousand/uL 5 19 5 99   HEMOGLOBIN g/dL 11 3* 12 0   HEMATOCRIT % 36 1 37 8   PLATELETS Thousands/uL 94* 101*   NEUTROS PCT %  --  86*   MONOS PCT %  --  4     Results from last 7 days   Lab Units 02/10/21  1751   SODIUM mmol/L 134*   POTASSIUM mmol/L 4 5   CHLORIDE mmol/L 99*   CO2 mmol/L 19*   ANION GAP mmol/L 16*   BUN mg/dL 84*   CREATININE mg/dL 3 39*   CALCIUM mg/dL 8 8   GLUCOSE RANDOM mg/dL 151*   ALT U/L 30   AST U/L 85*   ALK PHOS U/L 81   ALBUMIN g/dL 2 9*   TOTAL BILIRUBIN mg/dL 0 60          Results from last 7 days   Lab Units 02/10/21  2112 02/10/21  1751   INR  1 42* 1 41*   PTT seconds 34 41*      Results from last 7 days   Lab Units 02/11/21  0252 02/10/21  2124 02/10/21  1751   TROPONIN I ng/mL 0 62* 0 58* 0 57*     Results from last 7 days   Lab Units 02/10/21  2100 02/10/21  1751   LACTIC ACID mmol/L 1 4 2 4*     ABG:    VBG:    Results from last 7 days   Lab Units 02/10/21  1919   PROCALCITONIN ng/ml 7 52*       Micro  Results from last 7 days   Lab Units 02/10/21  1800 02/10/21  1751   BLOOD CULTURE  Received in Microbiology Lab  Culture in Progress  Received in Microbiology Lab  Culture in Progress  EKG: atrial fibrillation  Imaging: I have personally reviewed pertinent reports  and I have personally reviewed pertinent films in PACS    Intake and Output  I/O       02/09 0701 - 02/10 0700 02/10 0701 - 02/11 0700    P  O   120    I V  (mL/kg)  529 4 (6 6)    IV Piggyback  750    Total Intake(mL/kg)  1399 4 (17 4)    Urine (mL/kg/hr)  410    Total Output  410    Net  +989 4                Height and Weights      IBW: -92 5 kg  Body mass index is 28 57 kg/m²  Weight (last 2 days)     Date/Time   Weight    02/11/21 0532   80 3 (177 03)    02/10/21 2100   78 7 (173 5)    02/10/21 1711   78 3 (172 62)                Nutrition       Diet Orders   (From admission, onward)             Start     Ordered    02/10/21 2011  Diet Cardiovascular; Sodium 2 GM  Diet effective now     Question Answer Comment   Diet Type Cardiovascular    Cardiac Sodium 2 GM    RD to adjust diet per protocol?  No        02/10/21 2011                  Active Medications  Scheduled Meds:  Current Facility-Administered Medications   Medication Dose Route Frequency Provider Last Rate    ascorbic acid  1,000 mg Oral Q12H [facility] Dante Nuñez PA-C      aspirin  81 mg Oral Daily [facility] [place]      atorvastatin  40 mg Oral HS Dante Nuñez PA-C      cefTRIAXone  1,000 mg Intravenous Q24H Dante Nuñez PA-C      chlorhexidine  15 mL Swish & Spit Q12H [place], [place]      cholecalciferol  2,000 Units Oral Daily [facility] [place]      dexamethasone  6 mg Intravenous Q24H Dante Nuñez PA-C      doxycycline hyclate  100 mg Oral Q12H Dante Nuñez PA-C      heparin (porcine)  3-20 Units/kg/hr (Order-Specific) Intravenous Titrated GAIL Patel-C 16 Units/kg/hr (02/10/21 2200)    heparin (porcine)  2,000 Units Intravenous Q1H PRN GAIL Patel-FRANKI      heparin (porcine)  4,000 Units Intravenous Q1H PRN GAIL Patel-FRANKI      insulin lispro  1-5 Units Subcutaneous HS GAIL Patel-FRANKI      insulin lispro  1-6 Units Subcutaneous TID [facility] Dante Nuñez PA-C      isosorbide mononitrate  60 mg Oral Daily [facility] [place]      levothyroxine  50 mcg Oral Daily [facility] [place]      metoprolol tartrate  25 mg Oral Q12H [place], [place]      zinc sulfate  220 mg Oral Daily Dante Nuñez PA-C      Followed by   Carlos Raymond ON 2/18/2021] multivitamin-minerals  1 tablet Oral Daily Bhavana Parker      remdesivir  100 mg Intravenous Q24H Alison Sanchez PA-C      sertraline  50 mg Oral Daily Alison Sanchez PA-C       Continuous Infusions:  heparin (porcine), 3-20 Units/kg/hr (Order-Specific), Last Rate: 16 Units/kg/hr (02/10/21 2200)      PRN Meds:   heparin (porcine), 2,000 Units, Q1H PRN  heparin (porcine), 4,000 Units, Q1H PRN        Invasive Devices Review  Invasive Devices     Peripheral Intravenous Line            Peripheral IV 02/10/21 Left Antecubital less than 1 day    Peripheral IV 02/10/21 Left Hand less than 1 day    Peripheral IV 02/10/21 Right Antecubital less than 1 day          Drain            Urethral Catheter Latex; Temperature probe 16 Fr  less than 1 day                Rationale for remaining devices:  Maintain [place] for strict I&Os  ---------------------------------------------------------------------------------------  Advance Directive and Living Will:      Power of :    POLST:    ---------------------------------------------------------------------------------------  Care Time Delivered:   Upon my evaluation, this patient had a high probability of imminent or life-threatening deterioration due to COVID-19 infection, MANJEET, and STEMI, lactic acidosis, which required my direct attention, intervention, and personal management  I have personally provided 15 minutes (0000 to 0530) of critical care time, exclusive of procedures, teaching, family meetings, and any prior time recorded by providers other than myself  Alison Sanchez PA-C      Portions of the record may have been created with voice recognition software  Occasional wrong word or "sound a like" substitutions may have occurred due to the inherent limitations of voice recognition software    Read the chart carefully and recognize, using context, where substitutions have occurred

## 2021-02-11 NOTE — ASSESSMENT & PLAN NOTE
· Continue BB for rate control  Metoprolol 25 mg b i d  as BP tolerates; titrate to home dose Toprol 50 mg XL  · Hold Eliquis for systemic anticoagulation  Heparin gtt  · Check CTH is setting of new arrhythmia and word-finding difficulties

## 2021-02-11 NOTE — H&P
Progress Note Tita Nassar 1945, 77 y o  female MRN: [id number]    Unit/Bed#:  Encounter: [id number]    Primary Care Provider: Pako Zavala MD   Date and time admitted to hospital: 2/10/2021  5:03 PM        * COVID-19  Assessment & Plan  COVID Treatment (Moderate Pathway):  Continue supplement O2 as needed to maintain O2 saturations >92%  Vitamin C/Zinc/Statin x 10 days  Steroids:  Dexamethasone 6 mg q day  Convalescent Plasma: candidate for   Remdesivir: x 5 days  Day 1/5  Actemra:  Check inflammatory markers  Continue with systemic steroids times 24 hours  If patient fails to show improvement may be candidate for Actemra  Anticoagulation:  Chronically anticoagulated  Hold Eliquis  Heparin GTT  Encourage good spirometry/pulmonary toileting    Severe sepsis ([facility] [place] [facility] )  Assessment & Plan  · Evident by tachycardia, tachypnea, and lactic acidosis  · Covid +; treatment plan as noted  · Blood cultures x2, Urine culture  · Continue broad spectrum antibiotics  · Trend procalcitonin  Deescalate as able  · Cautious administration of IV fluid hydration    MANJEET (acute kidney injury) ([facility] )  Assessment & Plan  · Baseline Cr 1 3-1 4  · Hold further diuresis  · Strict I&Os  · Trend daily BUN/creatinine  · Nephrology consultation  · Maintain [place]    Lactic acidosis  Assessment & Plan  · Trend Q2 hours   · Cautious of administration IV fluid resuscitation    New onset atrial fibrillation ([facility] )  Assessment & Plan  · Continue BB for rate control  Metoprolol 25 mg b i d  as BP tolerates; titrate to home dose Toprol 50 mg XL  · Hold Eliquis for systemic anticoagulation  Heparin gtt  · Check CTH is setting of new arrhythmia and word-finding difficulties  Elevated troponin  Assessment & Plan  · Suspect secondary to demand and renal dysfunction  · Continuous cardiac monitoring  · Trend troponins  Daily EKG      Biventricular heart failure (HCC)  Assessment & Plan  · Chronic biventricular HFpHF, 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)  · Follows Dr Tahir [facility] Heart Failure team  · Continue BB  · Resume Imdur and hydralazine for afterload reduction as blood pressure tolerates  · Hold ACE-inhibitor in setting of MANJEET  · Daily weights; Dry weight 174lb  · Low sodium diet      Severe aortic stenosis  Assessment & Plan  · Cautious administration of IV fluid hydration  Severe pulmonary arterial systolic hypertension (HCC)  Assessment & Plan  · Group II  · Optimize heart failure plan as noted above  Solitary kidney  Assessment & Plan  · 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     CKD (chronic kidney disease) stage 3, GFR 30-59 ml/min  Assessment & Plan  Lab Results   Component Value Date    EGFR 15 02/10/2021    EGFR 43 02/21/2019    EGFR 44 02/20/2019    CREATININE 3 39 (H) 02/10/2021    CREATININE 1 42 (H) 02/21/2019    CREATININE 1 38 (H) 02/20/2019     · Plan as noted above  HLD (hyperlipidemia)  Assessment & Plan  · Atorvastatin 40 mg daily  Essential hypertension  Assessment & Plan  · Continue and beta-blockade reduced dose in setting transient hypotension  · Resume hydralazine for afterload reduction as BP tolerates  · Hold lisinopril in setting of MANJEET    Diabetes mellitus ([facility] )  Assessment & Plan  Lab Results   Component Value Date    HGBA1C 7 4 (H) 02/16/2019       No results for input(s): POCGLU in the last 72 hours  Blood Sugar Average: Last 72 hrs:     · Anticipate hyperglycemia with administration systemic steroid  · Sliding scale insulin coverage as needed      -------------------------------------------------------------------------------------------------------------  Chief Complaint:  Generalized malaise    History of Present Illness   HX and PE limited by: N/A  Daphne Tinsley is a 77 y o  female who presented to [facility] 2/10/21 for evaluation of generalized malaise and abnormal speech    Patient has a significant past medical history of chronic biventricular heart failure, severe aortic stenosis, severe PAH, HTN, HLD, DM, CKD III, solitary kidney (s/p left radical nephrectomy 2018), and history of DVT on Eliquis  Daughter states that patient began to suffer from upper respiratory congestion approximately 2 days prior to admission  She notes isolated fever as well as significant generalized malaise and fatigue  She also reports decreased p o  Intake during this time  Today daughter stated that patient was only able to converse a in short 1-2 word sentences secondary to fatigue prompting ED evaluation  Upon arrival to the emergency department patient was noted to be tachycardic with heart rate 125-130, and transiently hypotensive  Laboratory work significant for Cr 3 39 and HCO3 19  CT Chest revealing multifocal bilateral ground-glass opacifications  She received aggressive IV fluid resuscitation per sepsis protocol followed by 40 mg IV Lasix  She was referred to [address] for further medical evaluation, and recommendations  She will be admitted to step down II level of care  History obtained from child and the patient   -------------------------------------------------------------------------------------------------------------  Dispo: Admit to Stepdown Level 2    Code Status: Level 1 - Full Code  --------------------------------------------------------------------------------------------------------------  Review of Systems   Constitutional: Positive for activity change, appetite change, fatigue and fever  HENT: Positive for congestion and postnasal drip  Respiratory: Positive for shortness of breath  Negative for cough and chest tightness  Cardiovascular: Negative  Gastrointestinal: Negative  Genitourinary: Negative  Musculoskeletal: Negative  Skin: Negative  Neurological: Positive for weakness  Physical Exam  Vitals signs and nursing note reviewed     Constitutional: General: She is not in acute distress  Appearance: Normal appearance  She is not ill-appearing  HENT:      Head: Normocephalic  Mouth/Throat:      Mouth: Mucous membranes are dry  Comments: Dry mucous membranes  Eyes:      Extraocular Movements: Extraocular movements intact  Pupils: Pupils are equal, round, and reactive to light  Neck:      Musculoskeletal: Normal range of motion  Comments: Negative JVD  Cardiovascular:      Rate and Rhythm: Tachycardia present  Rhythm irregular  Heart sounds: Murmur present  Pulmonary:      Effort: Pulmonary effort is normal       Comments: Diminished breath sounds bilaterally  Abdominal:      General: Abdomen is flat  There is no distension  Palpations: Abdomen is soft  Musculoskeletal:      Right lower leg: No edema  Left lower leg: No edema  Skin:     General: Skin is warm and dry  Neurological:      General: No focal deficit present  Mental Status: She is alert and oriented to person, place, and time  Psychiatric:         Mood and Affect: Mood normal        --------------------------------------------------------------------------------------------------------------  Vitals:   Vitals:    02/10/21 1845 02/10/21 1915 02/10/21 1945 02/10/21 2100   BP: 125/60 106/55 144/68 124/93   BP Location: Left arm Left arm Left arm Left arm   Pulse: (!) 130 (!) 120 (!) 118 (!) 121   Resp: 13 20 22 (!) 23   Temp:    99 1 °F (37 3 °C)   TempSrc:    Oral   SpO2: 94% 94% 94% 94%   Weight:    78 7 kg (173 lb 8 oz)     Temp  Min: 98 3 °F (36 8 °C)  Max: 99 1 °F (37 3 °C)  IBW: -92 5 kg     Body mass index is 28 kg/m²      Laboratory and Diagnostics:  Results from last 7 days   Lab Units 02/10/21  2112 02/10/21  1751   WBC Thousand/uL 5 19 5 99   HEMOGLOBIN g/dL 11 3* 12 0   HEMATOCRIT % 36 1 37 8   PLATELETS Thousands/uL 94* 101*   NEUTROS PCT %  --  86*   MONOS PCT %  --  4     Results from last 7 days   Lab Units 02/10/21  1751   SODIUM mmol/L 134*   POTASSIUM mmol/L 4 5   CHLORIDE mmol/L 99*   CO2 mmol/L 19*   ANION GAP mmol/L 16*   BUN mg/dL 84*   CREATININE mg/dL 3 39*   CALCIUM mg/dL 8 8   GLUCOSE RANDOM mg/dL 151*   ALT U/L 30   AST U/L 85*   ALK PHOS U/L 81   ALBUMIN g/dL 2 9*   TOTAL BILIRUBIN mg/dL 0 60          Results from last 7 days   Lab Units 02/10/21  1751   INR  1 41*   PTT seconds 41*      Results from last 7 days   Lab Units 02/10/21  1751   TROPONIN I ng/mL 0 57*     Results from last 7 days   Lab Units 02/10/21  2100 02/10/21  1751   LACTIC ACID mmol/L 1 4 2 4*     ABG:    VBG:          Micro:        EKG: atrial fibrillation   Imaging: I have personally reviewed pertinent reports          Historical Information   Past Medical History:   Diagnosis Date    Aortic valve stenosis     Cancer (HCC)     Cardiac disease     Cardiomyopathy ([facility] )     CHF (congestive heart failure) (HCC)     Chronic systolic heart failure (HCC)     Coronary artery disease     Diabetes mellitus (HCC)     Diastolic dysfunction     Hypertension     Mitral regurgitation     Pulmonary HTN (HCC)     Renal disorder     Stroke ([facility] )     Tricuspid regurgitation      Past Surgical History:   Procedure Laterality Date    CARDIAC SURGERY      CORONARY ANGIOPLASTY WITH STENT PLACEMENT      KIDNEY SURGERY      MASTECTOMY       Social History   Social History     Substance and Sexual Activity   Alcohol Use Not Currently    Frequency: Never     Social History     Substance and Sexual Activity   Drug Use No     Social History     Tobacco Use   Smoking Status Former Smoker    Packs/day: 0 50    Years: 45 00    Pack years: 22 50    Quit date: 2009    Years since quittin 1   Smokeless Tobacco Never Used     Exercise History:   Family History:   Family History   Family history unknown: Yes       Medications:  Current Facility-Administered Medications   Medication Dose Route Frequency    ascorbic acid (VITAMIN C) tablet 1,000 mg  1,000 mg Oral Q12H [address]    [START ON 2/11/2021] aspirin (ECOTRIN LOW STRENGTH) EC tablet 81 mg  81 mg Oral Daily    atorvastatin (LIPITOR) tablet 40 mg  40 mg Oral HS    [START ON 2/11/2021] ceftriaxone (ROCEPHIN) 1 g/50 mL in dextrose IVPB  1,000 mg Intravenous Q24H    chlorhexidine (PERIDEX) 0 12 % oral rinse 15 mL  15 mL Swish & Spit Q12H [address]    [START ON 2/11/2021] cholecalciferol (VITAMIN D3) tablet 2,000 Units  2,000 Units Oral Daily    dexamethasone (DECADRON) injection 6 mg  6 mg Intravenous Q24H    doxycycline hyclate (VIBRAMYCIN) capsule 100 mg  100 mg Oral Q12H    heparin (porcine) 25,000 units in 0 45% NaCl 250 mL infusion (premix)  3-20 Units/kg/hr (Order-Specific) Intravenous Titrated    heparin (porcine) injection 2,000 Units  2,000 Units Intravenous Q1H PRN    heparin (porcine) injection 4,000 Units  4,000 Units Intravenous Q1H PRN    insulin lispro (HumaLOG) 100 units/mL subcutaneous injection 1-5 Units  1-5 Units Subcutaneous HS    [START ON 2/11/2021] insulin lispro (HumaLOG) 100 units/mL subcutaneous injection 1-6 Units  1-6 Units Subcutaneous TID AC    [START ON 2/11/2021] isosorbide mononitrate (IMDUR) 24 hr tablet 60 mg  60 mg Oral Daily    [START ON 2/11/2021] levothyroxine tablet 50 mcg  50 mcg Oral Daily    [START ON 2/11/2021] zinc sulfate (ZINCATE) capsule 220 mg  220 mg Oral Daily    Followed by   Ghassan Sida ON 2/18/2021] multivitamin-minerals (CENTRUM ADULTS) tablet 1 tablet  1 tablet Oral Daily    remdesivir (Veklury) 200 mg in sodium chloride 0 9 % 250 mL IVPB  200 mg Intravenous Q24H    Followed by   Ghassan Sida ON 2/11/2021] remdesivir (Veklury) 100 mg in sodium chloride 0 9 % 250 mL IVPB  100 mg Intravenous Q24H    [START ON 2/11/2021] sertraline (ZOLOFT) tablet 50 mg  50 mg Oral Daily     Home medications:  Prior to Admission Medications   Prescriptions Last Dose Informant Patient Reported? Taking?    Eliquis 2 5 MG 2/9/2021 at Unknown time  No Yes   Sig: TAKE 1 TABLET BY MOUTH TWICE A DAY   Multiple Vitamin (MULTIVITAMIN) tablet 2/9/2021 at Unknown time Self Yes Yes   Sig: Take 1 tablet by mouth daily   Omega-3 Fatty Acids (FISH OIL) 1,000 mg 2/9/2021 at Unknown time Self Yes Yes   Sig: Take 1,000 mg by mouth 2 (two) times a day   ascorbic acid (VITAMIN C) 250 mg tablet 2/9/2021 at Unknown time Self Yes Yes   Sig: Take 250 mg by mouth daily   aspirin (ECOTRIN LOW STRENGTH) 81 mg EC tablet 2/9/2021 at Unknown time Self No Yes   Sig: Take 1 tablet (81 mg total) by mouth daily   atorvastatin (LIPITOR) 40 mg tablet 2/9/2021 at Unknown time Self Yes Yes   Sig: Take 40 mg by mouth daily   hydrALAZINE (APRESOLINE) 25 mg tablet 2/9/2021 at Unknown time  No Yes   Sig: Take 1 tablet (25 mg total) by mouth every 8 (eight) hours   isosorbide mononitrate (IMDUR) 30 mg 24 hr tablet 2/9/2021 at Unknown time Family Member No Yes   Sig: TAKE 2 TABLETS BY MOUTH DAILY   Patient taking differently: Take 10 mg by mouth daily    levothyroxine 50 mcg tablet 2/10/2021 at Unknown time Self Yes Yes   Sig: Take 50 mcg by mouth daily   lisinopril (ZESTRIL) 2 5 mg tablet 2/9/2021 at Unknown time Self No Yes   Sig: Take 1 tablet (2 5 mg total) by mouth daily   metoprolol succinate (TOPROL-XL) 50 mg 24 hr tablet 2/9/2021 at Unknown time Self No Yes   Sig: Take 1 tablet by mouth daily   nitroglycerin (NITROSTAT) 0 4 mg SL tablet  Self Yes No   Sig: PLACE 1 TABLET SUBLINGUALLY EVERY 5 MINUTES X3 DOSES AS NEEDED FOR CHEST PAIN   oxybutynin (DITROPAN-XL) 10 MG 24 hr tablet 2/9/2021 at Unknown time Family Member No Yes   Sig: Take 1 tablet by mouth daily at bedtime   sertraline (ZOLOFT) 50 mg tablet 2/9/2021 at Unknown time Self Yes Yes   Sig: Take 50 mg by mouth daily   torsemide (DEMADEX) 20 mg tablet 2/9/2021 at Unknown time Self No Yes   Sig: Take 1 tablet (20 mg total) by mouth 2 (two) times a day   zinc gluconate 50 mg tablet  Family Member Yes Yes   Sig: Take 50 mg by mouth daily      Facility-Administered Medications: None     Allergies: Allergies   Allergen Reactions    Penicillins Rash       ------------------------------------------------------------------------------------------------------------  Advance Directive and Living Will:      Power of :    POLST:    ------------------------------------------------------------------------------------------------------------  Anticipated Length of Stay is > 2 midnights    Care Time Delivered:   Upon my evaluation, this patient had a high probability of imminent or life-threatening deterioration due to COVID-19 infection, lactic acidosis, severe sepsis, MANJEET, elevated troponin , which required my direct attention, intervention, and personal management  I have personally provided 30 minutes (2000 to 2200) of critical care time, exclusive of procedures, teaching, family meetings, and any prior time recorded by providers other than myself  Luis Travis PA-C        Portions of the record may have been created with voice recognition software  Occasional wrong word or "sound a like" substitutions may have occurred due to the inherent limitations of voice recognition software    Read the chart carefully and recognize, using context, where substitutions have occurred

## 2021-02-11 NOTE — ASSESSMENT & PLAN NOTE
· UTI POA  · Follow-up urine culture    · Continue broad-spectrum antibiotics ceftriaxone antibiotic day 2

## 2021-02-11 NOTE — ASSESSMENT & PLAN NOTE
· Hold further IV fluid hydration    · Will need close follow-up with outpatient cardiology team   Ongoing discussions regarding valvular placement

## 2021-02-11 NOTE — CASE MANAGEMENT
CM spoke to pt via phone-she is Covid +  Pt informs CM that she lives with her daughter Sammie Escoto in a 2 story house, but her bedroom is on the first floor  There are 5-6 CODEY and pt is able to navigate these steps  She uses a cane to ambulate  Her dgt Sammie Escoto assists with ADL's  She has been in [facility] in the past and has also used [facility]  Denies substance abuse or mental health issues  She quit smoking 10 years ago  Dr Rosalee Herrera is her PCP  She uses [facility]  [address] and has no problem with co-pays  She does not have a POA or Advanced directive and does not want info at this time  She does not work or drive  Her daughter transports to appointments and will transport home when she is medically cleared  CM discussed d/c needs including HH and STR  Pt wants to be discharged home with [facility] services  Understands that it is her preference  She is refusing STR at this time  CM department will continue to follow and will assess needs as hospitalization progresses  CM reviewed discharge planning process including the following: identifying help at home, patient preference for discharge planning needs, pharmacy preference, and availability of treatment team to discuss questions or concerns patient and/or family may have regarding understanding medications and recognizing signs and symptoms once discharged  CM also encouraged patient to follow up with all recommended appointments after discharge  Patient advised of importance for patient and family to participate in managing patients medical well being

## 2021-02-11 NOTE — ASSESSMENT & PLAN NOTE
· Suspect secondary to demand and renal dysfunction  · Continuous cardiac monitoring  · Trend troponins  Daily EKG

## 2021-02-12 PROBLEM — G93.41 METABOLIC ENCEPHALOPATHY: Status: RESOLVED | Noted: 2021-02-11 | Resolved: 2021-02-12

## 2021-02-12 LAB
ANION GAP SERPL CALCULATED.3IONS-SCNC: 16 MMOL/L (ref 4–13)
ANISOCYTOSIS BLD QL SMEAR: PRESENT
BACTERIA UR CULT: NORMAL
BASOPHILS # BLD MANUAL: 0 THOUSAND/UL (ref 0–0.1)
BASOPHILS NFR MAR MANUAL: 0 % (ref 0–1)
BUN SERPL-MCNC: 83 MG/DL (ref 5–25)
CALCIUM SERPL-MCNC: 8.3 MG/DL (ref 8.3–10.1)
CHLORIDE SERPL-SCNC: 108 MMOL/L (ref 100–108)
CK MB SERPL-MCNC: 15.3 NG/ML (ref 0–5)
CK MB SERPL-MCNC: 2.1 % (ref 0–2.5)
CK SERPL-CCNC: 744 U/L (ref 26–192)
CO2 SERPL-SCNC: 17 MMOL/L (ref 21–32)
CREAT SERPL-MCNC: 2.07 MG/DL (ref 0.6–1.3)
CRP SERPL QL: 169.3 MG/L
D DIMER PPP FEU-MCNC: 2.08 UG/ML FEU
EOSINOPHIL # BLD MANUAL: 0 THOUSAND/UL (ref 0–0.4)
EOSINOPHIL NFR BLD MANUAL: 0 % (ref 0–6)
ERYTHROCYTE [DISTWIDTH] IN BLOOD BY AUTOMATED COUNT: 15.8 % (ref 11.6–15.1)
FERRITIN SERPL-MCNC: 7352 NG/ML (ref 8–388)
GFR SERPL CREATININE-BSD FRML MDRD: 27 ML/MIN/1.73SQ M
GLUCOSE SERPL-MCNC: 150 MG/DL (ref 65–140)
GLUCOSE SERPL-MCNC: 183 MG/DL (ref 65–140)
GLUCOSE SERPL-MCNC: 184 MG/DL (ref 65–140)
GLUCOSE SERPL-MCNC: 192 MG/DL (ref 65–140)
GLUCOSE SERPL-MCNC: 197 MG/DL (ref 65–140)
GLUCOSE SERPL-MCNC: 197 MG/DL (ref 65–140)
HCT VFR BLD AUTO: 32.6 % (ref 34.8–46.1)
HGB BLD-MCNC: 10.2 G/DL (ref 11.5–15.4)
LYMPHOCYTES # BLD AUTO: 0.57 THOUSAND/UL (ref 0.6–4.47)
LYMPHOCYTES # BLD AUTO: 11 % (ref 14–44)
MCH RBC QN AUTO: 30.3 PG (ref 26.8–34.3)
MCHC RBC AUTO-ENTMCNC: 31.3 G/DL (ref 31.4–37.4)
MCV RBC AUTO: 97 FL (ref 82–98)
MONOCYTES # BLD AUTO: 0.15 THOUSAND/UL (ref 0–1.22)
MONOCYTES NFR BLD: 3 % (ref 4–12)
NEUTROPHILS # BLD MANUAL: 4.43 THOUSAND/UL (ref 1.85–7.62)
NEUTS SEG NFR BLD AUTO: 86 % (ref 43–75)
NRBC BLD AUTO-RTO: 0 /100 WBCS
PLATELET # BLD AUTO: 112 THOUSANDS/UL (ref 149–390)
PLATELET BLD QL SMEAR: ABNORMAL
PMV BLD AUTO: 12.6 FL (ref 8.9–12.7)
POTASSIUM SERPL-SCNC: 4.2 MMOL/L (ref 3.5–5.3)
PROCALCITONIN SERPL-MCNC: 3.28 NG/ML
RBC # BLD AUTO: 3.37 MILLION/UL (ref 3.81–5.12)
SODIUM SERPL-SCNC: 141 MMOL/L (ref 136–145)
TOTAL CELLS COUNTED SPEC: 100
WBC # BLD AUTO: 5.15 THOUSAND/UL (ref 4.31–10.16)

## 2021-02-12 PROCEDURE — 84145 PROCALCITONIN (PCT): CPT | Performed by: PHYSICIAN ASSISTANT

## 2021-02-12 PROCEDURE — 85379 FIBRIN DEGRADATION QUANT: CPT | Performed by: INTERNAL MEDICINE

## 2021-02-12 PROCEDURE — 85027 COMPLETE CBC AUTOMATED: CPT | Performed by: INTERNAL MEDICINE

## 2021-02-12 PROCEDURE — 82550 ASSAY OF CK (CPK): CPT | Performed by: INTERNAL MEDICINE

## 2021-02-12 PROCEDURE — 82553 CREATINE MB FRACTION: CPT | Performed by: INTERNAL MEDICINE

## 2021-02-12 PROCEDURE — 86140 C-REACTIVE PROTEIN: CPT | Performed by: INTERNAL MEDICINE

## 2021-02-12 PROCEDURE — 99233 SBSQ HOSP IP/OBS HIGH 50: CPT | Performed by: INTERNAL MEDICINE

## 2021-02-12 PROCEDURE — 97163 PT EVAL HIGH COMPLEX 45 MIN: CPT

## 2021-02-12 PROCEDURE — 99233 SBSQ HOSP IP/OBS HIGH 50: CPT | Performed by: ANESTHESIOLOGY

## 2021-02-12 PROCEDURE — 80048 BASIC METABOLIC PNL TOTAL CA: CPT | Performed by: INTERNAL MEDICINE

## 2021-02-12 PROCEDURE — 82948 REAGENT STRIP/BLOOD GLUCOSE: CPT

## 2021-02-12 PROCEDURE — 85007 BL SMEAR W/DIFF WBC COUNT: CPT | Performed by: INTERNAL MEDICINE

## 2021-02-12 PROCEDURE — 97167 OT EVAL HIGH COMPLEX 60 MIN: CPT

## 2021-02-12 PROCEDURE — 82728 ASSAY OF FERRITIN: CPT | Performed by: INTERNAL MEDICINE

## 2021-02-12 RX ORDER — HYDRALAZINE HYDROCHLORIDE 25 MG/1
25 TABLET, FILM COATED ORAL EVERY 8 HOURS SCHEDULED
Status: DISCONTINUED | OUTPATIENT
Start: 2021-02-12 | End: 2021-02-14 | Stop reason: HOSPADM

## 2021-02-12 RX ADMIN — LEVOTHYROXINE SODIUM 50 MCG: 50 TABLET ORAL at 09:09

## 2021-02-12 RX ADMIN — HYDRALAZINE HYDROCHLORIDE 25 MG: 25 TABLET, FILM COATED ORAL at 09:16

## 2021-02-12 RX ADMIN — OXYCODONE HYDROCHLORIDE AND ACETAMINOPHEN 1000 MG: 500 TABLET ORAL at 20:55

## 2021-02-12 RX ADMIN — HYDRALAZINE HYDROCHLORIDE 25 MG: 25 TABLET, FILM COATED ORAL at 21:28

## 2021-02-12 RX ADMIN — CHLORHEXIDINE GLUCONATE 0.12% ORAL RINSE 15 ML: 1.2 LIQUID ORAL at 20:54

## 2021-02-12 RX ADMIN — HYDRALAZINE HYDROCHLORIDE 25 MG: 25 TABLET, FILM COATED ORAL at 14:53

## 2021-02-12 RX ADMIN — METOPROLOL TARTRATE 50 MG: 50 TABLET, FILM COATED ORAL at 09:09

## 2021-02-12 RX ADMIN — INSULIN LISPRO 2 UNITS: 100 INJECTION, SOLUTION INTRAVENOUS; SUBCUTANEOUS at 09:28

## 2021-02-12 RX ADMIN — APIXABAN 2.5 MG: 2.5 TABLET, FILM COATED ORAL at 09:09

## 2021-02-12 RX ADMIN — ISOSORBIDE MONONITRATE 60 MG: 30 TABLET, EXTENDED RELEASE ORAL at 09:09

## 2021-02-12 RX ADMIN — OXYCODONE HYDROCHLORIDE AND ACETAMINOPHEN 1000 MG: 500 TABLET ORAL at 09:09

## 2021-02-12 RX ADMIN — CHLORHEXIDINE GLUCONATE 0.12% ORAL RINSE 15 ML: 1.2 LIQUID ORAL at 09:09

## 2021-02-12 RX ADMIN — ZINC SULFATE 220 MG (50 MG) CAPSULE 220 MG: CAPSULE at 09:09

## 2021-02-12 RX ADMIN — METOPROLOL TARTRATE 50 MG: 50 TABLET, FILM COATED ORAL at 20:55

## 2021-02-12 RX ADMIN — DEXAMETHASONE SODIUM PHOSPHATE 6 MG: 4 INJECTION, SOLUTION INTRAMUSCULAR; INTRAVENOUS at 20:07

## 2021-02-12 RX ADMIN — DOXYCYCLINE 100 MG: 100 CAPSULE ORAL at 20:06

## 2021-02-12 RX ADMIN — APIXABAN 2.5 MG: 2.5 TABLET, FILM COATED ORAL at 17:12

## 2021-02-12 RX ADMIN — ASPIRIN 81 MG: 81 TABLET, COATED ORAL at 09:09

## 2021-02-12 RX ADMIN — Medication 2000 UNITS: at 09:09

## 2021-02-12 RX ADMIN — ATORVASTATIN CALCIUM 40 MG: 40 TABLET, FILM COATED ORAL at 21:29

## 2021-02-12 RX ADMIN — INSULIN LISPRO 2 UNITS: 100 INJECTION, SOLUTION INTRAVENOUS; SUBCUTANEOUS at 17:12

## 2021-02-12 RX ADMIN — INSULIN LISPRO 1 UNITS: 100 INJECTION, SOLUTION INTRAVENOUS; SUBCUTANEOUS at 21:35

## 2021-02-12 RX ADMIN — DOXYCYCLINE 100 MG: 100 CAPSULE ORAL at 09:10

## 2021-02-12 RX ADMIN — INSULIN LISPRO 1 UNITS: 100 INJECTION, SOLUTION INTRAVENOUS; SUBCUTANEOUS at 12:31

## 2021-02-12 RX ADMIN — SERTRALINE HYDROCHLORIDE 50 MG: 50 TABLET ORAL at 09:09

## 2021-02-12 RX ADMIN — REMDESIVIR 100 MG: 100 INJECTION, POWDER, LYOPHILIZED, FOR SOLUTION INTRAVENOUS at 21:30

## 2021-02-12 RX ADMIN — CEFTRIAXONE SODIUM 1000 MG: 10 INJECTION, POWDER, FOR SOLUTION INTRAVENOUS at 17:20

## 2021-02-12 NOTE — ASSESSMENT & PLAN NOTE
Lab Results   Component Value Date    HGBA1C 7 4 (H) 02/16/2019       Recent Labs     02/11/21  1220 02/11/21  1716 02/11/21  1810 02/11/21  2159   POCGLU 145* 155* 136 150*       Blood Sugar Average: Last 72 hrs:  (P) 595 [id number]   · Anticipate hyperglycemia with administration systemic steroid  · Sliding scale insulin coverage as needed

## 2021-02-12 NOTE — OCCUPATIONAL THERAPY NOTE
Occupational Therapy Evaluation Note        Patient Name: Narcisa Neri  BHPJF'S Date: 2/12/2021 02/12/21 0849   OT Last Visit   OT Visit Date 02/12/21   Note Type   Note type Evaluation   Restrictions/Precautions   Weight Bearing Precautions Per Order No   Braces or Orthoses Other (Comment)  (none per pt)   Other Precautions Contact/isolation; Airborne/isolation; Chair Alarm; Bed Alarm;Cognitive;Telemetry;Multiple lines;Limb alert; Fall Risk  (COVID-19 confirmed)   Pain Assessment   Pain Assessment Tool 0-10   Pain Score No Pain   Home Living   Type of [address] [place] [address] One level;Stairs to enter with rails; Other (Comment)  (10 CODEY)   Bathroom Shower/Tub Tub/shower unit   Bathroom Toilet Standard   Bathroom Equipment Grab bars in shower; Shower chair;Grab bars around toilet   [facility]   Additional Comments Pt ambulatory with cane at baseline   Prior Function   Level of [place] Needs assistance with IADLs; Needs assistance with ADLs and functional mobility   Lives With Daughter; Other (Comment)  (2 granddaughters)   Receives Help From Family  (daughter home with pt throughout the day)   ADL Assistance Needs assistance   IADLs Needs assistance   Falls in the last 6 months 0   Vocational Retired   Lifestyle   Autonomy Per patient report, she lives with her daughter and 2 grandchildren in a one-story home with 10 CODEY  At baseline, patient requires assistance with both ADLs and IADLs from family  Patient reports that daughter assists with LB dressing and bathing  Patient reports using a cane at baseline for functional mobility  Patient is a questionable historian at time of evaluation; CM to follow up to ensure accuracy     Reciprocal Relationships Supportive family, who pt reports is home with her throughout the day   Service to Others Retired    Psychosocial   Psychosocial ([address]) [address] [place]  Supervision/Setup   Grooming Assistance 5  Supervision/Setup   UB Bathing Assistance 4  Minimal Assistance   LB Bathing Assistance 4  Minimal Assistance   UB Dressing Assistance 4  Minimal Assistance   LB Dressing Assistance 3  Moderate Assistance   Toileting Assistance  3  Moderate Assistance   Functional Assistance 4  Minimal Assistance   Additional Comments ADL assist levels based on pt's functional performance during OT evaluation   Bed Mobility   Supine to Sit 4  Minimal assistance   Additional items Assist x 1; Increased time required;Verbal cues; Bedrails;HOB elevated;LE management   Additional Comments Patient received lying supine in bed upon OT arrival; at end of session: pt seated OOB to recliner chair w/ all needs within reach and chair alarm activated   Transfers   Sit to Stand 4  Minimal assistance   Additional items Assist x 1; Increased time required;Verbal cues   Stand to Sit 4  Minimal assistance   Additional items Assist x 1; Increased time required;Verbal cues   Toilet transfer 4  Minimal assistance   Additional items Assist x 1; Increased time required;Armrests; Verbal cues; Commode   Additional Comments Performed functional transfers with RW   Functional Mobility   Functional Mobility 4  Minimal assistance   Additional Comments x1; ambulation to and from commode   Additional items Rolling walker   Balance   Static Sitting Fair +   Dynamic Sitting Fair   Static Standing Fair -   Dynamic Standing Poor +   Ambulatory Poor +   Activity Tolerance   Activity Tolerance Patient limited by fatigue   Medical Staff Made Aware PT [place]   Nurse Made Aware RN Conception Paige confirmed pt appropriate for therapy and made aware of session outcomes   RUE Assessment   RUE Assessment X  (Strength deficits noted with functional assessment)   LUE Assessment   LUE Assessment X  (Strength deficits noted with functional assessment)   Hand Function   Gross Motor Coordination Functional   Fine Motor Coordination Functional   Vision-Basic Assessment   Current Vision Does not wear glasses  (per pt)   Cognition   Overall Cognitive Status Impaired   Arousal/Participation Responsive; Alert; Cooperative   Attention Within functional limits   Orientation Level Oriented to person;Oriented to place; Disoriented to time;Disoriented to situation   Memory Decreased short term memory;Decreased recall of recent events   Following Commands Follows all commands and directions without difficulty   Comments Patient agreeable to OT evaluation  Administered SBT, patient scored a 24, indicating impairment consistent with dementia  Cognition Assessment Tools Other (Comment)  (Short Blessed Test)   Score 24   Assessment   Limitation Decreased ADL status; Decreased UE strength;Decreased Safe judgement during ADL;Decreased cognition;Decreased endurance;Decreased self-care trans;Decreased high-level ADLs   Prognosis Good   Assessment Patient is a 77 y o  female seen for OT evaluation s/p admit to [facility] on 2/10/2021 w/Pneumonia due to COVID-19 virus  Commorbidities affecting patient's functional performance at time of assessment include: severe sepsis, MANJEET, new onset atrial fibrillation, elevated troponin, biventricular heart failure, severe aortic stenosis, severe pulmonary arterial systolic HTN, solitary kidney, CKD stage 3, HLD, essential HTN, DM, and UTI  Patient  has a past medical history of Aortic valve stenosis, Cancer ([address] ), Cardiac disease, Cardiomyopathy ([address] ), CHF (congestive heart failure) ([address] ), Chronic systolic heart failure ([address] ), Coronary artery disease, Diabetes mellitus ([address] ), Diastolic dysfunction, Hypertension, Mitral regurgitation, Pulmonary HTN ([address] ), Renal disorder, Stroke ([address] ), and Tricuspid regurgitation  Orders placed for OT evaluation and treatment  Performed at least two patient identifiers during session including name and wristband  Prior to admission, patient was living with her daughter and two granddaughters in a one-story home with 10 CODEY   At baseline, patient requires assistance with both ADLs and IADLs from family  Personal factors affecting patient at time of initial evaluation include: steps to enter, limited insight into deficits, difficulty performing ADLs and difficulty performing IADLs  Upon evaluation, patient requires minimal  assist for UB ADLs, minimal  and moderate assist for LB ADLs, transfers and functional ambulation in room and bathroom with minimal  assist, with the use of Rolling Walker  Patient is alert, oriented to name,, oriented to place,, disoriented to time, and disoriented to situation,, presents with limited ability to recall information after a brief period of time (short term memory) / working memory   Occupational performance is affected by the following deficits: orientation, decreased muscle strength, dynamic sit/ stand balance deficit with poor standing tolerance time for self care and functional mobility, decreased activity tolerance, impaired memory, impaired problem solving, decreased safety awareness and postural control and postural alignment deficit, requiring external assistance to complete transitional movements, decreased endurance, and decreased functional mobility  Therapist completed  extensive additional review of medical records and additional review of physical, cognitive or psychosocial history, clinical examination identifying 5 or more performance deficits, clinical decision making of a high complexity , consistent with a high complexity level evaluation  Patient to benefit from continued Occupational Therapy treatment while in the hospital to address deficits as defined above and maximize level of functional independence with ADLs and functional mobility  Occupational Performance areas to address include: grooming , bathing/ shower, dressing, toilet hygiene, transfer to all surfaces, functional mobility, health maintenance, IADLs: safety procedures, Leisure Participation and Social participation   From OT standpoint, recommendation at time of d/c would be Home with family support and Home OT  Goals   Patient Goals to return home   Plan   Treatment Interventions ADL retraining;Functional transfer training;UE strengthening/ROM; Endurance training;Cognitive reorientation;Patient/family training;Equipment evaluation/education; Compensatory technique education; Energy conservation; Activityengagement;Continued evaluation   Goal Expiration Date 02/26/21   OT Treatment Day 0   OT Frequency 2-3x/wk   Recommendation   OT Discharge Recommendation Home with skilled therapy  (Skilled Home OT and increased family support)   AM-PAC Daily Activity Inpatient   Lower Body Dressing 2   Bathing 3   Toileting 2   Upper Body Dressing 3   Grooming 3   Eating 4   Daily Activity Raw Score 17   Daily Activity Standardized Score (Calc for Raw Score >=11) 37 26   AM-PAC Applied Cognition Inpatient   Following a Speech/Presentation 3   Understanding Ordinary Conversation 3   Taking Medications 1   Remembering Where Things Are Placed or Put Away 2   Remembering List of 4-5 Errands 1   Taking Care of Complicated Tasks 1   Applied Cognition Raw Score 11   Applied Cognition Standardized Score 27 03   [place] Index   Feeding 5   Bathing 0   Grooming Score 0   Dressing Score 5   Bladder Score 0   Bowels Score 10   Toilet Use Score 5   Transfers (Bed/Chair) Score 10   Mobility (Level Surface) Score 0   Stairs Score 0   [place] Index Score 35   Modified [place] Scale   Modified [place] Scale 4     Occupational Therapy Goals to be completed in 7-14 Days:    1 - Patient will verbalize and demonstrate use of energy conservation/ deep breathing technique and work simplification skills during functional activity with min verbal cues  2 - Patient will verbalize and demonstrate good body mechanics and joint protection techniques during  ADLs/ IADLs with min verbal cues      3 - Patient will increase OOB/ sitting tolerance to 2-4 hours per day for increased participation in self care and leisure tasks with no s/s of exertion  4 - Patient will increase standing tolerance time to 5 minutes with unilateral UE support to complete sink level ADLs@ supervision/setup level  5 - Patient will increase sitting tolerance at edge of bed to 20 minutes to complete UB ADLs @ set up assist level  6 - Patient will transfer bed to Chair / toilet at Set up assist level with AD as indicated  7 - Patient will complete UB ADLs with set up assist with use of compensatory/adaptive techniques as indicated  8 - Patient will complete LB ADLs with min assist with the use of adaptive equipment  9 - Patient will complete toileting hygiene with set up assist/ supervision for thoroughness    10 - Patient/ Family will demonstrate competency with UE Home Exercise Program       11- Pt will attend to continued cognitive assessment 100% of the time in order to provide most appropriate recommendations for d/c plans      Con [place], OTR/L

## 2021-02-12 NOTE — PLAN OF CARE
Problem: Potential for Falls  Goal: Patient will remain free of falls  Description: INTERVENTIONS:  - Assess patient frequently for physical needs  -  Identify cognitive and physical deficits and behaviors that affect risk of falls    -  [place] fall precautions as indicated by assessment   - Educate patient/family on patient safety including physical limitations  - Instruct patient to call for assistance with activity based on assessment  - Modify environment to reduce risk of injury  - Consider OT/PT consult to assist with strengthening/mobility  Outcome: Progressing     Problem: PAIN - ADULT  Goal: Verbalizes/displays adequate comfort level or baseline comfort level  Description: Interventions:  - Encourage patient to monitor pain and request assistance  - Assess pain using appropriate pain scale  - Administer analgesics based on type and severity of pain and evaluate response  - Implement non-pharmacological measures as appropriate and evaluate response  - Consider cultural and social influences on pain and pain management  - Notify physician/advanced practitioner if interventions unsuccessful or patient reports new pain  Outcome: Progressing     Problem: INFECTION - ADULT  Goal: Absence or prevention of progression during hospitalization  Description: INTERVENTIONS:  - Assess and monitor for signs and symptoms of infection  - Monitor lab/diagnostic results  - Monitor all insertion sites, i e  indwelling lines, tubes, and drains  - Monitor endotracheal if appropriate and nasal secretions for changes in amount and color  - [place] appropriate cooling/warming therapies per order  - Administer medications as ordered  - Instruct and encourage patient and family to use good hand hygiene technique  - Identify and instruct in appropriate isolation precautions for identified infection/condition  Outcome: Progressing  Goal: Absence of fever/infection during neutropenic period  Description: INTERVENTIONS:  - Monitor WBC    Outcome: Progressing     Problem: SAFETY ADULT  Goal: Patient will remain free of falls  Description: INTERVENTIONS:  - Assess patient frequently for physical needs  -  Identify cognitive and physical deficits and behaviors that affect risk of falls    -  [place] fall precautions as indicated by assessment   - Educate patient/family on patient safety including physical limitations  - Instruct patient to call for assistance with activity based on assessment  - Modify environment to reduce risk of injury  - Consider OT/PT consult to assist with strengthening/mobility  Outcome: Progressing  Goal: Maintain or return to baseline ADL function  Description: INTERVENTIONS:  -  Assess patient's ability to carry out ADLs; assess patient's baseline for ADL function and identify physical deficits which impact ability to perform ADLs (bathing, care of mouth/teeth, toileting, grooming, dressing, etc )  - Assess/evaluate cause of self-care deficits   - Assess range of motion  - Assess patient's mobility; develop plan if impaired  - Assess patient's need for assistive devices and provide as appropriate  - Encourage maximum independence but intervene and supervise when necessary  - Involve family in performance of ADLs  - Assess for home care needs following discharge   - Consider OT consult to assist with ADL evaluation and planning for discharge  - Provide patient education as appropriate  Outcome: Progressing  Goal: Maintain or return mobility status to optimal level  Description: INTERVENTIONS:  - Assess patient's baseline mobility status (ambulation, transfers, stairs, etc )    - Identify cognitive and physical deficits and behaviors that affect mobility  - Identify mobility aids required to assist with transfers and/or ambulation (gait belt, sit-to-stand, lift, walker, cane, etc )  - [place] fall precautions as indicated by assessment  - Record patient progress and toleration of activity level on Mobility SBAR; progress patient to next Phase/Stage  - Instruct patient to call for assistance with activity based on assessment  - Consider rehabilitation consult to assist with strengthening/weightbearing, etc   Outcome: Progressing     Problem: DISCHARGE PLANNING  Goal: Discharge to home or other facility with appropriate resources  Description: INTERVENTIONS:  - Identify barriers to discharge w/patient and caregiver  - Arrange for needed discharge resources and transportation as appropriate  - Identify discharge learning needs (meds, wound care, etc )  - Arrange for interpretive services to assist at discharge as needed  - Refer to Case Management Department for coordinating discharge planning if the patient needs post-hospital services based on physician/advanced practitioner order or complex needs related to functional status, cognitive ability, or social support system  Outcome: Progressing     Problem: Knowledge Deficit  Goal: Patient/family/caregiver demonstrates understanding of disease process, treatment plan, medications, and discharge instructions  Description: Complete learning assessment and assess knowledge base    Interventions:  - Provide teaching at level of understanding  - Provide teaching via preferred learning methods  Outcome: Progressing     Problem: CARDIOVASCULAR - ADULT  Goal: Maintains optimal cardiac output and hemodynamic stability  Description: INTERVENTIONS:  - Monitor I/O, vital signs and rhythm  - Monitor for S/S and trends of decreased cardiac output  - Administer and titrate ordered vasoactive medications to optimize hemodynamic stability  - Assess quality of pulses, skin color and temperature  - Assess for signs of decreased coronary artery perfusion  - Instruct patient to report change in severity of symptoms  Outcome: Progressing  Goal: Absence of cardiac dysrhythmias or at baseline rhythm  Description: INTERVENTIONS:  - Continuous cardiac monitoring, vital signs, obtain 12 lead EKG if ordered  - Administer antiarrhythmic and heart rate control medications as ordered  - Monitor electrolytes and administer replacement therapy as ordered  Outcome: Progressing     Problem: RESPIRATORY - ADULT  Goal: Achieves optimal ventilation and oxygenation  Description: INTERVENTIONS:  - Assess for changes in respiratory status  - Assess for changes in mentation and behavior  - Position to facilitate oxygenation and minimize respiratory effort  - Oxygen administered by appropriate delivery if ordered  - Initiate smoking cessation education as indicated  - Encourage broncho-pulmonary hygiene including cough, deep breathe, Incentive Spirometry  - Assess the need for suctioning and aspirate as needed  - Assess and instruct to report SOB or any respiratory difficulty  - Respiratory Therapy support as indicated  Outcome: Progressing     Problem: GENITOURINARY - ADULT  Goal: Maintains or returns to baseline urinary function  Description: INTERVENTIONS:  - Assess urinary function  - Encourage oral fluids to ensure adequate hydration if ordered  - Administer IV fluids as ordered to ensure adequate hydration  - Administer ordered medications as needed  - Offer frequent toileting  - Follow urinary retention protocol if ordered  Outcome: Progressing  Goal: Absence of urinary retention  Description: INTERVENTIONS:  - Assess patients ability to void and empty bladder  - Monitor I/O  - Bladder scan as needed  - Discuss with physician/AP medications to alleviate retention as needed  - Discuss catheterization for long term situations as appropriate  Outcome: Progressing  Goal: Urinary catheter remains patent  Description: INTERVENTIONS:  - Assess patency of urinary catheter  - If patient has a chronic paulino, consider changing catheter if non-functioning  - Follow guidelines for intermittent irrigation of non-functioning urinary catheter  Outcome: Progressing     Problem: MUSCULOSKELETAL - ADULT  Goal: Maintain or return mobility to safest level of function  Description: INTERVENTIONS:  - Assess patient's ability to carry out ADLs; assess patient's baseline for ADL function and identify physical deficits which impact ability to perform ADLs (bathing, care of mouth/teeth, toileting, grooming, dressing, etc )  - Assess/evaluate cause of self-care deficits   - Assess range of motion  - Assess patient's mobility  - Assess patient's need for assistive devices and provide as appropriate  - Encourage maximum independence but intervene and supervise when necessary  - Involve family in performance of ADLs  - Assess for home care needs following discharge   - Consider OT consult to assist with ADL evaluation and planning for discharge  - Provide patient education as appropriate  Outcome: Progressing  Goal: Maintain proper alignment of affected body part  Description: INTERVENTIONS:  - Support, maintain and protect limb and body alignment  - Provide patient/ family with appropriate education  Outcome: Progressing     Problem: Prexisting or High Potential for Compromised Skin Integrity  Goal: Skin integrity is maintained or improved  Description: INTERVENTIONS:  - Identify patients at risk for skin breakdown  - Assess and monitor skin integrity  - Assess and monitor nutrition and hydration status  - Monitor labs   - Assess for incontinence   - Turn and reposition patient  - Assist with mobility/ambulation  - Relieve pressure over bony prominences  - Avoid friction and shearing  - Provide appropriate hygiene as needed including keeping skin clean and dry  - Evaluate need for skin moisturizer/barrier cream  - Collaborate with interdisciplinary team   - Patient/family teaching  - Consider wound care consult   Outcome: Progressing

## 2021-02-12 NOTE — PROGRESS NOTES
Progress Note Sheree Casey 1945, 77 y o  female MRN: [id number]    Unit/Bed#:  Encounter: [id number]    Primary Care Provider: Travon Hauser MD   Date and time admitted to hospital: 2/10/2021  5:03 PM        * Pneumonia due to COVID-19 virus  Assessment & Plan  COVID Treatment (Moderate Pathway):  Continue supplement O2 as needed to maintain O2 saturations >92%  Vitamin C/Zinc/Statin x 10 days  Steroids:  Dexamethasone 6 mg q day  Convalescent Plasma: candidate for   Remdesivir: x 5 days  Day 3/5  Actemra:  Check inflammatory markers  Continue with systemic steroids times 24 hours  If patient fails to show improvement may be candidate for Actemra  Anticoagulation:  Chronically anticoagulated  Hold Eliquis  Heparin GTT  Encourage good spirometry/pulmonary toileting    Severe sepsis ([facility] )  Assessment & Plan  · Evident by tachycardia, tachypnea, and lactic acidosis  · Covid +; treatment plan as noted  · Blood cultures x2, Urine culture  · Continue broad spectrum antibiotics  · Trend procalcitonin  Deescalate as able  · Cautious administration of IV fluid hydration    MANJEET (acute kidney injury) ([facility] )  Assessment & Plan  · Baseline Cr 1 3-1 4  · Hold further diuresis  · Strict I&Os  · Trend daily BUN/creatinine  · Nephrology consultation  · Discontinue paulino catheter    New onset atrial fibrillation ([facility] )  Assessment & Plan  · Continue BB for rate control  Metoprolol tartrate 50 mg b i d  · Resume home Eliquis 2 5 mg b i d  for systemic anticoagulation  Elevated troponin  Assessment & Plan  · Suspect secondary to demand and renal dysfunction  · Continuous cardiac monitoring  · Trend troponins  Daily EKG  Biventricular heart failure (HCC)  Assessment & Plan  · Chronic biventricular HFpHF, 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)  · Follows Dr Tahir [facility] Heart Failure team  · Continue BB    · Resume Imdur and hydralazine for afterload reduction as blood pressure tolerates  · Hold ACE-inhibitor in setting of MANJEET  · Hold diuretic for additional day  · Daily weights; Dry weight 174lb  · Low sodium diet      Severe aortic stenosis  Assessment & Plan  · Hold further IV fluid hydration  · Will need close follow-up with outpatient cardiology team   Ongoing discussions regarding valvular placement    Severe pulmonary arterial systolic hypertension ([address] )  Assessment & Plan  · Group II  · Optimize heart failure plan as noted above  Solitary kidney  Assessment & Plan  · 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     CKD (chronic kidney disease) stage 3, GFR 30-59 ml/min  Assessment & Plan  Lab Results   Component Value Date    EGFR 20 02/11/2021    EGFR 15 02/10/2021    EGFR 43 02/21/2019    CREATININE 2 65 (H) 02/11/2021    CREATININE 3 39 (H) 02/10/2021    CREATININE 1 42 (H) 02/21/2019     · Plan as noted above  HLD (hyperlipidemia)  Assessment & Plan  · Atorvastatin 40 mg daily  Essential hypertension  Assessment & Plan  · Continue and beta-blockade reduced dose in setting transient hypotension  · Resume hydralazine for afterload reduction as BP tolerates  · Hold lisinopril in setting of MANJEET    Diabetes mellitus [facility])  Assessment & Plan  Lab Results   Component Value Date    HGBA1C 7 4 (H) 02/16/2019       Recent Labs     02/11/21  1220 02/11/21  1716 02/11/21  1810 02/11/21  2159   POCGLU 145* 155* 136 150*       Blood Sugar Average: Last 72 hrs:  (P) 818 [id number]   · Anticipate hyperglycemia with administration systemic steroid  · Sliding scale insulin coverage as needed    Metabolic encephalopathy-resolved as of 2/12/2021  Assessment & Plan  · Toxic metabolic in setting of ongoing infection  · CT head negative for acute intracranial abnormalities  · Delirium precautions  Regulate sleep-wake cycles  Daily CAM ICU      UTI (urinary tract infection)  Assessment & Plan  · UTI POA  · Follow-up urine culture  · Continue broad-spectrum antibiotics ceftriaxone antibiotic day 3      ----------------------------------------------------------------------------------------  HPI/24hr events:  Patient continued on moderate pathway for COVID-19 infection receiving dexamethasone and remedesivir  Received gentle IV fluid hydration a total of 1 L in setting of acute kidney injury  Heart rate better well controlled  Metoprolol uptitrated to 50 mg b i d   No acute issues overnight  Disposition: Transfer to Med-Surg   Code Status: Level 1 - Full Code  ---------------------------------------------------------------------------------------  SUBJECTIVE  Patient offers no chief complaint   ---------------------------------------------------------------------------------------  OBJECTIVE    Vitals   Vitals:    21 2100 21 2200 21 2300   BP: 103/65 112/59 122/56 139/68   BP Location: Left arm   Left arm   Pulse: 93 101 92 93   Resp: 20 (!) 32  16   Temp: 98 1 °F (36 7 °C)   98 8 °F (37 1 °C)   TempSrc: Oral   Axillary   SpO2: 95% 93%  92%   Weight:         Temp (24hrs), Av 3 °F (36 8 °C), Min:97 9 °F (36 6 °C), Max:98 8 °F (37 1 °C)  Current: Temperature: 98 8 °F (37 1 °C)          Respiratory:  Nasal Cannula O2 Flow Rate (L/min): 2 L/min    Invasive/non-invasive ventilation settings   Respiratory    Lab Data (Last 4 hours)    None         O2/Vent Data (Last 4 hours)    None                Physical Exam  Vitals signs reviewed  Constitutional:       General: She is not in acute distress  HENT:      Head: Normocephalic  Mouth/Throat:      Mouth: Mucous membranes are moist    Eyes:      Extraocular Movements: Extraocular movements intact  Pupils: Pupils are equal, round, and reactive to light  Neck:      Musculoskeletal: Normal range of motion  Cardiovascular:      Rate and Rhythm: Rhythm irregular  Heart sounds: Murmur present     Pulmonary:      Effort: Pulmonary effort is normal  No respiratory distress  Comments: Decreased breath sounds bilaterally  Abdominal:      General: Abdomen is flat  Bowel sounds are normal  There is no distension  Musculoskeletal:      Right lower leg: Edema present  Left lower leg: Edema present  Skin:     Capillary Refill: Capillary refill takes less than 2 seconds  Neurological:      General: No focal deficit present  Mental Status: She is alert  Laboratory and Diagnostics:  Results from last 7 days   Lab Units 02/11/21  0525 02/10/21  2112 02/10/21  1751   WBC Thousand/uL 3 61* 5 19 5 99   HEMOGLOBIN g/dL 10 5* 11 3* 12 0   HEMATOCRIT % 32 7* 36 1 37 8   PLATELETS Thousands/uL 96* 94* 101*   NEUTROS PCT %  --   --  86*   BANDS PCT % 1  --   --    MONOS PCT %  --   --  4   MONO PCT % 1*  --   --      Results from last 7 days   Lab Units 02/11/21  0525 02/10/21  1751   SODIUM mmol/L 140 134*   POTASSIUM mmol/L 4 5 4 5   CHLORIDE mmol/L 106 99*   CO2 mmol/L 16* 19*   ANION GAP mmol/L 18* 16*   BUN mg/dL 79* 84*   CREATININE mg/dL 2 65* 3 39*   CALCIUM mg/dL 8 3 8 8   GLUCOSE RANDOM mg/dL 164* 151*   ALT U/L 28 30   AST U/L 92* 85*   ALK PHOS U/L 68 81   ALBUMIN g/dL 2 4* 2 9*   TOTAL BILIRUBIN mg/dL 0 30 0 60          Results from last 7 days   Lab Units 02/11/21  0856 02/10/21  2112 02/10/21  1751   INR   --  1 42* 1 41*   PTT seconds >210* 34 41*      Results from last 7 days   Lab Units 02/11/21  0856 02/11/21  0644 02/11/21  0531 02/11/21  0252 02/10/21  2124 02/10/21  1751   TROPONIN I ng/mL 0 45* 0 50* 0 54* 0 62* 0 58* 0 57*     Results from last 7 days   Lab Units 02/10/21  2100 02/10/21  1751   LACTIC ACID mmol/L 1 4 2 4*     ABG:    VBG:    Results from last 7 days   Lab Units 02/11/21  0525 02/10/21  1919   PROCALCITONIN ng/ml 6 21* 7 52*       Micro  Results from last 7 days   Lab Units 02/10/21  1800 02/10/21  1751   BLOOD CULTURE  No Growth at 24 hrs  No Growth at 24 hrs         EKG:  Atrial fibrillation  Imaging: I have personally reviewed pertinent reports  and I have personally reviewed pertinent films in PACS    Intake and Output  I/O       02/10 0701 - 02/11 0700 02/11 0701 - 02/12 0700    P  O  120     I V  (mL/kg) 529 4 (6 6) 793 3 (9 9)    IV Piggyback 750     Total Intake(mL/kg) 1399 4 (17 4) 793 3 (9 9)    Urine (mL/kg/hr) 410 525 (0 3)    Stool  0    Total Output 410 525    Net +989 4 +268 3          Unmeasured Stool Occurrence  1 x          Height and Weights      IBW: -92 5 kg  Body mass index is 28 57 kg/m²  Weight (last 2 days)     Date/Time   Weight    02/11/21 0532   80 3 (177 03)    02/10/21 2100   78 7 (173 5)    02/10/21 1711   78 3 (172 62)                Nutrition       Diet Orders   (From admission, onward)             Start     Ordered    02/11/21 0733  Diet Cardiovascular; Sodium 2 GM  Diet effective now     Question Answer Comment   Diet Type Cardiovascular    Cardiac Sodium 2 GM    Special Instructions Disposable Meal    RD to adjust diet per protocol?  No        02/11/21 0732                  Active Medications  Scheduled Meds:  Current Facility-Administered Medications   Medication Dose Route Frequency Provider Last Rate    apixaban  2 5 mg Oral BID [place], CRBIJAN      ascorbic acid  1,000 mg Oral Q12H [address] Taz Bernal PA-C      aspirin  81 mg Oral Daily [place], [place]      atorvastatin  40 mg Oral HS Taz Bernal PA-C      cefTRIAXone  1,000 mg Intravenous Q24H Taz Bernal PA-C 1,000 mg (02/11/21 1843)    chlorhexidine  15 mL [place], [place]      cholecalciferol  2,000 Units Oral Daily Taz [place], [place]      dexamethasone  6 mg Intravenous Q24H [place], [place]      doxycycline hyclate  100 mg Oral Q12H [place], [place]      insulin lispro  1-5 Units Subcutaneous HS Taz Bernal PA-C      insulin lispro  1-6 Units Subcutaneous TID [facility] Taz Bernal PA-C      isosorbide mononitrate  60 mg Oral Daily Taz Bernal PA-C  levothyroxine  50 mcg Oral Daily Trina Nava PA-C      metoprolol tartrate  50 mg Oral Q12H [address] Bhavana Amos      zinc sulfate  220 mg Oral Daily Trina Nava PA-C      Followed by   Bela Po ON 2/18/2021] multivitamin-minerals  1 tablet Oral Daily Bhavana Amos      remdesivir  100 mg Intravenous Q24H Trina Nava PA-C      sertraline  50 mg Oral Daily Trina Nava PA-C      sodium chloride  75 mL/hr Intravenous Continuous Eryn Whitmore MD 75 mL/hr (02/11/21 1222)     Continuous Infusions:  sodium chloride, 75 mL/hr, Last Rate: 75 mL/hr (02/11/21 1222)      PRN Meds:        Invasive Devices Review  Invasive Devices     Peripheral Intravenous Line            Peripheral IV 02/10/21 Left Antecubital 1 day    Peripheral IV 02/10/21 Left Hand 1 day    Peripheral IV 02/10/21 Right Antecubital 1 day          Drain            Urethral Catheter Latex; Temperature probe 16 Fr  1 day                Rationale for remaining devices: d/c paulino catheter  ---------------------------------------------------------------------------------------  Advance Directive and Living Will:      Power of :    POLST:    ---------------------------------------------------------------------------------------  Care Time Delivered:   No Critical Care time spent       Trina Nava PA-C      Portions of the record may have been created with voice recognition software  Occasional wrong word or "sound a like" substitutions may have occurred due to the inherent limitations of voice recognition software    Read the chart carefully and recognize, using context, where substitutions have occurred

## 2021-02-12 NOTE — ASSESSMENT & PLAN NOTE
· Baseline Cr 1 3-1 4  · Hold further diuresis  · Strict I&Os  · Trend daily BUN/creatinine    · Nephrology consultation  · Discontinue paulino catheter

## 2021-02-12 NOTE — ASSESSMENT & PLAN NOTE
· UTI POA  · Follow-up urine culture    · Continue broad-spectrum antibiotics ceftriaxone antibiotic day 3

## 2021-02-12 NOTE — ASSESSMENT & PLAN NOTE
· Toxic metabolic in setting of ongoing infection  · CT head negative for acute intracranial abnormalities  · Delirium precautions  Regulate sleep-wake cycles  Daily CAM ICU

## 2021-02-12 NOTE — CASE MANAGEMENT
Pt improving-will be downgraded to med surg  Pt is now on room air   [facility] cannot accept-additional agencies pended

## 2021-02-12 NOTE — PLAN OF CARE
Problem: PHYSICAL THERAPY ADULT  Goal: Performs mobility at highest level of function for planned discharge setting  See evaluation for individualized goals  Description: Treatment/Interventions: Functional transfer training, LE strengthening/ROM, Elevations, Therapeutic exercise, Endurance training, Cognitive reorientation, Patient/family training, Bed mobility, Gait training, Spoke to nursing, OT          See flowsheet documentation for full assessment, interventions and recommendations  Note: Prognosis: Good  Problem List: Decreased strength, Decreased endurance, Impaired balance, Decreased mobility, Decreased cognition  Assessment: Pt is 77year old female seen for PT evaluation s/p admit to [facility] on 2/10/2021 with Pneumonia due to COVID-19 virus  PT consulted to assess pt's functional mobility and d/c needs  Order placed for PT eval and tx, with up with assist and ambulate patient order  Comorbidities affecting pt's physical performance at time of assessment include MANJEET, essential hypertension, DM, UTI, solitary kidney, CKD stage 3, severe aortic stenosis, severe pulmonary arterial systolic hypertension, biventricular heart failure, severe sepsis, new onset atrial fibrillation, elevated troponin, and hyperlipidemia  PTA, pt was requiring assust for mobility with use of a cane  Personal factors affecting pt at time of IE include: inaccessible home environment, ambulating with assistive device, stairs to enter home, inability to ambulate household distances, inability to navigate community distances, inability to navigate level surfaces without external assistance, unable to perform dynamic tasks in community, decreased cognition, inability to perform IADLs, and inability to perform ADLs   Please find objective findings from PT assessment regarding body systems outlined above with impairments and limitations including weakness, impaired balance, decreased endurance, gait deviations, decreased activity tolerance, decreased functional mobility tolerance, fall risk, and decreased cognition  The following objective measures performed on IE also reveal limitations: Barthel Index: 35/100 and Modified [place]: 4 (moderate/severe disability)  Pt's clinical presentation is currently unstable/unpredictable seen in pt's presentation of need for ongoing medical management/monitoring, pt is a fall risk, and pt requires cues/assist for safety with functional mobility  Pt to benefit from continued PT tx to address deficits as defined above and maximize level of functional independent mobility and consistency  From PT/mobility standpoint, recommendation at time of d/c would be Home PT with family support pending progress in order to facilitate return to PLOF  Barriers to Discharge: Inaccessible home environment     PT Discharge Recommendation: Home with skilled therapy     PT - OK to Discharge: No    See flowsheet documentation for full assessment

## 2021-02-12 NOTE — PHYSICAL THERAPY NOTE
Physical Therapy Evaluation     Patient's Name: Jasmyn Oh    Admitting Diagnosis  Weakness generalized [R53 1]  MANJEET (acute kidney injury) ([place] [address] ) [N17 9]  Atrial fibrillation with RVR ([place] [address] ) [I48 91]  COVID-19 [U07 1]    Problem List  Patient Active Problem List   Diagnosis    MANJEET (acute kidney injury) ([place] [address] )    Normocytic anemia    Essential hypertension    Diabetes mellitus ([place] [address] )    UTI (urinary tract infection)    Solitary kidney    Bilateral leg edema    Acute on chronic systolic congestive heart failure (HCC)    CKD (chronic kidney disease) stage 3, GFR 30-59 ml/min    Severe aortic stenosis    Severe pulmonary arterial systolic hypertension (HCC)    Biventricular heart failure (HCC)    Severe sepsis ([facility])    New onset atrial fibrillation ([place] [address] )    Pneumonia due to COVID-19 virus    Elevated troponin    HLD (hyperlipidemia)     Past Medical History  Past Medical History:   Diagnosis Date    Aortic valve stenosis     Cancer ([place] [address] )     Cardiac disease     Cardiomyopathy ([place] [address] )     CHF (congestive heart failure) ([facility])     Chronic systolic heart failure (HCC)     Coronary artery disease     Diabetes mellitus ([place] [address] )     Diastolic dysfunction     Hypertension     Mitral regurgitation     Pulmonary HTN (HCC)     Renal disorder     Stroke ([place] [address] )     Tricuspid regurgitation      Past Surgical History  Past Surgical History:   Procedure Laterality Date    CARDIAC SURGERY      CORONARY ANGIOPLASTY WITH STENT PLACEMENT      KIDNEY SURGERY      MASTECTOMY          02/12/21 8133   PT Last Visit   PT Visit Date 02/12/21   Note Type   Note type Evaluation   Pain Assessment   Pain Assessment Tool 0-10   Pain Score No Pain   Home Living   Type of [address] [place] [address] One level;Stairs to enter with rails  (10 CODEY)   Bathroom Shower/Tub Tub/shower unit   Bathroom Toilet Standard   Bathroom Equipment Grab bars in shower; Shower chair;Grab bars around toilet   300 Yg [employer] Equipment Cane   Additional Comments Pt ambulates with a cane at baseline   Prior Function   Level of [place] Needs assistance with ADLs and functional mobility   Lives With Daughter; Other (Comment)  (2 granddaughters)   Receives Help From Family  (daughter with pt throughout the day)   ADL Assistance Needs assistance   IADLs Needs assistance   Falls in the last 6 months 0   Vocational Retired  ()   Restrictions/Precautions   Wells Audelia Bearing Precautions Per Order No   Braces or Orthoses Other (Comment)  (none per patient)   Other Precautions Contact/isolation; Airborne/isolation;Droplet precautions; Chair Alarm; Bed Alarm;Telemetry; Fall Risk;Limb alert  (+covid)   General   Family/Caregiver Present No   Cognition   Overall Cognitive Status Impaired   Arousal/Participation Alert   Orientation Level Oriented to person;Oriented to place; Disoriented to time;Disoriented to situation   Memory Decreased recall of recent events;Decreased short term memory   Following Commands Follows all commands and directions without difficulty   Comments Pt agreeable to PT    RUE Assessment   RUE Assessment   (defer to OT assessment)   LUE Assessment   LUE Assessment   (defer to OT assessment)   RLE Assessment   RLE Assessment X   Strength RLE   RLE Overall Strength 3+/5   LLE Assessment   LLE Assessment X   Strength LLE   LLE Overall Strength 3+/5   Light Touch   RLE Light Touch Grossly intact   LLE Light Touch Grossly intact   Bed Mobility   Supine to Sit 4  Minimal assistance   Additional items Assist x 1;HOB elevated; Bedrails; Increased time required;Verbal cues;LE management   Transfers   Sit to Stand 4  Minimal assistance   Additional items Assist x 1; Increased time required;Verbal cues   Stand to Sit 4  Minimal assistance   Additional items Assist x 1; Armrests; Increased time required;Verbal cues   Ambulation/Elevation   Gait pattern Step to;Short stride; Shuffling   Gait Assistance 4  Minimal assist   Additional items Assist x 1;Verbal cues   Assistive Device Rolling walker   Distance 10 feet   Stair Management Assistance Not tested   Balance   Static Sitting Fair +   Dynamic Sitting Fair   Static Standing Fair -   Dynamic Standing Poor +   Ambulatory Poor +   Endurance Deficit   Endurance Deficit Yes   Activity Tolerance   Activity Tolerance Patient limited by fatigue   Medical Staff Made Aware OT Maria Ines Sommers   Nurse Made Aware Discussed case with ROMELIA Meyers; post session pt was left seated in recliner in NAD, all belongings within reach, +chair alarm   Assessment   Prognosis Good   Problem List Decreased strength;Decreased endurance; Impaired balance;Decreased mobility; Decreased cognition   Assessment Pt is 77year old female seen for PT evaluation s/p admit to [facility] on [facility]10[facility]2021 with Pneumonia due to COVID-19 virus  PT consulted to assess pt's functional mobility and d/c needs  Order placed for PT eval and tx, with up with assist and ambulate patient order  Comorbidities affecting pt's physical performance at time of assessment include MANJEET, essential hypertension, DM, UTI, solitary kidney, CKD stage 3, severe aortic stenosis, severe pulmonary arterial systolic hypertension, biventricular heart failure, severe sepsis, new onset atrial fibrillation, elevated troponin, and hyperlipidemia  PTA, pt was requiring assust for mobility with use of a cane  Personal factors affecting pt at time of IE include: inaccessible home environment, ambulating with assistive device, stairs to enter home, inability to ambulate household distances, inability to navigate community distances, inability to navigate level surfaces without external assistance, unable to perform dynamic tasks in community, decreased cognition, inability to perform IADLs, and inability to perform ADLs   Please find objective findings from PT assessment regarding body systems outlined above with impairments and limitations including weakness, impaired balance, decreased endurance, gait deviations, decreased activity tolerance, decreased functional mobility tolerance, fall risk, and decreased cognition  The following objective measures performed on IE also reveal limitations: Barthel Index: 35/100 and Modified [place]: 4 (moderate/severe disability)  Pt's clinical presentation is currently unstable/unpredictable seen in pt's presentation of need for ongoing medical management/monitoring, pt is a fall risk, and pt requires cues/assist for safety with functional mobility  Pt to benefit from continued PT tx to address deficits as defined above and maximize level of functional independent mobility and consistency  From PT/mobility standpoint, recommendation at time of d/c would be Home PT with family support pending progress in order to facilitate return to PLOF  Barriers to Discharge Inaccessible home environment   Goals   Patient Goals to return home   STG Expiration Date 02/22/21   Short Term Goal #1 In 7-10 days: Increase bilateral LE strength 1/2 grade to facilitate independent mobility, Perform all bed mobility tasks modified independent to decrease caregiver burden, Perform all transfers modified independent to improve independence, Ambulate > 150 ft  with least restrictive assistive device modified independent w/o LOB and w/ normalized gait pattern 100% of the time, Navigate 10 stairs with close supervision with unilateral handrail to facilitate return to previous living environment and Increase all balance 1/2 grade to decrease risk for falls   Plan   Treatment/Interventions Functional transfer training;LE strengthening/ROM; Elevations; Therapeutic exercise; Endurance training;Cognitive reorientation;Patient/family training;Bed mobility;Gait training;Spoke to nursing;OT   PT Frequency Other (Comment)  (3-5x/wk)   Recommendation   PT Discharge Recommendation Home with skilled therapy   PT - OK to Discharge No   AM-PAC Basic Mobility Inpatient   Turning in Bed Without Bedrails 3 Lying on Back to Sitting on Edge of Flat Bed 3   Moving Bed to Chair 3   Standing Up From Chair 3   Walk in Room 3   Climb 3-5 Stairs 3   Basic Mobility Inpatient Raw Score 18   Basic Mobility Standardized Score 41 05   Modified Lenoir Scale   Modified [place] Scale 4   Barthel Index   Feeding 5   Bathing 0   Grooming Score 0   Dressing Score 5   Bladder Score 0   Bowels Score 10   Toilet Use Score 5   Transfers (Bed/Chair) Score 10   Mobility (Level Surface) Score 0   Stairs Score 0   Barthel Index Score 35     Bill Wang, PT, DPT

## 2021-02-12 NOTE — ASSESSMENT & PLAN NOTE
· Continue BB for rate control  Metoprolol tartrate 50 mg b i d  · Resume home Eliquis 2 5 mg b i d  for systemic anticoagulation

## 2021-02-12 NOTE — PLAN OF CARE
Problem: OCCUPATIONAL THERAPY ADULT  Goal: Performs self-care activities at highest level of function for planned discharge setting  See evaluation for individualized goals  Description: Treatment Interventions: ADL retraining, Functional transfer training, UE strengthening/ROM, Endurance training, Cognitive reorientation, Patient/family training, Equipment evaluation/education, Compensatory technique education, Energy conservation, Activityengagement, Continued evaluation          See flowsheet documentation for full assessment, interventions and recommendations  Note: Limitation: Decreased ADL status, Decreased UE strength, Decreased Safe judgement during ADL, Decreased cognition, Decreased endurance, Decreased self-care trans, Decreased high-level ADLs  Prognosis: Good  Assessment: Patient is a 77 y o  female seen for OT evaluation s/p admit to [facility] on 2/10/2021 w/Pneumonia due to COVID-19 virus  Commorbidities affecting patient's functional performance at time of assessment include: severe sepsis, MANJEET, new onset atrial fibrillation, elevated troponin, biventricular heart failure, severe aortic stenosis, severe pulmonary arterial systolic HTN, solitary kidney, CKD stage 3, HLD, essential HTN, DM, and UTI  Patient  has a past medical history of Aortic valve stenosis, Cancer ([address] ), Cardiac disease, Cardiomyopathy ([address] ), CHF (congestive heart failure) ([address] ), Chronic systolic heart failure ([address] ), Coronary artery disease, Diabetes mellitus ([address] ), Diastolic dysfunction, Hypertension, Mitral regurgitation, Pulmonary HTN ([address] ), Renal disorder, Stroke ([address] ), and Tricuspid regurgitation  Orders placed for OT evaluation and treatment  Performed at least two patient identifiers during session including name and wristband  Prior to admission, patient was living with her daughter and two granddaughters in a one-story home with 10 CODEY   At baseline, patient requires assistance with both ADLs and IADLs from family  Personal factors affecting patient at time of initial evaluation include: steps to enter, limited insight into deficits, difficulty performing ADLs and difficulty performing IADLs  Upon evaluation, patient requires minimal  assist for UB ADLs, minimal  and moderate assist for LB ADLs, transfers and functional ambulation in room and bathroom with minimal  assist, with the use of Rolling Walker  Patient is alert, oriented to name,, oriented to place,, disoriented to time, and disoriented to situation,, presents with limited ability to recall information after a brief period of time (short term memory) / working memory   Occupational performance is affected by the following deficits: orientation, decreased muscle strength, dynamic sit/ stand balance deficit with poor standing tolerance time for self care and functional mobility, decreased activity tolerance, impaired memory, impaired problem solving, decreased safety awareness and postural control and postural alignment deficit, requiring external assistance to complete transitional movements, decreased endurance, and decreased functional mobility  Therapist completed  extensive additional review of medical records and additional review of physical, cognitive or psychosocial history, clinical examination identifying 5 or more performance deficits, clinical decision making of a high complexity , consistent with a high complexity level evaluation  Patient to benefit from continued Occupational Therapy treatment while in the hospital to address deficits as defined above and maximize level of functional independence with ADLs and functional mobility  Occupational Performance areas to address include: grooming , bathing/ shower, dressing, toilet hygiene, transfer to all surfaces, functional mobility, health maintenance, IADLs: safety procedures, Leisure Participation and Social participation   From OT standpoint, recommendation at time of d/c would be Home with family support and Home OT         OT Discharge Recommendation: Home with skilled therapy(Skilled Home OT and increased family support)

## 2021-02-12 NOTE — PROGRESS NOTES
NEPHROLOGY PROGRESS NOTE    Patient: Kristy Hernandez               Sex: female          DOA: 2/10/2021  5:03 PM   YOB: 1945        Age:  77 y o         LOS:  LOS: 2 days       HPI     Patient with COVID-19 infection and acute kidney injury    SUBJECTIVE     Awake and alert    Still require oxygen    Does not appear to be short of breath    No other acute complaint    CURRENT MEDICATIONS       Current Facility-Administered Medications:     apixaban (ELIQUIS) tablet 2 5 mg, 2 5 mg, Oral, BID, Flor Rojas, CHANDU, 2 5 mg at 02/12/21 0909    ascorbic acid (VITAMIN C) tablet 1,000 mg, 1,000 mg, Oral, Q12H [address], Benjamine Rinne, PA-C, 1,000 mg at 02/12/21 0909    aspirin (ECOTRIN LOW STRENGTH) EC tablet 81 mg, 81 mg, Oral, Daily, Benjamine Rinne, PA-C, 81 mg at 02/12/21 0909    atorvastatin (LIPITOR) tablet 40 mg, 40 mg, Oral, HS, Benjamine Rinne, PA-C, 40 mg at 02/11/21 2203    ceftriaxone (ROCEPHIN) 1 g/50 mL in dextrose IVPB, 1,000 mg, Intravenous, Q24H, Benjamine Rinne, PA-C, Last Rate: 100 mL/hr at 02/11/21 1843, 1,000 mg at 02/11/21 1843    chlorhexidine (PERIDEX) 0 12 % oral rinse 15 mL, 15 mL, Swish & Spit, Q12H [address], Benjamine Rinne, PA-C, 15 mL at 02/12/21 4109    cholecalciferol (VITAMIN D3) tablet 2,000 Units, 2,000 Units, Oral, Daily, Benjamine Rinne, PA-C, 2,000 Units at 02/12/21 0909    dexamethasone (DECADRON) injection 6 mg, 6 mg, Intravenous, Q24H, Benjamine Rinne, PA-C, 6 mg at 02/11/21 2203    doxycycline hyclate (VIBRAMYCIN) capsule 100 mg, 100 mg, Oral, Q12H, Benjamine Rinne, PA-C, 100 mg at 02/12/21 0910    hydrALAZINE (APRESOLINE) tablet 25 mg, 25 mg, Oral, Q8H [address], Eryn Whitmore MD, 25 mg at 02/12/21 0916    insulin lispro (HumaLOG) 100 units/mL subcutaneous injection 1-5 Units, 1-5 Units, Subcutaneous, HS, Benjamine Rinne, PA-C    insulin lispro (HumaLOG) 100 units/mL subcutaneous injection 1-6 Units, 1-6 Units, Subcutaneous, TID AC, 2 Units at 02/12/21 0928 **AND** Fingerstick Glucose (POCT), , , TID AC, Jared Sanabria PA-C    isosorbide mononitrate (IMDUR) 24 hr tablet 60 mg, 60 mg, Oral, Daily, Jared Sanabria, PA-C, 60 mg at 02/12/21 0909    levothyroxine tablet 50 mcg, 50 mcg, Oral, Daily, Jared Sanabria, PA-C, 50 mcg at 02/12/21 0909    metoprolol tartrate (LOPRESSOR) tablet 50 mg, 50 mg, Oral, Q12H [address], Jared Sanabria, PA-C, 50 mg at 02/12/21 0909    zinc sulfate (ZINCATE) capsule 220 mg, 220 mg, Oral, Daily, 220 mg at 02/12/21 0909 **FOLLOWED BY** [START ON 2/18/2021] multivitamin-minerals (CENTRUM ADULTS) tablet 1 tablet, 1 tablet, Oral, Daily, Jared Sanabria PA-C    [COMPLETED] remdesivir Thermon Blase) 200 mg in sodium chloride 0 9 % 250 mL IVPB, 200 mg, Intravenous, Q24H, 200 mg at 02/10/21 2153 **FOLLOWED BY** remdesivir (Veklury) 100 mg in sodium chloride 0 9 % 250 mL IVPB, 100 mg, Intravenous, Q24H, Jared Sanabria, PA-C, 100 mg at 02/11/21 2203    sertraline (ZOLOFT) tablet 50 mg, 50 mg, Oral, Daily, Jared Sanabria, PA-C, 50 mg at 02/12/21 7590    OBJECTIVE     Current Weight: Weight - Scale: 80 3 kg (177 lb 0 5 oz)  Vitals:    02/12/21 0702   BP: 137/87   Pulse: 90   Resp: 17   Temp: 97 6 °F (36 4 °C)   SpO2: 94%       Intake/Output Summary (Last 24 hours) at 2/12/2021 0958  Last data filed at 2/12/2021 1923  Gross per 24 hour   Intake 296 5 ml   Output 1725 ml   Net -1428 5 ml       PHYSICAL EXAMINATION     Physical Exam  Constitutional:       General: She is not in acute distress  Appearance: She is well-developed  HENT:      Head: Normocephalic  Eyes:      General: No scleral icterus  Conjunctiva/sclera: Conjunctivae normal    Neck:      Musculoskeletal: Neck supple  Vascular: No JVD  Cardiovascular:      Rate and Rhythm: Normal rate  Heart sounds: Normal heart sounds  Pulmonary:      Effort: Pulmonary effort is normal       Breath sounds: No wheezing  Abdominal:      Palpations: Abdomen is soft  Tenderness: There is no abdominal tenderness     Musculoskeletal: Normal range of motion  Skin:     General: Skin is warm  Findings: No rash  Neurological:      Mental Status: She is alert and oriented to person, place, and time  Psychiatric:         Behavior: Behavior normal           LAB RESULTS     Results from last 7 days   Lab Units 02/12/21  0550 02/11/21  0525 02/10/21  2112 02/10/21  1751   WBC Thousand/uL 5 15 3 61* 5 19 5 99   HEMOGLOBIN g/dL 10 2* 10 5* 11 3* 12 0   HEMATOCRIT % 32 6* 32 7* 36 1 37 8   PLATELETS Thousands/uL 112* 96* 94* 101*   POTASSIUM mmol/L 4 2 4 5  --  4 5   CHLORIDE mmol/L 108 106  --  99*   CO2 mmol/L 17* 16*  --  19*   BUN mg/dL 83* 79*  --  84*   CREATININE mg/dL 2 07* 2 65*  --  3 39*   EGFR ml/min/1 73sq m 27 20  --  15   CALCIUM mg/dL 8 3 8 3  --  8 8       RADIOLOGY RESULTS      Results for orders placed during the hospital encounter of 02/10/21   XR chest 1 view portable    Narrative CHEST     INDICATION:   Weakness, new a fib  Patient has confirmed COVID-19  COMPARISON:  Chest radiograph dated 2/15/2019  EXAM PERFORMED/VIEWS:  XR CHEST PORTABLE      FINDINGS:    Cardiomediastinal silhouette appears unremarkable  There are bilateral groundglass opacities  No pneumothorax or pleural effusion  Osseous structures appear within normal limits for patient age  Impression Bilateral groundglass opacities  In the setting of clinically suspected/proven COVID-19, this plain film appearance while nonspecific, can be seen in cases of viral pneumonia such as COVID-19  Workstation performed: [id number]       Results for orders placed during the hospital encounter of 02/15/19   XR chest 2 views    Narrative CHEST     INDICATION:   Shortness of breath and leg swelling  COMPARISON:  7/14/2017    EXAM PERFORMED/VIEWS:  XR CHEST PA & LATERAL      FINDINGS:    Heart shadow is enlarged but unchanged from prior exam     There is no focal infiltrate or pleural effusion  No pneumothorax    Pulmonary vasculature mildly prominent    Osseous structures appear within normal limits for patient age  Impression Mild pulmonary vascular congestion and enlargement of the cardiac silhouette  Workstation performed: [id number]       Results for orders placed during the hospital encounter of 02/10/21   CT chest without contrast    Narrative CT CHEST WITHOUT IV CONTRAST    INDICATION:   Shortness of breath  abnormal XR  Patient has confirmed COVID-19  COMPARISON:  9/1/2005    TECHNIQUE: CT examination of the chest was performed without intravenous contrast   Axial, sagittal, and coronal 2D reformatted images were created from the source data and submitted for interpretation  Radiation dose length product (DLP) for this visit:  440 76 mGy-cm   This examination, like all CT scans performed in the [facility], was performed utilizing techniques to minimize radiation dose exposure, including the use of iterative   reconstruction and automated exposure control  FINDINGS:    LUNGS:  There are multifocal patchy groundglass opacities within the lungs bilaterally, consistent with the patient's clinical history  There are multiple thin-walled cysts in the lungs bilaterally  PLEURA:  Unremarkable  HEART/GREAT VESSELS:  Unremarkable for patient's age  MEDIASTINUM AND NEVAEH:  Prominent mediastinal lymph nodes are noted  There is no suspicious hilar or axillary lymphadenopathy  CHEST WALL AND LOWER NECK:   Unremarkable  VISUALIZED STRUCTURES IN THE UPPER ABDOMEN:  Unremarkable  OSSEOUS STRUCTURES:  There are degenerative changes of the spine  Impression Multifocal groundglass opacities throughout the lungs bilaterally consistent with the history of Covid- 19  Workstation performed: [id number]       No results found for this or any previous visit  No results found for this or any previous visit  No results found for this or any previous visit      PLAN / RECOMMENDATIONS      Acute kidney injury:  Improving at this point  Will continue gentle IV hydration and monitor    COVID-19 infection: On medication with moderate pathway still require oxygen    Hypertension:  Very well controlled    Will continue to monitor    Rubio Johnson MD  Nephrology  2/12/2021        Portions of the record may have been created with voice recognition software  Occasional wrong word or "sound a like" substitutions may have occurred due to the inherent limitations of voice recognition software  Read the chart carefully and recognize, using context, where substitutions have occurred

## 2021-02-12 NOTE — ASSESSMENT & PLAN NOTE
COVID Treatment (Moderate Pathway):  Continue supplement O2 as needed to maintain O2 saturations >92%  Vitamin C/Zinc/Statin x 10 days  Steroids:  Dexamethasone 6 mg q day  Convalescent Plasma: candidate for   Remdesivir: x 5 days  Day 3/5  Actemra:  Check inflammatory markers  Continue with systemic steroids times 24 hours  If patient fails to show improvement may be candidate for Actemra  Anticoagulation:  Chronically anticoagulated  Hold Eliquis  Heparin GTT    Encourage good spirometry/pulmonary toileting

## 2021-02-12 NOTE — ASSESSMENT & PLAN NOTE
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

## 2021-02-12 NOTE — ASSESSMENT & PLAN NOTE
Lab Results   Component Value Date    EGFR 20 02/11/2021    EGFR 15 02/10/2021    EGFR 43 02/21/2019    CREATININE 2 65 (H) 02/11/2021    CREATININE 3 39 (H) 02/10/2021    CREATININE 1 42 (H) 02/21/2019     · Plan as noted above

## 2021-02-13 LAB
ANION GAP SERPL CALCULATED.3IONS-SCNC: 16 MMOL/L (ref 4–13)
BUN SERPL-MCNC: 72 MG/DL (ref 5–25)
CALCIUM SERPL-MCNC: 8.5 MG/DL (ref 8.3–10.1)
CHLORIDE SERPL-SCNC: 108 MMOL/L (ref 100–108)
CO2 SERPL-SCNC: 15 MMOL/L (ref 21–32)
CREAT SERPL-MCNC: 1.78 MG/DL (ref 0.6–1.3)
CRP SERPL QL: 112.4 MG/L
D DIMER PPP FEU-MCNC: 1.7 UG/ML FEU
ERYTHROCYTE [DISTWIDTH] IN BLOOD BY AUTOMATED COUNT: 15.9 % (ref 11.6–15.1)
FERRITIN SERPL-MCNC: 5407 NG/ML (ref 8–388)
GFR SERPL CREATININE-BSD FRML MDRD: 32 ML/MIN/1.73SQ M
GLUCOSE SERPL-MCNC: 138 MG/DL (ref 65–140)
GLUCOSE SERPL-MCNC: 143 MG/DL (ref 65–140)
GLUCOSE SERPL-MCNC: 202 MG/DL (ref 65–140)
GLUCOSE SERPL-MCNC: 235 MG/DL (ref 65–140)
HCT VFR BLD AUTO: 31.4 % (ref 34.8–46.1)
HGB BLD-MCNC: 10.1 G/DL (ref 11.5–15.4)
MCH RBC QN AUTO: 31.2 PG (ref 26.8–34.3)
MCHC RBC AUTO-ENTMCNC: 32.2 G/DL (ref 31.4–37.4)
MCV RBC AUTO: 97 FL (ref 82–98)
PLATELET # BLD AUTO: 141 THOUSANDS/UL (ref 149–390)
PMV BLD AUTO: 12.4 FL (ref 8.9–12.7)
POTASSIUM SERPL-SCNC: 4.7 MMOL/L (ref 3.5–5.3)
PROCALCITONIN SERPL-MCNC: 1.46 NG/ML
RBC # BLD AUTO: 3.24 MILLION/UL (ref 3.81–5.12)
SODIUM SERPL-SCNC: 139 MMOL/L (ref 136–145)
WBC # BLD AUTO: 4.93 THOUSAND/UL (ref 4.31–10.16)

## 2021-02-13 PROCEDURE — 82948 REAGENT STRIP/BLOOD GLUCOSE: CPT

## 2021-02-13 PROCEDURE — 85379 FIBRIN DEGRADATION QUANT: CPT | Performed by: INTERNAL MEDICINE

## 2021-02-13 PROCEDURE — 84145 PROCALCITONIN (PCT): CPT | Performed by: PHYSICIAN ASSISTANT

## 2021-02-13 PROCEDURE — 82728 ASSAY OF FERRITIN: CPT | Performed by: INTERNAL MEDICINE

## 2021-02-13 PROCEDURE — 99233 SBSQ HOSP IP/OBS HIGH 50: CPT | Performed by: INTERNAL MEDICINE

## 2021-02-13 PROCEDURE — 99233 SBSQ HOSP IP/OBS HIGH 50: CPT | Performed by: STUDENT IN AN ORGANIZED HEALTH CARE EDUCATION/TRAINING PROGRAM

## 2021-02-13 PROCEDURE — 80048 BASIC METABOLIC PNL TOTAL CA: CPT | Performed by: NURSE PRACTITIONER

## 2021-02-13 PROCEDURE — 85027 COMPLETE CBC AUTOMATED: CPT | Performed by: NURSE PRACTITIONER

## 2021-02-13 PROCEDURE — 86140 C-REACTIVE PROTEIN: CPT | Performed by: INTERNAL MEDICINE

## 2021-02-13 RX ADMIN — CEFTRIAXONE SODIUM 1000 MG: 10 INJECTION, POWDER, FOR SOLUTION INTRAVENOUS at 17:55

## 2021-02-13 RX ADMIN — ZINC SULFATE 220 MG (50 MG) CAPSULE 220 MG: CAPSULE at 09:44

## 2021-02-13 RX ADMIN — INSULIN LISPRO 2 UNITS: 100 INJECTION, SOLUTION INTRAVENOUS; SUBCUTANEOUS at 14:28

## 2021-02-13 RX ADMIN — ATORVASTATIN CALCIUM 40 MG: 40 TABLET, FILM COATED ORAL at 21:04

## 2021-02-13 RX ADMIN — HYDRALAZINE HYDROCHLORIDE 25 MG: 25 TABLET, FILM COATED ORAL at 05:06

## 2021-02-13 RX ADMIN — INSULIN LISPRO 2 UNITS: 100 INJECTION, SOLUTION INTRAVENOUS; SUBCUTANEOUS at 09:55

## 2021-02-13 RX ADMIN — SERTRALINE HYDROCHLORIDE 50 MG: 50 TABLET ORAL at 09:43

## 2021-02-13 RX ADMIN — HYDRALAZINE HYDROCHLORIDE 25 MG: 25 TABLET, FILM COATED ORAL at 14:28

## 2021-02-13 RX ADMIN — ASPIRIN 81 MG: 81 TABLET, COATED ORAL at 09:43

## 2021-02-13 RX ADMIN — LEVOTHYROXINE SODIUM 50 MCG: 50 TABLET ORAL at 09:43

## 2021-02-13 RX ADMIN — APIXABAN 2.5 MG: 2.5 TABLET, FILM COATED ORAL at 17:55

## 2021-02-13 RX ADMIN — METOPROLOL TARTRATE 50 MG: 50 TABLET, FILM COATED ORAL at 20:07

## 2021-02-13 RX ADMIN — DEXAMETHASONE SODIUM PHOSPHATE 6 MG: 4 INJECTION, SOLUTION INTRAMUSCULAR; INTRAVENOUS at 19:47

## 2021-02-13 RX ADMIN — Medication 2000 UNITS: at 09:43

## 2021-02-13 RX ADMIN — REMDESIVIR 100 MG: 100 INJECTION, POWDER, LYOPHILIZED, FOR SOLUTION INTRAVENOUS at 21:19

## 2021-02-13 RX ADMIN — OXYCODONE HYDROCHLORIDE AND ACETAMINOPHEN 1000 MG: 500 TABLET ORAL at 09:43

## 2021-02-13 RX ADMIN — CHLORHEXIDINE GLUCONATE 0.12% ORAL RINSE 15 ML: 1.2 LIQUID ORAL at 20:06

## 2021-02-13 RX ADMIN — APIXABAN 2.5 MG: 2.5 TABLET, FILM COATED ORAL at 09:44

## 2021-02-13 RX ADMIN — ISOSORBIDE MONONITRATE 60 MG: 30 TABLET, EXTENDED RELEASE ORAL at 09:44

## 2021-02-13 RX ADMIN — DOXYCYCLINE 100 MG: 100 CAPSULE ORAL at 09:43

## 2021-02-13 RX ADMIN — METOPROLOL TARTRATE 50 MG: 50 TABLET, FILM COATED ORAL at 09:44

## 2021-02-13 RX ADMIN — HYDRALAZINE HYDROCHLORIDE 25 MG: 25 TABLET, FILM COATED ORAL at 21:04

## 2021-02-13 RX ADMIN — DOXYCYCLINE 100 MG: 100 CAPSULE ORAL at 19:47

## 2021-02-13 RX ADMIN — CHLORHEXIDINE GLUCONATE 0.12% ORAL RINSE 15 ML: 1.2 LIQUID ORAL at 09:44

## 2021-02-13 RX ADMIN — OXYCODONE HYDROCHLORIDE AND ACETAMINOPHEN 1000 MG: 500 TABLET ORAL at 20:06

## 2021-02-13 NOTE — ASSESSMENT & PLAN NOTE
· Continue BB for rate control  Metoprolol tartrate 50 mg b i d   · Continue home Eliquis 2 5 mg b i d  for systemic anticoagulation

## 2021-02-13 NOTE — PLAN OF CARE
Problem: Potential for Falls  Goal: Patient will remain free of falls  Description: INTERVENTIONS:  - Assess patient frequently for physical needs  -  Identify cognitive and physical deficits and behaviors that affect risk of falls    -  [place] fall precautions as indicated by assessment   - Educate patient/family on patient safety including physical limitations  - Instruct patient to call for assistance with activity based on assessment  - Modify environment to reduce risk of injury  - Consider OT/PT consult to assist with strengthening/mobility  Outcome: Progressing     Problem: PAIN - ADULT  Goal: Verbalizes/displays adequate comfort level or baseline comfort level  Description: Interventions:  - Encourage patient to monitor pain and request assistance  - Assess pain using appropriate pain scale  - Administer analgesics based on type and severity of pain and evaluate response  - Implement non-pharmacological measures as appropriate and evaluate response  - Consider cultural and social influences on pain and pain management  - Notify physician/advanced practitioner if interventions unsuccessful or patient reports new pain  Outcome: Progressing     Problem: INFECTION - ADULT  Goal: Absence or prevention of progression during hospitalization  Description: INTERVENTIONS:  - Assess and monitor for signs and symptoms of infection  - Monitor lab/diagnostic results  - Monitor all insertion sites, i e  indwelling lines, tubes, and drains  - Monitor endotracheal if appropriate and nasal secretions for changes in amount and color  - [place] appropriate cooling/warming therapies per order  - Administer medications as ordered  - Instruct and encourage patient and family to use good hand hygiene technique  - Identify and instruct in appropriate isolation precautions for identified infection/condition  Outcome: Progressing  Goal: Absence of fever/infection during neutropenic period  Description: INTERVENTIONS:  - Monitor WBC    Outcome: Progressing     Problem: SAFETY ADULT  Goal: Patient will remain free of falls  Description: INTERVENTIONS:  - Assess patient frequently for physical needs  -  Identify cognitive and physical deficits and behaviors that affect risk of falls    -  [place] fall precautions as indicated by assessment   - Educate patient/family on patient safety including physical limitations  - Instruct patient to call for assistance with activity based on assessment  - Modify environment to reduce risk of injury  - Consider OT/PT consult to assist with strengthening/mobility  Outcome: Progressing  Goal: Maintain or return to baseline ADL function  Description: INTERVENTIONS:  -  Assess patient's ability to carry out ADLs; assess patient's baseline for ADL function and identify physical deficits which impact ability to perform ADLs (bathing, care of mouth/teeth, toileting, grooming, dressing, etc )  - Assess/evaluate cause of self-care deficits   - Assess range of motion  - Assess patient's mobility; develop plan if impaired  - Assess patient's need for assistive devices and provide as appropriate  - Encourage maximum independence but intervene and supervise when necessary  - Involve family in performance of ADLs  - Assess for home care needs following discharge   - Consider OT consult to assist with ADL evaluation and planning for discharge  - Provide patient education as appropriate  Outcome: Progressing  Goal: Maintain or return mobility status to optimal level  Description: INTERVENTIONS:  - Assess patient's baseline mobility status (ambulation, transfers, stairs, etc )    - Identify cognitive and physical deficits and behaviors that affect mobility  - Identify mobility aids required to assist with transfers and/or ambulation (gait belt, sit-to-stand, lift, walker, cane, etc )  - [place] fall precautions as indicated by assessment  - Record patient progress and toleration of activity level on Mobility SBAR; progress patient to next Phase/Stage  - Instruct patient to call for assistance with activity based on assessment  - Consider rehabilitation consult to assist with strengthening/weightbearing, etc   Outcome: Progressing     Problem: DISCHARGE PLANNING  Goal: Discharge to home or other facility with appropriate resources  Description: INTERVENTIONS:  - Identify barriers to discharge w/patient and caregiver  - Arrange for needed discharge resources and transportation as appropriate  - Identify discharge learning needs (meds, wound care, etc )  - Arrange for interpretive services to assist at discharge as needed  - Refer to Case Management Department for coordinating discharge planning if the patient needs post-hospital services based on physician/advanced practitioner order or complex needs related to functional status, cognitive ability, or social support system  Outcome: Progressing     Problem: Knowledge Deficit  Goal: Patient/family/caregiver demonstrates understanding of disease process, treatment plan, medications, and discharge instructions  Description: Complete learning assessment and assess knowledge base    Interventions:  - Provide teaching at level of understanding  - Provide teaching via preferred learning methods  Outcome: Progressing     Problem: CARDIOVASCULAR - ADULT  Goal: Maintains optimal cardiac output and hemodynamic stability  Description: INTERVENTIONS:  - Monitor I/O, vital signs and rhythm  - Monitor for S/S and trends of decreased cardiac output  - Administer and titrate ordered vasoactive medications to optimize hemodynamic stability  - Assess quality of pulses, skin color and temperature  - Assess for signs of decreased coronary artery perfusion  - Instruct patient to report change in severity of symptoms  Outcome: Progressing  Goal: Absence of cardiac dysrhythmias or at baseline rhythm  Description: INTERVENTIONS:  - Continuous cardiac monitoring, vital signs, obtain 12 lead EKG if ordered  - Administer antiarrhythmic and heart rate control medications as ordered  - Monitor electrolytes and administer replacement therapy as ordered  Outcome: Progressing     Problem: RESPIRATORY - ADULT  Goal: Achieves optimal ventilation and oxygenation  Description: INTERVENTIONS:  - Assess for changes in respiratory status  - Assess for changes in mentation and behavior  - Position to facilitate oxygenation and minimize respiratory effort  - Oxygen administered by appropriate delivery if ordered  - Initiate smoking cessation education as indicated  - Encourage broncho-pulmonary hygiene including cough, deep breathe, Incentive Spirometry  - Assess the need for suctioning and aspirate as needed  - Assess and instruct to report SOB or any respiratory difficulty  - Respiratory Therapy support as indicated  Outcome: Progressing     Problem: GENITOURINARY - ADULT  Goal: Maintains or returns to baseline urinary function  Description: INTERVENTIONS:  - Assess urinary function  - Encourage oral fluids to ensure adequate hydration if ordered  - Administer IV fluids as ordered to ensure adequate hydration  - Administer ordered medications as needed  - Offer frequent toileting  - Follow urinary retention protocol if ordered  Outcome: Progressing  Goal: Absence of urinary retention  Description: INTERVENTIONS:  - Assess patients ability to void and empty bladder  - Monitor I/O  - Bladder scan as needed  - Discuss with physician/AP medications to alleviate retention as needed  - Discuss catheterization for long term situations as appropriate  Outcome: Progressing  Goal: Urinary catheter remains patent  Description: INTERVENTIONS:  - Assess patency of urinary catheter  - If patient has a chronic paulino, consider changing catheter if non-functioning  - Follow guidelines for intermittent irrigation of non-functioning urinary catheter  Outcome: Progressing     Problem: MUSCULOSKELETAL - ADULT  Goal: Maintain or return mobility to safest level of function  Description: INTERVENTIONS:  - Assess patient's ability to carry out ADLs; assess patient's baseline for ADL function and identify physical deficits which impact ability to perform ADLs (bathing, care of mouth/teeth, toileting, grooming, dressing, etc )  - Assess/evaluate cause of self-care deficits   - Assess range of motion  - Assess patient's mobility  - Assess patient's need for assistive devices and provide as appropriate  - Encourage maximum independence but intervene and supervise when necessary  - Involve family in performance of ADLs  - Assess for home care needs following discharge   - Consider OT consult to assist with ADL evaluation and planning for discharge  - Provide patient education as appropriate  Outcome: Progressing  Goal: Maintain proper alignment of affected body part  Description: INTERVENTIONS:  - Support, maintain and protect limb and body alignment  - Provide patient/ family with appropriate education  Outcome: Progressing     Problem: Prexisting or High Potential for Compromised Skin Integrity  Goal: Skin integrity is maintained or improved  Description: INTERVENTIONS:  - Identify patients at risk for skin breakdown  - Assess and monitor skin integrity  - Assess and monitor nutrition and hydration status  - Monitor labs   - Assess for incontinence   - Turn and reposition patient  - Assist with mobility/ambulation  - Relieve pressure over bony prominences  - Avoid friction and shearing  - Provide appropriate hygiene as needed including keeping skin clean and dry  - Evaluate need for skin moisturizer/barrier cream  - Collaborate with interdisciplinary team   - Patient/family teaching  - Consider wound care consult   Outcome: Progressing

## 2021-02-13 NOTE — ASSESSMENT & PLAN NOTE
COVID Treatment (Moderate Pathway):  Continue supplement O2 as needed to maintain O2 saturations >92%  Vitamin C/Zinc/Statin x 10 days  Steroids:  Dexamethasone 6 mg q day  Remdesivir: x 5 days  Day 4/5  Actemra:  Check inflammatory markers  Continue with systemic steroids times 24 hours  If patient fails to show improvement may be candidate for Actemra  Anticoagulation:  Chronically anticoagulated    Transitioned from heparin gtt to eliquis   Encourage good spirometry/pulmonary toileting

## 2021-02-13 NOTE — PROGRESS NOTES
Progress Note Rodney Estimable 1945, 77 y o  female MRN: [id number]    Unit/Bed#:  Encounter: [id number]    Primary Care Provider: Seth Medeiros MD   Date and time admitted to hospital: 2/10/2021  5:03 PM        Severe sepsis [facility])  Assessment & Plan  · Secondary to covid 19 infection and suspected UTI  · Blood cultures negative, urine culture negative  · procalcitonin's elevated, continue 7 days of antibiotics per covid guidelines  · Continue decadron and remdesivir   · Continue vitamin therapy  · D dimer was >3 on arrival, now improved to 1 9, continue home dose eliquis     * Pneumonia due to COVID-19 virus  Assessment & Plan  COVID Treatment (Moderate Pathway):  Continue supplement O2 as needed to maintain O2 saturations >92%  Vitamin C/Zinc/Statin x 10 days  Steroids:  Dexamethasone 6 mg q day  Remdesivir: x 5 days  Day 4/5  Actemra:  Check inflammatory markers  Continue with systemic steroids times 24 hours  If patient fails to show improvement may be candidate for Actemra  Anticoagulation:  Chronically anticoagulated  Transitioned from heparin gtt to eliquis   Encourage good spirometry/pulmonary toileting    New onset atrial fibrillation ([address] )  Assessment & Plan  · Continue BB for rate control  Metoprolol tartrate 50 mg b i d   · Continue home Eliquis 2 5 mg b i d  for systemic anticoagulation  HLD (hyperlipidemia)  Assessment & Plan  · Atorvastatin 40 mg daily  Elevated troponin  Assessment & Plan  · Suspect secondary to demand and renal dysfunction  · Continuous cardiac monitoring  · Trend troponins  Daily EKG  Biventricular heart failure (HCC)  Assessment & Plan  · Chronic biventricular HFpHF, 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)  · Follows Dr Haro SLHERMAN Heart Failure team  · Continue BB  · Resume Imdur and hydralazine for afterload reduction as blood pressure tolerates    · Hold ACE-inhibitor in setting of MANJEET  · Hold diuretic for additional day   · Daily weights; Dry weight 174lb  · Low sodium diet      Severe pulmonary arterial systolic hypertension (HCC)  Assessment & Plan  · Group II  · Optimize heart failure plan as noted above  Severe aortic stenosis  Assessment & Plan  · Hold further IV fluid hydration  · Will need close follow-up with outpatient cardiology team   Ongoing discussions regarding valvular placement    CKD (chronic kidney disease) stage 3, GFR 30-59 ml/min  Assessment & Plan  Lab Results   Component Value Date    EGFR 32 02/13/2021    EGFR 27 02/12/2021    EGFR 20 02/11/2021    CREATININE 1 78 (H) 02/13/2021    CREATININE 2 07 (H) 02/12/2021    CREATININE 2 65 (H) 02/11/2021     · Plan as noted above  Solitary kidney  Assessment & Plan  · 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     UTI (urinary tract infection)  Assessment & Plan  · UTI POA  · Follow-up urine culture  · Completed 3 days of IV antibiotics, stable     Diabetes mellitus [facility])  Assessment & Plan  Lab Results   Component Value Date    HGBA1C 7 4 (H) 02/16/2019       Recent Labs     02/12/21  1139 02/12/21  1627 02/12/21  2058 02/13/21  0817   POCGLU 184* 197* 183* 202*       Blood Sugar Average: Last 72 hrs:  (P) 739 [id number]   · Anticipate hyperglycemia with administration systemic steroid  · Sliding scale insulin coverage as needed    Essential hypertension  Assessment & Plan  · Continue and beta-blockade reduced dose in setting transient hypotension  · Resume hydralazine for afterload reduction as BP tolerates  · Hold lisinopril in setting of MANJEET    MANJEET (acute kidney injury) ([facility] [address] )  Assessment & Plan  · Baseline Cr 1 3-1 4  · Hold further diuresis  · Strict I&Os  · Trend daily BUN/creatinine    · Nephrology following   · Discontinue paulino catheter    Results from last 7 days   Lab Units 02/13/21  0540 02/12/21  0550 02/11/21  0525 02/10/21  1751   CREATININE mg/dL 1 78* 2 07* 2 65* 3 39*           VTE Pharmacologic Prophylaxis:   Pharmacologic: Apixaban (Eliquis)  Mechanical VTE Prophylaxis in Place: Yes    Patient Centered Rounds: I have performed bedside rounds with nursing staff today  Discussions with Specialists or Other Care Team Provider: Nephrology     Education and Discussions with Family / Patient: Spoke to daughter Evonne Acosta     Time Spent for Care: 30 minutes  More than 50% of total time spent on counseling and coordination of care as described above  Current Length of Stay: 3 day(s)    Current Patient Status: Inpatient   Certification Statement: The patient will continue to require additional inpatient hospital stay due to covid 19    Discharge Plan: 24-48 hours     Code Status: Level 1 - Full Code      Subjective:   Patient feels better, no shortness of breath, chest pain, chest palpitations  Appetite is good  Objective:     Vitals:   Temp (24hrs), Av 8 °F (36 6 °C), Min:97 4 °F (36 3 °C), Max:98 1 °F (36 7 °C)    Temp:  [97 4 °F (36 3 °C)-98 1 °F (36 7 °C)] 97 8 °F (36 6 °C)  HR:  [] 82  Resp:  [13-28] 23  BP: (106-153)/(56-94) 121/72  SpO2:  [94 %-97 %] 97 %  Body mass index is 31 79 kg/m²  Input and Output Summary (last 24 hours): Intake/Output Summary (Last 24 hours) at 2021 1620  Last data filed at 2021 0900  Gross per 24 hour   Intake 1190 ml   Output 2250 ml   Net -1060 ml       Physical Exam:     Physical Exam  Vitals signs and nursing note reviewed  Constitutional:       Appearance: Normal appearance  HENT:      Head: Normocephalic and atraumatic  Nose: Nose normal  No congestion  Mouth/Throat:      Mouth: Mucous membranes are dry  Pharynx: Oropharynx is clear  Eyes:      General:         Right eye: No discharge  Left eye: No discharge  Neck:      Musculoskeletal: Normal range of motion and neck supple  Cardiovascular:      Rate and Rhythm: Normal rate and regular rhythm     Pulmonary:      Effort: Pulmonary effort is normal  No respiratory distress  Abdominal:      General: Abdomen is flat  Palpations: Abdomen is soft  Musculoskeletal:      Right lower leg: No edema  Left lower leg: No edema  Skin:     General: Skin is warm and dry  Neurological:      General: No focal deficit present  Mental Status: She is alert  Mental status is at baseline  Psychiatric:         Mood and Affect: Mood normal          Behavior: Behavior normal            Additional Data:     Labs:    Results from last 7 days   Lab Units 02/13/21  0540 02/12/21  0550 02/11/21  0525  02/10/21  1751   WBC Thousand/uL 4 93 5 15 3 61*   < > 5 99   HEMOGLOBIN g/dL 10 1* 10 2* 10 5*   < > 12 0   HEMATOCRIT % 31 4* 32 6* 32 7*   < > 37 8   PLATELETS Thousands/uL 141* 112* 96*   < > 101*   BANDS PCT %  --   --  1  --   --    NEUTROS PCT %  --   --   --   --  86*   LYMPHS PCT %  --   --   --   --  9*   LYMPHO PCT %  --  11* 14   < >  --    MONOS PCT %  --   --   --   --  4   MONO PCT %  --  3* 1*   < >  --    EOS PCT %  --  0 0   < > 0    < > = values in this interval not displayed  Results from last 7 days   Lab Units 02/13/21  0540  02/11/21  0525   SODIUM mmol/L 139   < > 140   POTASSIUM mmol/L 4 7   < > 4 5   CHLORIDE mmol/L 108   < > 106   CO2 mmol/L 15*   < > 16*   BUN mg/dL 72*   < > 79*   CREATININE mg/dL 1 78*   < > 2 65*   ANION GAP mmol/L 16*   < > 18*   CALCIUM mg/dL 8 5   < > 8 3   ALBUMIN g/dL  --   --  2 4*   TOTAL BILIRUBIN mg/dL  --   --  0 30   ALK PHOS U/L  --   --  68   ALT U/L  --   --  28   AST U/L  --   --  92*   GLUCOSE RANDOM mg/dL 235*   < > 164*    < > = values in this interval not displayed       Results from last 7 days   Lab Units 02/10/21  2112   INR  1 42*     Results from last 7 days   Lab Units 02/13/21  0817 02/12/21 2058 02/12/21  1627 02/12/21  1139 02/12/21  0710 02/11/21  2159 02/11/21  1810 02/11/21  1716 02/11/21  1220 02/11/21  0649 02/10/21  2254   POC GLUCOSE mg/dl 202* 183* 197* 184* 192* 150* 136 155* 145* 149* 139         Results from last 7 days   Lab Units 02/13/21  0540 02/12/21  0550 02/11/21  0525 02/10/21  2100 02/10/21  1919 02/10/21  1751   LACTIC ACID mmol/L  --   --   --  1 4  --  2 4*   PROCALCITONIN ng/ml 1 46* 3 28* 6 21*  --  7 52*  --            * I Have Reviewed All Lab Data Listed Above  * Additional Pertinent Lab Tests Reviewed: Samantha 66 Admission Reviewed    Imaging:    Imaging Reports Reviewed Today Include: NA  Imaging Personally Reviewed by Myself Includes:  NA    Recent Cultures (last 7 days):     Results from last 7 days   Lab Units 02/10/21  2334 02/10/21  1800 02/10/21  1751   BLOOD CULTURE   --  No Growth at 48 hrs  No Growth at 48 hrs     URINE CULTURE  No Growth <1000 cfu/mL  --   --        Last 24 Hours Medication List:   Current Facility-Administered Medications   Medication Dose Route Frequency Provider Last Rate    apixaban  2 5 mg Oral BID CHANDU Bolaños      ascorbic acid  1,000 mg Oral Q12H [address] Abby Jules PA-C      aspirin  81 mg Oral Daily Bhavana Mo      atorvastatin  40 mg Oral HS Abby Jules PA-C      cefTRIAXone  1,000 mg Intravenous Q24H Abby Jules PA-C 1,000 mg (02/12/21 1720)    chlorhexidine  15 mL [facility] Bhavana Mo      cholecalciferol  2,000 Units Oral Daily Bhavana Mo      dexamethasone  6 mg Intravenous Q24H Bhavana Mo      doxycycline hyclate  100 mg Oral Q12H Abby Jules PA-C      hydrALAZINE  25 mg Oral Q8H [address] Eryn Whitmore MD      insulin lispro  1-5 Units Subcutaneous HS Abby Jules PA-C      insulin lispro  1-6 Units Subcutaneous TID [facility] Abby Jules PA-C      isosorbide mononitrate  60 mg Oral Daily Bhavana Mo      levothyroxine  50 mcg Oral Daily Bhavana Mo      metoprolol tartrate  50 mg Oral Q12H [address] Bhavana Mo      zinc sulfate  220 mg Oral Daily Abby Jules PA-C      Followed by   Stiven Loco ON 2/18/2021] multivitamin-minerals  1 tablet Oral Daily Yuan Montejo [place]      remdesivir  100 mg Intravenous Q24H Yuan Montejo PA-C      sertraline  50 mg Oral Daily Yuan Montejo PA-C          Today, Patient Was Seen By: ILIA Markham      ** Please Note: Dictation voice to text software may have been used in the creation of this document   **

## 2021-02-13 NOTE — ASSESSMENT & PLAN NOTE
· Baseline Cr 1 3-1 4  · Hold further diuresis  · Strict I&Os  · Trend daily BUN/creatinine    · Nephrology following   · Discontinue paulino catheter    Results from last 7 days   Lab Units 02/13/21  0540 02/12/21  0550 02/11/21  0525 02/10/21  1751   CREATININE mg/dL 1 78* 2 07* 2 65* 3 39*

## 2021-02-13 NOTE — ASSESSMENT & PLAN NOTE
Lab Results   Component Value Date    EGFR 32 02/13/2021    EGFR 27 02/12/2021    EGFR 20 02/11/2021    CREATININE 1 78 (H) 02/13/2021    CREATININE 2 07 (H) 02/12/2021    CREATININE 2 65 (H) 02/11/2021     · Plan as noted above

## 2021-02-13 NOTE — ASSESSMENT & PLAN NOTE
· Secondary to covid 19 infection and suspected UTI  · Blood cultures negative, urine culture negative  · procalcitonin's elevated, continue 7 days of antibiotics per covid guidelines  · Continue decadron and remdesivir   · Continue vitamin therapy  · D dimer was >3 on arrival, now improved to 1 9, continue home dose eliquis

## 2021-02-13 NOTE — PROGRESS NOTES
NEPHROLOGY PROGRESS NOTE    Patient: Gudelia Rodriguez               Sex: female          DOA: 2/10/2021  5:03 PM   YOB: 1945        Age:  77 y o         LOS:  LOS: 3 days       HPI     Patient with COVID-19 also had acute kidney injury likely because of volume depletion    SUBJECTIVE     Patient is feeling better  Still feeling weak and tired    Oxygenating well with low-flow oxygen    No chest pain no palpitation or shortness of breath    Denies any urinary complaint    CURRENT MEDICATIONS       Current Facility-Administered Medications:     apixaban (ELIQUIS) tablet 2 5 mg, 2 5 mg, Oral, BID, Gareld [place], CRNP, 2 5 mg at 02/13/21 0944    ascorbic acid (VITAMIN C) tablet 1,000 mg, 1,000 mg, Oral, Q12H [address], GAIL Miller-C, 1,000 mg at 02/13/21 0943    aspirin (ECOTRIN LOW STRENGTH) EC tablet 81 mg, 81 mg, Oral, Daily, Aide Rubalcava PA-C, 81 mg at 02/13/21 0943    atorvastatin (LIPITOR) tablet 40 mg, 40 mg, Oral, HS, GAIL Miller-C, 40 mg at 02/12/21 2129    ceftriaxone (ROCEPHIN) 1 g/50 mL in dextrose IVPB, 1,000 mg, Intravenous, Q24H, Aide Rubalcava PA-C, Last Rate: 100 mL/hr at 02/12/21 1720, 1,000 mg at 02/12/21 1720    chlorhexidine (PERIDEX) 0 12 % oral rinse 15 mL, 15 mL, Swish & Spit, Q12H [address], GAIL Miller-C, 15 mL at 02/13/21 0944    cholecalciferol (VITAMIN D3) tablet 2,000 Units, 2,000 Units, Oral, Daily, Aide Rubalcava PA-C, 2,000 Units at 02/13/21 0943    dexamethasone (DECADRON) injection 6 mg, 6 mg, Intravenous, Q24H, GAIL Miller-FRANKI, 6 mg at 02/12/21 2007    doxycycline hyclate (VIBRAMYCIN) capsule 100 mg, 100 mg, Oral, Q12H, GAIL Miller-C, 100 mg at 02/13/21 4295    hydrALAZINE (APRESOLINE) tablet 25 mg, 25 mg, Oral, Q8H [address], Eryn Whitmore MD, 25 mg at 02/13/21 0506    insulin lispro (HumaLOG) 100 units/mL subcutaneous injection 1-5 Units, 1-5 Units, Subcutaneous, HS, Aide Rubalcava PA-C, 1 Units at 02/12/21 2135    insulin lispro (HumaLOG) 100 units/mL subcutaneous injection 1-6 Units, 1-6 Units, Subcutaneous, TID AC, 2 Units at 02/12/21 1712 **AND** Fingerstick Glucose (POCT), , , TID AC, Zaid Roman PA-C    isosorbide mononitrate (IMDUR) 24 hr tablet 60 mg, 60 mg, Oral, Daily, Zaid Roman PA-C, 60 mg at 02/13/21 0944    levothyroxine tablet 50 mcg, 50 mcg, Oral, Daily, Zaid Roman PA-C, 50 mcg at 02/13/21 0943    metoprolol tartrate (LOPRESSOR) tablet 50 mg, 50 mg, Oral, Q12H [address], Zaid Roman PA-C, 50 mg at 02/13/21 0944    zinc sulfate (ZINCATE) capsule 220 mg, 220 mg, Oral, Daily, 220 mg at 02/13/21 0944 **FOLLOWED BY** [START ON 2/18/2021] multivitamin-minerals (CENTRUM ADULTS) tablet 1 tablet, 1 tablet, Oral, Daily, Zaid Roman PA-C    [COMPLETED] remdesivir Charley Sox) 200 mg in sodium chloride 0 9 % 250 mL IVPB, 200 mg, Intravenous, Q24H, 200 mg at 02/10/21 2153 **FOLLOWED BY** remdesivir (Veklury) 100 mg in sodium chloride 0 9 % 250 mL IVPB, 100 mg, Intravenous, Q24H, Zaid Roman PA-C, 100 mg at 02/12/21 2130    sertraline (ZOLOFT) tablet 50 mg, 50 mg, Oral, Daily, Zaid Roman PA-C, 50 mg at 02/13/21 0943    OBJECTIVE     Current Weight: Weight - Scale: 84 kg (185 lb 3 oz)  Vitals:    02/13/21 0943   BP: 144/82   Pulse: (!) 108   Resp:    Temp:    SpO2:        Intake/Output Summary (Last 24 hours) at 2/13/2021 0946  Last data filed at 2/13/2021 0601  Gross per 24 hour   Intake 1477 ml   Output 1950 ml   Net -473 ml       PHYSICAL EXAMINATION     Physical Exam  Constitutional:       General: She is not in acute distress  Appearance: She is well-developed  HENT:      Head: Normocephalic  Eyes:      General: No scleral icterus  Conjunctiva/sclera: Conjunctivae normal    Neck:      Musculoskeletal: Neck supple  Vascular: No JVD  Cardiovascular:      Rate and Rhythm: Normal rate  Heart sounds: Normal heart sounds  Pulmonary:      Effort: Pulmonary effort is normal       Breath sounds: No wheezing  Abdominal:      Palpations: Abdomen is soft  Tenderness: There is no abdominal tenderness  Musculoskeletal: Normal range of motion  Skin:     General: Skin is warm  Findings: No rash  Neurological:      Mental Status: She is alert and oriented to person, place, and time  Psychiatric:         Behavior: Behavior normal           LAB RESULTS     Results from last 7 days   Lab Units 02/13/21  0540 02/12/21  0550 02/11/21  0525 02/10/21  2112 02/10/21  1751   WBC Thousand/uL 4 93 5 15 3 61* 5 19 5 99   HEMOGLOBIN g/dL 10 1* 10 2* 10 5* 11 3* 12 0   HEMATOCRIT % 31 4* 32 6* 32 7* 36 1 37 8   PLATELETS Thousands/uL 141* 112* 96* 94* 101*   POTASSIUM mmol/L 4 7 4 2 4 5  --  4 5   CHLORIDE mmol/L 108 108 106  --  99*   CO2 mmol/L 15* 17* 16*  --  19*   BUN mg/dL 72* 83* 79*  --  84*   CREATININE mg/dL 1 78* 2 07* 2 65*  --  3 39*   EGFR ml/min/1 73sq m 32 27 20  --  15   CALCIUM mg/dL 8 5 8 3 8 3  --  8 8       RADIOLOGY RESULTS      Results for orders placed during the hospital encounter of 02/10/21   XR chest 1 view portable    Narrative CHEST     INDICATION:   Weakness, new a fib  Patient has confirmed COVID-19  COMPARISON:  Chest radiograph dated 2/15/2019  EXAM PERFORMED/VIEWS:  XR CHEST PORTABLE      FINDINGS:    Cardiomediastinal silhouette appears unremarkable  There are bilateral groundglass opacities  No pneumothorax or pleural effusion  Osseous structures appear within normal limits for patient age  Impression Bilateral groundglass opacities  In the setting of clinically suspected/proven COVID-19, this plain film appearance while nonspecific, can be seen in cases of viral pneumonia such as COVID-19  Workstation performed: [id number]       Results for orders placed during the hospital encounter of 02/15/19   XR chest 2 views    Narrative CHEST     INDICATION:   Shortness of breath and leg swelling      COMPARISON:  7/14/2017    EXAM PERFORMED/VIEWS:  XR CHEST PA & LATERAL      FINDINGS:    Heart shadow is enlarged but unchanged from prior exam     There is no focal infiltrate or pleural effusion  No pneumothorax  Pulmonary vasculature mildly prominent    Osseous structures appear within normal limits for patient age  Impression Mild pulmonary vascular congestion and enlargement of the cardiac silhouette  Workstation performed: [id number]       Results for orders placed during the hospital encounter of 02/10/21   CT chest without contrast    Narrative CT CHEST WITHOUT IV CONTRAST    INDICATION:   Shortness of breath  abnormal XR  Patient has confirmed COVID-19  COMPARISON:  9/1/2005    TECHNIQUE: CT examination of the chest was performed without intravenous contrast   Axial, sagittal, and coronal 2D reformatted images were created from the source data and submitted for interpretation  Radiation dose length product (DLP) for this visit:  440 76 mGy-cm   This examination, like all CT scans performed in the [facility], was performed utilizing techniques to minimize radiation dose exposure, including the use of iterative   reconstruction and automated exposure control  FINDINGS:    LUNGS:  There are multifocal patchy groundglass opacities within the lungs bilaterally, consistent with the patient's clinical history  There are multiple thin-walled cysts in the lungs bilaterally  PLEURA:  Unremarkable  HEART/GREAT VESSELS:  Unremarkable for patient's age  MEDIASTINUM AND NEVAEH:  Prominent mediastinal lymph nodes are noted  There is no suspicious hilar or axillary lymphadenopathy  CHEST WALL AND LOWER NECK:   Unremarkable  VISUALIZED STRUCTURES IN THE UPPER ABDOMEN:  Unremarkable  OSSEOUS STRUCTURES:  There are degenerative changes of the spine  Impression Multifocal groundglass opacities throughout the lungs bilaterally consistent with the history of Covid- 19            Workstation performed: [id number]       No results found for this or any previous visit  No results found for this or any previous visit  No results found for this or any previous visit  PLAN / RECOMMENDATIONS      Acute kidney injury:  Improving with hydration which I will continue  Advised to better    CKD stage 3:  Baseline creatinine is about 1 4    COVID-19 infection: On medication seems to be improving    Hypertension:  Reasonably well controlled    Will continue to monitor    Charley Barr MD  Nephrology  2/13/2021        Portions of the record may have been created with voice recognition software  Occasional wrong word or "sound a like" substitutions may have occurred due to the inherent limitations of voice recognition software  Read the chart carefully and recognize, using context, where substitutions have occurred

## 2021-02-13 NOTE — ASSESSMENT & PLAN NOTE
Lab Results   Component Value Date    HGBA1C 7 4 (H) 02/16/2019       Recent Labs     02/12/21  1139 02/12/21  1627 02/12/21 2058 02/13/21  0817   POCGLU 184* 197* 183* 202*       Blood Sugar Average: Last 72 hrs:  (P) 277 [id number]   · Anticipate hyperglycemia with administration systemic steroid  · Sliding scale insulin coverage as needed

## 2021-02-14 VITALS
RESPIRATION RATE: 20 BRPM | BODY MASS INDEX: 30.41 KG/M2 | TEMPERATURE: 97.6 F | HEIGHT: 64 IN | SYSTOLIC BLOOD PRESSURE: 115 MMHG | HEART RATE: 83 BPM | OXYGEN SATURATION: 98 % | DIASTOLIC BLOOD PRESSURE: 68 MMHG | WEIGHT: 178.13 LBS

## 2021-02-14 PROBLEM — R77.8 ELEVATED TROPONIN: Status: RESOLVED | Noted: 2021-02-10 | Resolved: 2021-02-14

## 2021-02-14 PROBLEM — N17.9 AKI (ACUTE KIDNEY INJURY) (HCC): Status: RESOLVED | Noted: 2017-07-14 | Resolved: 2021-02-14

## 2021-02-14 PROBLEM — R65.20 SEVERE SEPSIS (HCC): Status: RESOLVED | Noted: 2021-02-10 | Resolved: 2021-02-14

## 2021-02-14 PROBLEM — A41.9 SEVERE SEPSIS (HCC): Status: RESOLVED | Noted: 2021-02-10 | Resolved: 2021-02-14

## 2021-02-14 PROBLEM — N39.0 UTI (URINARY TRACT INFECTION): Status: RESOLVED | Noted: 2017-12-23 | Resolved: 2021-02-14

## 2021-02-14 LAB
ALBUMIN SERPL BCP-MCNC: 2.6 G/DL (ref 3.5–5)
ALP SERPL-CCNC: 86 U/L (ref 46–116)
ALT SERPL W P-5'-P-CCNC: 35 U/L (ref 12–78)
ANION GAP SERPL CALCULATED.3IONS-SCNC: 13 MMOL/L (ref 4–13)
ANISOCYTOSIS BLD QL SMEAR: PRESENT
AST SERPL W P-5'-P-CCNC: 59 U/L (ref 5–45)
BASOPHILS # BLD MANUAL: 0 THOUSAND/UL (ref 0–0.1)
BASOPHILS NFR MAR MANUAL: 0 % (ref 0–1)
BILIRUB SERPL-MCNC: 0.4 MG/DL (ref 0.2–1)
BUN SERPL-MCNC: 55 MG/DL (ref 5–25)
CALCIUM ALBUM COR SERPL-MCNC: 9.7 MG/DL (ref 8.3–10.1)
CALCIUM SERPL-MCNC: 8.6 MG/DL (ref 8.3–10.1)
CHLORIDE SERPL-SCNC: 106 MMOL/L (ref 100–108)
CO2 SERPL-SCNC: 16 MMOL/L (ref 21–32)
CREAT SERPL-MCNC: 1.56 MG/DL (ref 0.6–1.3)
CRP SERPL QL: 64.8 MG/L
D DIMER PPP FEU-MCNC: 2.11 UG/ML FEU
EOSINOPHIL # BLD MANUAL: 0 THOUSAND/UL (ref 0–0.4)
EOSINOPHIL NFR BLD MANUAL: 0 % (ref 0–6)
ERYTHROCYTE [DISTWIDTH] IN BLOOD BY AUTOMATED COUNT: 15.9 % (ref 11.6–15.1)
GFR SERPL CREATININE-BSD FRML MDRD: 37 ML/MIN/1.73SQ M
GLUCOSE SERPL-MCNC: 128 MG/DL (ref 65–140)
GLUCOSE SERPL-MCNC: 194 MG/DL (ref 65–140)
GLUCOSE SERPL-MCNC: 202 MG/DL (ref 65–140)
GLUCOSE SERPL-MCNC: 202 MG/DL (ref 65–140)
HCT VFR BLD AUTO: 32.1 % (ref 34.8–46.1)
HGB BLD-MCNC: 10.7 G/DL (ref 11.5–15.4)
LYMPHOCYTES # BLD AUTO: 1.02 THOUSAND/UL (ref 0.6–4.47)
LYMPHOCYTES # BLD AUTO: 13 % (ref 14–44)
MAGNESIUM SERPL-MCNC: 2.1 MG/DL (ref 1.6–2.6)
MCH RBC QN AUTO: 32.5 PG (ref 26.8–34.3)
MCHC RBC AUTO-ENTMCNC: 33.3 G/DL (ref 31.4–37.4)
MCV RBC AUTO: 98 FL (ref 82–98)
MONOCYTES # BLD AUTO: 0.24 THOUSAND/UL (ref 0–1.22)
MONOCYTES NFR BLD: 3 % (ref 4–12)
NEUTROPHILS # BLD MANUAL: 6.59 THOUSAND/UL (ref 1.85–7.62)
NEUTS BAND NFR BLD MANUAL: 2 % (ref 0–8)
NEUTS SEG NFR BLD AUTO: 82 % (ref 43–75)
NRBC BLD AUTO-RTO: 0 /100 WBCS
PLATELET # BLD AUTO: 168 THOUSANDS/UL (ref 149–390)
PLATELET BLD QL SMEAR: ADEQUATE
PMV BLD AUTO: 12.3 FL (ref 8.9–12.7)
POTASSIUM SERPL-SCNC: 4.9 MMOL/L (ref 3.5–5.3)
PROT SERPL-MCNC: 7.8 G/DL (ref 6.4–8.2)
RBC # BLD AUTO: 3.29 MILLION/UL (ref 3.81–5.12)
SODIUM SERPL-SCNC: 135 MMOL/L (ref 136–145)
TOTAL CELLS COUNTED SPEC: 100
WBC # BLD AUTO: 7.84 THOUSAND/UL (ref 4.31–10.16)

## 2021-02-14 PROCEDURE — 80053 COMPREHEN METABOLIC PANEL: CPT | Performed by: STUDENT IN AN ORGANIZED HEALTH CARE EDUCATION/TRAINING PROGRAM

## 2021-02-14 PROCEDURE — 82728 ASSAY OF FERRITIN: CPT | Performed by: STUDENT IN AN ORGANIZED HEALTH CARE EDUCATION/TRAINING PROGRAM

## 2021-02-14 PROCEDURE — 85007 BL SMEAR W/DIFF WBC COUNT: CPT | Performed by: STUDENT IN AN ORGANIZED HEALTH CARE EDUCATION/TRAINING PROGRAM

## 2021-02-14 PROCEDURE — 82948 REAGENT STRIP/BLOOD GLUCOSE: CPT

## 2021-02-14 PROCEDURE — 85379 FIBRIN DEGRADATION QUANT: CPT | Performed by: STUDENT IN AN ORGANIZED HEALTH CARE EDUCATION/TRAINING PROGRAM

## 2021-02-14 PROCEDURE — 86140 C-REACTIVE PROTEIN: CPT | Performed by: STUDENT IN AN ORGANIZED HEALTH CARE EDUCATION/TRAINING PROGRAM

## 2021-02-14 PROCEDURE — 85027 COMPLETE CBC AUTOMATED: CPT | Performed by: STUDENT IN AN ORGANIZED HEALTH CARE EDUCATION/TRAINING PROGRAM

## 2021-02-14 PROCEDURE — 83735 ASSAY OF MAGNESIUM: CPT | Performed by: STUDENT IN AN ORGANIZED HEALTH CARE EDUCATION/TRAINING PROGRAM

## 2021-02-14 PROCEDURE — 99239 HOSP IP/OBS DSCHRG MGMT >30: CPT | Performed by: STUDENT IN AN ORGANIZED HEALTH CARE EDUCATION/TRAINING PROGRAM

## 2021-02-14 PROCEDURE — 99233 SBSQ HOSP IP/OBS HIGH 50: CPT | Performed by: INTERNAL MEDICINE

## 2021-02-14 RX ORDER — MELATONIN
2000 DAILY
Qty: 4 TABLET | Refills: 0 | Status: SHIPPED | OUTPATIENT
Start: 2021-02-15 | End: 2021-04-07

## 2021-02-14 RX ORDER — DOXYCYCLINE HYCLATE 100 MG/1
100 CAPSULE ORAL EVERY 12 HOURS
Qty: 4 CAPSULE | Refills: 0 | Status: SHIPPED | OUTPATIENT
Start: 2021-02-14 | End: 2021-02-16

## 2021-02-14 RX ORDER — DEXAMETHASONE 6 MG/1
6 TABLET ORAL 2 TIMES DAILY WITH MEALS
Qty: 10 TABLET | Refills: 0 | Status: SHIPPED | OUTPATIENT
Start: 2021-02-14 | End: 2021-02-19

## 2021-02-14 RX ORDER — METOPROLOL TARTRATE 50 MG/1
50 TABLET, FILM COATED ORAL EVERY 12 HOURS SCHEDULED
Qty: 60 TABLET | Refills: 0 | Status: SHIPPED | OUTPATIENT
Start: 2021-02-14 | End: 2021-04-07

## 2021-02-14 RX ADMIN — LEVOTHYROXINE SODIUM 50 MCG: 50 TABLET ORAL at 08:36

## 2021-02-14 RX ADMIN — INSULIN LISPRO 2 UNITS: 100 INJECTION, SOLUTION INTRAVENOUS; SUBCUTANEOUS at 13:27

## 2021-02-14 RX ADMIN — APIXABAN 2.5 MG: 2.5 TABLET, FILM COATED ORAL at 08:36

## 2021-02-14 RX ADMIN — METOPROLOL TARTRATE 50 MG: 50 TABLET, FILM COATED ORAL at 08:36

## 2021-02-14 RX ADMIN — DOXYCYCLINE 100 MG: 100 CAPSULE ORAL at 08:38

## 2021-02-14 RX ADMIN — SERTRALINE HYDROCHLORIDE 50 MG: 50 TABLET ORAL at 08:36

## 2021-02-14 RX ADMIN — HYDRALAZINE HYDROCHLORIDE 25 MG: 25 TABLET, FILM COATED ORAL at 06:27

## 2021-02-14 RX ADMIN — ASPIRIN 81 MG: 81 TABLET, COATED ORAL at 08:36

## 2021-02-14 RX ADMIN — APIXABAN 2.5 MG: 2.5 TABLET, FILM COATED ORAL at 18:26

## 2021-02-14 RX ADMIN — INSULIN LISPRO 2 UNITS: 100 INJECTION, SOLUTION INTRAVENOUS; SUBCUTANEOUS at 09:08

## 2021-02-14 RX ADMIN — HYDRALAZINE HYDROCHLORIDE 25 MG: 25 TABLET, FILM COATED ORAL at 14:45

## 2021-02-14 RX ADMIN — ISOSORBIDE MONONITRATE 60 MG: 30 TABLET, EXTENDED RELEASE ORAL at 08:37

## 2021-02-14 RX ADMIN — OXYCODONE HYDROCHLORIDE AND ACETAMINOPHEN 1000 MG: 500 TABLET ORAL at 08:37

## 2021-02-14 RX ADMIN — ZINC SULFATE 220 MG (50 MG) CAPSULE 220 MG: CAPSULE at 08:37

## 2021-02-14 RX ADMIN — Medication 2000 UNITS: at 08:36

## 2021-02-14 RX ADMIN — CHLORHEXIDINE GLUCONATE 0.12% ORAL RINSE 15 ML: 1.2 LIQUID ORAL at 08:37

## 2021-02-14 NOTE — ASSESSMENT & PLAN NOTE
· Secondary to covid 19 infection and suspected UTI  · Blood cultures negative, urine culture negative  · procalcitonin's elevated, continue 7 days of antibiotics per covid guidelines  · Completed 5 day course of remdesivir  · Will need to complete course of antibiotics, vitamin therapy and decadron as an outpatient upon discharge  · D dimer prior to discharge of 2 11, patient is chronically anticoagulated on eliquis, continue

## 2021-02-14 NOTE — ASSESSMENT & PLAN NOTE
Lab Results   Component Value Date    EGFR 37 02/14/2021    EGFR 32 02/13/2021    EGFR 27 02/12/2021    CREATININE 1 56 (H) 02/14/2021    CREATININE 1 78 (H) 02/13/2021    CREATININE 2 07 (H) 02/12/2021     · Stable, follow up with nephrology as an outpatient

## 2021-02-14 NOTE — ASSESSMENT & PLAN NOTE
· Continue BB for rate control  Increased Metoprolol tartrate to 50 mg b i d   · Continue home Eliquis 2 5 mg b i d  for systemic anticoagulation

## 2021-02-14 NOTE — ASSESSMENT & PLAN NOTE
· Resume hydralazine for afterload reduction as BP tolerates  · Resume lisinopril on discharge, MANJEET resolved

## 2021-02-14 NOTE — ASSESSMENT & PLAN NOTE
· Chronic biventricular HFpHF, 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)  · Follows Dr Tahir [facility] Heart Failure team  · Continue BB  · Resume Imdur and hydralazine for afterload reduction as blood pressure tolerates    · Held ACE-inhibitor in setting of MANJEET- resume on discharge  · Held diuretic for additional day- Resume on discharge  · Daily weights; Dry weight 174lb  · Low sodium diet

## 2021-02-14 NOTE — DISCHARGE SUMMARY
Discharge- Apollo Louis 1945, 77 y o  female MRN: [id number]    Unit/Bed#:  Encounter: [id number]    Primary Care Provider: Kenneth Cardona MD   Date and time admitted to hospital: 2/10/2021  5:03 PM        * Pneumonia due to COVID-19 virus  Assessment & Plan  COVID Treatment (Moderate Pathway):  Does not require supplemental oxygen, has been satting above 90% on room air  Continue vitamin therapy, decadron, and po antibiotics as an outpatient, procalcitonin was elevated  Inflammatory markers trending downward  Was initially placed on heparin gtt, now transitioned back to home eliquis, she is chronically anticoagulated for afib   No indicated for CTA chest prior to discharge given CKD, nephrectomy, and hemodynamic stability and lack of oxygen requirements    Severe sepsis (HCC)-resolved as of 2/14/2021  Assessment & Plan  · Secondary to covid 19 infection and suspected UTI  · Blood cultures negative, urine culture negative  · procalcitonin's elevated, continue 7 days of antibiotics per covid guidelines  · Completed 5 day course of remdesivir  · Will need to complete course of antibiotics, vitamin therapy and decadron as an outpatient upon discharge  · D dimer prior to discharge of 2 11, patient is chronically anticoagulated on eliquis, continue    New onset atrial fibrillation ([address] )  Assessment & Plan  · Continue BB for rate control  Increased Metoprolol tartrate to 50 mg b i d   · Continue home Eliquis 2 5 mg b i d  for systemic anticoagulation  HLD (hyperlipidemia)  Assessment & Plan  · Atorvastatin 40 mg daily  Biventricular heart failure (HCC)  Assessment & Plan  · Chronic biventricular HFpHF, 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)  · Follows Dr Haro [facility] Heart Failure team  · Continue BB  · Resume Imdur and hydralazine for afterload reduction as blood pressure tolerates    · Held ACE-inhibitor in setting of MANJEET- resume on discharge  · Held diuretic for additional day- Resume on discharge  · Daily weights; Dry weight 174lb  · Low sodium diet      Severe pulmonary arterial systolic hypertension (HCC)  Assessment & Plan  · Group II  · Optimize heart failure plan as noted above  Severe aortic stenosis  Assessment & Plan  · Hold further IV fluid hydration  · Will need close follow-up with outpatient cardiology team   Ongoing discussions regarding valvular placement    CKD (chronic kidney disease) stage 3, GFR 30-59 ml/min  Assessment & Plan  Lab Results   Component Value Date    EGFR 37 02/14/2021    EGFR 32 02/13/2021    EGFR 27 02/12/2021    CREATININE 1 56 (H) 02/14/2021    CREATININE 1 78 (H) 02/13/2021    CREATININE 2 07 (H) 02/12/2021     · Stable, follow up with nephrology as an outpatient     Solitary kidney  Assessment & Plan  · 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   · Continue outpatient follow up     Diabetes mellitus [facility])  Assessment & Plan  Lab Results   Component Value Date    HGBA1C 7 4 (H) 02/16/2019       Recent Labs     02/13/21  1552 02/13/21  2104 02/14/21  0829 02/14/21  1136   POCGLU 143* 138 202* 194*       Blood Sugar Average: Last 72 hrs:  (P) 169 [id number]   · Adequately controlled for discharge  · Continue home regimen    Essential hypertension  Assessment & Plan  · Resume hydralazine for afterload reduction as BP tolerates  · Resume lisinopril on discharge, MANJEET resolved    Elevated troponin-resolved as of 2/14/2021  Assessment & Plan  · Suspect secondary to demand and renal dysfunction  · Recommend follow up with cardiology  · Non chest pain associated elevation in troponin     UTI (urinary tract infection)-resolved as of 2/14/2021  Assessment & Plan  · UTI present on admission  · Follow-up urine culture    · Completed 3 days of IV antibiotics, stable   · Resolved    MANJEET (acute kidney injury) (HCC)-resolved as of 2/14/2021  Assessment & Plan  · Now resolved, creatinine much improved  · Stable for discharge  · Brumfield catheter removed prior to discharge    Results from last 7 days   Lab Units 02/14/21  1115 02/13/21  0540 02/12/21  0550 02/11/21  0525 02/10/21  1751   CREATININE mg/dL 1 56* 1 78* 2 07* 2 65* 3 39*           Discharging Physician / Practitioner: Melani Traore MD  PCP: Elana Ann MD  Admission Date:   Admission Orders (From admission, onward)     Ordered        02/10/21 2006  Inpatient Admission  Once                   Discharge Date: 02/14/21    Disposition:      · Home    Reason for Admission: Covid 19 infection    Discharge Diagnoses:     Please see assessment and plan section above for further details regarding discharge diagnoses  Resolved Problems  Date Reviewed: 2/25/2019          Resolved    MANJEET (acute kidney injury) ([facility] [address] ) 2/14/2021     Resolved by  Melani Traore MD    Overview Signed 12/25/2017 12:32 PM by Judy Ocampo MD     In a solitary kidney with component of cardiorenal syndrome given that patient has history of CHF    Function unchanged, gentle bolus of fluid, appreciate Nephrology input         UTI (urinary tract infection) 2/14/2021     Resolved by  Melani Traore MD    Overview Signed 12/25/2017 12:31 PM by Judy Ocampo MD     Present on admission pansensitive E coli, switch from Rocephin to cefuroxime         Severe sepsis ([facility] [address] ) 2/14/2021     Resolved by  Melani Traore MD    Lactic acidosis 2/11/2021     Resolved by  Bryce Donald PA-C    Elevated troponin 2/14/2021     Resolved by  Melani Traore MD    Metabolic encephalopathy 0/53/0335     Resolved by  Bryce Donald PA-C          Consultations During Hospital Stay:  · Nephrology, ICU    Procedures Performed:   · Imaging     Medication Adjustments and Discharge Medications:  · Summary of Medication Adjustments made as a result of this hospitalization: see med rec   · Medication Dosing Tapers - Please refer to Discharge Medication List for details on any medication dosing tapers (if applicable to patient)  · Medications being temporarily held (include recommended restart time): see med rec   · Discharge Medication List: See after visit summary for reconciled discharge medications  Wound Care Recommendations:  When applicable, please see wound care section of After Visit Summary  Diet Recommendations at Discharge:  Diet -        Diet Orders   (From admission, onward)             Start     Ordered    02/11/21 0702  Diet Cardiovascular; Sodium 2 GM  Diet effective now     Question Answer Comment   Diet Type Cardiovascular    Cardiac Sodium 2 GM    Special Instructions Disposable Meal    RD to adjust diet per protocol? No        02/11/21 0732                Instructions for any Catheters / Lines Present at Discharge (including removal date, if applicable): na    Significant Findings / Test Results:   Principal Problem:    Pneumonia due to COVID-19 virus  Active Problems:    New onset atrial fibrillation (HCC)    Essential hypertension    Diabetes mellitus ([facility] [address] )    Solitary kidney    CKD (chronic kidney disease) stage 3, GFR 30-59 ml/min    Severe aortic stenosis    Severe pulmonary arterial systolic hypertension (HCC)    Biventricular heart failure (HCC)    HLD (hyperlipidemia)  Resolved Problems:    Severe sepsis (HCC)    MANJEET (acute kidney injury) (HCC)    UTI (urinary tract infection)    Lactic acidosis    Elevated troponin    Metabolic encephalopathy  ·   ·     Incidental Findings:   · See above      Test Results Pending at Discharge (will require follow up):    · Na      Outpatient Tests Requested:  · Follow up with cardiology, nephrology, and pcp     Complications:  See above     Hospital Course:     Daphne Tinsley is a 77 y o  female patient who originally presented to the hospital on 2/10/2021 due to severe sepsis secondary to uti and covid 19 infection further complicated by new onset afib and elevated troponin, initially admitted to ICU then stabilized for downgrade to [place] and stable for discharge on 2/14/21  Condition at Discharge: good     Discharge Day Visit / Exam:     Subjective:  Patient feels well today, has no chest pain or chest palpitations  No shortness of breath  Appetite is good  Vitals: Blood Pressure: 133/63 (02/14/21 1137)  Pulse: 84 (02/14/21 1424)  Temperature: 97 6 °F (36 4 °C) (02/14/21 1137)  Temp Source: Axillary (02/14/21 1137)  Respirations: 21 (02/14/21 1424)  Height: 5' 4" (162 6 cm) (02/12/21 2241)  Weight - Scale: 80 8 kg (178 lb 2 1 oz) (02/14/21 0600)  SpO2: 99 % (02/14/21 1424)  Exam:   Physical Exam  Vitals signs and nursing note reviewed  Constitutional:       Appearance: Normal appearance  HENT:      Head: Normocephalic and atraumatic  Nose: Nose normal  No congestion  Mouth/Throat:      Mouth: Mucous membranes are dry  Pharynx: Oropharynx is clear  Eyes:      General:         Right eye: No discharge  Left eye: No discharge  Neck:      Musculoskeletal: Normal range of motion and neck supple  Cardiovascular:      Rate and Rhythm: Normal rate and regular rhythm  Pulmonary:      Effort: Pulmonary effort is normal  No respiratory distress  Abdominal:      General: Abdomen is flat  Palpations: Abdomen is soft  Musculoskeletal:      Right lower leg: No edema  Left lower leg: No edema  Skin:     General: Skin is warm and dry  Neurological:      General: No focal deficit present  Mental Status: She is alert  Mental status is at baseline  Psychiatric:         Mood and Affect: Mood normal          Behavior: Behavior normal        Discussion with Family: Spoke to [facility]  Updated her that patient is stable for discharge  That patient has CKD is on chronic anticoagulation making CTA chest in setting of nephrectomy more risky than beneficial  Also notified her that patient did not get home 02 evaluation prior to discharge because she has never been hypoxic   She is agreeable to discharge today  She was made aware of elevated troponin which was non chest pain associated and would benefit from continued follow up with her cardiologist      Goals of Care Discussions:  · Code Status at Discharge: Level 1 - Full Code  · Were there any Goals of Care Discussions during Hospitalization?: No  · Results of any General Goals of Care Discussions: na  · POLST Completed: No   · If POLST Completed, Summary of POLST Agreement Provided Here: na   · OK to Rehospitalize if Needed? No    Discharge instructions/Information to patient and family:   See after visit summary section titled Discharge Instructions for information provided to patient and family  Planned Readmission: none      Discharge Statement:  I spent 30+ minutes discharging the patient  This time was spent on the day of discharge  I had direct contact with the patient on the day of discharge  Greater than 50% of the total time was spent examining patient, answering all patient questions, arranging and discussing plan of care with patient as well as directly providing post-discharge instructions  Additional time then spent on discharge activities      ** Please Note: This note has been constructed using a voice recognition system **

## 2021-02-14 NOTE — ASSESSMENT & PLAN NOTE
Lab Results   Component Value Date    HGBA1C 7 4 (H) 02/16/2019       Recent Labs     02/13/21  1552 02/13/21  2104 02/14/21  0829 02/14/21  1136   POCGLU 143* 138 202* 194*       Blood Sugar Average: Last 72 hrs:  (P) 243 [id number]   · Adequately controlled for discharge  · Continue home regimen

## 2021-02-14 NOTE — ASSESSMENT & PLAN NOTE
COVID Treatment (Moderate Pathway):  Does not require supplemental oxygen, has been satting above 90% on room air  Continue vitamin therapy, decadron, and po antibiotics as an outpatient, procalcitonin was elevated  Inflammatory markers trending downward  Was initially placed on heparin gtt, now transitioned back to home eliquis, she is chronically anticoagulated for afib   No indicated for CTA chest prior to discharge given CKD, nephrectomy, and hemodynamic stability and lack of oxygen requirements

## 2021-02-14 NOTE — ASSESSMENT & PLAN NOTE
· Now resolved, creatinine much improved  · Stable for discharge  · Brumfield catheter removed prior to discharge    Results from last 7 days   Lab Units 02/14/21  1115 02/13/21  0540 02/12/21  0550 02/11/21  0525 02/10/21  1751   CREATININE mg/dL 1 56* 1 78* 2 07* 2 65* 3 39*

## 2021-02-14 NOTE — ASSESSMENT & PLAN NOTE
· Suspect secondary to demand and renal dysfunction    · Recommend follow up with cardiology  · Non chest pain associated elevation in troponin

## 2021-02-14 NOTE — DISCHARGE INSTR - AVS FIRST PAGE

## 2021-02-14 NOTE — PLAN OF CARE
Problem: Potential for Falls  Goal: Patient will remain free of falls  Description: INTERVENTIONS:  - Assess patient frequently for physical needs  -  Identify cognitive and physical deficits and behaviors that affect risk of falls    -  [place] fall precautions as indicated by assessment   - Educate patient/family on patient safety including physical limitations  - Instruct patient to call for assistance with activity based on assessment  - Modify environment to reduce risk of injury  - Consider OT/PT consult to assist with strengthening/mobility  Outcome: Progressing     Problem: PAIN - ADULT  Goal: Verbalizes/displays adequate comfort level or baseline comfort level  Description: Interventions:  - Encourage patient to monitor pain and request assistance  - Assess pain using appropriate pain scale  - Administer analgesics based on type and severity of pain and evaluate response  - Implement non-pharmacological measures as appropriate and evaluate response  - Consider cultural and social influences on pain and pain management  - Notify physician/advanced practitioner if interventions unsuccessful or patient reports new pain  Outcome: Progressing     Problem: INFECTION - ADULT  Goal: Absence or prevention of progression during hospitalization  Description: INTERVENTIONS:  - Assess and monitor for signs and symptoms of infection  - Monitor lab/diagnostic results  - Monitor all insertion sites, i e  indwelling lines, tubes, and drains  - Monitor endotracheal if appropriate and nasal secretions for changes in amount and color  - [place] appropriate cooling/warming therapies per order  - Administer medications as ordered  - Instruct and encourage patient and family to use good hand hygiene technique  - Identify and instruct in appropriate isolation precautions for identified infection/condition  Outcome: Progressing  Goal: Absence of fever/infection during neutropenic period  Description: INTERVENTIONS:  - Monitor WBC    Outcome: Progressing     Problem: SAFETY ADULT  Goal: Patient will remain free of falls  Description: INTERVENTIONS:  - Assess patient frequently for physical needs  -  Identify cognitive and physical deficits and behaviors that affect risk of falls    -  [place] fall precautions as indicated by assessment   - Educate patient/family on patient safety including physical limitations  - Instruct patient to call for assistance with activity based on assessment  - Modify environment to reduce risk of injury  - Consider OT/PT consult to assist with strengthening/mobility  Outcome: Progressing  Goal: Maintain or return to baseline ADL function  Description: INTERVENTIONS:  -  Assess patient's ability to carry out ADLs; assess patient's baseline for ADL function and identify physical deficits which impact ability to perform ADLs (bathing, care of mouth/teeth, toileting, grooming, dressing, etc )  - Assess/evaluate cause of self-care deficits   - Assess range of motion  - Assess patient's mobility; develop plan if impaired  - Assess patient's need for assistive devices and provide as appropriate  - Encourage maximum independence but intervene and supervise when necessary  - Involve family in performance of ADLs  - Assess for home care needs following discharge   - Consider OT consult to assist with ADL evaluation and planning for discharge  - Provide patient education as appropriate  Outcome: Progressing  Goal: Maintain or return mobility status to optimal level  Description: INTERVENTIONS:  - Assess patient's baseline mobility status (ambulation, transfers, stairs, etc )    - Identify cognitive and physical deficits and behaviors that affect mobility  - Identify mobility aids required to assist with transfers and/or ambulation (gait belt, sit-to-stand, lift, walker, cane, etc )  - [place] fall precautions as indicated by assessment  - Record patient progress and toleration of activity level on Mobility SBAR; progress patient to next Phase/Stage  - Instruct patient to call for assistance with activity based on assessment  - Consider rehabilitation consult to assist with strengthening/weightbearing, etc   Outcome: Progressing     Problem: DISCHARGE PLANNING  Goal: Discharge to home or other facility with appropriate resources  Description: INTERVENTIONS:  - Identify barriers to discharge w/patient and caregiver  - Arrange for needed discharge resources and transportation as appropriate  - Identify discharge learning needs (meds, wound care, etc )  - Arrange for interpretive services to assist at discharge as needed  - Refer to Case Management Department for coordinating discharge planning if the patient needs post-hospital services based on physician/advanced practitioner order or complex needs related to functional status, cognitive ability, or social support system  Outcome: Progressing     Problem: Knowledge Deficit  Goal: Patient/family/caregiver demonstrates understanding of disease process, treatment plan, medications, and discharge instructions  Description: Complete learning assessment and assess knowledge base    Interventions:  - Provide teaching at level of understanding  - Provide teaching via preferred learning methods  Outcome: Progressing     Problem: CARDIOVASCULAR - ADULT  Goal: Maintains optimal cardiac output and hemodynamic stability  Description: INTERVENTIONS:  - Monitor I/O, vital signs and rhythm  - Monitor for S/S and trends of decreased cardiac output  - Administer and titrate ordered vasoactive medications to optimize hemodynamic stability  - Assess quality of pulses, skin color and temperature  - Assess for signs of decreased coronary artery perfusion  - Instruct patient to report change in severity of symptoms  Outcome: Progressing  Goal: Absence of cardiac dysrhythmias or at baseline rhythm  Description: INTERVENTIONS:  - Continuous cardiac monitoring, vital signs, obtain 12 lead EKG if ordered  - Administer antiarrhythmic and heart rate control medications as ordered  - Monitor electrolytes and administer replacement therapy as ordered  Outcome: Progressing     Problem: RESPIRATORY - ADULT  Goal: Achieves optimal ventilation and oxygenation  Description: INTERVENTIONS:  - Assess for changes in respiratory status  - Assess for changes in mentation and behavior  - Position to facilitate oxygenation and minimize respiratory effort  - Oxygen administered by appropriate delivery if ordered  - Initiate smoking cessation education as indicated  - Encourage broncho-pulmonary hygiene including cough, deep breathe, Incentive Spirometry  - Assess the need for suctioning and aspirate as needed  - Assess and instruct to report SOB or any respiratory difficulty  - Respiratory Therapy support as indicated  Outcome: Progressing     Problem: GENITOURINARY - ADULT  Goal: Maintains or returns to baseline urinary function  Description: INTERVENTIONS:  - Assess urinary function  - Encourage oral fluids to ensure adequate hydration if ordered  - Administer IV fluids as ordered to ensure adequate hydration  - Administer ordered medications as needed  - Offer frequent toileting  - Follow urinary retention protocol if ordered  Outcome: Progressing  Goal: Absence of urinary retention  Description: INTERVENTIONS:  - Assess patients ability to void and empty bladder  - Monitor I/O  - Bladder scan as needed  - Discuss with physician/AP medications to alleviate retention as needed  - Discuss catheterization for long term situations as appropriate  Outcome: Progressing  Goal: Urinary catheter remains patent  Description: INTERVENTIONS:  - Assess patency of urinary catheter  - If patient has a chronic paulino, consider changing catheter if non-functioning  - Follow guidelines for intermittent irrigation of non-functioning urinary catheter  Outcome: Progressing     Problem: MUSCULOSKELETAL - ADULT  Goal: Maintain or return mobility to safest level of function  Description: INTERVENTIONS:  - Assess patient's ability to carry out ADLs; assess patient's baseline for ADL function and identify physical deficits which impact ability to perform ADLs (bathing, care of mouth/teeth, toileting, grooming, dressing, etc )  - Assess/evaluate cause of self-care deficits   - Assess range of motion  - Assess patient's mobility  - Assess patient's need for assistive devices and provide as appropriate  - Encourage maximum independence but intervene and supervise when necessary  - Involve family in performance of ADLs  - Assess for home care needs following discharge   - Consider OT consult to assist with ADL evaluation and planning for discharge  - Provide patient education as appropriate  Outcome: Progressing  Goal: Maintain proper alignment of affected body part  Description: INTERVENTIONS:  - Support, maintain and protect limb and body alignment  - Provide patient/ family with appropriate education  Outcome: Progressing     Problem: Prexisting or High Potential for Compromised Skin Integrity  Goal: Skin integrity is maintained or improved  Description: INTERVENTIONS:  - Identify patients at risk for skin breakdown  - Assess and monitor skin integrity  - Assess and monitor nutrition and hydration status  - Monitor labs   - Assess for incontinence   - Turn and reposition patient  - Assist with mobility/ambulation  - Relieve pressure over bony prominences  - Avoid friction and shearing  - Provide appropriate hygiene as needed including keeping skin clean and dry  - Evaluate need for skin moisturizer/barrier cream  - Collaborate with interdisciplinary team   - Patient/family teaching  - Consider wound care consult   Outcome: Progressing

## 2021-02-14 NOTE — ASSESSMENT & PLAN NOTE
· UTI present on admission  · Follow-up urine culture    · Completed 3 days of IV antibiotics, stable   · Resolved

## 2021-02-14 NOTE — PROGRESS NOTES
NEPHROLOGY PROGRESS NOTE    Patient: Alie Garcia               Sex: female          DOA: 2/10/2021  5:03 PM   YOB: 1945        Age:  77 y o         LOS:  LOS: 4 days       HPI     Patient admitted COVID-19 infection and had acute kidney injury    SUBJECTIVE     Overall seems to feeling well    Still complaining quite will weakness    Does not appear to be short of breath and oxygenating well at this point    No chest pain no palpitation    CURRENT MEDICATIONS       Current Facility-Administered Medications:     apixaban (ELIQUIS) tablet 2 5 mg, 2 5 mg, Oral, BID, Raynaldo Zafar, CRNP, 2 5 mg at 02/14/21 0836    ascorbic acid (VITAMIN C) tablet 1,000 mg, 1,000 mg, Oral, Q12H [facility], Bruno Garcia PA-C, 1,000 mg at 02/14/21 0130    aspirin (ECOTRIN LOW STRENGTH) EC tablet 81 mg, 81 mg, Oral, Daily, Bruno Garcia PA-C, 81 mg at 02/14/21 0836    atorvastatin (LIPITOR) tablet 40 mg, 40 mg, Oral, HS, Bruno Garcia PA-C, 40 mg at 02/13/21 2104    ceftriaxone (ROCEPHIN) 1 g/50 mL in dextrose IVPB, 1,000 mg, Intravenous, Q24H, Bruno Garcia PA-C, Last Rate: 100 mL/hr at 02/13/21 1755, 1,000 mg at 02/13/21 1755    chlorhexidine (PERIDEX) 0 12 % oral rinse 15 mL, 15 mL, Swish & Spit, Q12H [facility], Bruno Garcia PA-C, 15 mL at 02/14/21 5093    cholecalciferol (VITAMIN D3) tablet 2,000 Units, 2,000 Units, Oral, Daily, Bruno Garcia PA-C, 2,000 Units at 02/14/21 0836    dexamethasone (DECADRON) injection 6 mg, 6 mg, Intravenous, Q24H, Bruno Garcia PA-C, 6 mg at 02/13/21 1947    doxycycline hyclate (VIBRAMYCIN) capsule 100 mg, 100 mg, Oral, Q12H, Bruno Garcia PA-C, 100 mg at 02/14/21 3556    hydrALAZINE (APRESOLINE) tablet 25 mg, 25 mg, Oral, Q8H [facility], Eryn Whitmore MD, 25 mg at 02/14/21 0627    insulin lispro (HumaLOG) 100 units/mL subcutaneous injection 1-5 Units, 1-5 Units, Subcutaneous, HS, Bruno Garcia PA-C, 1 Units at 02/12/21 2135    insulin lispro (HumaLOG) 100 units/mL subcutaneous injection 1-6 Units, 1-6 Units, Subcutaneous, TID AC, 2 Units at 02/14/21 0908 **AND** Fingerstick Glucose (POCT), , , TID AC, Luis Travis PA-C    isosorbide mononitrate (IMDUR) 24 hr tablet 60 mg, 60 mg, Oral, Daily, Luis Travis PA-C, 60 mg at 02/14/21 9921    levothyroxine tablet 50 mcg, 50 mcg, Oral, Daily, Luis Travis PA-C, 50 mcg at 02/14/21 0836    metoprolol tartrate (LOPRESSOR) tablet 50 mg, 50 mg, Oral, Q12H [address], Luis Travis PA-C, 50 mg at 02/14/21 1750    zinc sulfate (ZINCATE) capsule 220 mg, 220 mg, Oral, Daily, 220 mg at 02/14/21 0837 **FOLLOWED BY** [START ON 2/18/2021] multivitamin-minerals (CENTRUM ADULTS) tablet 1 tablet, 1 tablet, Oral, Daily, Luis Travis PA-C    [COMPLETED] remdesivir Sherleen Halt) 200 mg in sodium chloride 0 9 % 250 mL IVPB, 200 mg, Intravenous, Q24H, 200 mg at 02/10/21 2153 **FOLLOWED BY** remdesivir (Veklury) 100 mg in sodium chloride 0 9 % 250 mL IVPB, 100 mg, Intravenous, Q24H, Luis Travis PA-C, 100 mg at 02/13/21 2119    sertraline (ZOLOFT) tablet 50 mg, 50 mg, Oral, Daily, Luis Travis PA-C, 50 mg at 02/14/21 0836    OBJECTIVE     Current Weight: Weight - Scale: 80 8 kg (178 lb 2 1 oz)  Vitals:    02/14/21 0841   BP: 126/61   Pulse: 92   Resp: (!) 24   Temp:    SpO2: 96%       Intake/Output Summary (Last 24 hours) at 2/14/2021 0945  Last data filed at 2/13/2021 2201  Gross per 24 hour   Intake 550 ml   Output 400 ml   Net 150 ml       PHYSICAL EXAMINATION     Physical Exam  Constitutional:       General: She is not in acute distress  Appearance: She is well-developed  HENT:      Head: Normocephalic  Eyes:      General: No scleral icterus  Conjunctiva/sclera: Conjunctivae normal    Neck:      Musculoskeletal: Neck supple  Vascular: No JVD  Cardiovascular:      Rate and Rhythm: Normal rate  Heart sounds: Normal heart sounds  Pulmonary:      Effort: Pulmonary effort is normal       Breath sounds: No wheezing     Abdominal:      Palpations: Abdomen is soft       Tenderness: There is no abdominal tenderness  Musculoskeletal: Normal range of motion  Skin:     General: Skin is warm  Findings: No rash  Neurological:      Mental Status: She is alert and oriented to person, place, and time  Psychiatric:         Behavior: Behavior normal           LAB RESULTS     Results from last 7 days   Lab Units 02/13/21  0540 02/12/21  0550 02/11/21  0525 02/10/21  2112 02/10/21  1751   WBC Thousand/uL 4 93 5 15 3 61* 5 19 5 99   HEMOGLOBIN g/dL 10 1* 10 2* 10 5* 11 3* 12 0   HEMATOCRIT % 31 4* 32 6* 32 7* 36 1 37 8   PLATELETS Thousands/uL 141* 112* 96* 94* 101*   POTASSIUM mmol/L 4 7 4 2 4 5  --  4 5   CHLORIDE mmol/L 108 108 106  --  99*   CO2 mmol/L 15* 17* 16*  --  19*   BUN mg/dL 72* 83* 79*  --  84*   CREATININE mg/dL 1 78* 2 07* 2 65*  --  3 39*   EGFR ml/min/1 73sq m 32 27 20  --  15   CALCIUM mg/dL 8 5 8 3 8 3  --  8 8       RADIOLOGY RESULTS      Results for orders placed during the hospital encounter of 02/10/21   XR chest 1 view portable    Narrative CHEST     INDICATION:   Weakness, new a fib  Patient has confirmed COVID-19  COMPARISON:  Chest radiograph dated 2/15/2019  EXAM PERFORMED/VIEWS:  XR CHEST PORTABLE      FINDINGS:    Cardiomediastinal silhouette appears unremarkable  There are bilateral groundglass opacities  No pneumothorax or pleural effusion  Osseous structures appear within normal limits for patient age  Impression Bilateral groundglass opacities  In the setting of clinically suspected/proven COVID-19, this plain film appearance while nonspecific, can be seen in cases of viral pneumonia such as COVID-19  Workstation performed: [id number]       Results for orders placed during the hospital encounter of 02/15/19   XR chest 2 views    Narrative CHEST     INDICATION:   Shortness of breath and leg swelling      COMPARISON:  7/14/2017    EXAM PERFORMED/VIEWS:  XR CHEST PA & LATERAL      FINDINGS:    Heart shadow is enlarged but unchanged from prior exam     There is no focal infiltrate or pleural effusion  No pneumothorax  Pulmonary vasculature mildly prominent    Osseous structures appear within normal limits for patient age  Impression Mild pulmonary vascular congestion and enlargement of the cardiac silhouette  Workstation performed: [id number]       Results for orders placed during the hospital encounter of 02/10/21   CT chest without contrast    Narrative CT CHEST WITHOUT IV CONTRAST    INDICATION:   Shortness of breath  abnormal XR  Patient has confirmed COVID-19  COMPARISON:  9/1/2005    TECHNIQUE: CT examination of the chest was performed without intravenous contrast   Axial, sagittal, and coronal 2D reformatted images were created from the source data and submitted for interpretation  Radiation dose length product (DLP) for this visit:  440 76 mGy-cm   This examination, like all CT scans performed in the [facility], was performed utilizing techniques to minimize radiation dose exposure, including the use of iterative   reconstruction and automated exposure control  FINDINGS:    LUNGS:  There are multifocal patchy groundglass opacities within the lungs bilaterally, consistent with the patient's clinical history  There are multiple thin-walled cysts in the lungs bilaterally  PLEURA:  Unremarkable  HEART/GREAT VESSELS:  Unremarkable for patient's age  MEDIASTINUM AND NEVAEH:  Prominent mediastinal lymph nodes are noted  There is no suspicious hilar or axillary lymphadenopathy  CHEST WALL AND LOWER NECK:   Unremarkable  VISUALIZED STRUCTURES IN THE UPPER ABDOMEN:  Unremarkable  OSSEOUS STRUCTURES:  There are degenerative changes of the spine  Impression Multifocal groundglass opacities throughout the lungs bilaterally consistent with the history of Covid- 19            Workstation performed: [id number]       No results found for this or any previous visit  No results found for this or any previous visit  No results found for this or any previous visit  PLAN / RECOMMENDATIONS      Acute kidney injury:  I do not have blood for today yet  It was improving yesterday  Will continue to monitor    COVID-19 infection: On medication seems to be stable    Hypertension:  Very well control    Cardiomyopathy with aortic stenosis:  Patient is off diuretic but will need diuretic will monitor very closely    Will follow    Richie Williamson MD  Nephrology  2/14/2021        Portions of the record may have been created with voice recognition software  Occasional wrong word or "sound a like" substitutions may have occurred due to the inherent limitations of voice recognition software  Read the chart carefully and recognize, using context, where substitutions have occurred

## 2021-02-15 LAB — FERRITIN SERPL-MCNC: 3048 NG/ML (ref 8–388)

## 2021-02-16 LAB
BACTERIA BLD CULT: NORMAL
BACTERIA BLD CULT: NORMAL

## 2021-02-19 LAB — GLUCOSE SERPL-MCNC: 225 MG/DL (ref 65–140)

## 2021-03-03 NOTE — PROGRESS NOTES
Heart Failure Outpatient Progress Note - Jenny Cherry 77 y o  female MRN: [id number]    @ Encounter: [id number]      Assessment/Plan:    Patient Active Problem List    Diagnosis Date Noted    Severe aortic stenosis 02/10/2021    Severe pulmonary arterial systolic hypertension (HCC) 02/10/2021    [facility] 02/10/2021    New onset atrial fibrillation ([facility] ) 02/10/2021    Pneumonia due to COVID-19 virus 02/10/2021    HLD (hyperlipidemia) 02/10/2021    CKD (chronic kidney disease) stage 3, GFR 30-59 ml/min 02/21/2019    Bilateral leg edema 02/16/2019    Acute on chronic systolic congestive heart failure ([facility] ) 02/16/2019    Solitary kidney 12/25/2017    Normocytic anemia 07/14/2017    Essential hypertension 07/14/2017    Diabetes mellitus ([place] [address] ) 07/14/2017     # Chronic BiV HFrEF, recovered, predominant HFpEF, Stage C, NYHA II  Etiology: ischemic and NICM; EF improved 7/2017 to 10/2017 with optimized med therapy, then reduced 2/18/19 possibly due to compliance or progression of AS and MR  Dobutamine stress echo had shown severe MR with pulmonary vascular remodeling evidenced by RVOT notching  Pt also with hx of breast CA ( may have received doxorubicin- pt unsure  Weight: 171 lbs  NT proBNP:  2/10/21: 15,152  2/15/19: 10,568    Studies- personally reviewed by myself  Serology: --SPEP, ferritin was high 712, HIV are all negative; CHRISTINA (positive)     --TTE 3/10/2020:   LVEF 55%  Restrictive physiology  LVIDd 5 1 cm  RV: Mild RVE    +LAE>SUKH  Mod MR    AV: Severe AS w/ KOFI 1 cm2 w/ obstructive index of 0 29  Decrease LVOT VTI  Mild TR  PASP 65 mmHg  --R+LHC 2/20/19: 60% distal LAD  prox LCx 100%  @ site of prior stent  OM1 100%   RCA dominant, mod nonobs CAD  Mod AS (KOFI 1 cm2)  RHC (independently reviewed)-> RA 25, RV 78/27, PA 75/34/48, PCWP 38; LVEDP ~38 mmHg; CO/CI 2 3/1 1 (Luisa)  TPG 10, PVR 4 3  SVR 2655   PVR:SVR 0 13; CVP:PCWP 0 66; Byron 1 64     --TTE 2/18/19: LVEF ~20%, LVIDd 6 cm, mod RVE w/ mod reduced RVSF (S'<10 cm/s, TAPSE 1 5 cm), mild IVS flattening in diastole  Mod VICTORIANO  @ least moderate eccentric MR  Likely low output based on noninvasive assessment (LVOT VTI reduced<13 cm)  Mod to severe AS (0 8 to 1 cm2)  AV Vmax 2 m/s  Transaortic velocity likely reduced 2/2 to low SV  PASP 70 mmHg      --TTE 10/17/17: LVEF 40-45%, LVIDd 5 6 cm, mild RVE  Normal RVSF (TAPSE>2 cm, S'>10 cm/s)  Mild to mod AS w/ mean 15 mmHg  KOFI ~1 4 cm2  @ least mod MR  Mild TR  PASP 46 mmHg  Mid to late systolic RVOT notching   LVOT VTI ~25 cm      --TTE 7/14/17: LVEF 25-30%, LVIDd 6 2 cm  Mild RVE w/ preserved RVSF (TAPSE>2 and S'>10 cm/s)  Sev MR and TR  Mod VICTORIANO  AV Vmax 2 2 m/s  Mild to mod AS w/ mean grad 10, KOFI 1 1-1 3 cm2  Mid to late systolic RVOT notching      --Pharm Nuc 10/10/2016: No e/o ischemia  LVEF 46%  --TTE 2/23/2016: LVEF 40-45%  LVIDd 5 5 cm  RCA and LCx region WMA  PASP 55 mmHg  Mild AS w/ KOFI 1 7 cm2  Mild to mod MR  Neurohormonal Blockade:  --Beta-Blocker: Continue metoprolol succinate 50 mg PO daily  --ACEi, ARB or ARNi: Continue lisinopril 2 5 mg PO daily; given borderline GFR and significant change on MRA, will defer Entresto  --Vasodilators: Continue imdur 60 mg PO daily (insurance providing poor coverage for isordil); continue hydralazine 25 mg q8hrs for further SVR reduction;  --Aldosterone Receptor Blocker: Currently off MRA 2/2 to GFR<30 while on MRA  Now >30 off MRA    --Diuretic: Continue torsemid 20 mg PO daily (as weight is stable on that @ 174 lbs)     Sudden Cardiac Death Risk Reduction:  --ICD: D/C LifeVest as LVEF >35%     Electrical Resynchronization:  --Candidacy for BiV device: Narrow QRS     Advanced Therapies: Will continue to monitor    # Severe AS  Echo 3/10/20: severe AS with KOFI 1 cm2, DI: 0 29    Dobutamine stress 4/30/19: LVEF 40% @ rest  Mod low-flow low gradient AS w/o increase in AV gradient w/ pharmacologic stress   Severe MR w/ eccentric jet  Mid systolic RVOT notching  Frequent PVCs in couplets and triplets on low dose dobutamine prevented further uptitration  # Pulmonary Hypertension, group 2 with evidence of PVR elevation- vascular remodeling  Hx of DVT  Tx: left sided optimizaton    # Frequent PVCs  48 hour holter 7/11/19 shows ~8-9% PVC burden  However, HR precludes uptitration of BB  # CAD s/p PCI x 2 RCA 2011  Rx: asa, metoprolol, atorvastatin    [facility] 2/20/19: 60% distal LAD  prox LCx 100%  @ site of prior stent  OM1 100%   RCA dominant, mod nonobs CAD  # hyperlipidemia- atorvastatin 40 mg   # hx of DVT  Eliquis 2 5 mg BID  # HTN- hydralazine 25 mg Q8, lisinopril 2 5 mg daily, imdur 30 mg daily  # hx of breast CA  # CKD 3  # COVID 19 PNA- Feb 2021    TODAY'S PLAN:  Continue Eliquis 2 5 mg BID  Atorvastatin 40 mg daily for lipids and CAD  Continue GDMT for HF  Monitor for progression given borderline severe AS- they want to hold off  --2g sodium diet  - Daily weights    HPI:      78 yo with hx of CAD s/p PCI x 2 to RCA in 2011, HTN, HLD, hx of DVT on eliquis now (off coumadin), who is followed for hx of chronic HFrEF and now more so valvular HFpEF as EF has improved over time as evidenced on last echo March 2020  She does have a hx of L breast CA s/p lymph node dissection with chemotherapy >25 years ago  Last echo shows possible progression of her AS as KOFI around 1 cm2 but DI 0 29  She also has MR and evidence of PVR elevation on echo with RVOT notching  This PH is mostly post capillary    Interim:  Hospitalized with COVID 19 PNA, discharged on 2/14  Daughter thinks more forgetfulness post COVID19    Review of Systems   Constitutional: Negative for activity change, appetite change, fatigue and unexpected weight change  HENT: Negative for congestion and nosebleeds  Eyes: Negative  Respiratory: Negative for cough, chest tightness and shortness of breath      Cardiovascular: Negative for chest pain, palpitations and leg swelling  Gastrointestinal: Negative for abdominal distention  Endocrine: Negative  Genitourinary: Negative  Musculoskeletal: Negative  Skin: Negative  Neurological: Negative for dizziness, syncope and weakness  Hematological: Negative  Psychiatric/Behavioral: Negative  Past Medical History:   Diagnosis Date    MANJEET (acute kidney injury) ([place] [address] ) 7/14/2017    In a solitary kidney with component of cardiorenal syndrome given that patient has history of CHF  Function unchanged, gentle bolus of fluid, appreciate Nephrology input    Aortic valve stenosis     Cancer ([place] [address] )     Cardiac disease     Cardiomyopathy ([place] [address] )     [facility]     Chronic systolic heart failure (HCC)     Coronary artery disease     Diabetes mellitus ([place] [address] )     Diastolic dysfunction     Elevated troponin 2/10/2021    Hypertension     Mitral regurgitation     Pulmonary HTN ([place] [address] )     Renal disorder     Severe sepsis ([place] [address] ) 2/10/2021    Stroke ([place] [address] )     Tricuspid regurgitation     UTI (urinary tract infection) 12/23/2017    Present on admission pansensitive E coli, switch from Rocephin to cefuroxime         Allergies   Allergen Reactions    Penicillins Rash           Current Outpatient Medications:     ascorbic acid (VITAMIN C) 1000 MG tablet, Take 1 tablet (1,000 mg total) by mouth every 12 (twelve) hours for 2 days, Disp: 6 tablet, Rfl: 0    aspirin (ECOTRIN LOW STRENGTH) 81 mg EC tablet, Take 1 tablet (81 mg total) by mouth daily, Disp: 30 tablet, Rfl: 0    atorvastatin (LIPITOR) 40 mg tablet, Take 40 mg by mouth daily, Disp: , Rfl:     cholecalciferol (VITAMIN D3) 1,000 units tablet, Take 2 tablets (2,000 Units total) by mouth daily for 2 days, Disp: 4 tablet, Rfl: 0    Eliquis 2 5 MG, TAKE 1 TABLET BY MOUTH TWICE A DAY, Disp: 180 tablet, Rfl: 3    hydrALAZINE (APRESOLINE) 25 mg tablet, Take 1 tablet (25 mg total) by mouth every 8 (eight) hours, Disp: 90 tablet, Rfl: 3   isosorbide mononitrate (IMDUR) 30 mg 24 hr tablet, TAKE 2 TABLETS BY MOUTH DAILY (Patient taking differently: Take 10 mg by mouth daily ), Disp: 180 tablet, Rfl: 3    levothyroxine 50 mcg tablet, Take 50 mcg by mouth daily, Disp: , Rfl:     lisinopril (ZESTRIL) 2 5 mg tablet, Take 1 tablet (2 5 mg total) by mouth daily, Disp: 90 tablet, Rfl: 3    metoprolol tartrate (LOPRESSOR) 50 mg tablet, Take 1 tablet (50 mg total) by mouth every 12 (twelve) hours, Disp: 60 tablet, Rfl: 0    Multiple Vitamin (MULTIVITAMIN) tablet, Take 1 tablet by mouth daily, Disp: , Rfl:     nitroglycerin (NITROSTAT) 0 4 mg SL tablet, PLACE 1 TABLET SUBLINGUALLY EVERY 5 MINUTES X3 DOSES AS NEEDED FOR CHEST PAIN, Disp: , Rfl:     Omega-3 Fatty Acids (FISH OIL) 1,000 mg, Take 1,000 mg by mouth 2 (two) times a day, Disp: , Rfl:     oxybutynin (DITROPAN-XL) 10 MG 24 hr tablet, Take 1 tablet by mouth daily at bedtime, Disp: , Rfl: 0    sertraline (ZOLOFT) 50 mg tablet, Take 50 mg by mouth daily, Disp: , Rfl:     torsemide (DEMADEX) 20 mg tablet, Take 1 tablet (20 mg total) by mouth 2 (two) times a day, Disp: 180 tablet, Rfl: 3    zinc gluconate 50 mg tablet, Take 50 mg by mouth daily, Disp: , Rfl:     Social History     Socioeconomic History    Marital status:       Spouse name: Not on file    Number of children: Not on file    Years of education: Not on file    Highest education level: Not on file   Occupational History    Not on file   Social Needs    Financial resource strain: Not on file    Food insecurity     Worry: Not on file     Inability: Not on file    Transportation needs     Medical: Not on file     Non-medical: Not on file   Tobacco Use    Smoking status: Former Smoker     Packs/day: 0 50     Years: 45 00     Pack years: 22 50     Quit date: 2009     Years since quittin 2    Smokeless tobacco: Never Used   Substance and Sexual Activity    Alcohol use: Not Currently     Frequency: Never    Drug use: No    Sexual activity: Not on file   Lifestyle    Physical activity     Days per week: Not on file     Minutes per session: Not on file    Stress: Not on file   Relationships    Social connections     Talks on phone: Not on file     Gets together: Not on file     Attends Jew service: Not on file     Active member of club or organization: Not on file     Attends meetings of clubs or organizations: Not on file     Relationship status: Not on file    Intimate partner violence     Fear of current or ex partner: Not on file     Emotionally abused: Not on file     Physically abused: Not on file     Forced sexual activity: Not on file   Other Topics Concern    Not on file   Social History Narrative    Lives with daughter       Family History   Family history unknown: Yes       Physical Exam:    Vitals: There were no vitals filed for this visit  Physical Exam  Constitutional:       Appearance: She is well-developed  HENT:      Head: Normocephalic and atraumatic  Eyes:      Pupils: Pupils are equal, round, and reactive to light  Neck:      Musculoskeletal: Normal range of motion  Vascular: No JVD  Cardiovascular:      Rate and Rhythm: Normal rate and regular rhythm  Heart sounds: No murmur  Pulmonary:      Effort: Pulmonary effort is normal  No respiratory distress  Breath sounds: Normal breath sounds  Abdominal:      General: There is no distension  Palpations: Abdomen is soft  Tenderness: There is no abdominal tenderness  Musculoskeletal: Normal range of motion  Skin:     General: Skin is warm and dry  Findings: No rash  Neurological:      Mental Status: She is alert and oriented to person, place, and time         Labs & Results:    Lab Results   Component Value Date    SODIUM 135 (L) 02/14/2021    K 4 9 02/14/2021     02/14/2021    CO2 16 (L) 02/14/2021    BUN 55 (H) 02/14/2021    CREATININE 1 56 (H) 02/14/2021    GLUC 202 (H) 02/14/2021 CALCIUM 8 6 02/14/2021     Lab Results   Component Value Date    WBC 7 84 02/14/2021    HGB 10 7 (L) 02/14/2021    HCT 32 1 (L) 02/14/2021    MCV 98 02/14/2021     02/14/2021     Lab Results   Component Value Date    NTBNP 15,152 (H) 02/10/2021      Lab Results   Component Value Date    CHOLESTEROL <50 (L) 07/14/2017     Lab Results   Component Value Date    HDL 9 (L) 07/14/2017     Lab Results   Component Value Date    TRIG 57 07/14/2017     No results found for: [facility]    EKG personally reviewed by Breezy Marie DO  Counseling / Coordination of Care  Time spent today 25 minutes  Greater than 50% of total time was spent with the patient and / or family counseling and / or coordination of care  We went over current diagnosis, most recent studies and any changes in treatment      [address] [place] [address] PULMONARY HYPERTENSION  MEDICAL DIRECTOR OF [place]

## 2021-03-05 ENCOUNTER — OFFICE VISIT (OUTPATIENT)
Dept: CARDIOLOGY CLINIC | Facility: CLINIC | Age: 76
End: 2021-03-05
Payer: MEDICARE

## 2021-03-05 VITALS
WEIGHT: 171 LBS | SYSTOLIC BLOOD PRESSURE: 110 MMHG | BODY MASS INDEX: 29.19 KG/M2 | DIASTOLIC BLOOD PRESSURE: 58 MMHG | HEIGHT: 64 IN | OXYGEN SATURATION: 98 % | HEART RATE: 94 BPM

## 2021-03-05 DIAGNOSIS — I35.0 SEVERE AORTIC STENOSIS: ICD-10-CM

## 2021-03-05 DIAGNOSIS — E78.2 MIXED HYPERLIPIDEMIA: ICD-10-CM

## 2021-03-05 DIAGNOSIS — I50.32 CHRONIC DIASTOLIC CHF (CONGESTIVE HEART FAILURE), NYHA CLASS 2 (HCC): ICD-10-CM

## 2021-03-05 DIAGNOSIS — I27.20 PULMONARY HYPERTENSION (HCC): ICD-10-CM

## 2021-03-05 DIAGNOSIS — I50.82 BIVENTRICULAR HEART FAILURE (HCC): Primary | ICD-10-CM

## 2021-03-05 PROCEDURE — 99214 OFFICE O/P EST MOD 30 MIN: CPT | Performed by: INTERNAL MEDICINE

## 2021-03-05 NOTE — PATIENT INSTRUCTIONS
No change in heart meds    I ordered an echo    COVID 19 vaccine scheduling can be done by going through [address] number: [phone]

## 2021-03-08 ENCOUNTER — TELEPHONE (OUTPATIENT)
Dept: NEPHROLOGY | Facility: CLINIC | Age: 76
End: 2021-03-08

## 2021-04-06 ENCOUNTER — TELEPHONE (OUTPATIENT)
Dept: NEPHROLOGY | Facility: CLINIC | Age: 76
End: 2021-04-06

## 2021-04-07 ENCOUNTER — CONSULT (OUTPATIENT)
Dept: NEPHROLOGY | Facility: CLINIC | Age: 76
End: 2021-04-07
Payer: MEDICARE

## 2021-04-07 VITALS
HEIGHT: 65 IN | HEART RATE: 54 BPM | WEIGHT: 168.2 LBS | DIASTOLIC BLOOD PRESSURE: 68 MMHG | TEMPERATURE: 95.6 F | BODY MASS INDEX: 28.02 KG/M2 | RESPIRATION RATE: 16 BRPM | SYSTOLIC BLOOD PRESSURE: 124 MMHG

## 2021-04-07 DIAGNOSIS — N18.32 ANEMIA DUE TO STAGE 3B CHRONIC KIDNEY DISEASE (HCC): Primary | ICD-10-CM

## 2021-04-07 DIAGNOSIS — D63.1 ANEMIA DUE TO STAGE 3B CHRONIC KIDNEY DISEASE (HCC): Primary | ICD-10-CM

## 2021-04-07 DIAGNOSIS — N18.30 STAGE 3 CHRONIC KIDNEY DISEASE, UNSPECIFIED WHETHER STAGE 3A OR 3B CKD (HCC): ICD-10-CM

## 2021-04-07 PROCEDURE — 99215 OFFICE O/P EST HI 40 MIN: CPT | Performed by: INTERNAL MEDICINE

## 2021-04-07 NOTE — PROGRESS NOTES
NEPHROLOGY OFFICE CONSULT  Carlos Vigil 77 y o  female MRN: [id number]    Encounter: [id number] DATE: 4/7/2021    REASON FOR VISIT: Carlos Vigil is a 77 y  o female who was referred by Helen Moreland for evaluation  Stiven Wong HPI:    77 y o  F with PMH of xanthogranulomatous pyelonephritis s/p nephrectomy in 2018, CKD III, CHF with diastolic dysfunction, aortic stenosis, pulmonary hypertension stage II, anemia, COVID 19 infection, breast cancer s/p chemotherapy > 25 years ago who presents to Renal clinic for management of CKD  Patient noted to have elevated creatinine as early as 2016  In the year 2017, serum creatinine alpa to > 3  She underwent nephrectomy in 2018 due to diagnosis listed above  Her baseline creatinine has ranged between 1 5-2 0 since then with the most recent episode of MANJEET occurring in 2021 during COVID infection  Patient takes diuretics for CHF and denies any c/o chest pain, shortness of breath or edema  Noted to have aortic stenosis and managed by Cardiologist          PAST MEDICAL HISTORY:  Past Medical History:   Diagnosis Date    MANJEET (acute kidney injury) ([address] ) 7/14/2017    In a solitary kidney with component of cardiorenal syndrome given that patient has history of CHF    Function unchanged, gentle bolus of fluid, appreciate Nephrology input    Aortic valve stenosis     Cancer ([address] )     Cardiac disease     Cardiomyopathy ([address] )     CHF (congestive heart failure) (HCC)     Chronic kidney disease     Chronic systolic heart failure (HCC)     Coronary artery disease     Diabetes mellitus ([address] )     Diastolic dysfunction     Elevated troponin 2/10/2021    Hypertension     Mitral regurgitation     Pulmonary HTN ([address] )     Renal disorder     Severe sepsis ([address] ) 2/10/2021    Stroke ([address] )     Tricuspid regurgitation     UTI (urinary tract infection) 12/23/2017    Present on admission pansensitive E coli, switch from Rocephin to cefuroxime       PAST SURGICAL HISTORY:  Past Surgical History: Procedure Laterality Date    CARDIAC SURGERY      CORONARY ANGIOPLASTY WITH STENT PLACEMENT      KIDNEY SURGERY      MASTECTOMY         SOCIAL HISTORY:  Social History     Substance and Sexual Activity   Alcohol Use Not Currently    Frequency: Never     Social History     Substance and Sexual Activity   Drug Use No     Social History     Tobacco Use   Smoking Status Former Smoker    Packs/day: 0 50    Years: 45 00    Pack years: 22 50    Quit date: 2009    Years since quittin 2   Smokeless Tobacco Never Used       FAMILY HISTORY:  Family History   Family history unknown:  Yes       ALLERGY:  Allergies   Allergen Reactions    Penicillins Rash       MEDICATIONS:    Current Outpatient Medications:     ascorbic acid (VITAMIN C) 1000 MG tablet, Take 1 tablet (1,000 mg total) by mouth every 12 (twelve) hours for 2 days, Disp: 6 tablet, Rfl: 0    aspirin (ECOTRIN LOW STRENGTH) 81 mg EC tablet, Take 1 tablet (81 mg total) by mouth daily, Disp: 30 tablet, Rfl: 0    atorvastatin (LIPITOR) 40 mg tablet, Take 40 mg by mouth daily, Disp: , Rfl:     cholecalciferol (VITAMIN D3) 1,000 units tablet, Take 2 tablets (2,000 Units total) by mouth daily for 2 days, Disp: 4 tablet, Rfl: 0    Eliquis 2 5 MG, TAKE 1 TABLET BY MOUTH TWICE A DAY, Disp: 180 tablet, Rfl: 3    hydrALAZINE (APRESOLINE) 25 mg tablet, Take 1 tablet (25 mg total) by mouth every 8 (eight) hours, Disp: 90 tablet, Rfl: 3    isosorbide mononitrate (IMDUR) 30 mg 24 hr tablet, TAKE 2 TABLETS BY MOUTH DAILY (Patient taking differently: Take 10 mg by mouth daily ), Disp: 180 tablet, Rfl: 3    levothyroxine 50 mcg tablet, Take 50 mcg by mouth daily, Disp: , Rfl:     lisinopril (ZESTRIL) 2 5 mg tablet, Take 1 tablet (2 5 mg total) by mouth daily, Disp: 90 tablet, Rfl: 3    metoprolol tartrate (LOPRESSOR) 50 mg tablet, Take 1 tablet (50 mg total) by mouth every 12 (twelve) hours, Disp: 60 tablet, Rfl: 0    Multiple Vitamin (MULTIVITAMIN) tablet, Take 1 tablet by mouth daily, Disp: , Rfl:     nitroglycerin (NITROSTAT) 0 4 mg SL tablet, PLACE 1 TABLET SUBLINGUALLY EVERY 5 MINUTES X3 DOSES AS NEEDED FOR CHEST PAIN, Disp: , Rfl:     Omega-3 Fatty Acids (FISH OIL) 1,000 mg, Take 1,000 mg by mouth 2 (two) times a day, Disp: , Rfl:     oxybutynin (DITROPAN-XL) 10 MG 24 hr tablet, Take 1 tablet by mouth daily at bedtime, Disp: , Rfl: 0    sertraline (ZOLOFT) 50 mg tablet, Take 50 mg by mouth daily, Disp: , Rfl:     torsemide (DEMADEX) 20 mg tablet, Take 1 tablet (20 mg total) by mouth 2 (two) times a day, Disp: 180 tablet, Rfl: 3    zinc gluconate 50 mg tablet, Take 50 mg by mouth daily, Disp: , Rfl:     REVIEW OF SYSTEMS:    Review of Systems   Constitutional: Positive for fatigue  HENT: Negative  Eyes: Negative  Respiratory: Positive for shortness of breath  Cardiovascular: Negative  Gastrointestinal: Negative  Endocrine: Negative  Genitourinary: Negative  Musculoskeletal: Negative  Skin: Negative  Allergic/Immunologic: Negative  Neurological: Negative  Hematological: Negative  All other systems reviewed and are negative  PHYSICAL EXAM:  Vitals:    04/07/21 1328   BP: 124/68   BP Location: Left arm   Patient Position: Sitting   Cuff Size: Large   Pulse: (!) 54   Resp: 16   Temp: (!) 95 6 °F (35 3 °C)   TempSrc: Temporal   Weight: 76 3 kg (168 lb 3 2 oz)   Height: 5' 4 5" (1 638 m)     Body mass index is 28 43 kg/m²  Physical Exam  Constitutional:       Appearance: She is well-developed  HENT:      Head: Normocephalic and atraumatic  Eyes:      Pupils: Pupils are equal, round, and reactive to light  Neck:      Musculoskeletal: Neck supple  Cardiovascular:      Rate and Rhythm: Normal rate and regular rhythm  Heart sounds: Murmur present  Pulmonary:      Effort: Pulmonary effort is normal    Abdominal:      General: Bowel sounds are normal       Palpations: Abdomen is soft  Musculoskeletal: Normal range of motion  Skin:     General: Skin is warm  Neurological:      Mental Status: She is alert and oriented to person, place, and time  LAB RESULTS:  Results for orders placed or performed during the hospital encounter of 02/10/21   Blood culture #1    Specimen: Hand, Left; Blood   Result Value Ref Range    Blood Culture No Growth After 5 Days  Blood culture #2    Specimen: Arm, Left; Blood   Result Value Ref Range    Blood Culture No Growth After 5 Days      COVID19, Influenza A/B, RSV PCR, [facility]    Specimen: Nasopharyngeal Swab; Nares   Result Value Ref Range    SARS-CoV-2 Positive (A) Negative    INFLUENZA A PCR Negative Negative    INFLUENZA B PCR Negative Negative    RSV PCR Negative Negative   Urine culture    Specimen: Urine, Indwelling Brumfield Catheter   Result Value Ref Range    Urine Culture No Growth <1000 cfu/mL    Comprehensive metabolic panel   Result Value Ref Range    Sodium 134 (L) 136 - 145 mmol/L    Potassium 4 5 3 5 - 5 3 mmol/L    Chloride 99 (L) 100 - 108 mmol/L    CO2 19 (L) 21 - 32 mmol/L    ANION GAP 16 (H) 4 - 13 mmol/L    BUN 84 (H) 5 - 25 mg/dL    Creatinine 3 39 (H) 0 60 - 1 30 mg/dL    Glucose 151 (H) 65 - 140 mg/dL    Calcium 8 8 8 3 - 10 1 mg/dL    Corrected Calcium 9 7 8 3 - 10 1 mg/dL    AST 85 (H) 5 - 45 U/L    ALT 30 12 - 78 U/L    Alkaline Phosphatase 81 46 - 116 U/L    Total Protein 8 5 (H) 6 4 - 8 2 g/dL    Albumin 2 9 (L) 3 5 - 5 0 g/dL    Total Bilirubin 0 60 0 20 - 1 00 mg/dL    eGFR 15 ml/min/1 73sq m   CBC and differential   Result Value Ref Range    WBC 5 99 4 31 - 10 16 Thousand/uL    RBC 3 98 3 81 - 5 12 Million/uL    Hemoglobin 12 0 11 5 - 15 4 g/dL    Hematocrit 37 8 34 8 - 46 1 %    MCV 95 82 - 98 fL    MCH 30 2 26 8 - 34 3 pg    MCHC 31 7 31 4 - 37 4 g/dL    RDW 15 6 (H) 11 6 - 15 1 %    MPV 13 8 (H) 8 9 - 12 7 fL    Platelets 989 (L) 776 - 390 Thousands/uL    nRBC 0 /100 WBCs    Neutrophils Relative 86 (H) 43 - 75 %    Immat GRANS % 1 0 - 2 %    Lymphocytes Relative 9 (L) 14 - 44 %    Monocytes Relative 4 4 - 12 %    Eosinophils Relative 0 0 - 6 %    Basophils Relative 0 0 - 1 %    Neutrophils Absolute 5 20 1 85 - 7 62 Thousands/µL    Immature Grans Absolute 0 03 0 00 - 0 20 Thousand/uL    Lymphocytes Absolute 0 54 (L) 0 60 - 4 47 Thousands/µL    Monocytes Absolute 0 21 0 17 - 1 22 Thousand/µL    Eosinophils Absolute 0 00 0 00 - 0 61 Thousand/µL    Basophils Absolute 0 01 0 00 - 0 10 Thousands/µL   Troponin I   Result Value Ref Range    Troponin I 0 57 (H) <=0 04 ng/mL   NT-BNP PRO   Result Value Ref Range    NT-proBNP 15,152 (H) <125 pg/mL   UA w Reflex to Microscopic w Reflex to Culture    Specimen: Urine, Indwelling Brumfield Catheter   Result Value Ref Range    Color, UA Yellow     Clarity, UA Slightly Cloudy     Specific [place], UA 1 015 1 003 - 1 030    pH, UA 6 0 4 5, 5 0, 5 5, 6 0, 6 5, 7 0, 7 5, 8 0    Leukocytes, UA Large (A) Negative    Nitrite, UA Negative Negative    Protein, UA Negative Negative mg/dl    Glucose, UA Negative Negative mg/dl    Ketones, UA Negative Negative mg/dl    Urobilinogen, UA 0 2 0 2, 1 0 E U /dl E U /dl    Bilirubin, UA Negative Negative    Blood, UA Moderate (A) Negative   Protime-INR   Result Value Ref Range    Protime 16 6 (H) 11 6 - 14 5 seconds    INR 1 41 (H) 0 84 - 1 19   APTT   Result Value Ref Range    PTT 41 (H) 23 - 37 seconds   Lactic acid   Result Value Ref Range    LACTIC ACID 2 4 (HH) 0 5 - 2 0 mmol/L   Procalcitonin with AM Reflex   Result Value Ref Range    Procalcitonin 7 52 (H) <=0 25 ng/ml   C-reactive protein   Result Value Ref Range     1 (H) <3 0 mg/L   D-dimer, quantitative   Result Value Ref Range    D-Dimer, Quant 3 21 (H) <0 50 ug/ml FEU   CK (with reflex to MB)   Result Value Ref Range    Total  (H) 26 - 192 U/L   Chronic Hepatitis Panel   Result Value Ref Range    Hepatitis B Surface Ag Non-reactive Non-reactive, NonReactive - Confirmed    Hepatitis C Ab Non-reactive Non-reactive    Hep B C IgM Non-reactive Non-reactive    Hep B Core Total Ab Non-reactive Non-reactive   Rapid HIV 1/2 AB-AG Combo   Result Value Ref Range    Rapid HIV 1 AND 2 Non-Reactive Non-Reactive    HIV-1 P24 Ag Screen Non-Reactive Non-Reactive   APTT six (6) hours after Heparin bolus/drip initiation or dosing change   Result Value Ref Range    PTT 34 23 - 37 seconds   CBC   Result Value Ref Range    WBC 5 19 4 31 - 10 16 Thousand/uL    RBC 3 79 (L) 3 81 - 5 12 Million/uL    Hemoglobin 11 3 (L) 11 5 - 15 4 g/dL    Hematocrit 36 1 34 8 - 46 1 %    MCV 95 82 - 98 fL    MCH 29 8 26 8 - 34 3 pg    MCHC 31 3 (L) 31 4 - 37 4 g/dL    RDW 15 8 (H) 11 6 - 15 1 %    Platelets 94 (L) 791 - 390 Thousands/uL    MPV 13 6 (H) 8 9 - 12 7 fL   Protime-INR   Result Value Ref Range    Protime 16 6 (H) 11 6 - 14 5 seconds    INR 1 42 (H) 0 84 - 1 19   Lactic acid, plasma   Result Value Ref Range    LACTIC ACID 1 4 0 5 - 2 0 mmol/L   Troponin I   Result Value Ref Range    Troponin I 0 50 (H) <=0 04 ng/mL   Troponin I   Result Value Ref Range    Troponin I 0 58 (H) <=0 04 ng/mL   CKMB   Result Value Ref Range    CK-MB Index 1 8 0 0 - 2 5 %    CK-MB 16 4 (H) 0 0 - 5 0 ng/mL   Ferritin   Result Value Ref Range    Ferritin 7,847 (H) 8 - 388 ng/mL   Urine Microscopic   Result Value Ref Range    RBC, UA 4-10 (A) None Seen, 0-1, 1-2, 2-4, 0-5 /hpf    WBC, UA Innumerable (A) None Seen, 0-1, 1-2, 0-5, 2-4 /hpf    Epithelial Cells Occasional None Seen, Occasional /hpf    Bacteria, UA Moderate (A) None Seen, Occasional /hpf   Troponin I   Result Value Ref Range    Troponin I 0 62 (H) <=0 04 ng/mL   Procalcitonin Reflex   Result Value Ref Range    Procalcitonin 6 21 (H) <=0 25 ng/ml   Ferritin   Result Value Ref Range    Ferritin 7,119 (H) 8 - 388 ng/mL   CBC and differential   Result Value Ref Range    WBC 3 61 (L) 4 31 - 10 16 Thousand/uL    RBC 3 41 (L) 3 81 - 5 12 Million/uL    Hemoglobin 10 5 (L) 11 5 - 15 4 g/dL Hematocrit 32 7 (L) 34 8 - 46 1 %    MCV 96 82 - 98 fL    MCH 30 8 26 8 - 34 3 pg    MCHC 32 1 31 4 - 37 4 g/dL    RDW 15 8 (H) 11 6 - 15 1 %    MPV 13 0 (H) 8 9 - 12 7 fL    Platelets 96 (L) 299 - 390 Thousands/uL    nRBC 0 /100 WBCs   Comprehensive metabolic panel   Result Value Ref Range    Sodium 140 136 - 145 mmol/L    Potassium 4 5 3 5 - 5 3 mmol/L    Chloride 106 100 - 108 mmol/L    CO2 16 (L) 21 - 32 mmol/L    ANION GAP 18 (H) 4 - 13 mmol/L    BUN 79 (H) 5 - 25 mg/dL    Creatinine 2 65 (H) 0 60 - 1 30 mg/dL    Glucose 164 (H) 65 - 140 mg/dL    Calcium 8 3 8 3 - 10 1 mg/dL    Corrected Calcium 9 6 8 3 - 10 1 mg/dL    AST 92 (H) 5 - 45 U/L    ALT 28 12 - 78 U/L    Alkaline Phosphatase 68 46 - 116 U/L    Total Protein 7 3 6 4 - 8 2 g/dL    Albumin 2 4 (L) 3 5 - 5 0 g/dL    Total Bilirubin 0 30 0 20 - 1 00 mg/dL    eGFR 20 ml/min/1 73sq m   C-reactive protein   Result Value Ref Range     8 (H) <3 0 mg/L   D-dimer, quantitative   Result Value Ref Range    D-Dimer, Quant 2 43 (H) <0 50 ug/ml FEU   Troponin I   Result Value Ref Range    Troponin I 0 54 (H) <=0 04 ng/mL   Troponin I   Result Value Ref Range    Troponin I 0 45 (H) <=0 04 ng/mL   APTT   Result Value Ref Range    PTT >210 (HH) 23 - 37 seconds   Microalbumin / creatinine urine ratio   Result Value Ref Range    Creatinine, Ur 86 7 mg/dL    Microalbum  ,U,Random 130 0 (H) 0 0 - 20 0 mg/L    Microalb Creat Ratio 150 (H) 0 - 30 mg/g creatinine   Sodium, urine, random   Result Value Ref Range    Sodium, Ur 27    Urea nitrogen, urine   Result Value Ref Range    Urea Nitrogen, Ur 663 mg/dL   CK   Result Value Ref Range    Total  (H) 26 - 192 U/L   CBC and differential   Result Value Ref Range    WBC 5 15 4 31 - 10 16 Thousand/uL    RBC 3 37 (L) 3 81 - 5 12 Million/uL    Hemoglobin 10 2 (L) 11 5 - 15 4 g/dL    Hematocrit 32 6 (L) 34 8 - 46 1 %    MCV 97 82 - 98 fL    MCH 30 3 26 8 - 34 3 pg    MCHC 31 3 (L) 31 4 - 37 4 g/dL    RDW 15 8 (H) 11 6 - 15 1 %    MPV 12 6 8 9 - 12 7 fL    Platelets 626 (L) 813 - 390 Thousands/uL    nRBC 0 /100 WBCs   Basic metabolic panel   Result Value Ref Range    Sodium 141 136 - 145 mmol/L    Potassium 4 2 3 5 - 5 3 mmol/L    Chloride 108 100 - 108 mmol/L    CO2 17 (L) 21 - 32 mmol/L    ANION GAP 16 (H) 4 - 13 mmol/L    BUN 83 (H) 5 - 25 mg/dL    Creatinine 2 07 (H) 0 60 - 1 30 mg/dL    Glucose 197 (H) 65 - 140 mg/dL    Calcium 8 3 8 3 - 10 1 mg/dL    eGFR 27 ml/min/1 73sq m   Ferritin   Result Value Ref Range    Ferritin 7,352 (H) 8 - 388 ng/mL   C-reactive protein   Result Value Ref Range     3 (H) <3 0 mg/L   D-dimer, quantitative   Result Value Ref Range    D-Dimer, Quant 2 08 (H) <0 50 ug/ml FEU   Procalcitonin   Result Value Ref Range    Procalcitonin 3 28 (H) <=0 25 ng/ml   CKMB   Result Value Ref Range    CK-MB Index 2 1 0 0 - 2 5 %    CK-MB 15 3 (H) 0 0 - 5 0 ng/mL   Procalcitonin Reflex   Result Value Ref Range    Procalcitonin 1 46 (H) <=0 25 ng/ml   Basic metabolic panel   Result Value Ref Range    Sodium 139 136 - 145 mmol/L    Potassium 4 7 3 5 - 5 3 mmol/L    Chloride 108 100 - 108 mmol/L    CO2 15 (L) 21 - 32 mmol/L    ANION GAP 16 (H) 4 - 13 mmol/L    BUN 72 (H) 5 - 25 mg/dL    Creatinine 1 78 (H) 0 60 - 1 30 mg/dL    Glucose 235 (H) 65 - 140 mg/dL    Calcium 8 5 8 3 - 10 1 mg/dL    eGFR 32 ml/min/1 73sq m   CBC   Result Value Ref Range    WBC 4 93 4 31 - 10 16 Thousand/uL    RBC 3 24 (L) 3 81 - 5 12 Million/uL    Hemoglobin 10 1 (L) 11 5 - 15 4 g/dL    Hematocrit 31 4 (L) 34 8 - 46 1 %    MCV 97 82 - 98 fL    MCH 31 2 26 8 - 34 3 pg    MCHC 32 2 31 4 - 37 4 g/dL    RDW 15 9 (H) 11 6 - 15 1 %    Platelets 059 (L) 225 - 390 Thousands/uL    MPV 12 4 8 9 - 12 7 fL   C-reactive protein   Result Value Ref Range     4 (H) <3 0 mg/L   Ferritin   Result Value Ref Range    Ferritin 5,407 (H) 8 - 388 ng/mL   D-dimer, quantitative   Result Value Ref Range    D-Dimer, Quant 1 70 (H) <0 50 ug/ml FEU   CBC and differential   Result Value Ref Range    WBC 7 84 4 31 - 10 16 Thousand/uL    RBC 3 29 (L) 3 81 - 5 12 Million/uL    Hemoglobin 10 7 (L) 11 5 - 15 4 g/dL    Hematocrit 32 1 (L) 34 8 - 46 1 %    MCV 98 82 - 98 fL    MCH 32 5 26 8 - 34 3 pg    MCHC 33 3 31 4 - 37 4 g/dL    RDW 15 9 (H) 11 6 - 15 1 %    MPV 12 3 8 9 - 12 7 fL    Platelets 993 652 - 725 Thousands/uL    nRBC 0 /100 WBCs   Magnesium   Result Value Ref Range    Magnesium 2 1 1 6 - 2 6 mg/dL   Comprehensive metabolic panel   Result Value Ref Range    Sodium 135 (L) 136 - 145 mmol/L    Potassium 4 9 3 5 - 5 3 mmol/L    Chloride 106 100 - 108 mmol/L    CO2 16 (L) 21 - 32 mmol/L    ANION GAP 13 4 - 13 mmol/L    BUN 55 (H) 5 - 25 mg/dL    Creatinine 1 56 (H) 0 60 - 1 30 mg/dL    Glucose 202 (H) 65 - 140 mg/dL    Calcium 8 6 8 3 - 10 1 mg/dL    Corrected Calcium 9 7 8 3 - 10 1 mg/dL    AST 59 (H) 5 - 45 U/L    ALT 35 12 - 78 U/L    Alkaline Phosphatase 86 46 - 116 U/L    Total Protein 7 8 6 4 - 8 2 g/dL    Albumin 2 6 (L) 3 5 - 5 0 g/dL    Total Bilirubin 0 40 0 20 - 1 00 mg/dL    eGFR 37 ml/min/1 73sq m   Ferritin   Result Value Ref Range    Ferritin 3,048 (H) 8 - 388 ng/mL   D-dimer, quantitative   Result Value Ref Range    D-Dimer, Quant 2 11 (H) <0 50 ug/ml FEU   C-reactive protein   Result Value Ref Range    CRP 64 8 (H) <3 0 mg/L   ECG 12 lead   Result Value Ref Range    Ventricular Rate 136 BPM    Atrial Rate 129 BPM    SD Interval  ms    QRSD Interval 100 ms    QT Interval 350 ms    QTC Interval 526 ms    P Axis  degrees    QRS Axis -4 degrees    T Wave Axis 90 degrees   ECG 12 lead   Result Value Ref Range    Ventricular Rate 118 BPM    Atrial Rate 98 BPM    SD Interval  ms    QRSD Interval 102 ms    QT Interval 352 ms    QTC Interval 493 ms    P Axis  degrees    QRS Axis 49 degrees    T Wave Axis -3 degrees   Fingerstick Glucose (POCT)   Result Value Ref Range    POC Glucose 139 65 - 140 mg/dl   Fingerstick Glucose (POCT)   Result Value Ref Range POC Glucose 149 (H) 65 - 140 mg/dl   Fingerstick Glucose (POCT)   Result Value Ref Range    POC Glucose 145 (H) 65 - 140 mg/dl   Fingerstick Glucose (POCT)   Result Value Ref Range    POC Glucose 136 65 - 140 mg/dl   Fingerstick Glucose (POCT)   Result Value Ref Range    POC Glucose 155 (H) 65 - 140 mg/dl   Fingerstick Glucose (POCT)   Result Value Ref Range    POC Glucose 150 (H) 65 - 140 mg/dl   Fingerstick Glucose (POCT)   Result Value Ref Range    POC Glucose 192 (H) 65 - 140 mg/dl   Fingerstick Glucose (POCT)   Result Value Ref Range    POC Glucose 184 (H) 65 - 140 mg/dl   Fingerstick Glucose (POCT)   Result Value Ref Range    POC Glucose 197 (H) 65 - 140 mg/dl   Fingerstick Glucose (POCT)   Result Value Ref Range    POC Glucose 183 (H) 65 - 140 mg/dl   Fingerstick Glucose (POCT)   Result Value Ref Range    POC Glucose 202 (H) 65 - 140 mg/dl   Fingerstick Glucose (POCT)   Result Value Ref Range    POC Glucose 143 (H) 65 - 140 mg/dl   Fingerstick Glucose (POCT)   Result Value Ref Range    POC Glucose 138 65 - 140 mg/dl   Fingerstick Glucose (POCT)   Result Value Ref Range    POC Glucose 202 (H) 65 - 140 mg/dl   Fingerstick Glucose (POCT)   Result Value Ref Range    POC Glucose 194 (H) 65 - 140 mg/dl   Fingerstick Glucose (POCT)   Result Value Ref Range    POC Glucose 128 65 - 140 mg/dl   Fingerstick Glucose (POCT)   Result Value Ref Range    POC Glucose 225 (H) 65 - 140 mg/dl   Manual Differential(PHLEBS Do Not Order)   Result Value Ref Range    Segmented % 84 (H) 43 - 75 %    Bands % 1 0 - 8 %    Lymphocytes % 14 14 - 44 %    Monocytes % 1 (L) 4 - 12 %    Eosinophils, % 0 0 - 6 %    Basophils % 0 0 - 1 %    Absolute Neutrophils 3 07 1 85 - 7 62 Thousand/uL    Lymphocytes Absolute 0 51 (L) 0 60 - 4 47 Thousand/uL    Monocytes Absolute 0 04 0 00 - 1 22 Thousand/uL    Eosinophils Absolute 0 00 0 00 - 0 40 Thousand/uL    Basophils Absolute 0 00 0 00 - 0 10 Thousand/uL    Total Counted 100     Марина Cells Present Platelet Estimate Decreased (A) Adequate   Manual Differential(PHLEBS Do Not Order)   Result Value Ref Range    Segmented % 86 (H) 43 - 75 %    Lymphocytes % 11 (L) 14 - 44 %    Monocytes % 3 (L) 4 - 12 %    Eosinophils, % 0 0 - 6 %    Basophils % 0 0 - 1 %    Absolute Neutrophils 4 43 1 85 - 7 62 Thousand/uL    Lymphocytes Absolute 0 57 (L) 0 60 - 4 47 Thousand/uL    Monocytes Absolute 0 15 0 00 - 1 22 Thousand/uL    Eosinophils Absolute 0 00 0 00 - 0 40 Thousand/uL    Basophils Absolute 0 00 0 00 - 0 10 Thousand/uL    Total Counted 100     Anisocytosis Present     Platelet Estimate Borderline (A) Adequate   Manual Differential(PHLEBS Do Not Order)   Result Value Ref Range    Segmented % 82 (H) 43 - 75 %    Bands % 2 0 - 8 %    Lymphocytes % 13 (L) 14 - 44 %    Monocytes % 3 (L) 4 - 12 %    Eosinophils, % 0 0 - 6 %    Basophils % 0 0 - 1 %    Absolute Neutrophils 6 59 1 85 - 7 62 Thousand/uL    Lymphocytes Absolute 1 02 0 60 - 4 47 Thousand/uL    Monocytes Absolute 0 24 0 00 - 1 22 Thousand/uL    Eosinophils Absolute 0 00 0 00 - 0 40 Thousand/uL    Basophils Absolute 0 00 0 00 - 0 10 Thousand/uL    Total Counted 100     Anisocytosis Present     Platelet Estimate Adequate Adequate         ASSESSMENT and PLAN:  Lorena Arreola was seen today for chronic kidney disease and consult      Diagnoses and all orders for this visit:    Anemia due to stage 3b chronic kidney disease  -     Ferritin; Standing  -     Iron Saturation %; Standing  -     Ferritin  -     Iron Saturation %    Stage 3 chronic kidney disease, unspecified whether stage 3a or 3b CKD  -     Ambulatory referral to Nephrology  -     Albumin; Standing  -     Calcium; Standing  -     CBC and differential; Standing  -     Comprehensive metabolic panel; Standing  -     Ferritin; Standing  -     Iron Saturation %; Standing  -     Phosphorus; Standing  -     Protein / creatinine ratio, urine; Standing  -     PTH, intact; Standing  -     Urinalysis with microscopic; Standing  -     Vitamin D 25 hydroxy; Standing  -     Albumin  -     Calcium  -     CBC and differential  -     Comprehensive metabolic panel  -     Ferritin  -     Iron Saturation %  -     Phosphorus  -     Protein / creatinine ratio, urine  -     PTH, intact  -     Urinalysis with microscopic  -     Vitamin D 25 hydroxy      1) Chronic Kidney Disease stage IIIb: Etiology likely renovascular disease, prior h/o nephrectomy, hypertensive nephrosclerosis, diabetic glomerulosclerosis as well as renovascular disease  Recent lab on March 5th 2021 revealed S Cr of 1 5 and at baseline  2) Hypertension due to CKD: Target BP < 781 mm Hg systolic  On Hydralazine, lisinopril and Imdur  3) CKD- Mineral bone disorder: Will order PTHi, Vitamin D, Calcium and phosphorus levels  4) Anemia due to CKD: Hb on target > 10  Target Hb is 9 5-11 g/dl  Recommended avoiding iron supplements given recent ferritin was > 3000    5) volume status: Euvolemic on torsemide  Recommended 1 5 L fluid intake  6) Breast cancer: s/p chemotherapy and in remission    7) Pulmonary Hypertension: stage II and managed by Cardiologist      I have spent 65 minutes with Patient and family today in which greater than 50% of this time was spent in counseling/coordination of care regarding Risks and benefits of tx options, Intructions for management, Patient and family education, Importance of tx compliance and Risk factor reductions  Alvin Mackay

## 2021-04-07 NOTE — LETTER
April 7, 2021     Lesley Lord MD  [address] [facility] [address]    Patient: Tiffanie Muñoz   YOB: 1945   Date of Visit: 4/7/2021       Dear Dr Claudette Bloomer: Thank you for referring Tiffanie Muñoz to me for evaluation  Below are my notes for this consultation  If you have questions, please do not hesitate to call me  I look forward to following your patient along with you  Sincerely,        Tomeka Salazar MD        CC: No Recipients  Tomeka Salazar MD  4/7/2021  2:15 PM  Incomplete  NEPHROLOGY OFFICE CONSULT  Tiffanie Muñoz 77 y o  female MRN: [id number]    Encounter: [id number] DATE: 4/7/2021    REASON FOR VISIT: Tiffanie Muñoz is a 77 y  o female who was referred by [facility] for evaluation  Alvin Mackay HPI:    77 y o  F with PMH of xanthogranulomatous pyelonephritis s/p nephrectomy in 2018, CKD III, CHF with diastolic dysfunction, aortic stenosis, pulmonary hypertension stage II, anemia, COVID 19 infection, breast cancer s/p chemotherapy > 25 years ago who presents to Renal clinic for management of CKD  Patient noted to have elevated creatinine as early as 2016  In the year 2017, serum creatinine alpa to > 3  She underwent nephrectomy in 2018 due to diagnosis listed above  Her baseline creatinine has ranged between 1 5-2 0 since then with the most recent episode of MANJEET occurring in 2021 during COVID infection  Patient takes diuretics for CHF and denies any c/o chest pain, shortness of breath or edema  Noted to have aortic stenosis and managed by Cardiologist          PAST MEDICAL HISTORY:  Past Medical History:   Diagnosis Date    MANJEET (acute kidney injury) ([address] ) 7/14/2017    In a solitary kidney with component of cardiorenal syndrome given that patient has history of CHF    Function unchanged, gentle bolus of fluid, appreciate Nephrology input    Aortic valve stenosis     Cancer ([address] )     Cardiac disease     Cardiomyopathy ([address] )     CHF (congestive heart failure) (HCC)     Chronic kidney disease     Chronic systolic heart failure (HCC)     Coronary artery disease     Diabetes mellitus (HCC)     Diastolic dysfunction     Elevated troponin 2/10/2021    Hypertension     Mitral regurgitation     Pulmonary HTN [facility]     Renal disorder     [facility] 2/10/2021    Stroke ([facility] )     Tricuspid regurgitation     UTI (urinary tract infection) 2017    Present on admission pansensitive E coli, switch from Rocephin to cefuroxime       PAST SURGICAL HISTORY:  Past Surgical History:   Procedure Laterality Date    CARDIAC SURGERY      CORONARY ANGIOPLASTY WITH STENT PLACEMENT      KIDNEY SURGERY      MASTECTOMY         SOCIAL HISTORY:  Social History     Substance and Sexual Activity   Alcohol Use Not Currently    Frequency: Never     Social History     Substance and Sexual Activity   Drug Use No     Social History     Tobacco Use   Smoking Status Former Smoker    Packs/day: 0 50    Years: 45 00    Pack years: 22 50    Quit date: 2009    Years since quittin 2   Smokeless Tobacco Never Used       FAMILY HISTORY:  Family History   Family history unknown:  Yes       ALLERGY:  Allergies   Allergen Reactions    Penicillins Rash       MEDICATIONS:    Current Outpatient Medications:     ascorbic acid (VITAMIN C) 1000 MG tablet, Take 1 tablet (1,000 mg total) by mouth every 12 (twelve) hours for 2 days, Disp: 6 tablet, Rfl: 0    aspirin (ECOTRIN LOW STRENGTH) 81 mg EC tablet, Take 1 tablet (81 mg total) by mouth daily, Disp: 30 tablet, Rfl: 0    atorvastatin (LIPITOR) 40 mg tablet, Take 40 mg by mouth daily, Disp: , Rfl:     cholecalciferol (VITAMIN D3) 1,000 units tablet, Take 2 tablets (2,000 Units total) by mouth daily for 2 days, Disp: 4 tablet, Rfl: 0    Eliquis 2 5 MG, TAKE 1 TABLET BY MOUTH TWICE A DAY, Disp: 180 tablet, Rfl: 3    hydrALAZINE (APRESOLINE) 25 mg tablet, Take 1 tablet (25 mg total) by mouth every 8 (eight) hours, Disp: 90 tablet, Rfl: 3   isosorbide mononitrate (IMDUR) 30 mg 24 hr tablet, TAKE 2 TABLETS BY MOUTH DAILY (Patient taking differently: Take 10 mg by mouth daily ), Disp: 180 tablet, Rfl: 3    levothyroxine 50 mcg tablet, Take 50 mcg by mouth daily, Disp: , Rfl:     lisinopril (ZESTRIL) 2 5 mg tablet, Take 1 tablet (2 5 mg total) by mouth daily, Disp: 90 tablet, Rfl: 3    metoprolol tartrate (LOPRESSOR) 50 mg tablet, Take 1 tablet (50 mg total) by mouth every 12 (twelve) hours, Disp: 60 tablet, Rfl: 0    Multiple Vitamin (MULTIVITAMIN) tablet, Take 1 tablet by mouth daily, Disp: , Rfl:     nitroglycerin (NITROSTAT) 0 4 mg SL tablet, PLACE 1 TABLET SUBLINGUALLY EVERY 5 MINUTES X3 DOSES AS NEEDED FOR CHEST PAIN, Disp: , Rfl:     Omega-3 Fatty Acids (FISH OIL) 1,000 mg, Take 1,000 mg by mouth 2 (two) times a day, Disp: , Rfl:     oxybutynin (DITROPAN-XL) 10 MG 24 hr tablet, Take 1 tablet by mouth daily at bedtime, Disp: , Rfl: 0    sertraline (ZOLOFT) 50 mg tablet, Take 50 mg by mouth daily, Disp: , Rfl:     torsemide (DEMADEX) 20 mg tablet, Take 1 tablet (20 mg total) by mouth 2 (two) times a day, Disp: 180 tablet, Rfl: 3    zinc gluconate 50 mg tablet, Take 50 mg by mouth daily, Disp: , Rfl:     REVIEW OF SYSTEMS:    Review of Systems   Constitutional: Positive for fatigue  HENT: Negative  Eyes: Negative  Respiratory: Positive for shortness of breath  Cardiovascular: Negative  Gastrointestinal: Negative  Endocrine: Negative  Genitourinary: Negative  Musculoskeletal: Negative  Skin: Negative  Allergic/Immunologic: Negative  Neurological: Negative  Hematological: Negative  All other systems reviewed and are negative        PHYSICAL EXAM:  Vitals:    04/07/21 1328   BP: 124/68   BP Location: Left arm   Patient Position: Sitting   Cuff Size: Large   Pulse: (!) 54   Resp: 16   Temp: (!) 95 6 °F (35 3 °C)   TempSrc: Temporal   Weight: 76 3 kg (168 lb 3 2 oz)   Height: 5' 4 5" (1 638 m)     Body mass index is 28 43 kg/m²  Physical Exam  Constitutional:       Appearance: She is well-developed  HENT:      Head: Normocephalic and atraumatic  Eyes:      Pupils: Pupils are equal, round, and reactive to light  Neck:      Musculoskeletal: Neck supple  Cardiovascular:      Rate and Rhythm: Normal rate and regular rhythm  Heart sounds: Murmur present  Pulmonary:      Effort: Pulmonary effort is normal    Abdominal:      General: Bowel sounds are normal       Palpations: Abdomen is soft  Musculoskeletal: Normal range of motion  Skin:     General: Skin is warm  Neurological:      Mental Status: She is alert and oriented to person, place, and time  LAB RESULTS:  Results for orders placed or performed during the hospital encounter of 02/10/21   Blood culture #1    Specimen: Hand, Left; Blood   Result Value Ref Range    Blood Culture No Growth After 5 Days  Blood culture #2    Specimen: Arm, Left; Blood   Result Value Ref Range    Blood Culture No Growth After 5 Days      COVID19, Influenza A/B, RSV PCR, [facility]    Specimen: Nasopharyngeal Swab; Nares   Result Value Ref Range    SARS-CoV-2 Positive (A) Negative    INFLUENZA A PCR Negative Negative    INFLUENZA B PCR Negative Negative    RSV PCR Negative Negative   Urine culture    Specimen: Urine, Indwelling Brumfield Catheter   Result Value Ref Range    Urine Culture No Growth <1000 cfu/mL    Comprehensive metabolic panel   Result Value Ref Range    Sodium 134 (L) 136 - 145 mmol/L    Potassium 4 5 3 5 - 5 3 mmol/L    Chloride 99 (L) 100 - 108 mmol/L    CO2 19 (L) 21 - 32 mmol/L    ANION GAP 16 (H) 4 - 13 mmol/L    BUN 84 (H) 5 - 25 mg/dL    Creatinine 3 39 (H) 0 60 - 1 30 mg/dL    Glucose 151 (H) 65 - 140 mg/dL    Calcium 8 8 8 3 - 10 1 mg/dL    Corrected Calcium 9 7 8 3 - 10 1 mg/dL    AST 85 (H) 5 - 45 U/L    ALT 30 12 - 78 U/L    Alkaline Phosphatase 81 46 - 116 U/L    Total Protein 8 5 (H) 6 4 - 8 2 g/dL    Albumin 2 9 (L) 3 5 - 5 0 g/dL Total Bilirubin 0 60 0 20 - 1 00 mg/dL    eGFR 15 ml/min/1 73sq m   CBC and differential   Result Value Ref Range    WBC 5 99 4 31 - 10 16 Thousand/uL    RBC 3 98 3 81 - 5 12 Million/uL    Hemoglobin 12 0 11 5 - 15 4 g/dL    Hematocrit 37 8 34 8 - 46 1 %    MCV 95 82 - 98 fL    MCH 30 2 26 8 - 34 3 pg    MCHC 31 7 31 4 - 37 4 g/dL    RDW 15 6 (H) 11 6 - 15 1 %    MPV 13 8 (H) 8 9 - 12 7 fL    Platelets 019 (L) 049 - 390 Thousands/uL    nRBC 0 /100 WBCs    Neutrophils Relative 86 (H) 43 - 75 %    Immat GRANS % 1 0 - 2 %    Lymphocytes Relative 9 (L) 14 - 44 %    Monocytes Relative 4 4 - 12 %    Eosinophils Relative 0 0 - 6 %    Basophils Relative 0 0 - 1 %    Neutrophils Absolute 5 20 1 85 - 7 62 Thousands/µL    Immature Grans Absolute 0 03 0 00 - 0 20 Thousand/uL    Lymphocytes Absolute 0 54 (L) 0 60 - 4 47 Thousands/µL    Monocytes Absolute 0 21 0 17 - 1 22 Thousand/µL    Eosinophils Absolute 0 00 0 00 - 0 61 Thousand/µL    Basophils Absolute 0 01 0 00 - 0 10 Thousands/µL   Troponin I   Result Value Ref Range    Troponin I 0 57 (H) <=0 04 ng/mL   NT-BNP PRO   Result Value Ref Range    NT-proBNP 15,152 (H) <125 pg/mL   UA w Reflex to Microscopic w Reflex to Culture    Specimen: Urine, Indwelling Brumfield Catheter   Result Value Ref Range    Color, UA Yellow     Clarity, UA Slightly Cloudy     Specific [place], UA 1 015 1 003 - 1 030    pH, UA 6 0 4 5, 5 0, 5 5, 6 0, 6 5, 7 0, 7 5, 8 0    Leukocytes, UA Large (A) Negative    Nitrite, UA Negative Negative    Protein, UA Negative Negative mg/dl    Glucose, UA Negative Negative mg/dl    Ketones, UA Negative Negative mg/dl    Urobilinogen, UA 0 2 0 2, 1 0 E U /dl E U /dl    Bilirubin, UA Negative Negative    Blood, UA Moderate (A) Negative   Protime-INR   Result Value Ref Range    Protime 16 6 (H) 11 6 - 14 5 seconds    INR 1 41 (H) 0 84 - 1 19   APTT   Result Value Ref Range    PTT 41 (H) 23 - 37 seconds   Lactic acid   Result Value Ref Range    LACTIC ACID 2 4 (HH) 0 5 - 2 0 mmol/L   Procalcitonin with AM Reflex   Result Value Ref Range    Procalcitonin 7 52 (H) <=0 25 ng/ml   C-reactive protein   Result Value Ref Range     1 (H) <3 0 mg/L   D-dimer, quantitative   Result Value Ref Range    D-Dimer, Quant 3 21 (H) <0 50 ug/ml FEU   CK (with reflex to MB)   Result Value Ref Range    Total  (H) 26 - 192 U/L   Chronic Hepatitis Panel   Result Value Ref Range    Hepatitis B Surface Ag Non-reactive Non-reactive, NonReactive - Confirmed    Hepatitis C Ab Non-reactive Non-reactive    Hep B C IgM Non-reactive Non-reactive    Hep B Core Total Ab Non-reactive Non-reactive   Rapid HIV 1/2 AB-AG Combo   Result Value Ref Range    Rapid HIV 1 AND 2 Non-Reactive Non-Reactive    HIV-1 P24 Ag Screen Non-Reactive Non-Reactive   APTT six (6) hours after Heparin bolus/drip initiation or dosing change   Result Value Ref Range    PTT 34 23 - 37 seconds   CBC   Result Value Ref Range    WBC 5 19 4 31 - 10 16 Thousand/uL    RBC 3 79 (L) 3 81 - 5 12 Million/uL    Hemoglobin 11 3 (L) 11 5 - 15 4 g/dL    Hematocrit 36 1 34 8 - 46 1 %    MCV 95 82 - 98 fL    MCH 29 8 26 8 - 34 3 pg    MCHC 31 3 (L) 31 4 - 37 4 g/dL    RDW 15 8 (H) 11 6 - 15 1 %    Platelets 94 (L) 621 - 390 Thousands/uL    MPV 13 6 (H) 8 9 - 12 7 fL   Protime-INR   Result Value Ref Range    Protime 16 6 (H) 11 6 - 14 5 seconds    INR 1 42 (H) 0 84 - 1 19   Lactic acid, plasma   Result Value Ref Range    LACTIC ACID 1 4 0 5 - 2 0 mmol/L   Troponin I   Result Value Ref Range    Troponin I 0 50 (H) <=0 04 ng/mL   Troponin I   Result Value Ref Range    Troponin I 0 58 (H) <=0 04 ng/mL   CKMB   Result Value Ref Range    CK-MB Index 1 8 0 0 - 2 5 %    CK-MB 16 4 (H) 0 0 - 5 0 ng/mL   Ferritin   Result Value Ref Range    Ferritin 7,847 (H) 8 - 388 ng/mL   Urine Microscopic   Result Value Ref Range    RBC, UA 4-10 (A) None Seen, 0-1, 1-2, 2-4, 0-5 /hpf    WBC, UA Innumerable (A) None Seen, 0-1, 1-2, 0-5, 2-4 /hpf    Epithelial Cells Occasional None Seen, Occasional /hpf    Bacteria, UA Moderate (A) None Seen, Occasional /hpf   Troponin I   Result Value Ref Range    Troponin I 0 62 (H) <=0 04 ng/mL   Procalcitonin Reflex   Result Value Ref Range    Procalcitonin 6 21 (H) <=0 25 ng/ml   Ferritin   Result Value Ref Range    Ferritin 7,119 (H) 8 - 388 ng/mL   CBC and differential   Result Value Ref Range    WBC 3 61 (L) 4 31 - 10 16 Thousand/uL    RBC 3 41 (L) 3 81 - 5 12 Million/uL    Hemoglobin 10 5 (L) 11 5 - 15 4 g/dL    Hematocrit 32 7 (L) 34 8 - 46 1 %    MCV 96 82 - 98 fL    MCH 30 8 26 8 - 34 3 pg    MCHC 32 1 31 4 - 37 4 g/dL    RDW 15 8 (H) 11 6 - 15 1 %    MPV 13 0 (H) 8 9 - 12 7 fL    Platelets 96 (L) 116 - 390 Thousands/uL    nRBC 0 /100 WBCs   Comprehensive metabolic panel   Result Value Ref Range    Sodium 140 136 - 145 mmol/L    Potassium 4 5 3 5 - 5 3 mmol/L    Chloride 106 100 - 108 mmol/L    CO2 16 (L) 21 - 32 mmol/L    ANION GAP 18 (H) 4 - 13 mmol/L    BUN 79 (H) 5 - 25 mg/dL    Creatinine 2 65 (H) 0 60 - 1 30 mg/dL    Glucose 164 (H) 65 - 140 mg/dL    Calcium 8 3 8 3 - 10 1 mg/dL    Corrected Calcium 9 6 8 3 - 10 1 mg/dL    AST 92 (H) 5 - 45 U/L    ALT 28 12 - 78 U/L    Alkaline Phosphatase 68 46 - 116 U/L    Total Protein 7 3 6 4 - 8 2 g/dL    Albumin 2 4 (L) 3 5 - 5 0 g/dL    Total Bilirubin 0 30 0 20 - 1 00 mg/dL    eGFR 20 ml/min/1 73sq m   C-reactive protein   Result Value Ref Range     8 (H) <3 0 mg/L   D-dimer, quantitative   Result Value Ref Range    D-Dimer, Quant 2 43 (H) <0 50 ug/ml FEU   Troponin I   Result Value Ref Range    Troponin I 0 54 (H) <=0 04 ng/mL   Troponin I   Result Value Ref Range    Troponin I 0 45 (H) <=0 04 ng/mL   APTT   Result Value Ref Range    PTT >210 (HH) 23 - 37 seconds   Microalbumin / creatinine urine ratio   Result Value Ref Range    Creatinine, Ur 86 7 mg/dL    Microalbum  ,U,Random 130 0 (H) 0 0 - 20 0 mg/L    Microalb Creat Ratio 150 (H) 0 - 30 mg/g creatinine   Sodium, urine, random   Result Value Ref Range    Sodium, Ur 27    Urea nitrogen, urine   Result Value Ref Range    Urea Nitrogen, Ur 663 mg/dL   CK   Result Value Ref Range    Total  (H) 26 - 192 U/L   CBC and differential   Result Value Ref Range    WBC 5 15 4 31 - 10 16 Thousand/uL    RBC 3 37 (L) 3 81 - 5 12 Million/uL    Hemoglobin 10 2 (L) 11 5 - 15 4 g/dL    Hematocrit 32 6 (L) 34 8 - 46 1 %    MCV 97 82 - 98 fL    MCH 30 3 26 8 - 34 3 pg    MCHC 31 3 (L) 31 4 - 37 4 g/dL    RDW 15 8 (H) 11 6 - 15 1 %    MPV 12 6 8 9 - 12 7 fL    Platelets 832 (L) 169 - 390 Thousands/uL    nRBC 0 /100 WBCs   Basic metabolic panel   Result Value Ref Range    Sodium 141 136 - 145 mmol/L    Potassium 4 2 3 5 - 5 3 mmol/L    Chloride 108 100 - 108 mmol/L    CO2 17 (L) 21 - 32 mmol/L    ANION GAP 16 (H) 4 - 13 mmol/L    BUN 83 (H) 5 - 25 mg/dL    Creatinine 2 07 (H) 0 60 - 1 30 mg/dL    Glucose 197 (H) 65 - 140 mg/dL    Calcium 8 3 8 3 - 10 1 mg/dL    eGFR 27 ml/min/1 73sq m   Ferritin   Result Value Ref Range    Ferritin 7,352 (H) 8 - 388 ng/mL   C-reactive protein   Result Value Ref Range     3 (H) <3 0 mg/L   D-dimer, quantitative   Result Value Ref Range    D-Dimer, Quant 2 08 (H) <0 50 ug/ml FEU   Procalcitonin   Result Value Ref Range    Procalcitonin 3 28 (H) <=0 25 ng/ml   CKMB   Result Value Ref Range    CK-MB Index 2 1 0 0 - 2 5 %    CK-MB 15 3 (H) 0 0 - 5 0 ng/mL   Procalcitonin Reflex   Result Value Ref Range    Procalcitonin 1 46 (H) <=0 25 ng/ml   Basic metabolic panel   Result Value Ref Range    Sodium 139 136 - 145 mmol/L    Potassium 4 7 3 5 - 5 3 mmol/L    Chloride 108 100 - 108 mmol/L    CO2 15 (L) 21 - 32 mmol/L    ANION GAP 16 (H) 4 - 13 mmol/L    BUN 72 (H) 5 - 25 mg/dL    Creatinine 1 78 (H) 0 60 - 1 30 mg/dL    Glucose 235 (H) 65 - 140 mg/dL    Calcium 8 5 8 3 - 10 1 mg/dL    eGFR 32 ml/min/1 73sq m   CBC   Result Value Ref Range    WBC 4 93 4 31 - 10 16 Thousand/uL    RBC 3 24 (L) 3 81 - 5 12 Million/uL Hemoglobin 10 1 (L) 11 5 - 15 4 g/dL    Hematocrit 31 4 (L) 34 8 - 46 1 %    MCV 97 82 - 98 fL    MCH 31 2 26 8 - 34 3 pg    MCHC 32 2 31 4 - 37 4 g/dL    RDW 15 9 (H) 11 6 - 15 1 %    Platelets 312 (L) 810 - 390 Thousands/uL    MPV 12 4 8 9 - 12 7 fL   C-reactive protein   Result Value Ref Range     4 (H) <3 0 mg/L   Ferritin   Result Value Ref Range    Ferritin 5,407 (H) 8 - 388 ng/mL   D-dimer, quantitative   Result Value Ref Range    D-Dimer, Quant 1 70 (H) <0 50 ug/ml FEU   CBC and differential   Result Value Ref Range    WBC 7 84 4 31 - 10 16 Thousand/uL    RBC 3 29 (L) 3 81 - 5 12 Million/uL    Hemoglobin 10 7 (L) 11 5 - 15 4 g/dL    Hematocrit 32 1 (L) 34 8 - 46 1 %    MCV 98 82 - 98 fL    MCH 32 5 26 8 - 34 3 pg    MCHC 33 3 31 4 - 37 4 g/dL    RDW 15 9 (H) 11 6 - 15 1 %    MPV 12 3 8 9 - 12 7 fL    Platelets 265 722 - 586 Thousands/uL    nRBC 0 /100 WBCs   Magnesium   Result Value Ref Range    Magnesium 2 1 1 6 - 2 6 mg/dL   Comprehensive metabolic panel   Result Value Ref Range    Sodium 135 (L) 136 - 145 mmol/L    Potassium 4 9 3 5 - 5 3 mmol/L    Chloride 106 100 - 108 mmol/L    CO2 16 (L) 21 - 32 mmol/L    ANION GAP 13 4 - 13 mmol/L    BUN 55 (H) 5 - 25 mg/dL    Creatinine 1 56 (H) 0 60 - 1 30 mg/dL    Glucose 202 (H) 65 - 140 mg/dL    Calcium 8 6 8 3 - 10 1 mg/dL    Corrected Calcium 9 7 8 3 - 10 1 mg/dL    AST 59 (H) 5 - 45 U/L    ALT 35 12 - 78 U/L    Alkaline Phosphatase 86 46 - 116 U/L    Total Protein 7 8 6 4 - 8 2 g/dL    Albumin 2 6 (L) 3 5 - 5 0 g/dL    Total Bilirubin 0 40 0 20 - 1 00 mg/dL    eGFR 37 ml/min/1 73sq m   Ferritin   Result Value Ref Range    Ferritin 3,048 (H) 8 - 388 ng/mL   D-dimer, quantitative   Result Value Ref Range    D-Dimer, Quant 2 11 (H) <0 50 ug/ml FEU   C-reactive protein   Result Value Ref Range    CRP 64 8 (H) <3 0 mg/L   ECG 12 lead   Result Value Ref Range    Ventricular Rate 136 BPM    Atrial Rate 129 BPM    CA Interval  ms    QRSD Interval 100 ms    QT Interval 350 ms    QTC Interval 526 ms    P Axis  degrees    QRS Axis -4 degrees    T Wave Axis 90 degrees   ECG 12 lead   Result Value Ref Range    Ventricular Rate 118 BPM    Atrial Rate 98 BPM    ID Interval  ms    QRSD Interval 102 ms    QT Interval 352 ms    QTC Interval 493 ms    P Axis  degrees    QRS Axis 49 degrees    T Wave Axis -3 degrees   Fingerstick Glucose (POCT)   Result Value Ref Range    POC Glucose 139 65 - 140 mg/dl   Fingerstick Glucose (POCT)   Result Value Ref Range    POC Glucose 149 (H) 65 - 140 mg/dl   Fingerstick Glucose (POCT)   Result Value Ref Range    POC Glucose 145 (H) 65 - 140 mg/dl   Fingerstick Glucose (POCT)   Result Value Ref Range    POC Glucose 136 65 - 140 mg/dl   Fingerstick Glucose (POCT)   Result Value Ref Range    POC Glucose 155 (H) 65 - 140 mg/dl   Fingerstick Glucose (POCT)   Result Value Ref Range    POC Glucose 150 (H) 65 - 140 mg/dl   Fingerstick Glucose (POCT)   Result Value Ref Range    POC Glucose 192 (H) 65 - 140 mg/dl   Fingerstick Glucose (POCT)   Result Value Ref Range    POC Glucose 184 (H) 65 - 140 mg/dl   Fingerstick Glucose (POCT)   Result Value Ref Range    POC Glucose 197 (H) 65 - 140 mg/dl   Fingerstick Glucose (POCT)   Result Value Ref Range    POC Glucose 183 (H) 65 - 140 mg/dl   Fingerstick Glucose (POCT)   Result Value Ref Range    POC Glucose 202 (H) 65 - 140 mg/dl   Fingerstick Glucose (POCT)   Result Value Ref Range    POC Glucose 143 (H) 65 - 140 mg/dl   Fingerstick Glucose (POCT)   Result Value Ref Range    POC Glucose 138 65 - 140 mg/dl   Fingerstick Glucose (POCT)   Result Value Ref Range    POC Glucose 202 (H) 65 - 140 mg/dl   Fingerstick Glucose (POCT)   Result Value Ref Range    POC Glucose 194 (H) 65 - 140 mg/dl   Fingerstick Glucose (POCT)   Result Value Ref Range    POC Glucose 128 65 - 140 mg/dl   Fingerstick Glucose (POCT)   Result Value Ref Range    POC Glucose 225 (H) 65 - 140 mg/dl   Manual Differential(PHLEBS Do Not Order) Result Value Ref Range    Segmented % 84 (H) 43 - 75 %    Bands % 1 0 - 8 %    Lymphocytes % 14 14 - 44 %    Monocytes % 1 (L) 4 - 12 %    Eosinophils, % 0 0 - 6 %    Basophils % 0 0 - 1 %    Absolute Neutrophils 3 07 1 85 - 7 62 Thousand/uL    Lymphocytes Absolute 0 51 (L) 0 60 - 4 47 Thousand/uL    Monocytes Absolute 0 04 0 00 - 1 22 Thousand/uL    Eosinophils Absolute 0 00 0 00 - 0 40 Thousand/uL    Basophils Absolute 0 00 0 00 - 0 10 Thousand/uL    Total Counted 100     Марина Cells Present     Platelet Estimate Decreased (A) Adequate   Manual Differential(PHLEBS Do Not Order)   Result Value Ref Range    Segmented % 86 (H) 43 - 75 %    Lymphocytes % 11 (L) 14 - 44 %    Monocytes % 3 (L) 4 - 12 %    Eosinophils, % 0 0 - 6 %    Basophils % 0 0 - 1 %    Absolute Neutrophils 4 43 1 85 - 7 62 Thousand/uL    Lymphocytes Absolute 0 57 (L) 0 60 - 4 47 Thousand/uL    Monocytes Absolute 0 15 0 00 - 1 22 Thousand/uL    Eosinophils Absolute 0 00 0 00 - 0 40 Thousand/uL    Basophils Absolute 0 00 0 00 - 0 10 Thousand/uL    Total Counted 100     Anisocytosis Present     Platelet Estimate Borderline (A) Adequate   Manual Differential(PHLEBS Do Not Order)   Result Value Ref Range    Segmented % 82 (H) 43 - 75 %    Bands % 2 0 - 8 %    Lymphocytes % 13 (L) 14 - 44 %    Monocytes % 3 (L) 4 - 12 %    Eosinophils, % 0 0 - 6 %    Basophils % 0 0 - 1 %    Absolute Neutrophils 6 59 1 85 - 7 62 Thousand/uL    Lymphocytes Absolute 1 02 0 60 - 4 47 Thousand/uL    Monocytes Absolute 0 24 0 00 - 1 22 Thousand/uL    Eosinophils Absolute 0 00 0 00 - 0 40 Thousand/uL    Basophils Absolute 0 00 0 00 - 0 10 Thousand/uL    Total Counted 100     Anisocytosis Present     Platelet Estimate Adequate Adequate         ASSESSMENT and PLAN:  Yen Bains was seen today for chronic kidney disease and consult      Diagnoses and all orders for this visit:    Anemia due to stage 3b chronic kidney disease  -     Ferritin; Standing  -     Iron Saturation %; Standing  -     Ferritin  -     Iron Saturation %    Stage 3 chronic kidney disease, unspecified whether stage 3a or 3b CKD  -     Ambulatory referral to Nephrology  -     Albumin; Standing  -     Calcium; Standing  -     CBC and differential; Standing  -     Comprehensive metabolic panel; Standing  -     Ferritin; Standing  -     Iron Saturation %; Standing  -     Phosphorus; Standing  -     Protein / creatinine ratio, urine; Standing  -     PTH, intact; Standing  -     Urinalysis with microscopic; Standing  -     Vitamin D 25 hydroxy; Standing  -     Albumin  -     Calcium  -     CBC and differential  -     Comprehensive metabolic panel  -     Ferritin  -     Iron Saturation %  -     Phosphorus  -     Protein / creatinine ratio, urine  -     PTH, intact  -     Urinalysis with microscopic  -     Vitamin D 25 hydroxy      1) Chronic Kidney Disease stage IIIb: Etiology likely renovascular disease, prior h/o nephrectomy, hypertensive nephrosclerosis, diabetic glomerulosclerosis as well as renovascular disease  Recent lab on March 5th 2021 revealed S Cr of 1 5 and at baseline  2) Hypertension due to CKD: Target BP < 170 mm Hg systolic  On Hydralazine, lisinopril and Imdur  3) CKD- Mineral bone disorder: Will order PTHi, Vitamin D, Calcium and phosphorus levels  4) Anemia due to CKD: Hb on target > 10  Target Hb is 9 5-11 g/dl  Recommended avoiding iron supplements given recent ferritin was > 3000    5) volume status: Euvolemic on torsemide  Recommended 1 5 L fluid intake  6) Breast cancer: s/p chemotherapy and in remission    7) Pulmonary Hypertension: stage II and managed by Cardiologist      I have spent 65 minutes with Patient and family today in which greater than 50% of this time was spent in counseling/coordination of care regarding Risks and benefits of tx options, Intructions for management, Patient and family education, Importance of tx compliance and Risk factor reductions  Maninder Dorman

## 2021-07-21 NOTE — TELEPHONE ENCOUNTER
7/21/2021 10:28 AM    Mr. Anita Mays  [address] [facility] [address]    Dear Anita Mays    My name is Kg Lamb,  Associate Care Coordinator for [address] [place] [address],[address], and I have been trying to reach you. The Associate Care Management (ACM) program is a free-of-charge, confidential service provided to our employees and their family members covered by the [facility]. I can help you with care transitions such as when you come home from the hospital, when help is needed to manage your disease, or when you need assistance coordinating services or appointments. As healthcare providers, we know that patients do better when they have close follow up with a primary care provider (PCP). I can help you find one that is convenient to you and covered by your insurance. I can also help you understand any after visit instructions, such as what symptoms to watch out for, or any new or changed medications. We can work together using your preferred communication -- telephone, email, [employer]. If you do not have a [employer] account, I can help you request access. Our program is designed to provide you with the opportunity to have a [employer] care manager partner with you for your healthcare needs. Due to not being able to reach you, I am closing out the current program, but will remain available to you should you have any questions. Please contact me at the below number if I can provide you with assistance for any of the above services.     Sincerely,    Cira Cee LPN  [facility] Care Coordinator  Associate Care Management  [address] [place] [address],[address]  [address]  [place] [phone]  F [phone]  [email or web address] IS COMING IN ON 3/7/19 TO SEE BK4

## 2024-12-02 NOTE — ASSESSMENT & PLAN NOTE
Tori Domínguez was admitted to [facility] ICU from ED via bed accompanied by .   Reason for hospitalization is shortness of breath.   Upon arrival, patient is stable. Patient has history significant for diabetes, HTN, CKD, PERI.  Patient oriented to bed, call light, room, and unit.  Patient provided with the following educational materials upon admission:safety, advanced directives, infection control, and pain.   Level of understanding patient verbalized understanding.   Admission orders received at this time.   Dr. Aguilar notified of patient arrival.   See Epic documentation for patient individualized nursing care plan.   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